# Patient Record
Sex: MALE | Race: WHITE | NOT HISPANIC OR LATINO
[De-identification: names, ages, dates, MRNs, and addresses within clinical notes are randomized per-mention and may not be internally consistent; named-entity substitution may affect disease eponyms.]

---

## 2017-01-03 ENCOUNTER — APPOINTMENT (OUTPATIENT)
Dept: HEMATOLOGY ONCOLOGY | Facility: CLINIC | Age: 67
End: 2017-01-03

## 2017-01-03 ENCOUNTER — OUTPATIENT (OUTPATIENT)
Dept: OUTPATIENT SERVICES | Facility: HOSPITAL | Age: 67
LOS: 1 days | Discharge: HOME | End: 2017-01-03

## 2017-01-03 VITALS
WEIGHT: 180 LBS | RESPIRATION RATE: 14 BRPM | BODY MASS INDEX: 22.38 KG/M2 | TEMPERATURE: 98.4 F | DIASTOLIC BLOOD PRESSURE: 85 MMHG | SYSTOLIC BLOOD PRESSURE: 144 MMHG | HEART RATE: 65 BPM | HEIGHT: 75 IN

## 2017-01-03 DIAGNOSIS — Z80.1 FAMILY HISTORY OF MALIGNANT NEOPLASM OF TRACHEA, BRONCHUS AND LUNG: ICD-10-CM

## 2017-01-03 DIAGNOSIS — Z78.9 OTHER SPECIFIED HEALTH STATUS: ICD-10-CM

## 2017-01-03 DIAGNOSIS — Z80.6 FAMILY HISTORY OF LEUKEMIA: ICD-10-CM

## 2017-01-03 LAB
BASOPHILS # BLD: 0.02 TH/MM3
BASOPHILS NFR BLD: 0.3 %
EOSINOPHIL # BLD: 0.05 TH/MM3
EOSINOPHIL NFR BLD: 0.7 %
ERYTHROCYTE [DISTWIDTH] IN BLOOD BY AUTOMATED COUNT: 12.4 %
GRANULOCYTES # BLD: 4.59 TH/MM3
GRANULOCYTES NFR BLD: 67.3 %
HCT VFR BLD AUTO: 42.1 %
HGB BLD-MCNC: 14.1 G/DL
IMM GRANULOCYTES # BLD: 0.01 TH/MM3
IMM GRANULOCYTES NFR BLD: 0.1 %
LYMPHOCYTES # BLD: 1.6 TH/MM3
LYMPHOCYTES NFR BLD: 23.5 %
MCH RBC QN AUTO: 30.7 PG
MCHC RBC AUTO-ENTMCNC: 33.5 G/DL
MCV RBC AUTO: 91.5 FL
MONOCYTES # BLD: 0.55 TH/MM3
MONOCYTES NFR BLD: 8.1 %
PLATELET # BLD: 234 TH/MM3
PMV BLD AUTO: 10.6 FL
RBC # BLD AUTO: 4.6 MIL/MM3
WBC # BLD: 6.82 TH/MM3

## 2017-01-04 LAB
ALBUMIN SERPL-MCNC: 4.4 G/DL
ALBUMIN/GLOB SERPL: 1.57
ALP SERPL-CCNC: 81 IU/L
ALT SERPL-CCNC: 15 IU/L
ANION GAP SERPL CALC-SCNC: 10 MEQ/L
AST SERPL-CCNC: 29 IU/L
BILIRUB SERPL-MCNC: 0.8 MG/DL
BUN SERPL-MCNC: 15 MG/DL
BUN/CREAT SERPL: 18.1 %
CALCIUM SERPL-MCNC: 9.7 MG/DL
CHLORIDE SERPL-SCNC: 103 MEQ/L
CO2 SERPL-SCNC: 27 MEQ/L
CREAT SERPL-MCNC: 0.83 MG/DL
GFR SERPL CREATININE-BSD FRML MDRD: 93
GLUCOSE SERPL-MCNC: 82 MG/DL
LACTATE DEHYDROGENASE (NORTH): 150 IU/L
POTASSIUM SERPL-SCNC: 4.6 MMOL/L
PROT SERPL-MCNC: 7.2 G/DL
SODIUM SERPL-SCNC: 140 MEQ/L

## 2017-06-27 DIAGNOSIS — C18.9 MALIGNANT NEOPLASM OF COLON, UNSPECIFIED: ICD-10-CM

## 2017-07-03 ENCOUNTER — OUTPATIENT (OUTPATIENT)
Dept: OUTPATIENT SERVICES | Facility: HOSPITAL | Age: 67
LOS: 1 days | Discharge: HOME | End: 2017-07-03

## 2017-07-03 ENCOUNTER — APPOINTMENT (OUTPATIENT)
Dept: HEMATOLOGY ONCOLOGY | Facility: CLINIC | Age: 67
End: 2017-07-03

## 2017-07-03 VITALS
SYSTOLIC BLOOD PRESSURE: 134 MMHG | WEIGHT: 172 LBS | RESPIRATION RATE: 14 BRPM | HEIGHT: 75 IN | BODY MASS INDEX: 21.39 KG/M2 | HEART RATE: 63 BPM | DIASTOLIC BLOOD PRESSURE: 83 MMHG | TEMPERATURE: 98.7 F

## 2017-07-03 DIAGNOSIS — C43.4 MALIGNANT MELANOMA OF SCALP AND NECK: ICD-10-CM

## 2017-07-05 DIAGNOSIS — C18.9 MALIGNANT NEOPLASM OF COLON, UNSPECIFIED: ICD-10-CM

## 2017-12-14 ENCOUNTER — OUTPATIENT (OUTPATIENT)
Dept: OUTPATIENT SERVICES | Facility: HOSPITAL | Age: 67
LOS: 1 days | Discharge: HOME | End: 2017-12-14

## 2017-12-14 DIAGNOSIS — S69.80XA OTHER SPECIFIED INJURIES OF UNSPECIFIED WRIST, HAND AND FINGER(S), INITIAL ENCOUNTER: ICD-10-CM

## 2018-01-08 ENCOUNTER — APPOINTMENT (OUTPATIENT)
Dept: HEMATOLOGY ONCOLOGY | Facility: CLINIC | Age: 68
End: 2018-01-08

## 2018-03-09 ENCOUNTER — OUTPATIENT (OUTPATIENT)
Dept: OUTPATIENT SERVICES | Facility: HOSPITAL | Age: 68
LOS: 1 days | Discharge: HOME | End: 2018-03-09

## 2018-03-09 DIAGNOSIS — M79.671 PAIN IN RIGHT FOOT: ICD-10-CM

## 2018-05-15 ENCOUNTER — APPOINTMENT (OUTPATIENT)
Dept: SURGERY | Facility: CLINIC | Age: 68
End: 2018-05-15
Payer: COMMERCIAL

## 2018-05-15 VITALS — WEIGHT: 170 LBS | BODY MASS INDEX: 21.14 KG/M2 | HEIGHT: 75 IN

## 2018-05-15 DIAGNOSIS — N50.819 TESTICULAR PAIN, UNSPECIFIED: ICD-10-CM

## 2018-05-15 PROCEDURE — 99214 OFFICE O/P EST MOD 30 MIN: CPT

## 2019-07-25 ENCOUNTER — OUTPATIENT (OUTPATIENT)
Dept: OUTPATIENT SERVICES | Facility: HOSPITAL | Age: 69
LOS: 1 days | Discharge: HOME | End: 2019-07-25

## 2019-07-25 DIAGNOSIS — R53.82 CHRONIC FATIGUE, UNSPECIFIED: ICD-10-CM

## 2019-07-25 DIAGNOSIS — E11.9 TYPE 2 DIABETES MELLITUS WITHOUT COMPLICATIONS: ICD-10-CM

## 2019-07-25 DIAGNOSIS — N40.1 BENIGN PROSTATIC HYPERPLASIA WITH LOWER URINARY TRACT SYMPTOMS: ICD-10-CM

## 2019-07-25 DIAGNOSIS — D51.9 VITAMIN B12 DEFICIENCY ANEMIA, UNSPECIFIED: ICD-10-CM

## 2019-07-25 DIAGNOSIS — R97.20 ELEVATED PROSTATE SPECIFIC ANTIGEN [PSA]: ICD-10-CM

## 2019-07-25 DIAGNOSIS — E03.9 HYPOTHYROIDISM, UNSPECIFIED: ICD-10-CM

## 2019-07-25 DIAGNOSIS — D64.9 ANEMIA, UNSPECIFIED: ICD-10-CM

## 2020-06-18 PROBLEM — N50.819 ORCHALGIA: Status: ACTIVE | Noted: 2018-05-15

## 2023-03-28 ENCOUNTER — APPOINTMENT (OUTPATIENT)
Dept: GASTROENTEROLOGY | Facility: CLINIC | Age: 73
End: 2023-03-28
Payer: MEDICARE

## 2023-03-28 ENCOUNTER — TRANSCRIPTION ENCOUNTER (OUTPATIENT)
Age: 73
End: 2023-03-28

## 2023-03-28 VITALS
DIASTOLIC BLOOD PRESSURE: 83 MMHG | BODY MASS INDEX: 21.39 KG/M2 | HEIGHT: 75 IN | OXYGEN SATURATION: 99 % | HEART RATE: 70 BPM | SYSTOLIC BLOOD PRESSURE: 145 MMHG | WEIGHT: 172 LBS

## 2023-03-28 DIAGNOSIS — R14.3 FLATULENCE: ICD-10-CM

## 2023-03-28 DIAGNOSIS — Z80.8 FAMILY HISTORY OF MALIGNANT NEOPLASM OF OTHER ORGANS OR SYSTEMS: ICD-10-CM

## 2023-03-28 DIAGNOSIS — Z87.898 PERSONAL HISTORY OF OTHER SPECIFIED CONDITIONS: ICD-10-CM

## 2023-03-28 DIAGNOSIS — Z80.6 FAMILY HISTORY OF LEUKEMIA: ICD-10-CM

## 2023-03-28 PROCEDURE — 99204 OFFICE O/P NEW MOD 45 MIN: CPT

## 2023-03-28 RX ORDER — ALFUZOSIN HYDROCHLORIDE 10 MG/1
10 TABLET, EXTENDED RELEASE ORAL
Refills: 0 | Status: ACTIVE | COMMUNITY

## 2023-03-28 RX ORDER — FINASTERIDE 1 MG/1
TABLET ORAL
Refills: 0 | Status: ACTIVE | COMMUNITY

## 2023-03-28 NOTE — HISTORY OF PRESENT ILLNESS
[FreeTextEntry1] : 73 yr old male with BPH and past H/O GERD here for further evaluation of irregular BMs for the past 10 months. He has been having about 6 BMs per day, comes out in small amounts like "balls".  He also described clear or brown mucous or watery in consistency during this time. He C/O a nontender mass protruding from his rectum that he has to push back in. . He has noted BRBPR on the toilet tissue about once a week for the past several months. He also C/O increased flatulence. He has a past H/O heartburn/GERD and had an EGD years ago (Dr. Elizabeth's office) and was negative for ulcer, H Pylori but he was treated for GERD. He denied frequent heartburn now. His last colonoscopy was done at Dr. Elizabeth's office 10-15 yrs ago; he is unsure if he had polyps but he was told to return in 10 yrs. He denied dysphagia, vomiting, weight loss, fevers, or melena. He denied a Family H/O colon, gastric, esophageal, liver, or pancreatic cancer.

## 2023-03-28 NOTE — REVIEW OF SYSTEMS
[Constipation] : constipation [Diarrhea] : diarrhea [Negative] : Heme/Lymph [Bleeding] : bleeding [Bloating (gassiness)] : bloating [Abdominal Pain] : no abdominal pain [Vomiting] : no vomiting [Heartburn] : no heartburn [Melena (black stool)] : no melena [Fecal Incontinence (soiling)] : no fecal incontinence

## 2023-03-28 NOTE — PHYSICAL EXAM
[Alert] : alert [Normal Voice/Communication] : normal voice/communication [No Acute Distress] : no acute distress [Well Developed] : well developed [Well Nourished] : well nourished [Sclera] : the sclera and conjunctiva were normal

## 2023-03-31 ENCOUNTER — LABORATORY RESULT (OUTPATIENT)
Age: 73
End: 2023-03-31

## 2023-04-03 LAB
ASTROVIRUS: DETECTED
CDIFF BY PCR: NOT DETECTED
CRP SERPL-MCNC: <3 MG/L
ERYTHROCYTE [SEDIMENTATION RATE] IN BLOOD BY WESTERGREN METHOD: 3 MM/HR
GI PCR PANEL: DETECTED
IGA SER QL IEP: 186 MG/DL
INR PPP: 1.08 RATIO
PT BLD: 12.4 SEC

## 2023-04-04 LAB
BACTERIA STL CULT: NORMAL
DEPRECATED O AND P PREP STL: NORMAL
TTG IGA SER IA-ACNC: <1.2 U/ML
TTG IGA SER-ACNC: NEGATIVE
TTG IGG SER IA-ACNC: 7.6 U/ML
TTG IGG SER IA-ACNC: ABNORMAL

## 2023-04-05 LAB
BAKER'S YEAST AB QL: 27.3 UNITS
BAKER'S YEAST IGA QL IA: 11.4 UNITS
BAKER'S YEAST IGA QN IA: NEGATIVE
BAKER'S YEAST IGG QN IA: ABNORMAL

## 2023-04-10 LAB
CALPROTECTIN FECAL: <16 UG/G
FAT STL QN: NORMAL
FAT STL QN: NORMAL
PANCREATIC ELASTASE, FECAL: 237 CD:794062645

## 2023-04-15 ENCOUNTER — NON-APPOINTMENT (OUTPATIENT)
Age: 73
End: 2023-04-15

## 2023-04-24 ENCOUNTER — APPOINTMENT (OUTPATIENT)
Dept: NEUROSURGERY | Facility: CLINIC | Age: 73
End: 2023-04-24
Payer: MEDICARE

## 2023-04-24 VITALS — BODY MASS INDEX: 21.14 KG/M2 | WEIGHT: 170 LBS | HEIGHT: 75 IN

## 2023-04-24 DIAGNOSIS — M25.551 PAIN IN RIGHT HIP: ICD-10-CM

## 2023-04-24 DIAGNOSIS — M25.552 PAIN IN RIGHT HIP: ICD-10-CM

## 2023-04-24 PROCEDURE — 99205 OFFICE O/P NEW HI 60 MIN: CPT

## 2023-04-25 NOTE — HISTORY OF PRESENT ILLNESS
[de-identified] : Mr. LASSITER presents today for evaluation of right leg pain.  He reports pain in the distal right anterior thigh into his knee and lateral shin.  At times he will experience numbness and paresthesias in the right foot.  He has minimal lower back pain.  He reports some stiffness in the lower back when he changes positions however his right leg pain will worsen with prolonged standing.  He also feels decreased range of motion in his hips.  He has trialed physical therapy with minimal relief.  No recent injections.\par \par We have reviewed an MRI of the lumbar spine from [facility] 3/23/23. He is found to have stenosis at L4/5. There are modic changes at L4, L5, and S1. \par \par PHYSICAL EXAM: \par Constitutional: Well appearing, no distress\par HEENT: Normocephalic Atraumatic\par Psychiatric: Alert and oriented to all spheres, normal mood\par Pulmonary: No respiratory distress\par Neurologic: \par CN II-XII grossly intact\par Palpation: no pain to palpation \par Strength: Full strength in all major muscle groups\par Sensation: Full sensation to light touch in all extremities\par Reflexes: \par  2+ patellar\par  2+ ankle jerk\par Restriction in ROM LS spine and bilateral hips. \par Signs:\par SLR negative\par Gait: steady, walking w/o assistance. \par \par

## 2023-04-28 ENCOUNTER — NON-APPOINTMENT (OUTPATIENT)
Age: 73
End: 2023-04-28

## 2023-04-28 ENCOUNTER — APPOINTMENT (OUTPATIENT)
Dept: GASTROENTEROLOGY | Facility: CLINIC | Age: 73
End: 2023-04-28
Payer: MEDICARE

## 2023-04-28 DIAGNOSIS — A09 INFECTIOUS GASTROENTERITIS AND COLITIS, UNSPECIFIED: ICD-10-CM

## 2023-04-28 PROCEDURE — 99442: CPT | Mod: 95

## 2023-04-28 RX ORDER — SODIUM SULFATE, POTASSIUM SULFATE AND MAGNESIUM SULFATE 1.6; 3.13; 17.5 G/177ML; G/177ML; G/177ML
17.5-3.13-1.6 SOLUTION ORAL
Qty: 2 | Refills: 0 | Status: DISCONTINUED | COMMUNITY
Start: 2023-03-28 | End: 2023-04-28

## 2023-04-28 NOTE — ASSESSMENT
[FreeTextEntry1] : 74 yo male with several months of diarrhea.  stool gi pcr revealed astrovirus, but that usually resolves in 4 to 5 days.  Will proceed with colonoscopy\par \par Other\par ANCA indeterminate:Asca weak positive will examine for IBD during colonoscopy\par \par TTG weak positive: will do HLA testing and deaminated gliadin antibody: will add egd to colonoscopy

## 2023-04-28 NOTE — HISTORY OF PRESENT ILLNESS
[FreeTextEntry1] : 74 yo male with several month history of diarrhea.\par The patient had blood  and stool tests  that revealed astrovirus and were equivocal for celiac disease. His diarrhea continues. He denies nausea or vomiting or black or bloody stool or fevers or chills.

## 2023-05-03 ENCOUNTER — APPOINTMENT (OUTPATIENT)
Age: 73
End: 2023-05-03
Payer: MEDICARE

## 2023-05-03 PROCEDURE — 99442: CPT | Mod: 95

## 2023-05-04 LAB
GLIADIN IGA SER QL: 5.2 UNITS
GLIADIN IGG SER QL: <5 UNITS
GLIADIN PEPTIDE IGA SER-ACNC: NEGATIVE
GLIADIN PEPTIDE IGG SER-ACNC: NEGATIVE

## 2023-05-12 LAB
ANNOTATION COMMENT IMP: NORMAL
HLA-DQ2: POSITIVE
HLA-DQ8 QL: NEGATIVE
REF LAB TEST METHOD: NORMAL

## 2023-05-17 NOTE — HISTORY OF PRESENT ILLNESS
[Home] : at home, [unfilled] , at the time of the visit. [Other Location: e.g. Home (Enter Location, City,State)___] : at [unfilled] [Verbal consent obtained from patient] : the patient, [unfilled] [FreeTextEntry4] : Magali Ramos

## 2023-06-06 ENCOUNTER — APPOINTMENT (OUTPATIENT)
Dept: SURGERY | Facility: CLINIC | Age: 73
End: 2023-06-06
Payer: MEDICARE

## 2023-06-06 VITALS — BODY MASS INDEX: 21.14 KG/M2 | HEIGHT: 75 IN | WEIGHT: 170 LBS

## 2023-06-06 DIAGNOSIS — S76.219A STRAIN OF ADDUCTOR MUSCLE, FASCIA AND TENDON OF UNSPECIFIED THIGH, INITIAL ENCOUNTER: ICD-10-CM

## 2023-06-06 DIAGNOSIS — S76.212S STRAIN OF ADDUCTOR MUSCLE, FASCIA AND TENDON OF LEFT THIGH, SEQUELA: ICD-10-CM

## 2023-06-06 PROCEDURE — 99203 OFFICE O/P NEW LOW 30 MIN: CPT

## 2023-06-06 NOTE — CONSULT LETTER
[Dear  ___] : Dear  [unfilled], [Courtesy Letter:] : I had the pleasure of seeing your patient, [unfilled], in my office today. [Please see my note below.] : Please see my note below. [Consult Closing:] : Thank you very much for allowing me to participate in the care of this patient.  If you have any questions, please do not hesitate to contact me. [FreeTextEntry3] : Respectfully,\par \par Alfred Silverio M.D., FACS\par  [DrYandel  ___] : Dr. PORTER

## 2023-06-06 NOTE — ASSESSMENT
[FreeTextEntry1] : Jose is a pleasant 73-year-old  with a past medical history significant for BPH, lumbar spinal stenosis, celiac disease and IBS, arthritis, left neck melanoma status post wide excision and sentinel lymph node biopsy in 2014 along with the repair of his left inguinal hernia with mesh by me in 2014 and his right inguinal hernia repair with mesh by me in 2005 now returning to the office with very intermittent discomfort in the groin area causing him concern.  He has been having worsening gastrointestinal issues and was planning on a colonoscopy and his gastroenterologist thought it would be prudent to rule out a hernia prior to proceeding with his colonoscopy.\par \par Physical examination demonstrates well-healed scars in both groins with no evidence of hernia recurrence or delayed complications.  Both testicles are normal.  His umbilical examination is unremarkable.  His abdomen is soft and flat.  There is no evidence of a new ventral, epigastric or spigelian hernia.  His current BMI is 21.\par \par Jose was counseled and reassured.  I believe his symptoms are related to either intermittent mild groin muscle strains versus his degenerative lower back disease and spinal stenosis.  No surgical intervention is warranted, of course.  He is cleared to proceed with colonoscopy from my standpoint.  I recommended he return to me in the future if any hernia related issues arise, of course.

## 2023-06-06 NOTE — PHYSICAL EXAM
[JVD] : no jugular venous distention  [Normal Breath Sounds] : Normal breath sounds [No Rash or Lesion] : No rash or lesion [Alert] : alert [Calm] : calm [de-identified] : healthy [de-identified] : normal [de-identified] : soft and flat abdomen [de-identified] : normal testicles [de-identified] : no evidence of hernia recurrence

## 2023-07-10 ENCOUNTER — APPOINTMENT (OUTPATIENT)
Dept: NEUROSURGERY | Facility: CLINIC | Age: 73
End: 2023-07-10
Payer: MEDICARE

## 2023-07-10 VITALS — HEIGHT: 75 IN | BODY MASS INDEX: 20.51 KG/M2 | WEIGHT: 165 LBS

## 2023-07-10 DIAGNOSIS — M48.9 SPONDYLOPATHY, UNSPECIFIED: ICD-10-CM

## 2023-07-10 DIAGNOSIS — K58.9 IRRITABLE BOWEL SYNDROME W/OUT DIARRHEA: ICD-10-CM

## 2023-07-10 PROCEDURE — 99214 OFFICE O/P EST MOD 30 MIN: CPT

## 2023-07-10 NOTE — ASSESSMENT
[FreeTextEntry1] : This is a 72 yo M who presents for neurosurgical revisit. He was initially seen with regards to lumbar radiculopathy. Such complaints have improved with the use of gabapentin and he wishes to monitor his continued response through conservative means.\par \par With regards to his new thoracic findings of a possible metastatic disease an updated MR T spine w w/o contrast is warranted for my review. The study is to be completed tomorrow and he will revisit with us on Thursday to review the films and to devise a continued treatment plan moving forward.\par \par All questions and concerns have been addressed and he is largely agreeable to proceed.\par \par Mary Niño PA-C \par Isabel Dalton MD\par

## 2023-07-10 NOTE — HISTORY OF PRESENT ILLNESS
[FreeTextEntry1] : Mr. LASSITER is a 74 yo M who presents for a neurosurgical revisit. He notes persistent low back pain along with radiating pain into the right lower extremity. He remains under the care of Dr. Pickett of [facility] and was issued gabapentin at 100mg PO TID. The regimen reduced the intensity of his pain 50% and he remains content with his response to medication. He has decided to hold off on a LSNRI considering his relief through medication use.\par \par Since his last encounter he has begun to experience isolated thoracic pain, correlating with his left posterior shoulder. He had mentioned the concern to Dr. Pickett and a MR T spine was ordered. The study was completed at [facility] which revealed possibly bony lesions concerning for metastatic disease. The imaging quality is very poor and we are unable to review any axial anatomy.\par \par An updated study is absolutely warranted for my review to assess the underlying pathology and he is agreeable to repeat the MRI.\par \par PHYSICAL EXAM: \par Constitutional: Well appearing, no distress\par HEENT: Normocephalic Atraumatic\par Psychiatric: Alert and oriented to all spheres, normal mood\par Pulmonary: No respiratory distress\par \par Neurologic: \par CN II-XII grossly intact\par Palpation: (+) mid-thoracic tenderness to palpation. \par Strength: Full strength in all major muscle groups\par Sensation: Full sensation to light touch in all extremities\par Reflexes: \par  2+ patellar\par  2+ ankle jerk\par \par ROM limited due to pain\par \par Signs:\par SLR negative\par \par Gait: steady, walking w/o assistance.\par

## 2023-07-11 ENCOUNTER — OUTPATIENT (OUTPATIENT)
Dept: OUTPATIENT SERVICES | Facility: HOSPITAL | Age: 73
LOS: 1 days | End: 2023-07-11
Payer: MEDICARE

## 2023-07-11 DIAGNOSIS — M48.9 SPONDYLOPATHY, UNSPECIFIED: ICD-10-CM

## 2023-07-11 DIAGNOSIS — Z00.8 ENCOUNTER FOR OTHER GENERAL EXAMINATION: ICD-10-CM

## 2023-07-11 PROCEDURE — 72157 MRI CHEST SPINE W/O & W/DYE: CPT

## 2023-07-11 PROCEDURE — A9579: CPT

## 2023-07-11 PROCEDURE — 72157 MRI CHEST SPINE W/O & W/DYE: CPT | Mod: 26,MH

## 2023-07-12 DIAGNOSIS — M48.9 SPONDYLOPATHY, UNSPECIFIED: ICD-10-CM

## 2023-07-13 ENCOUNTER — APPOINTMENT (OUTPATIENT)
Dept: NEUROSURGERY | Facility: CLINIC | Age: 73
End: 2023-07-13
Payer: MEDICARE

## 2023-07-13 VITALS — BODY MASS INDEX: 20.51 KG/M2 | WEIGHT: 165 LBS | HEIGHT: 75 IN

## 2023-07-13 PROCEDURE — 99214 OFFICE O/P EST MOD 30 MIN: CPT

## 2023-07-14 NOTE — HISTORY OF PRESENT ILLNESS
[FreeTextEntry1] : Mr. Tran presents today in follow up to review thoracic MRI, he presented with isolated thoracic pain, though the pain is not constant, and the pain is 1 out of 10.  He does experience the pain when he lays supine, but then the pain dissipates. \par  In addition to the isolated thoracic pain, he also had low back pain with right radicular lateral leg pain, associated with numbness in the toe. Reports the gabapentin 100mg TID, as been helping, though he ran out of the medication on Sunday, and is pending a refill from Dr. Cool. \par Symptoms is aggravated with prolong standing; Alleviated when he sits.\par \par EMG of the lower extremities showed right L5-S1 radiculopathy with denervation in the right biceps femoris and right L5-S1 paraspinal muscles.\par \par MRI Lumbar at [facility] showed L4-5 stenosis; Modic changes at L4, L5, S1.\par \par MRI Thoracic spine w/w/o contrast done at [facility] showed multiple rounded lesions throughout vertebral bodies, largest measuring the largest measuring up to 1.5 cm within T3 and 1.4 cm within T8. Diagnostic considerations include lipid poor hemangiomas, metastatic disease and multiple myeloma. Consider nuclear medicine bone scan for further evaluation

## 2023-07-18 ENCOUNTER — OUTPATIENT (OUTPATIENT)
Dept: OUTPATIENT SERVICES | Facility: HOSPITAL | Age: 73
LOS: 1 days | End: 2023-07-18
Payer: MEDICARE

## 2023-07-18 ENCOUNTER — RESULT REVIEW (OUTPATIENT)
Age: 73
End: 2023-07-18

## 2023-07-18 DIAGNOSIS — M48.9 SPONDYLOPATHY, UNSPECIFIED: ICD-10-CM

## 2023-07-18 PROCEDURE — 78306 BONE IMAGING WHOLE BODY: CPT | Mod: 26,MH

## 2023-07-18 PROCEDURE — 78803 RP LOCLZJ TUM SPECT 1 AREA: CPT

## 2023-07-18 PROCEDURE — 78803 RP LOCLZJ TUM SPECT 1 AREA: CPT | Mod: 26,MH

## 2023-07-18 PROCEDURE — A9503: CPT

## 2023-07-18 PROCEDURE — 78306 BONE IMAGING WHOLE BODY: CPT

## 2023-07-19 DIAGNOSIS — M48.9 SPONDYLOPATHY, UNSPECIFIED: ICD-10-CM

## 2023-07-26 ENCOUNTER — APPOINTMENT (OUTPATIENT)
Dept: NEUROSURGERY | Facility: CLINIC | Age: 73
End: 2023-07-26
Payer: MEDICARE

## 2023-07-26 VITALS — BODY MASS INDEX: 20.51 KG/M2 | WEIGHT: 165 LBS | HEIGHT: 75 IN

## 2023-07-26 DIAGNOSIS — Z09 ENCOUNTER FOR FOLLOW-UP EXAMINATION AFTER COMPLETED TREATMENT FOR CONDITIONS OTHER THAN MALIGNANT NEOPLASM: ICD-10-CM

## 2023-07-26 DIAGNOSIS — M48.061 SPINAL STENOSIS, LUMBAR REGION WITHOUT NEUROGENIC CLAUDICATION: ICD-10-CM

## 2023-07-26 PROCEDURE — 99213 OFFICE O/P EST LOW 20 MIN: CPT

## 2023-07-28 NOTE — REVIEW OF SYSTEMS
[As Noted in HPI] : as noted in HPI [Negative] : Neurological [Difficulty Walking] : no difficulty walking [Frequent Falls] : not falling

## 2023-07-28 NOTE — HISTORY OF PRESENT ILLNESS
[FreeTextEntry1] : CHIEF COMPLAINT: Mr. Tran, Presents today still with Mild Thoracic pain. He is still experiencing the pain when laying in a supine position. He is experiencing slight numbness in both toes,  And denies having any weakness or tingling at this time.\par \par \par \par CONSERVATIVE MANAGEMENT TRIALED:  \par \par  \par \par DIAGNOSTIC TESTING: \par \par

## 2023-11-07 ENCOUNTER — APPOINTMENT (OUTPATIENT)
Dept: GASTROENTEROLOGY | Facility: CLINIC | Age: 73
End: 2023-11-07
Payer: MEDICARE

## 2023-11-07 VITALS
DIASTOLIC BLOOD PRESSURE: 78 MMHG | SYSTOLIC BLOOD PRESSURE: 130 MMHG | OXYGEN SATURATION: 99 % | BODY MASS INDEX: 20.39 KG/M2 | HEIGHT: 75 IN | HEART RATE: 89 BPM | WEIGHT: 164 LBS

## 2023-11-07 DIAGNOSIS — K64.9 UNSPECIFIED HEMORRHOIDS: ICD-10-CM

## 2023-11-07 LAB
HCT VFR BLD CALC: 40.6 %
HGB BLD-MCNC: 13 G/DL
MCHC RBC-ENTMCNC: 30.2 PG
MCHC RBC-ENTMCNC: 32 G/DL
MCV RBC AUTO: 94.4 FL
PLATELET # BLD AUTO: 241 K/UL
PMV BLD: 11.4 FL
RBC # BLD: 4.3 M/UL
RBC # FLD: 13.3 %
WBC # FLD AUTO: 5.89 K/UL

## 2023-11-07 PROCEDURE — 99214 OFFICE O/P EST MOD 30 MIN: CPT

## 2023-11-07 RX ORDER — DOCUSATE SODIUM 100 MG/1
100 CAPSULE ORAL TWICE DAILY
Qty: 60 | Refills: 6 | Status: ACTIVE | COMMUNITY
Start: 2023-11-07 | End: 1900-01-01

## 2023-11-07 RX ORDER — PSYLLIUM SEED
48.57 PACKET (EA) ORAL DAILY
Qty: 1 | Refills: 5 | Status: ACTIVE | COMMUNITY
Start: 2023-11-07 | End: 1900-01-01

## 2023-11-07 RX ORDER — POLYETHYLENE GLYCOL 3350 AND ELECTROLYTES WITH LEMON FLAVOR 236; 22.74; 6.74; 5.86; 2.97 G/4L; G/4L; G/4L; G/4L; G/4L
236 POWDER, FOR SOLUTION ORAL
Qty: 1 | Refills: 1 | Status: ACTIVE | COMMUNITY
Start: 2023-11-07 | End: 1900-01-01

## 2023-11-07 RX ORDER — FOLIC ACID 1 MG/1
1 TABLET ORAL
Refills: 0 | Status: ACTIVE | COMMUNITY

## 2023-11-07 RX ORDER — METHOTREXATE 25 MG/ML
1 INJECTION, SOLUTION INTRA-ARTERIAL; INTRAMUSCULAR; INTRATHECAL; INTRAVENOUS
Refills: 0 | Status: ACTIVE | COMMUNITY

## 2023-11-08 LAB
INR PPP: 1.15 RATIO
PT BLD: 13.1 SEC

## 2023-11-14 ENCOUNTER — APPOINTMENT (OUTPATIENT)
Dept: SURGERY | Facility: CLINIC | Age: 73
End: 2023-11-14
Payer: MEDICARE

## 2023-11-14 VITALS
OXYGEN SATURATION: 98 % | DIASTOLIC BLOOD PRESSURE: 72 MMHG | TEMPERATURE: 97 F | HEIGHT: 75 IN | SYSTOLIC BLOOD PRESSURE: 126 MMHG | WEIGHT: 165 LBS | BODY MASS INDEX: 20.51 KG/M2 | HEART RATE: 78 BPM

## 2023-11-14 DIAGNOSIS — K62.89 OTHER SPECIFIED DISEASES OF ANUS AND RECTUM: ICD-10-CM

## 2023-11-14 PROCEDURE — 99203 OFFICE O/P NEW LOW 30 MIN: CPT | Mod: 25

## 2023-11-14 PROCEDURE — 46600 DIAGNOSTIC ANOSCOPY SPX: CPT

## 2023-11-21 ENCOUNTER — OUTPATIENT (OUTPATIENT)
Dept: OUTPATIENT SERVICES | Facility: HOSPITAL | Age: 73
LOS: 1 days | End: 2023-11-21
Payer: MEDICARE

## 2023-11-21 DIAGNOSIS — R07.9 CHEST PAIN, UNSPECIFIED: ICD-10-CM

## 2023-11-21 PROCEDURE — 71046 X-RAY EXAM CHEST 2 VIEWS: CPT | Mod: 26

## 2023-11-21 PROCEDURE — 71046 X-RAY EXAM CHEST 2 VIEWS: CPT

## 2023-11-22 DIAGNOSIS — R07.9 CHEST PAIN, UNSPECIFIED: ICD-10-CM

## 2023-11-29 PROBLEM — K62.89 PROCTITIS: Status: ACTIVE | Noted: 2023-11-20

## 2023-11-30 ENCOUNTER — RESULT REVIEW (OUTPATIENT)
Age: 73
End: 2023-11-30

## 2023-11-30 ENCOUNTER — TRANSCRIPTION ENCOUNTER (OUTPATIENT)
Age: 73
End: 2023-11-30

## 2023-11-30 ENCOUNTER — OUTPATIENT (OUTPATIENT)
Dept: OUTPATIENT SERVICES | Facility: HOSPITAL | Age: 73
LOS: 1 days | Discharge: HOME | End: 2023-11-30
Payer: MEDICARE

## 2023-11-30 VITALS
DIASTOLIC BLOOD PRESSURE: 82 MMHG | SYSTOLIC BLOOD PRESSURE: 132 MMHG | HEART RATE: 94 BPM | TEMPERATURE: 98 F | HEIGHT: 75 IN | WEIGHT: 154.98 LBS

## 2023-11-30 VITALS
OXYGEN SATURATION: 100 % | SYSTOLIC BLOOD PRESSURE: 147 MMHG | RESPIRATION RATE: 18 BRPM | HEART RATE: 74 BPM | DIASTOLIC BLOOD PRESSURE: 74 MMHG

## 2023-11-30 DIAGNOSIS — K64.9 UNSPECIFIED HEMORRHOIDS: ICD-10-CM

## 2023-11-30 DIAGNOSIS — Z98.890 OTHER SPECIFIED POSTPROCEDURAL STATES: Chronic | ICD-10-CM

## 2023-11-30 DIAGNOSIS — R19.5 OTHER FECAL ABNORMALITIES: ICD-10-CM

## 2023-11-30 PROCEDURE — 88305 TISSUE EXAM BY PATHOLOGIST: CPT | Mod: 26

## 2023-11-30 PROCEDURE — 88312 SPECIAL STAINS GROUP 1: CPT

## 2023-11-30 PROCEDURE — 88312 SPECIAL STAINS GROUP 1: CPT | Mod: 26

## 2023-11-30 PROCEDURE — 43239 EGD BIOPSY SINGLE/MULTIPLE: CPT | Mod: XS

## 2023-11-30 PROCEDURE — 45380 COLONOSCOPY AND BIOPSY: CPT

## 2023-11-30 PROCEDURE — 88305 TISSUE EXAM BY PATHOLOGIST: CPT

## 2023-11-30 NOTE — ASU PATIENT PROFILE, ADULT - FALL HARM RISK - HARM RISK INTERVENTIONS

## 2023-11-30 NOTE — ASU PATIENT PROFILE, ADULT - TEACHING/LEARNING RELIGIOUS CONSIDERATIONS
Pt presents to the er with complaints of a headache. Pt states that he has been having a headache for 4 months. Pt states that the pain starts at the base of head and goes up and into the forehead and behind the right eye. Pt states that his pain is 10/10. Pt states that he has been to a few different hospitals and have gotten some medications from them. Pt states that the pain comes and goes.    none

## 2023-11-30 NOTE — ASU DISCHARGE PLAN (ADULT/PEDIATRIC) - CALL YOUR DOCTOR IF YOU HAVE ANY OF THE FOLLOWING:
Bleeding that does not stop/Pain not relieved by Medications/Fever greater than (need to indicate Fahrenheit or Celsius)/Numbness, tingling, color or temperature change to extremity/Nausea and vomiting that does not stop/Excessive diarrhea/Inability to tolerate liquids or foods
[facility]

## 2023-11-30 NOTE — CHART NOTE - NSCHARTNOTEFT_GEN_A_CORE
PACU ANESTHESIA ADMISSION NOTE      Procedure:   Post op diagnosis:      ____  Intubated  TV:______       Rate: ______      FiO2: ______    _x___  Patent Airway    _x___  Full return of protective reflexes    _x___  Full recovery from anesthesia / back to baseline status    Vitals:  T(C): 36.2 (11-30-23 @ 09:48), Max: 36.7 (11-30-23 @ 07:35)  HR: 74 (11-30-23 @ 10:13) (70 - 94)  BP: 147/74 (11-30-23 @ 10:13) (122/58 - 156/61)  RR: 18 (11-30-23 @ 10:13) (18 - 18)  SpO2: 100% (11-30-23 @ 10:13) (100% - 100%)    Mental Status:  _x___ Awake   _____ Alert   _____ Drowsy   _____ Sedated    Nausea/Vomiting:  _x___  NO       ______Yes,   See Post - Op Orders         Pain Scale (0-10):  __0___    Treatment: _x___ None    ____ See Post - Op/PCA Orders    Post - Operative Fluids:   __x__ Oral   ____ See Post - Op Orders    Plan: Discharge:   _x___Home       _____Floor     _____Critical Care    _____  Other:_________________    Comments:  No anesthesia issues or complications noted.  Discharge when criteria met.

## 2023-11-30 NOTE — PRE-ANESTHESIA EVALUATION ADULT - NSANTHDIETYNSD_GEN_ALL_CORE
Yes Consent: The patient's consent was obtained including but not limited to risks of crusting, scabbing, blistering, scarring, darker or lighter pigmentary change, recurrence, incomplete removal and infection. Detail Level: Simple Render Note In Bullet Format When Appropriate: No Post-Care Instructions: I reviewed with the patient in detail post-care instructions. Patient is to wear sunprotection, and avoid picking at any of the treated lesions. Pt may apply Vaseline to crusted or scabbing areas. Number Of Freeze-Thaw Cycles: 3 freeze-thaw cycles Duration Of Freeze Thaw-Cycle (Seconds): 3 Render Post-Care Instructions In Note?: yes Aperture Size (Optional): C

## 2023-11-30 NOTE — ASU DISCHARGE PLAN (ADULT/PEDIATRIC) - CARE PROVIDER_API CALL
Pk Tran  Gastroenterology  [address] [place] [address]  Phone: [phone]  Fax: [phone]  Established Patient  Follow Up Time: Routine

## 2023-11-30 NOTE — ASU PATIENT PROFILE, ADULT - NSICDXPASTMEDICALHX_GEN_ALL_CORE_FT
PAST MEDICAL HISTORY:  History of BPH     History of hay fever     Rheumatoid arthritis     Skin cancer (melanoma)     Spinal stenosis

## 2023-12-01 ENCOUNTER — OUTPATIENT (OUTPATIENT)
Dept: OUTPATIENT SERVICES | Facility: HOSPITAL | Age: 73
LOS: 1 days | End: 2023-12-01
Payer: MEDICARE

## 2023-12-01 DIAGNOSIS — Z98.890 OTHER SPECIFIED POSTPROCEDURAL STATES: Chronic | ICD-10-CM

## 2023-12-01 DIAGNOSIS — Z00.00 ENCOUNTER FOR GENERAL ADULT MEDICAL EXAMINATION WITHOUT ABNORMAL FINDINGS: ICD-10-CM

## 2023-12-01 PROBLEM — Z87.438 PERSONAL HISTORY OF OTHER DISEASES OF MALE GENITAL ORGANS: Chronic | Status: ACTIVE | Noted: 2023-11-30

## 2023-12-01 PROBLEM — M48.00 SPINAL STENOSIS, SITE UNSPECIFIED: Chronic | Status: ACTIVE | Noted: 2023-11-30

## 2023-12-01 PROBLEM — M06.9 RHEUMATOID ARTHRITIS, UNSPECIFIED: Chronic | Status: ACTIVE | Noted: 2023-11-30

## 2023-12-01 PROBLEM — C43.9 MALIGNANT MELANOMA OF SKIN, UNSPECIFIED: Chronic | Status: ACTIVE | Noted: 2023-11-30

## 2023-12-01 PROBLEM — Z87.09 PERSONAL HISTORY OF OTHER DISEASES OF THE RESPIRATORY SYSTEM: Chronic | Status: ACTIVE | Noted: 2023-11-30

## 2023-12-01 LAB
SURGICAL PATHOLOGY STUDY: SIGNIFICANT CHANGE UP

## 2023-12-01 PROCEDURE — 80053 COMPREHEN METABOLIC PANEL: CPT

## 2023-12-02 ENCOUNTER — RESULT REVIEW (OUTPATIENT)
Age: 73
End: 2023-12-02

## 2023-12-02 ENCOUNTER — OUTPATIENT (OUTPATIENT)
Dept: OUTPATIENT SERVICES | Facility: HOSPITAL | Age: 73
LOS: 1 days | End: 2023-12-02
Payer: MEDICARE

## 2023-12-02 DIAGNOSIS — Z98.890 OTHER SPECIFIED POSTPROCEDURAL STATES: Chronic | ICD-10-CM

## 2023-12-02 DIAGNOSIS — K62.89 OTHER SPECIFIED DISEASES OF ANUS AND RECTUM: ICD-10-CM

## 2023-12-02 LAB
ALBUMIN SERPL ELPH-MCNC: 4.4 G/DL
ALP BLD-CCNC: 79 U/L
ALT SERPL-CCNC: 9 U/L
ANION GAP SERPL CALC-SCNC: 12 MMOL/L
AST SERPL-CCNC: 38 U/L
BILIRUB SERPL-MCNC: 0.7 MG/DL
BUN SERPL-MCNC: 14 MG/DL
CALCIUM SERPL-MCNC: 9.9 MG/DL
CHLORIDE SERPL-SCNC: 104 MMOL/L
CO2 SERPL-SCNC: 28 MMOL/L
CREAT SERPL-MCNC: 0.82 MG/DL
EGFR: 94 ML/MIN/1.73M2
GLUCOSE SERPL-MCNC: 98 MG/DL
POTASSIUM SERPL-SCNC: 4.2 MMOL/L
PROT SERPL-MCNC: 6.9 G/DL
SODIUM SERPL-SCNC: 144 MMOL/L

## 2023-12-02 PROCEDURE — 74177 CT ABD & PELVIS W/CONTRAST: CPT | Mod: 26,MH

## 2023-12-02 PROCEDURE — 74177 CT ABD & PELVIS W/CONTRAST: CPT

## 2023-12-03 DIAGNOSIS — K62.89 OTHER SPECIFIED DISEASES OF ANUS AND RECTUM: ICD-10-CM

## 2023-12-05 ENCOUNTER — APPOINTMENT (OUTPATIENT)
Dept: GASTROENTEROLOGY | Facility: CLINIC | Age: 73
End: 2023-12-05
Payer: MEDICARE

## 2023-12-05 ENCOUNTER — OUTPATIENT (OUTPATIENT)
Dept: OUTPATIENT SERVICES | Facility: HOSPITAL | Age: 73
LOS: 1 days | End: 2023-12-05
Payer: MEDICARE

## 2023-12-05 VITALS
OXYGEN SATURATION: 98 % | TEMPERATURE: 97 F | HEIGHT: 75 IN | HEART RATE: 69 BPM | DIASTOLIC BLOOD PRESSURE: 74 MMHG | BODY MASS INDEX: 19.52 KG/M2 | SYSTOLIC BLOOD PRESSURE: 150 MMHG | WEIGHT: 157 LBS

## 2023-12-05 DIAGNOSIS — Z00.00 ENCOUNTER FOR GENERAL ADULT MEDICAL EXAMINATION WITHOUT ABNORMAL FINDINGS: ICD-10-CM

## 2023-12-05 DIAGNOSIS — R19.7 DIARRHEA, UNSPECIFIED: ICD-10-CM

## 2023-12-05 DIAGNOSIS — K29.80 DUODENITIS WITHOUT BLEEDING: ICD-10-CM

## 2023-12-05 DIAGNOSIS — K59.00 CONSTIPATION, UNSPECIFIED: ICD-10-CM

## 2023-12-05 DIAGNOSIS — Z98.890 OTHER SPECIFIED POSTPROCEDURAL STATES: Chronic | ICD-10-CM

## 2023-12-05 DIAGNOSIS — K62.89 OTHER SPECIFIED DISEASES OF ANUS AND RECTUM: ICD-10-CM

## 2023-12-05 DIAGNOSIS — K62.5 HEMORRHAGE OF ANUS AND RECTUM: ICD-10-CM

## 2023-12-05 DIAGNOSIS — D12.8 BENIGN NEOPLASM OF RECTUM: ICD-10-CM

## 2023-12-05 PROCEDURE — 99214 OFFICE O/P EST MOD 30 MIN: CPT | Mod: GC

## 2023-12-05 PROCEDURE — 99204 OFFICE O/P NEW MOD 45 MIN: CPT

## 2023-12-06 PROBLEM — K62.5 RECTAL BLEEDING: Status: ACTIVE | Noted: 2023-03-28

## 2023-12-06 PROBLEM — R19.7 DIARRHEA: Status: ACTIVE | Noted: 2023-11-07

## 2023-12-06 PROBLEM — K59.00 CONSTIPATION: Status: ACTIVE | Noted: 2023-11-07

## 2023-12-07 DIAGNOSIS — K62.5 HEMORRHAGE OF ANUS AND RECTUM: ICD-10-CM

## 2023-12-07 DIAGNOSIS — K62.89 OTHER SPECIFIED DISEASES OF ANUS AND RECTUM: ICD-10-CM

## 2023-12-07 DIAGNOSIS — R19.7 DIARRHEA, UNSPECIFIED: ICD-10-CM

## 2023-12-07 DIAGNOSIS — K59.00 CONSTIPATION, UNSPECIFIED: ICD-10-CM

## 2023-12-12 ENCOUNTER — APPOINTMENT (OUTPATIENT)
Dept: GASTROENTEROLOGY | Facility: CLINIC | Age: 73
End: 2023-12-12
Payer: MEDICARE

## 2023-12-12 VITALS — BODY MASS INDEX: 19.52 KG/M2 | WEIGHT: 157 LBS | HEIGHT: 75 IN

## 2023-12-12 DIAGNOSIS — K62.89 OTHER SPECIFIED DISEASES OF ANUS AND RECTUM: ICD-10-CM

## 2023-12-12 DIAGNOSIS — R63.4 ABNORMAL WEIGHT LOSS: ICD-10-CM

## 2023-12-12 DIAGNOSIS — R19.5 OTHER FECAL ABNORMALITIES: ICD-10-CM

## 2023-12-12 PROCEDURE — 99214 OFFICE O/P EST MOD 30 MIN: CPT

## 2023-12-12 RX ORDER — GABAPENTIN 100 MG/1
100 CAPSULE ORAL
Refills: 0 | Status: DISCONTINUED | COMMUNITY
End: 2023-12-12

## 2023-12-12 RX ORDER — GABAPENTIN 400 MG/1
400 CAPSULE ORAL
Refills: 0 | Status: ACTIVE | COMMUNITY

## 2023-12-15 ENCOUNTER — INPATIENT (INPATIENT)
Facility: HOSPITAL | Age: 73
LOS: 2 days | Discharge: HOME HEALTH | DRG: 920 | End: 2023-12-18
Attending: INTERNAL MEDICINE | Admitting: STUDENT IN AN ORGANIZED HEALTH CARE EDUCATION/TRAINING PROGRAM
Payer: MEDICARE

## 2023-12-15 ENCOUNTER — RESULT REVIEW (OUTPATIENT)
Age: 73
End: 2023-12-15

## 2023-12-15 ENCOUNTER — TRANSCRIPTION ENCOUNTER (OUTPATIENT)
Age: 73
End: 2023-12-15

## 2023-12-15 VITALS
HEIGHT: 75 IN | RESPIRATION RATE: 18 BRPM | DIASTOLIC BLOOD PRESSURE: 85 MMHG | TEMPERATURE: 100 F | WEIGHT: 154.98 LBS | SYSTOLIC BLOOD PRESSURE: 142 MMHG | HEART RATE: 73 BPM

## 2023-12-15 DIAGNOSIS — K62.5 HEMORRHAGE OF ANUS AND RECTUM: ICD-10-CM

## 2023-12-15 DIAGNOSIS — Z85.820 PERSONAL HISTORY OF MALIGNANT MELANOMA OF SKIN: ICD-10-CM

## 2023-12-15 DIAGNOSIS — D37.5 NEOPLASM OF UNCERTAIN BEHAVIOR OF RECTUM: ICD-10-CM

## 2023-12-15 DIAGNOSIS — Z98.890 OTHER SPECIFIED POSTPROCEDURAL STATES: Chronic | ICD-10-CM

## 2023-12-15 DIAGNOSIS — M06.9 RHEUMATOID ARTHRITIS, UNSPECIFIED: ICD-10-CM

## 2023-12-15 DIAGNOSIS — K91.61 INTRAOPERATIVE HEMORRHAGE AND HEMATOMA OF A DIGESTIVE SYSTEM ORGAN OR STRUCTURE COMPLICATING A DIGESTIVE SYSTEM PROCEDURE: ICD-10-CM

## 2023-12-15 DIAGNOSIS — E87.6 HYPOKALEMIA: ICD-10-CM

## 2023-12-15 DIAGNOSIS — D64.9 ANEMIA, UNSPECIFIED: ICD-10-CM

## 2023-12-15 DIAGNOSIS — N40.0 BENIGN PROSTATIC HYPERPLASIA WITHOUT LOWER URINARY TRACT SYMPTOMS: ICD-10-CM

## 2023-12-15 DIAGNOSIS — C20 MALIGNANT NEOPLASM OF RECTUM: ICD-10-CM

## 2023-12-15 DIAGNOSIS — M48.00 SPINAL STENOSIS, SITE UNSPECIFIED: ICD-10-CM

## 2023-12-15 LAB
ALBUMIN SERPL ELPH-MCNC: 3.4 G/DL — LOW (ref 3.5–5.2)
ALBUMIN SERPL ELPH-MCNC: 3.4 G/DL — LOW (ref 3.5–5.2)
ALP SERPL-CCNC: 62 U/L — SIGNIFICANT CHANGE UP (ref 30–115)
ALP SERPL-CCNC: 62 U/L — SIGNIFICANT CHANGE UP (ref 30–115)
ALT FLD-CCNC: 10 U/L — SIGNIFICANT CHANGE UP (ref 0–41)
ALT FLD-CCNC: 10 U/L — SIGNIFICANT CHANGE UP (ref 0–41)
ANION GAP SERPL CALC-SCNC: 9 MMOL/L — SIGNIFICANT CHANGE UP (ref 7–14)
ANION GAP SERPL CALC-SCNC: 9 MMOL/L — SIGNIFICANT CHANGE UP (ref 7–14)
AST SERPL-CCNC: 33 U/L — SIGNIFICANT CHANGE UP (ref 0–41)
AST SERPL-CCNC: 33 U/L — SIGNIFICANT CHANGE UP (ref 0–41)
BILIRUB SERPL-MCNC: 0.6 MG/DL — SIGNIFICANT CHANGE UP (ref 0.2–1.2)
BILIRUB SERPL-MCNC: 0.6 MG/DL — SIGNIFICANT CHANGE UP (ref 0.2–1.2)
BUN SERPL-MCNC: 9 MG/DL — LOW (ref 10–20)
BUN SERPL-MCNC: 9 MG/DL — LOW (ref 10–20)
CALCIUM SERPL-MCNC: 8.7 MG/DL — SIGNIFICANT CHANGE UP (ref 8.4–10.5)
CALCIUM SERPL-MCNC: 8.7 MG/DL — SIGNIFICANT CHANGE UP (ref 8.4–10.5)
CHLORIDE SERPL-SCNC: 103 MMOL/L — SIGNIFICANT CHANGE UP (ref 98–110)
CHLORIDE SERPL-SCNC: 103 MMOL/L — SIGNIFICANT CHANGE UP (ref 98–110)
CO2 SERPL-SCNC: 28 MMOL/L — SIGNIFICANT CHANGE UP (ref 17–32)
CO2 SERPL-SCNC: 28 MMOL/L — SIGNIFICANT CHANGE UP (ref 17–32)
CREAT SERPL-MCNC: 0.7 MG/DL — SIGNIFICANT CHANGE UP (ref 0.7–1.5)
CREAT SERPL-MCNC: 0.7 MG/DL — SIGNIFICANT CHANGE UP (ref 0.7–1.5)
EGFR: 99 ML/MIN/1.73M2 — SIGNIFICANT CHANGE UP
EGFR: 99 ML/MIN/1.73M2 — SIGNIFICANT CHANGE UP
GLUCOSE SERPL-MCNC: 109 MG/DL — HIGH (ref 70–99)
GLUCOSE SERPL-MCNC: 109 MG/DL — HIGH (ref 70–99)
HCT VFR BLD CALC: 32 % — LOW (ref 42–52)
HCT VFR BLD CALC: 32 % — LOW (ref 42–52)
HGB BLD-MCNC: 10.9 G/DL — LOW (ref 14–18)
HGB BLD-MCNC: 10.9 G/DL — LOW (ref 14–18)
MCHC RBC-ENTMCNC: 31.5 PG — HIGH (ref 27–31)
MCHC RBC-ENTMCNC: 31.5 PG — HIGH (ref 27–31)
MCHC RBC-ENTMCNC: 34.1 G/DL — SIGNIFICANT CHANGE UP (ref 32–37)
MCHC RBC-ENTMCNC: 34.1 G/DL — SIGNIFICANT CHANGE UP (ref 32–37)
MCV RBC AUTO: 92.5 FL — SIGNIFICANT CHANGE UP (ref 80–94)
MCV RBC AUTO: 92.5 FL — SIGNIFICANT CHANGE UP (ref 80–94)
NRBC # BLD: 0 /100 WBCS — SIGNIFICANT CHANGE UP (ref 0–0)
NRBC # BLD: 0 /100 WBCS — SIGNIFICANT CHANGE UP (ref 0–0)
PLATELET # BLD AUTO: 194 K/UL — SIGNIFICANT CHANGE UP (ref 130–400)
PLATELET # BLD AUTO: 194 K/UL — SIGNIFICANT CHANGE UP (ref 130–400)
PMV BLD: 11.2 FL — HIGH (ref 7.4–10.4)
PMV BLD: 11.2 FL — HIGH (ref 7.4–10.4)
POTASSIUM SERPL-MCNC: 3.3 MMOL/L — LOW (ref 3.5–5)
POTASSIUM SERPL-MCNC: 3.3 MMOL/L — LOW (ref 3.5–5)
POTASSIUM SERPL-SCNC: 3.3 MMOL/L — LOW (ref 3.5–5)
POTASSIUM SERPL-SCNC: 3.3 MMOL/L — LOW (ref 3.5–5)
PROT SERPL-MCNC: 5.3 G/DL — LOW (ref 6–8)
PROT SERPL-MCNC: 5.3 G/DL — LOW (ref 6–8)
RBC # BLD: 3.46 M/UL — LOW (ref 4.7–6.1)
RBC # BLD: 3.46 M/UL — LOW (ref 4.7–6.1)
RBC # FLD: 13.2 % — SIGNIFICANT CHANGE UP (ref 11.5–14.5)
RBC # FLD: 13.2 % — SIGNIFICANT CHANGE UP (ref 11.5–14.5)
SODIUM SERPL-SCNC: 140 MMOL/L — SIGNIFICANT CHANGE UP (ref 135–146)
SODIUM SERPL-SCNC: 140 MMOL/L — SIGNIFICANT CHANGE UP (ref 135–146)
WBC # BLD: 7.64 K/UL — SIGNIFICANT CHANGE UP (ref 4.8–10.8)
WBC # BLD: 7.64 K/UL — SIGNIFICANT CHANGE UP (ref 4.8–10.8)
WBC # FLD AUTO: 7.64 K/UL — SIGNIFICANT CHANGE UP (ref 4.8–10.8)
WBC # FLD AUTO: 7.64 K/UL — SIGNIFICANT CHANGE UP (ref 4.8–10.8)

## 2023-12-15 PROCEDURE — 85027 COMPLETE CBC AUTOMATED: CPT

## 2023-12-15 PROCEDURE — 88307 TISSUE EXAM BY PATHOLOGIST: CPT | Mod: 26

## 2023-12-15 PROCEDURE — 84100 ASSAY OF PHOSPHORUS: CPT

## 2023-12-15 PROCEDURE — 86803 HEPATITIS C AB TEST: CPT

## 2023-12-15 PROCEDURE — 88342 IMHCHEM/IMCYTCHM 1ST ANTB: CPT | Mod: 26

## 2023-12-15 PROCEDURE — 85025 COMPLETE CBC W/AUTO DIFF WBC: CPT

## 2023-12-15 PROCEDURE — 36415 COLL VENOUS BLD VENIPUNCTURE: CPT

## 2023-12-15 PROCEDURE — 83735 ASSAY OF MAGNESIUM: CPT

## 2023-12-15 PROCEDURE — 88341 IMHCHEM/IMCYTCHM EA ADD ANTB: CPT | Mod: 26

## 2023-12-15 PROCEDURE — 99223 1ST HOSP IP/OBS HIGH 75: CPT | Mod: 25

## 2023-12-15 PROCEDURE — 80053 COMPREHEN METABOLIC PANEL: CPT

## 2023-12-15 RX ORDER — FINASTERIDE 5 MG/1
5 TABLET, FILM COATED ORAL DAILY
Refills: 0 | Status: DISCONTINUED | OUTPATIENT
Start: 2023-12-15 | End: 2023-12-18

## 2023-12-15 RX ORDER — CIPROFLOXACIN LACTATE 400MG/40ML
400 VIAL (ML) INTRAVENOUS EVERY 12 HOURS
Refills: 0 | Status: DISCONTINUED | OUTPATIENT
Start: 2023-12-15 | End: 2023-12-18

## 2023-12-15 RX ORDER — METHOTREXATE 2.5 MG/1
15 TABLET ORAL
Refills: 0 | Status: DISCONTINUED | OUTPATIENT
Start: 2023-12-15 | End: 2023-12-18

## 2023-12-15 RX ORDER — ONDANSETRON 8 MG/1
4 TABLET, FILM COATED ORAL ONCE
Refills: 0 | Status: DISCONTINUED | OUTPATIENT
Start: 2023-12-15 | End: 2023-12-18

## 2023-12-15 RX ORDER — METRONIDAZOLE 500 MG
500 TABLET ORAL ONCE
Refills: 0 | Status: COMPLETED | OUTPATIENT
Start: 2023-12-15 | End: 2023-12-15

## 2023-12-15 RX ORDER — GABAPENTIN 400 MG/1
0 CAPSULE ORAL
Refills: 0 | DISCHARGE

## 2023-12-15 RX ORDER — GABAPENTIN 400 MG/1
400 CAPSULE ORAL EVERY 8 HOURS
Refills: 0 | Status: DISCONTINUED | OUTPATIENT
Start: 2023-12-15 | End: 2023-12-18

## 2023-12-15 RX ORDER — FOLIC ACID 0.8 MG
1 TABLET ORAL DAILY
Refills: 0 | Status: DISCONTINUED | OUTPATIENT
Start: 2023-12-15 | End: 2023-12-18

## 2023-12-15 RX ORDER — CIPROFLOXACIN LACTATE 400MG/40ML
400 VIAL (ML) INTRAVENOUS ONCE
Refills: 0 | Status: COMPLETED | OUTPATIENT
Start: 2023-12-15 | End: 2023-12-15

## 2023-12-15 RX ORDER — METRONIDAZOLE 500 MG
500 TABLET ORAL EVERY 8 HOURS
Refills: 0 | Status: DISCONTINUED | OUTPATIENT
Start: 2023-12-15 | End: 2023-12-18

## 2023-12-15 RX ADMIN — Medication 1 MILLIGRAM(S): at 19:12

## 2023-12-15 RX ADMIN — Medication 100 MILLIGRAM(S): at 15:10

## 2023-12-15 RX ADMIN — GABAPENTIN 400 MILLIGRAM(S): 400 CAPSULE ORAL at 22:28

## 2023-12-15 RX ADMIN — Medication 200 MILLIGRAM(S): at 19:12

## 2023-12-15 RX ADMIN — Medication 200 MILLIGRAM(S): at 15:10

## 2023-12-15 RX ADMIN — Medication 100 MILLIGRAM(S): at 22:28

## 2023-12-15 RX ADMIN — FINASTERIDE 5 MILLIGRAM(S): 5 TABLET, FILM COATED ORAL at 19:12

## 2023-12-15 NOTE — H&P ADULT - ASSESSMENT
73 year old patient, known to have BPH, RA and spinal stenosis, presented initially to the Endoscopy unit for rectal polypoid mass resection with EMR.     #Rectal polypoid mass s/p resection and EMR  - Colonoscopy 11/30/2023: irregular friable mass from dentate line to 10 cm. At dentate line, lesion is 75% circumferential, and towards 10 cm it was 25% circumferential.  Lesion is broad and rises to a polypoid shape.  - s/p today rectal polypoid mass resection with EMR. Admitted for monitoring.   - Monitor CBC BID; f/up labs at 8:00 pm  - If there are clinical signs of bleed, abdominal rigidity or tachycardia -> get KUB  - Clear liquid diet; if tolerated advance progressively   - GI will follow up     #BPH  - c/w finasteride and Alfuzosin    #spinal stenosis  - c/w Gabapentin    #RA  - c/w Methotrexate     Activity: IAT  Diet: clear liquid  DVT ppx: check CBC at 8 pm; if no signs of bleed start on heparin   GI ppx: none 73 year old patient, known to have BPH, RA and spinal stenosis, presented initially to the Endoscopy unit for rectal polypoid mass resection with EMR.     #Rectal polypoid mass s/p resection and EMR  - Colonoscopy 11/30/2023: irregular friable mass from dentate line to 10 cm. At dentate line, lesion is 75% circumferential, and towards 10 cm it was 25% circumferential.  Lesion is broad and rises to a polypoid shape.  - s/p today rectal polypoid mass resection with EMR. Admitted for monitoring.   - s/p Cipro and Flagyl in endo suite  - c/w same ABx   - Monitor CBC BID; f/up labs at 8:00 pm  - If there are clinical signs of bleed, abdominal rigidity or tachycardia -> get KUB  - Clear liquid diet; if tolerated advance progressively   - GI will follow up     #BPH  - c/w finasteride and Alfuzosin    #spinal stenosis  - c/w Gabapentin    #RA  - c/w Methotrexate     Activity: IAT  Diet: clear liquid  DVT ppx: check CBC at 8 pm; if no signs of bleed start on heparin   GI ppx: none

## 2023-12-15 NOTE — CHART NOTE - NSCHARTNOTEFT_GEN_A_CORE
PACU ANESTHESIA ADMISSION NOTE      Procedure: Colonoscopy with EMR  Post op diagnosis:  colon polyps    ____  Intubated  TV:______       Rate: ______      FiO2: ______    __x__  Patent Airway    _x___  Full return of protective reflexes    _x___  Full recovery from anesthesia / back to baseline     Vitals:   T:  97.3F         R:    18              BP:     132/68             Sat:     100%              P: 67      Mental Status:  _x___ Awake   _x____ Alert   _____ Drowsy   _____ Sedated    Nausea/Vomiting:  _x___ NO  ______Yes,   See Post - Op Orders          Pain Scale (0-10):  __0/10___    Treatment: ____ None    _x___ See Post - Op/PCA Orders    Post - Operative Fluids:   ____ Oral   __x__ See Post - Op Orders    Plan: Discharge:   ____Home       ___x__Floor     _____Critical Care    _____  Other:_________________    Comments: Pt tolerated procedure well, no anesthesia related complications. Care of pt endorsed to PACU, report given to PACU RN. Discharge to the next level of care when criteria are met.

## 2023-12-15 NOTE — CONSULT NOTE ADULT - SUBJECTIVE AND OBJECTIVE BOX
Gastroenterology/Hepatology Consultation    For questions and inquiries please page [phone].  For urgent matters or after 5pm and on weekends please page the fellow on call through the GI paging system.    73y Male with   Patient is a 73y old  Male who presents with a chief complaint of s/p rectal polypoid mass resection (15 Dec 2023 16:03)    HPI:  73 year old patient, known to have BPH, RA and spinal stenosis, presented initially to the Endoscopy unit for rectal polypoid mass resection with EMR.   History goes back in 11/30/2023, when patient underwent colonoscopy with findings of irregular friable mass from dentate line to 10 cm. At dentate line, lesion is  75% circumferential, and towards 10 cm it was 25% circumferential.  Lesion is broad and rises to a polypoid shape.  He is s/p today rectal polypoid mass resection with EMR. Admitted for monitoring.     Patient denies fever, chills, URT, abdominal or urinary symptoms. Vitals wnl.   Laboratory workup pending collection at 8:00pm.          (15 Dec 2023 16:03)      GI Hx: Pt referred for elective EMR of large rectal polyp. The polyp measures at least 10cm extending from just proximal to the anal verge to 8-10 cm proximally. the plyp was an LST Neha 0-IIa+Is, removed by under water EMR in piece meal fashion. Spontaneous bleeding was noted and was controlled periotically The procedure was lengthy and took over 5 hours. Shweta stat was used at the end of the procedure to decrease the risk of bleeding. Patient tolerated well the procedure          Review of system  General:  (-) weight loss, (-) fevers  Eyes:  (-) visual changes  CV:  (-) chest pain  Resp: (-) SOB, (-) wheezing  GI: (-) abdominal pain,  (-) nausea, (-) vomiting, (-) dysphagia, (-) diarrhea, (-) constipation, (-) rectal bleeding, (-) melena, (-) hematemesis.  Neuro: (-) confusion, (-) weakness  Psych:  (-) Hallucinations  Heme:  (-) easy bruisability    Past medical/surgical Hx:  PAST MEDICAL & SURGICAL HISTORY:  Skin cancer (melanoma)      Rheumatoid arthritis      Spinal stenosis      History of BPH      History of hay fever      S/P bilateral inguinal hernia repair        Home Medications:  alfuzosin 10 mg oral tablet, extended release: 1 tab(s) orally once a day  finasteride 5 mg oral tablet: 1 tab(s) orally once a day  folic acid 1 mg oral tablet: 1 tab(s) orally once a day  folic acid 1 mg oral tablet: 1 tab(s) orally once a day  gabapentin 400 mg oral tablet: 1 tab(s) orally every 8 hours  methotrexate 15 mg/0.6 mL subcutaneous solution: subcutaneous once a week  saccharomyces boulardii lyo 250 mg oral capsule: 1 cap(s) orally 2 times a week      Allergies: No Known Allergies      Current Medications:   ciprofloxacin   IVPB 400 milliGRAM(s) IV Intermittent every 12 hours  finasteride 5 milliGRAM(s) Oral daily  folic acid 1 milliGRAM(s) Oral daily  gabapentin 400 milliGRAM(s) Oral every 8 hours  methotrexate 15 milliGRAM(s) Oral <User Schedule>  metroNIDAZOLE  IVPB 500 milliGRAM(s) IV Intermittent every 8 hours  ondansetron Injectable 4 milliGRAM(s) IV Push once PRN      Physical exam:  T(C): 36.1 (12-15-23 @ 18:47), Max: 37.5 (12-15-23 @ 08:40)  HR: 53 (12-15-23 @ 18:47) (53 - 73)  BP: 108/57 (12-15-23 @ 18:47) (108/57 - 142/85)  RR: 18 (12-15-23 @ 18:47) (16 - 18)  SpO2: 97% (12-15-23 @ 18:47) (97% - 100%)  GENERAL: NAD  HEAD:  Atraumatic, Normocephalic  EYES: Sclera:NL  NECK: Supple, no JVD or thyromegaly  CHEST/LUNG: Good bilateral air entry  HEART: normal S1, S2. Regular  ABDOMEN: (-) distended, (-) tender, (-) rebound, (+) BS, (-)HSM  EXTREMITIES: (-) edema  NEUROLOGY: (-) asterixis  SKIN: (-) jaundice  HAYLEE: (-) melena (-) brbpr    Data:    MCV   RDW   HGB trend:              Liver panel trend:             Gastroenterology/Hepatology Consultation    For questions and inquiries please page [phone].  For urgent matters or after 5pm and on weekends please page the fellow on call through the GI paging system.    73y Male with   Patient is a 73y old  Male who presents with a chief complaint of s/p rectal polypoid mass resection (15 Dec 2023 16:03)    HPI:  73 year old patient, known to have BPH, RA and spinal stenosis, presented initially to the Endoscopy unit for rectal polypoid mass resection with EMR.   History goes back in 11/30/2023, when patient underwent colonoscopy with findings of irregular friable mass from dentate line to 10 cm. At dentate line, lesion is  75% circumferential, and towards 10 cm it was 25% circumferential.  Lesion is broad and rises to a polypoid shape.  He is s/p today rectal polypoid mass resection with EMR. Admitted for monitoring.     Patient denies fever, chills, URT, abdominal or urinary symptoms. Vitals wnl.   Laboratory workup pending collection at 8:00pm.          (15 Dec 2023 16:03)      GI Hx: Pt referred for elective EMR of large rectal polyp. The polyp measures at least 10cm extending from just proximal to the anal verge to 8-10 cm proximally. the plyp was an LST Neha 0-IIa+Is, removed by under water EMR in piece meal fashion. Spontaneous bleeding was noted and was controlled periotically The procedure was lengthy and took over 5 hours. Shweta stat was used at the end of the procedure to decrease the risk of bleeding. Patient tolerated well the procedure          Review of system  General:  (-) weight loss, (-) fevers  Eyes:  (-) visual changes  CV:  (-) chest pain  Resp: (-) SOB, (-) wheezing  GI: (-) abdominal pain,  (-) nausea, (-) vomiting, (-) dysphagia, (-) diarrhea, (-) constipation, (-) rectal bleeding, (-) melena, (-) hematemesis.  Neuro: (-) confusion, (-) weakness  Psych:  (-) Hallucinations  Heme:  (-) easy bruisability    Past medical/surgical Hx:  PAST MEDICAL & SURGICAL HISTORY:  Skin cancer (melanoma)      Rheumatoid arthritis      Spinal stenosis      History of BPH      History of hay fever      S/P bilateral inguinal hernia repair        Home Medications:  alfuzosin 10 mg oral tablet, extended release: 1 tab(s) orally once a day  finasteride 5 mg oral tablet: 1 tab(s) orally once a day  folic acid 1 mg oral tablet: 1 tab(s) orally once a day  folic acid 1 mg oral tablet: 1 tab(s) orally once a day  gabapentin 400 mg oral tablet: 1 tab(s) orally every 8 hours  methotrexate 15 mg/0.6 mL subcutaneous solution: subcutaneous once a week  saccharomyces boulardii lyo 250 mg oral capsule: 1 cap(s) orally 2 times a week      Allergies: No Known Allergies      Current Medications:   ciprofloxacin   IVPB 400 milliGRAM(s) IV Intermittent every 12 hours  finasteride 5 milliGRAM(s) Oral daily  folic acid 1 milliGRAM(s) Oral daily  gabapentin 400 milliGRAM(s) Oral every 8 hours  methotrexate 15 milliGRAM(s) Oral <User Schedule>  metroNIDAZOLE  IVPB 500 milliGRAM(s) IV Intermittent every 8 hours  ondansetron Injectable 4 milliGRAM(s) IV Push once PRN      Physical exam:  T(C): 36.1 (12-15-23 @ 18:47), Max: 37.5 (12-15-23 @ 08:40)  HR: 53 (12-15-23 @ 18:47) (53 - 73)  BP: 108/57 (12-15-23 @ 18:47) (108/57 - 142/85)  RR: 18 (12-15-23 @ 18:47) (16 - 18)  SpO2: 97% (12-15-23 @ 18:47) (97% - 100%)  GENERAL: NAD  HEAD:  Atraumatic, Normocephalic  EYES: Sclera:NL  NECK: Supple, no JVD or thyromegaly  CHEST/LUNG: Good bilateral air entry  HEART: normal S1, S2. Regular  ABDOMEN: (-) distended, (-) tender, (-) rebound, (+) BS, (-)HSM  EXTREMITIES: (-) edema  NEUROLOGY: (-) asterixis  SKIN: (-) jaundice  HAYLEE: (-) melena (-) brbpr    Data:    MCV   RDW   HGB trend:              Liver panel trend:

## 2023-12-15 NOTE — PATIENT PROFILE ADULT - FUNCTIONAL ASSESSMENT - BASIC MOBILITY 6.
Patient presents with referral for home health following right total hip arthroplasty 6/3/2021 by [facility].  He returns to [facility] 6/25.      Patient states he is very sore and reports he was very painful and weak prior to surgery due to still healing from lumbar surgery in November 2020 and the deterioration of his right hip.
3 = A little assistance

## 2023-12-15 NOTE — CONSULT NOTE ADULT - ASSESSMENT
Post UEMR of a large LSt in the rectum    - Admit for post EMR care  - Monitor CBC  - Start clear liquids  - CIpro flagyl x 4 days (dose given intra procedurally  - In 48 hours advance diet to low residue x 2 weeks  - Risk of bleeding remains x 2 weeks post procedure  - Follow up upon DC  - Repeat colonoscopy in 1 month

## 2023-12-15 NOTE — PATIENT PROFILE ADULT - DOES PATIENT HAVE ADVANCE DIRECTIVE
Pt oriented to room. Pt with left arm in sling. Complaints of pain to arm and shoulder. Pt is alert and oriented. Denies SOB, Chest pain, or distress besides shoulder pain. Call light in reach. Bed alarm on. No

## 2023-12-15 NOTE — ASU PATIENT PROFILE, ADULT - FALL HARM RISK - HARM RISK INTERVENTIONS
Communicate Risk of Fall with Harm to all staff/Reinforce activity limits and safety measures with patient and family/Tailored Fall Risk Interventions/Visual Cue: Yellow wristband and red socks/Bed in lowest position, wheels locked, appropriate side rails in place/Call bell, personal items and telephone in reach/Instruct patient to call for assistance before getting out of bed or chair/Non-slip footwear when patient is out of bed/[place] to call system/Physically safe environment - no spills, clutter or unnecessary equipment/Purposeful Proactive Rounding/Room/bathroom lighting operational, light cord in reach Communicate Risk of Fall with Harm to all staff/Reinforce activity limits and safety measures with patient and family/Tailored Fall Risk Interventions/Visual Cue: Yellow wristband and red socks/Bed in lowest position, wheels locked, appropriate side rails in place/Call bell, personal items and telephone in reach/Instruct patient to call for assistance before getting out of bed or chair/Non-slip footwear when patient is out of bed/[place] to call system/Physically safe environment - no spills, clutter or unnecessary equipment/Purposeful Proactive Rounding/Room/bathroom lighting operational, light cord in reach

## 2023-12-15 NOTE — H&P ADULT - HISTORY OF PRESENT ILLNESS
73 year old patient, known to have BPH, RA and spinal stenosis, presented initially to the Endoscopy unit for rectal polypoid mass resection with EMR.   History goes back in 11/30/2023, when patient underwent colonoscopy with findings of irregular friable mass from dentate line to 10 cm. At dentate line, lesion is  75% circumferential, and towards 10 cm it was 25% circumferential.  Lesion is broad and rises to a polypoid shape.  He is s/p today rectal polypoid mass resection with EMR. Admitted for monitoring.     Patient denies fever, chills, URT, abdominal or urinary symptoms. Vitals wnl.   Laboratory workup pending collection at 8:00pm.

## 2023-12-15 NOTE — H&P ADULT - NSHPPHYSICALEXAM_GEN_ALL_CORE
T(C): 36.3 (12-15-23 @ 16:20), Max: 37.5 (12-15-23 @ 08:40)  HR: 56 (12-15-23 @ 16:50) (56 - 73)  BP: 131/79 (12-15-23 @ 16:50) (127/69 - 142/85)  RR: 16 (12-15-23 @ 16:50) (16 - 18)  SpO2: 100% (12-15-23 @ 16:50) (100% - 100%)    CONSTITUTIONAL: Well groomed, no apparent distress  RESP: No respiratory distress, no use of accessory muscles; CTA b/l, no WRR  CV: RRR, +S1S2, no MRG; no JVD; no peripheral edema  GI: Soft, NT, ND, no rebound, no guarding; no palpable masses  NEURO: AAOx3; no focal deficits

## 2023-12-15 NOTE — PATIENT PROFILE ADULT - FALL HARM RISK - HARM RISK INTERVENTIONS
Assistance with ambulation/Assistance OOB with selected safe patient handling equipment/Communicate Risk of Fall with Harm to all staff/Discuss with provider need for PT consult/Monitor gait and stability/Provide patient with walking aids - walker, cane, crutches/Reinforce activity limits and safety measures with patient and family/Tailored Fall Risk Interventions/Visual Cue: Yellow wristband and red socks/Bed in lowest position, wheels locked, appropriate side rails in place/Call bell, personal items and telephone in reach/Instruct patient to call for assistance before getting out of bed or chair/Non-slip footwear when patient is out of bed/[place] to call system/Physically safe environment - no spills, clutter or unnecessary equipment/Purposeful Proactive Rounding/Room/bathroom lighting operational, light cord in reach Assistance with ambulation/Assistance OOB with selected safe patient handling equipment/Communicate Risk of Fall with Harm to all staff/Discuss with provider need for PT consult/Monitor gait and stability/Provide patient with walking aids - walker, cane, crutches/Reinforce activity limits and safety measures with patient and family/Tailored Fall Risk Interventions/Visual Cue: Yellow wristband and red socks/Bed in lowest position, wheels locked, appropriate side rails in place/Call bell, personal items and telephone in reach/Instruct patient to call for assistance before getting out of bed or chair/Non-slip footwear when patient is out of bed/[place] to call system/Physically safe environment - no spills, clutter or unnecessary equipment/Purposeful Proactive Rounding/Room/bathroom lighting operational, light cord in reach

## 2023-12-16 LAB
ALBUMIN SERPL ELPH-MCNC: 3.8 G/DL — SIGNIFICANT CHANGE UP (ref 3.5–5.2)
ALBUMIN SERPL ELPH-MCNC: 3.8 G/DL — SIGNIFICANT CHANGE UP (ref 3.5–5.2)
ALP SERPL-CCNC: 63 U/L — SIGNIFICANT CHANGE UP (ref 30–115)
ALP SERPL-CCNC: 63 U/L — SIGNIFICANT CHANGE UP (ref 30–115)
ALT FLD-CCNC: 10 U/L — SIGNIFICANT CHANGE UP (ref 0–41)
ALT FLD-CCNC: 10 U/L — SIGNIFICANT CHANGE UP (ref 0–41)
ANION GAP SERPL CALC-SCNC: 9 MMOL/L — SIGNIFICANT CHANGE UP (ref 7–14)
ANION GAP SERPL CALC-SCNC: 9 MMOL/L — SIGNIFICANT CHANGE UP (ref 7–14)
AST SERPL-CCNC: 36 U/L — SIGNIFICANT CHANGE UP (ref 0–41)
AST SERPL-CCNC: 36 U/L — SIGNIFICANT CHANGE UP (ref 0–41)
BASOPHILS # BLD AUTO: 0.02 K/UL — SIGNIFICANT CHANGE UP (ref 0–0.2)
BASOPHILS # BLD AUTO: 0.02 K/UL — SIGNIFICANT CHANGE UP (ref 0–0.2)
BASOPHILS NFR BLD AUTO: 0.3 % — SIGNIFICANT CHANGE UP (ref 0–1)
BASOPHILS NFR BLD AUTO: 0.3 % — SIGNIFICANT CHANGE UP (ref 0–1)
BILIRUB SERPL-MCNC: 0.8 MG/DL — SIGNIFICANT CHANGE UP (ref 0.2–1.2)
BILIRUB SERPL-MCNC: 0.8 MG/DL — SIGNIFICANT CHANGE UP (ref 0.2–1.2)
BUN SERPL-MCNC: 7 MG/DL — LOW (ref 10–20)
BUN SERPL-MCNC: 7 MG/DL — LOW (ref 10–20)
CALCIUM SERPL-MCNC: 9.3 MG/DL — SIGNIFICANT CHANGE UP (ref 8.4–10.5)
CALCIUM SERPL-MCNC: 9.3 MG/DL — SIGNIFICANT CHANGE UP (ref 8.4–10.5)
CHLORIDE SERPL-SCNC: 107 MMOL/L — SIGNIFICANT CHANGE UP (ref 98–110)
CHLORIDE SERPL-SCNC: 107 MMOL/L — SIGNIFICANT CHANGE UP (ref 98–110)
CO2 SERPL-SCNC: 30 MMOL/L — SIGNIFICANT CHANGE UP (ref 17–32)
CO2 SERPL-SCNC: 30 MMOL/L — SIGNIFICANT CHANGE UP (ref 17–32)
CREAT SERPL-MCNC: 0.8 MG/DL — SIGNIFICANT CHANGE UP (ref 0.7–1.5)
CREAT SERPL-MCNC: 0.8 MG/DL — SIGNIFICANT CHANGE UP (ref 0.7–1.5)
EGFR: 95 ML/MIN/1.73M2 — SIGNIFICANT CHANGE UP
EGFR: 95 ML/MIN/1.73M2 — SIGNIFICANT CHANGE UP
EOSINOPHIL # BLD AUTO: 0.04 K/UL — SIGNIFICANT CHANGE UP (ref 0–0.7)
EOSINOPHIL # BLD AUTO: 0.04 K/UL — SIGNIFICANT CHANGE UP (ref 0–0.7)
EOSINOPHIL NFR BLD AUTO: 0.5 % — SIGNIFICANT CHANGE UP (ref 0–8)
EOSINOPHIL NFR BLD AUTO: 0.5 % — SIGNIFICANT CHANGE UP (ref 0–8)
GLUCOSE SERPL-MCNC: 94 MG/DL — SIGNIFICANT CHANGE UP (ref 70–99)
GLUCOSE SERPL-MCNC: 94 MG/DL — SIGNIFICANT CHANGE UP (ref 70–99)
HCT VFR BLD CALC: 33.9 % — LOW (ref 42–52)
HCT VFR BLD CALC: 33.9 % — LOW (ref 42–52)
HCT VFR BLD CALC: 35.6 % — LOW (ref 42–52)
HCT VFR BLD CALC: 35.6 % — LOW (ref 42–52)
HCV AB S/CO SERPL IA: 0.06 COI — SIGNIFICANT CHANGE UP
HCV AB S/CO SERPL IA: 0.06 COI — SIGNIFICANT CHANGE UP
HCV AB SERPL-IMP: SIGNIFICANT CHANGE UP
HCV AB SERPL-IMP: SIGNIFICANT CHANGE UP
HGB BLD-MCNC: 11.4 G/DL — LOW (ref 14–18)
HGB BLD-MCNC: 11.4 G/DL — LOW (ref 14–18)
HGB BLD-MCNC: 11.8 G/DL — LOW (ref 14–18)
HGB BLD-MCNC: 11.8 G/DL — LOW (ref 14–18)
IMM GRANULOCYTES NFR BLD AUTO: 0.4 % — HIGH (ref 0.1–0.3)
IMM GRANULOCYTES NFR BLD AUTO: 0.4 % — HIGH (ref 0.1–0.3)
LYMPHOCYTES # BLD AUTO: 0.81 K/UL — LOW (ref 1.2–3.4)
LYMPHOCYTES # BLD AUTO: 0.81 K/UL — LOW (ref 1.2–3.4)
LYMPHOCYTES # BLD AUTO: 10.7 % — LOW (ref 20.5–51.1)
LYMPHOCYTES # BLD AUTO: 10.7 % — LOW (ref 20.5–51.1)
MAGNESIUM SERPL-MCNC: 2 MG/DL — SIGNIFICANT CHANGE UP (ref 1.8–2.4)
MAGNESIUM SERPL-MCNC: 2 MG/DL — SIGNIFICANT CHANGE UP (ref 1.8–2.4)
MCHC RBC-ENTMCNC: 31.3 PG — HIGH (ref 27–31)
MCHC RBC-ENTMCNC: 31.3 PG — HIGH (ref 27–31)
MCHC RBC-ENTMCNC: 31.5 PG — HIGH (ref 27–31)
MCHC RBC-ENTMCNC: 31.5 PG — HIGH (ref 27–31)
MCHC RBC-ENTMCNC: 33.1 G/DL — SIGNIFICANT CHANGE UP (ref 32–37)
MCHC RBC-ENTMCNC: 33.1 G/DL — SIGNIFICANT CHANGE UP (ref 32–37)
MCHC RBC-ENTMCNC: 33.6 G/DL — SIGNIFICANT CHANGE UP (ref 32–37)
MCHC RBC-ENTMCNC: 33.6 G/DL — SIGNIFICANT CHANGE UP (ref 32–37)
MCV RBC AUTO: 93.1 FL — SIGNIFICANT CHANGE UP (ref 80–94)
MCV RBC AUTO: 93.1 FL — SIGNIFICANT CHANGE UP (ref 80–94)
MCV RBC AUTO: 94.9 FL — HIGH (ref 80–94)
MCV RBC AUTO: 94.9 FL — HIGH (ref 80–94)
MONOCYTES # BLD AUTO: 0.8 K/UL — HIGH (ref 0.1–0.6)
MONOCYTES # BLD AUTO: 0.8 K/UL — HIGH (ref 0.1–0.6)
MONOCYTES NFR BLD AUTO: 10.5 % — HIGH (ref 1.7–9.3)
MONOCYTES NFR BLD AUTO: 10.5 % — HIGH (ref 1.7–9.3)
NEUTROPHILS # BLD AUTO: 5.9 K/UL — SIGNIFICANT CHANGE UP (ref 1.4–6.5)
NEUTROPHILS # BLD AUTO: 5.9 K/UL — SIGNIFICANT CHANGE UP (ref 1.4–6.5)
NEUTROPHILS NFR BLD AUTO: 77.6 % — HIGH (ref 42.2–75.2)
NEUTROPHILS NFR BLD AUTO: 77.6 % — HIGH (ref 42.2–75.2)
NRBC # BLD: 0 /100 WBCS — SIGNIFICANT CHANGE UP (ref 0–0)
PHOSPHATE SERPL-MCNC: 3.2 MG/DL — SIGNIFICANT CHANGE UP (ref 2.1–4.9)
PHOSPHATE SERPL-MCNC: 3.2 MG/DL — SIGNIFICANT CHANGE UP (ref 2.1–4.9)
PLATELET # BLD AUTO: 197 K/UL — SIGNIFICANT CHANGE UP (ref 130–400)
PLATELET # BLD AUTO: 197 K/UL — SIGNIFICANT CHANGE UP (ref 130–400)
PLATELET # BLD AUTO: 202 K/UL — SIGNIFICANT CHANGE UP (ref 130–400)
PLATELET # BLD AUTO: 202 K/UL — SIGNIFICANT CHANGE UP (ref 130–400)
PMV BLD: 10.6 FL — HIGH (ref 7.4–10.4)
PMV BLD: 10.6 FL — HIGH (ref 7.4–10.4)
PMV BLD: 11.2 FL — HIGH (ref 7.4–10.4)
PMV BLD: 11.2 FL — HIGH (ref 7.4–10.4)
POTASSIUM SERPL-MCNC: 4.1 MMOL/L — SIGNIFICANT CHANGE UP (ref 3.5–5)
POTASSIUM SERPL-MCNC: 4.1 MMOL/L — SIGNIFICANT CHANGE UP (ref 3.5–5)
POTASSIUM SERPL-SCNC: 4.1 MMOL/L — SIGNIFICANT CHANGE UP (ref 3.5–5)
POTASSIUM SERPL-SCNC: 4.1 MMOL/L — SIGNIFICANT CHANGE UP (ref 3.5–5)
PROT SERPL-MCNC: 5.6 G/DL — LOW (ref 6–8)
PROT SERPL-MCNC: 5.6 G/DL — LOW (ref 6–8)
RBC # BLD: 3.64 M/UL — LOW (ref 4.7–6.1)
RBC # BLD: 3.64 M/UL — LOW (ref 4.7–6.1)
RBC # BLD: 3.75 M/UL — LOW (ref 4.7–6.1)
RBC # BLD: 3.75 M/UL — LOW (ref 4.7–6.1)
RBC # FLD: 13.3 % — SIGNIFICANT CHANGE UP (ref 11.5–14.5)
RBC # FLD: 13.3 % — SIGNIFICANT CHANGE UP (ref 11.5–14.5)
RBC # FLD: 13.5 % — SIGNIFICANT CHANGE UP (ref 11.5–14.5)
RBC # FLD: 13.5 % — SIGNIFICANT CHANGE UP (ref 11.5–14.5)
SODIUM SERPL-SCNC: 146 MMOL/L — SIGNIFICANT CHANGE UP (ref 135–146)
SODIUM SERPL-SCNC: 146 MMOL/L — SIGNIFICANT CHANGE UP (ref 135–146)
WBC # BLD: 7.09 K/UL — SIGNIFICANT CHANGE UP (ref 4.8–10.8)
WBC # BLD: 7.09 K/UL — SIGNIFICANT CHANGE UP (ref 4.8–10.8)
WBC # BLD: 7.6 K/UL — SIGNIFICANT CHANGE UP (ref 4.8–10.8)
WBC # BLD: 7.6 K/UL — SIGNIFICANT CHANGE UP (ref 4.8–10.8)
WBC # FLD AUTO: 7.09 K/UL — SIGNIFICANT CHANGE UP (ref 4.8–10.8)
WBC # FLD AUTO: 7.09 K/UL — SIGNIFICANT CHANGE UP (ref 4.8–10.8)
WBC # FLD AUTO: 7.6 K/UL — SIGNIFICANT CHANGE UP (ref 4.8–10.8)
WBC # FLD AUTO: 7.6 K/UL — SIGNIFICANT CHANGE UP (ref 4.8–10.8)

## 2023-12-16 PROCEDURE — 99232 SBSQ HOSP IP/OBS MODERATE 35: CPT | Mod: GC

## 2023-12-16 RX ORDER — ENOXAPARIN SODIUM 100 MG/ML
40 INJECTION SUBCUTANEOUS EVERY 24 HOURS
Refills: 0 | Status: DISCONTINUED | OUTPATIENT
Start: 2023-12-16 | End: 2023-12-18

## 2023-12-16 RX ADMIN — GABAPENTIN 400 MILLIGRAM(S): 400 CAPSULE ORAL at 21:04

## 2023-12-16 RX ADMIN — METHOTREXATE 15 MILLIGRAM(S): 2.5 TABLET ORAL at 08:55

## 2023-12-16 RX ADMIN — GABAPENTIN 400 MILLIGRAM(S): 400 CAPSULE ORAL at 13:02

## 2023-12-16 RX ADMIN — GABAPENTIN 400 MILLIGRAM(S): 400 CAPSULE ORAL at 05:07

## 2023-12-16 RX ADMIN — Medication 200 MILLIGRAM(S): at 05:07

## 2023-12-16 RX ADMIN — Medication 200 MILLIGRAM(S): at 17:07

## 2023-12-16 RX ADMIN — Medication 100 MILLIGRAM(S): at 05:07

## 2023-12-16 RX ADMIN — Medication 100 MILLIGRAM(S): at 21:04

## 2023-12-16 RX ADMIN — Medication 100 MILLIGRAM(S): at 13:02

## 2023-12-16 RX ADMIN — Medication 1 MILLIGRAM(S): at 12:58

## 2023-12-16 RX ADMIN — FINASTERIDE 5 MILLIGRAM(S): 5 TABLET, FILM COATED ORAL at 12:58

## 2023-12-16 RX ADMIN — ENOXAPARIN SODIUM 40 MILLIGRAM(S): 100 INJECTION SUBCUTANEOUS at 12:58

## 2023-12-16 NOTE — PROGRESS NOTE ADULT - ASSESSMENT
Patient is a 72 yo M w/PMH of  BPH, RA, spinal stenosis, and colonoscopy in 11/30/2023 w/colonoscopy showing irregular friable mass from dentate line to 10 cm w/polypoid shape who presented s/p rectal polypoid mass resection with EMR in the endoscopy unit w/vitals stable, labs notable for K 3.3 and hgb 10.9 now admitted for monitoring.    #Rectal polypoid mass s/p resection and EMR  - Colonoscopy 11/30/2023: irregular friable mass from dentate line to 10 cm. At dentate line, lesion is 75% circumferential, and towards 10 cm it was 25% circumferential.  Lesion is broad and rises to a polypoid shape.  - s/p today rectal polypoid mass resection with EMR. Admitted for monitoring.   - GI post-procedure note recs (12/15): Monitor CBC, Start clear liquids, CIpro flagyl x 4 days (dose given intra procedurally), In 48 hours advance diet to low residue x 2 weeks   Risk of bleeding remains x 2 weeks post procedure, Repeat colonoscopy in 1 month  - s/p Cipro and Flagyl in endo suite  - c/w cipro and flagyl   - Monitor CBC BID; f/up labs at 8:00 pm  - If there are clinical signs of bleed, abdominal rigidity or tachycardia -> get KUB  - Clear liquid diet; if tolerated advance progressively     #BPH  - c/w finasteride and Alfuzosin    #spinal stenosis  - c/w Gabapentin    #RA  - c/w Methotrexate     #MISC  - DVT ppx: check CBC at 8 pm; if no signs of bleed start on heparin   - GI ppx: none  - Activity: IAT  - Diet: clear liquid     Patient is a 74 yo M w/PMH of  BPH, RA, spinal stenosis, and colonoscopy in 11/30/2023 w/colonoscopy showing irregular friable mass from dentate line to 10 cm w/polypoid shape who presented s/p rectal polypoid mass resection with EMR in the endoscopy unit w/vitals stable, labs notable for K 3.3 and hgb 10.9 now admitted for monitoring.    #Rectal polypoid mass s/p resection and EMR  - Colonoscopy 11/30/2023: irregular friable mass from dentate line to 10 cm. At dentate line, lesion is 75% circumferential, and towards 10 cm it was 25% circumferential.  Lesion is broad and rises to a polypoid shape.  - s/p today rectal polypoid mass resection with EMR. Admitted for monitoring.   - GI post-procedure note recs (12/15): Monitor CBC, Start clear liquids, CIpro flagyl x 4 days (dose given intra procedurally), In 48 hours advance diet to low residue x 2 weeks   Risk of bleeding remains x 2 weeks post procedure, Repeat colonoscopy in 1 month  - s/p Cipro and Flagyl in endo suite  - c/w cipro and flagyl   - Monitor CBC BID; f/up labs at 8:00 pm  - If there are clinical signs of bleed, abdominal rigidity or tachycardia -> get KUB  - Clear liquid diet; if tolerated advance progressively     #BPH  - c/w finasteride and Alfuzosin    #spinal stenosis  - c/w Gabapentin    #RA  - c/w Methotrexate     #MISC  - DVT ppx: check CBC at 8 pm; if no signs of bleed start on heparin   - GI ppx: none  - Activity: IAT  - Diet: clear liquid     Patient is a 72 yo M w/PMH of  BPH, RA, spinal stenosis, and colonoscopy in 11/30/2023 w/colonoscopy showing irregular friable mass from dentate line to 10 cm w/polypoid shape who presented s/p rectal polypoid mass resection with EMR in the endoscopy unit w/vitals stable, labs notable for K 3.3 and hgb 10.9 now admitted for monitoring.    #Rectal polypoid mass s/p resection and EMR  - Colonoscopy 11/30/2023: irregular friable mass from dentate line to 10 cm. At dentate line, lesion is 75% circumferential, and towards 10 cm it was 25% circumferential.  Lesion is broad and rises to a polypoid shape.  - s/p today rectal polypoid mass resection with EMR. Admitted for monitoring.   - GI post-procedure note recs (12/15): Monitor CBC, Start clear liquids, CIpro flagyl x 4 days (dose given intra procedurally), In 48 hours advance diet to low residue x 2 weeks   Risk of bleeding remains x 2 weeks post procedure, Repeat colonoscopy in 1 month  - s/p Cipro and Flagyl in endo suite  - c/w cipro and flagyl   - Monitor CBC BID  - If there are clinical signs of bleed, abdominal rigidity or tachycardia -> get KUB  - Clear liquid diet; if tolerated advance progressively     #BPH  - c/w finasteride and Alfuzosin    #spinal stenosis  - c/w Gabapentin    #RA  - c/w Methotrexate     #MISC  - DVT ppx: Lovenox  - GI ppx: none  - Activity: IAT  - Diet: clear liquid     Patient is a 74 yo M w/PMH of  BPH, RA, spinal stenosis, and colonoscopy in 11/30/2023 w/colonoscopy showing irregular friable mass from dentate line to 10 cm w/polypoid shape who presented s/p rectal polypoid mass resection with EMR in the endoscopy unit w/vitals stable, labs notable for K 3.3 and hgb 10.9 now admitted for monitoring.    #Rectal polypoid mass s/p resection and EMR  - Colonoscopy 11/30/2023: irregular friable mass from dentate line to 10 cm. At dentate line, lesion is 75% circumferential, and towards 10 cm it was 25% circumferential.  Lesion is broad and rises to a polypoid shape.  - s/p today rectal polypoid mass resection with EMR. Admitted for monitoring.   - GI post-procedure note recs (12/15): Monitor CBC, Start clear liquids, CIpro flagyl x 4 days (dose given intra procedurally), In 48 hours advance diet to low residue x 2 weeks   Risk of bleeding remains x 2 weeks post procedure, Repeat colonoscopy in 1 month  - s/p Cipro and Flagyl in endo suite  - c/w cipro and flagyl   - Monitor CBC BID  - If there are clinical signs of bleed, abdominal rigidity or tachycardia -> get KUB  - Clear liquid diet; if tolerated advance progressively     #BPH  - c/w finasteride and Alfuzosin    #spinal stenosis  - c/w Gabapentin    #RA  - c/w Methotrexate     #MISC  - DVT ppx: Lovenox  - GI ppx: none  - Activity: IAT  - Diet: clear liquid

## 2023-12-16 NOTE — PROGRESS NOTE ADULT - SUBJECTIVE AND OBJECTIVE BOX
----------Daily Progress Note----------    HISTORY OF PRESENT ILLNESS:  Patient is a 74 yo M w/PMH of  BPH, RA, spinal stenosis, and colonoscopy in 11/30/2023 w/colonoscopy showing irregular friable mass from dentate line to 10 cm w/polypoid shape who presented s/p rectal polypoid mass resection with EMR in the endoscopy unit w/vitals stable, labs notable for K 3.3 and hgb 10.9 now admitted for monitoring.    Today is hospital day 1d.     INTERVAL HOSPITAL COURSE / OVERNIGHT EVENTS:  O/N event:      24 hr event:      Patient was examined and seen at bedside. This morning he is resting comfortably in bed and reports no new issues or overnight events.     Review of Systems: Otherwise unremarkable     <<<<<PAST MEDICAL & SURGICAL HISTORY>>>>>  Skin cancer (melanoma)    Rheumatoid arthritis    Spinal stenosis    History of BPH    History of hay fever    S/P bilateral inguinal hernia repair      ALLERGIES  No Known Allergies      Home Medications:  alfuzosin 10 mg oral tablet, extended release: 1 tab(s) orally once a day (15 Dec 2023 08:40)  finasteride 5 mg oral tablet: 1 tab(s) orally once a day (15 Dec 2023 08:40)  folic acid 1 mg oral tablet: 1 tab(s) orally once a day (15 Dec 2023 08:40)  folic acid 1 mg oral tablet: 1 tab(s) orally once a day (15 Dec 2023 17:10)  gabapentin 400 mg oral tablet: 1 tab(s) orally every 8 hours (15 Dec 2023 17:10)  methotrexate 15 mg/0.6 mL subcutaneous solution: subcutaneous once a week (15 Dec 2023 08:40)  saccharomyces boulardii lyo 250 mg oral capsule: 1 cap(s) orally 2 times a week (15 Dec 2023 08:40)        MEDICATIONS  STANDING MEDICATIONS  ciprofloxacin   IVPB 400 milliGRAM(s) IV Intermittent every 12 hours  finasteride 5 milliGRAM(s) Oral daily  folic acid 1 milliGRAM(s) Oral daily  gabapentin 400 milliGRAM(s) Oral every 8 hours  methotrexate 15 milliGRAM(s) Oral <User Schedule>  metroNIDAZOLE  IVPB 500 milliGRAM(s) IV Intermittent every 8 hours    PRN MEDICATIONS  ondansetron Injectable 4 milliGRAM(s) IV Push once PRN    VITALS:  T(F): 97.6  HR: 52  BP: 101/56  RR: 18  SpO2: 97%    <<<<<PHYSICAL EXAM>>>>>  GENERAL: Well developed, well nourished and in no acute distress. Resting comfortably in bed.  HEENT: Normocephalic, atraumatic, mucous membranes moist, EOMI, PERRLA, bilateral sclera anicteric, no conjunctival injection  Neck: Supple, non-tender, no lymphadenopathy.  PULMONARY: Clear to auscultation bilaterally. No rales, rhonchi, or wheezing.  CARDIOVASCULAR: Regular rate and rhythm, S1-S2, no murmurs  GASTROINTESTINAL: Soft, non-tender, non-distended, no guarding.  RENAL: No CVA tenderness.  SKIN/EXTREMITIES: No clubbing or edema  NEUROLOGIC/MUSCULOSKELETAL: AOx4, grossly moving all extremities, no focal deficits.    <<<<<LABS>>>>>                        10.9   7.64  )-----------( 194      ( 15 Dec 2023 21:19 )             32.0     12-15    140  |  103  |  9<L>  ----------------------------<  109<H>  3.3<L>   |  28  |  0.7    Ca    8.7      15 Dec 2023 21:19    TPro  5.3<L>  /  Alb  3.4<L>  /  TBili  0.6  /  DBili  x   /  AST  33  /  ALT  10  /  AlkPhos  62  12-15      Urinalysis Basic - ( 15 Dec 2023 21:19 )    Color: x / Appearance: x / SG: x / pH: x  Gluc: 109 mg/dL / Ketone: x  / Bili: x / Urobili: x   Blood: x / Protein: x / Nitrite: x   Leuk Esterase: x / RBC: x / WBC x   Sq Epi: x / Non Sq Epi: x / Bacteria: x          [id number]        <<<<<RADIOLOGY>>>>>        ----------------------------------------------------------------------------------------------------------------------------------------------------------------------------------------------- ----------Daily Progress Note----------    HISTORY OF PRESENT ILLNESS:  Patient is a 72 yo M w/PMH of  BPH, RA, spinal stenosis, and colonoscopy in 11/30/2023 w/colonoscopy showing irregular friable mass from dentate line to 10 cm w/polypoid shape who presented s/p rectal polypoid mass resection with EMR in the endoscopy unit w/vitals stable, labs notable for K 3.3 and hgb 10.9 now admitted for monitoring.    Today is hospital day 1d.     INTERVAL HOSPITAL COURSE / OVERNIGHT EVENTS:  O/N event:      24 hr event:      Patient was examined and seen at bedside. This morning he is resting comfortably in bed and reports no new issues or overnight events.     Review of Systems: Otherwise unremarkable     <<<<<PAST MEDICAL & SURGICAL HISTORY>>>>>  Skin cancer (melanoma)    Rheumatoid arthritis    Spinal stenosis    History of BPH    History of hay fever    S/P bilateral inguinal hernia repair      ALLERGIES  No Known Allergies      Home Medications:  alfuzosin 10 mg oral tablet, extended release: 1 tab(s) orally once a day (15 Dec 2023 08:40)  finasteride 5 mg oral tablet: 1 tab(s) orally once a day (15 Dec 2023 08:40)  folic acid 1 mg oral tablet: 1 tab(s) orally once a day (15 Dec 2023 08:40)  folic acid 1 mg oral tablet: 1 tab(s) orally once a day (15 Dec 2023 17:10)  gabapentin 400 mg oral tablet: 1 tab(s) orally every 8 hours (15 Dec 2023 17:10)  methotrexate 15 mg/0.6 mL subcutaneous solution: subcutaneous once a week (15 Dec 2023 08:40)  saccharomyces boulardii lyo 250 mg oral capsule: 1 cap(s) orally 2 times a week (15 Dec 2023 08:40)        MEDICATIONS  STANDING MEDICATIONS  ciprofloxacin   IVPB 400 milliGRAM(s) IV Intermittent every 12 hours  finasteride 5 milliGRAM(s) Oral daily  folic acid 1 milliGRAM(s) Oral daily  gabapentin 400 milliGRAM(s) Oral every 8 hours  methotrexate 15 milliGRAM(s) Oral <User Schedule>  metroNIDAZOLE  IVPB 500 milliGRAM(s) IV Intermittent every 8 hours    PRN MEDICATIONS  ondansetron Injectable 4 milliGRAM(s) IV Push once PRN    VITALS:  T(F): 97.6  HR: 52  BP: 101/56  RR: 18  SpO2: 97%    <<<<<PHYSICAL EXAM>>>>>  GENERAL: Well developed, well nourished and in no acute distress. Resting comfortably in bed.  HEENT: Normocephalic, atraumatic, mucous membranes moist, EOMI, PERRLA, bilateral sclera anicteric, no conjunctival injection  Neck: Supple, non-tender, no lymphadenopathy.  PULMONARY: Clear to auscultation bilaterally. No rales, rhonchi, or wheezing.  CARDIOVASCULAR: Regular rate and rhythm, S1-S2, no murmurs  GASTROINTESTINAL: Soft, non-tender, non-distended, no guarding.  RENAL: No CVA tenderness.  SKIN/EXTREMITIES: No clubbing or edema  NEUROLOGIC/MUSCULOSKELETAL: AOx4, grossly moving all extremities, no focal deficits.    <<<<<LABS>>>>>                        10.9   7.64  )-----------( 194      ( 15 Dec 2023 21:19 )             32.0     12-15    140  |  103  |  9<L>  ----------------------------<  109<H>  3.3<L>   |  28  |  0.7    Ca    8.7      15 Dec 2023 21:19    TPro  5.3<L>  /  Alb  3.4<L>  /  TBili  0.6  /  DBili  x   /  AST  33  /  ALT  10  /  AlkPhos  62  12-15      Urinalysis Basic - ( 15 Dec 2023 21:19 )    Color: x / Appearance: x / SG: x / pH: x  Gluc: 109 mg/dL / Ketone: x  / Bili: x / Urobili: x   Blood: x / Protein: x / Nitrite: x   Leuk Esterase: x / RBC: x / WBC x   Sq Epi: x / Non Sq Epi: x / Bacteria: x          [id number]        <<<<<RADIOLOGY>>>>>        ----------------------------------------------------------------------------------------------------------------------------------------------------------------------------------------------- ----------Daily Progress Note----------    HISTORY OF PRESENT ILLNESS:  Patient is a 74 yo M w/PMH of  BPH, RA, spinal stenosis, and colonoscopy in 11/30/2023 w/colonoscopy showing irregular friable mass from dentate line to 10 cm w/polypoid shape who presented s/p rectal polypoid mass resection with EMR in the endoscopy unit w/vitals stable, labs notable for K 3.3 and hgb 10.9 now admitted for monitoring.    Today is hospital day 1d.     INTERVAL HOSPITAL COURSE / OVERNIGHT EVENTS:  O/N event:  None    24 hr event:  None    Patient was examined and seen at bedside. Denies any abdominal pain, denies any hematochezia, no BM yet, endorses good appetite    Review of Systems: Otherwise unremarkable     <<<<<PAST MEDICAL & SURGICAL HISTORY>>>>>  Skin cancer (melanoma)    Rheumatoid arthritis    Spinal stenosis    History of BPH    History of hay fever    S/P bilateral inguinal hernia repair      ALLERGIES  No Known Allergies      Home Medications:  alfuzosin 10 mg oral tablet, extended release: 1 tab(s) orally once a day (15 Dec 2023 08:40)  finasteride 5 mg oral tablet: 1 tab(s) orally once a day (15 Dec 2023 08:40)  folic acid 1 mg oral tablet: 1 tab(s) orally once a day (15 Dec 2023 08:40)  folic acid 1 mg oral tablet: 1 tab(s) orally once a day (15 Dec 2023 17:10)  gabapentin 400 mg oral tablet: 1 tab(s) orally every 8 hours (15 Dec 2023 17:10)  methotrexate 15 mg/0.6 mL subcutaneous solution: subcutaneous once a week (15 Dec 2023 08:40)  saccharomyces boulardii lyo 250 mg oral capsule: 1 cap(s) orally 2 times a week (15 Dec 2023 08:40)        MEDICATIONS  STANDING MEDICATIONS  ciprofloxacin   IVPB 400 milliGRAM(s) IV Intermittent every 12 hours  finasteride 5 milliGRAM(s) Oral daily  folic acid 1 milliGRAM(s) Oral daily  gabapentin 400 milliGRAM(s) Oral every 8 hours  methotrexate 15 milliGRAM(s) Oral <User Schedule>  metroNIDAZOLE  IVPB 500 milliGRAM(s) IV Intermittent every 8 hours    PRN MEDICATIONS  ondansetron Injectable 4 milliGRAM(s) IV Push once PRN    VITALS:  T(F): 97.6  HR: 52  BP: 101/56  RR: 18  SpO2: 97%    <<<<<PHYSICAL EXAM>>>>>  GENERAL: NAD  PULMONARY: Clear to auscultation bilaterally. No rales, rhonchi, or wheezing.  CARDIOVASCULAR: Regular rate and rhythm, S1-S2, no murmurs, rubs, or gallops  GASTROINTESTINAL: Soft, non-tender, non-distended, no guarding, normoactive bowel sounds  SKIN/EXTREMITIES: Warm, 2+ tibialis posterior pulse, no UE or LE edema  NEUROLOGIC/MUSCULOSKELETAL: AOx4, grossly moving all extremities, no focal deficits.    <<<<<LABS>>>>>                        10.9   7.64  )-----------( 194      ( 15 Dec 2023 21:19 )             32.0     12-15    140  |  103  |  9<L>  ----------------------------<  109<H>  3.3<L>   |  28  |  0.7    Ca    8.7      15 Dec 2023 21:19    TPro  5.3<L>  /  Alb  3.4<L>  /  TBili  0.6  /  DBili  x   /  AST  33  /  ALT  10  /  AlkPhos  62  12-15      Urinalysis Basic - ( 15 Dec 2023 21:19 )    Color: x / Appearance: x / SG: x / pH: x  Gluc: 109 mg/dL / Ketone: x  / Bili: x / Urobili: x   Blood: x / Protein: x / Nitrite: x   Leuk Esterase: x / RBC: x / WBC x   Sq Epi: x / Non Sq Epi: x / Bacteria: x          [id number]        <<<<<RADIOLOGY>>>>>        ----------------------------------------------------------------------------------------------------------------------------------------------------------------------------------------------- ----------Daily Progress Note----------    HISTORY OF PRESENT ILLNESS:  Patient is a 74 yo M w/PMH of  BPH, RA, spinal stenosis, and colonoscopy in 11/30/2023 w/colonoscopy showing irregular friable mass from dentate line to 10 cm w/polypoid shape who presented s/p rectal polypoid mass resection with EMR in the endoscopy unit w/vitals stable, labs notable for K 3.3 and hgb 10.9 now admitted for monitoring.    Today is hospital day 1d.     INTERVAL HOSPITAL COURSE / OVERNIGHT EVENTS:  O/N event:  None    24 hr event:  None    Patient was examined and seen at bedside. Denies any abdominal pain, denies any hematochezia, no BM yet, endorses good appetite    Review of Systems: Otherwise unremarkable     <<<<<PAST MEDICAL & SURGICAL HISTORY>>>>>  Skin cancer (melanoma)    Rheumatoid arthritis    Spinal stenosis    History of BPH    History of hay fever    S/P bilateral inguinal hernia repair      ALLERGIES  No Known Allergies      Home Medications:  alfuzosin 10 mg oral tablet, extended release: 1 tab(s) orally once a day (15 Dec 2023 08:40)  finasteride 5 mg oral tablet: 1 tab(s) orally once a day (15 Dec 2023 08:40)  folic acid 1 mg oral tablet: 1 tab(s) orally once a day (15 Dec 2023 08:40)  folic acid 1 mg oral tablet: 1 tab(s) orally once a day (15 Dec 2023 17:10)  gabapentin 400 mg oral tablet: 1 tab(s) orally every 8 hours (15 Dec 2023 17:10)  methotrexate 15 mg/0.6 mL subcutaneous solution: subcutaneous once a week (15 Dec 2023 08:40)  saccharomyces boulardii lyo 250 mg oral capsule: 1 cap(s) orally 2 times a week (15 Dec 2023 08:40)        MEDICATIONS  STANDING MEDICATIONS  ciprofloxacin   IVPB 400 milliGRAM(s) IV Intermittent every 12 hours  finasteride 5 milliGRAM(s) Oral daily  folic acid 1 milliGRAM(s) Oral daily  gabapentin 400 milliGRAM(s) Oral every 8 hours  methotrexate 15 milliGRAM(s) Oral <User Schedule>  metroNIDAZOLE  IVPB 500 milliGRAM(s) IV Intermittent every 8 hours    PRN MEDICATIONS  ondansetron Injectable 4 milliGRAM(s) IV Push once PRN    VITALS:  T(F): 97.6  HR: 52  BP: 101/56  RR: 18  SpO2: 97%    <<<<<PHYSICAL EXAM>>>>>  GENERAL: NAD  PULMONARY: Clear to auscultation bilaterally. No rales, rhonchi, or wheezing.  CARDIOVASCULAR: Regular rate and rhythm, S1-S2, no murmurs, rubs, or gallops  GASTROINTESTINAL: Soft, non-tender, non-distended, no guarding, normoactive bowel sounds  SKIN/EXTREMITIES: Warm, 2+ tibialis posterior pulse, no UE or LE edema  NEUROLOGIC/MUSCULOSKELETAL: AOx4, grossly moving all extremities, no focal deficits.    <<<<<LABS>>>>>                        10.9   7.64  )-----------( 194      ( 15 Dec 2023 21:19 )             32.0     12-15    140  |  103  |  9<L>  ----------------------------<  109<H>  3.3<L>   |  28  |  0.7    Ca    8.7      15 Dec 2023 21:19    TPro  5.3<L>  /  Alb  3.4<L>  /  TBili  0.6  /  DBili  x   /  AST  33  /  ALT  10  /  AlkPhos  62  12-15      Urinalysis Basic - ( 15 Dec 2023 21:19 )    Color: x / Appearance: x / SG: x / pH: x  Gluc: 109 mg/dL / Ketone: x  / Bili: x / Urobili: x   Blood: x / Protein: x / Nitrite: x   Leuk Esterase: x / RBC: x / WBC x   Sq Epi: x / Non Sq Epi: x / Bacteria: x          [id number]        <<<<<RADIOLOGY>>>>>        -----------------------------------------------------------------------------------------------------------------------------------------------------------------------------------------------

## 2023-12-16 NOTE — PROGRESS NOTE ADULT - SUBJECTIVE AND OBJECTIVE BOX
Gastroenterology Follow Up Note      [facility] 3B 014 A [facility]  Patient Name: MARI LASSITER  Age: 73y  Gender: Male      Chief Complaint  Patient is a 73y old Male who presents with a chief complaint of s/p rectal polypoid mass resection (16 Dec 2023 06:55)  Primary diagnosis of Hemorrhage of rectum and anus      Reason for Consult  EMR for rectal mass      Progress Note  This morning patient was seen and examined at bedside.    Today is hospital day 1d.  Patient is doing fine. No acute events overnight.   Patient's appetite is adequate, but he is still on clears and denies nausea or vomiting.   Patient denies any abdominal pain.  Last bowel movement was clear post golytely and was on 12/15.        Vital Signs in the last 24 hours   Vitals Summary T(C): 36.4 (12-16-23 @ 05:20), Max: 36.6 (12-15-23 @ 21:18)  HR: 52 (12-16-23 @ 05:20) (52 - 69)  BP: 101/56 (12-16-23 @ 05:20) (101/56 - 136/70)  RR: 18 (12-16-23 @ 05:20) (16 - 18)  SpO2: 97% (12-15-23 @ 18:47) (97% - 100%)  Vent Data   Intake/ Output   12-15-23 @ 07:01  -  12-16-23 @ 07:00  --------------------------------------------------------  IN: 0 mL / OUT: 1600 mL / NET: -1600 mL      Physical Exam  * General Appearance: Alert, cooperative, interactive, oriented to time, place, and person, in no acute distress  * Lungs: Good bilateral air entry, normal breath sounds  * Heart: Regular Rate and Rhythm, normal S1 and S2, no audible murmur, rub, or gallop  * Abdomen: Symmetric, no scar, soft, non-tender, bowel sounds active all four quadrants      Investigations   Laboratory Workup      - CBC:                        11.4   7.60  )-----------( 197      ( 16 Dec 2023 08:04 )             33.9       - Hgb Trend:  11.4  12-16-23 @ 08:04  10.9  12-15-23 @ 21:19          - Chemistry:  12-16    146  |  107  |  7<L>  ----------------------------<  94  4.1   |  30  |  0.8    Ca    9.3      16 Dec 2023 08:04  Phos  3.2     12-16  Mg     2.0     12-16    TPro  5.6<L>  /  Alb  3.8  /  TBili  0.8  /  DBili  x   /  AST  36  /  ALT  10  /  AlkPhos  63  12-16    Liver panel trend:  TBili 0.8   /   AST 36   /   ALT 10   /   AlkP 63   /   Tptn 5.6   /   Alb 3.8    /   DBili --      12-16  TBili 0.6   /   AST 33   /   ALT 10   /   AlkP 62   /   Tptn 5.3   /   Alb 3.4    /   DBili --      12-15          Microbiological Workup  Urinalysis Basic - ( 16 Dec 2023 08:04 )    Color: x / Appearance: x / SG: x / pH: x  Gluc: 94 mg/dL / Ketone: x  / Bili: x / Urobili: x   Blood: x / Protein: x / Nitrite: x   Leuk Esterase: x / RBC: x / WBC x   Sq Epi: x / Non Sq Epi: x / Bacteria: x      Current Medications  Standing Medications  ciprofloxacin   IVPB 400 milliGRAM(s) IV Intermittent every 12 hours  enoxaparin Injectable 40 milliGRAM(s) SubCutaneous every 24 hours  finasteride 5 milliGRAM(s) Oral daily  folic acid 1 milliGRAM(s) Oral daily  gabapentin 400 milliGRAM(s) Oral every 8 hours  methotrexate 15 milliGRAM(s) Oral <User Schedule>  metroNIDAZOLE  IVPB 500 milliGRAM(s) IV Intermittent every 8 hours    PRN Medications  ondansetron Injectable 4 milliGRAM(s) IV Push once PRN Nausea and/or Vomiting    Singles Doses Administered  ciprofloxacin   IVPB 400 milliGRAM(s) IV Intermittent once  metroNIDAZOLE  IVPB 500 milliGRAM(s) IV Intermittent once       Gastroenterology Follow Up Note      [facility]  Patient Name: MARI LASSITER  Age: 73y  Gender: Male      Chief Complaint  Patient is a 73y old Male who presents with a chief complaint of s/p rectal polypoid mass resection (16 Dec 2023 06:55)  Primary diagnosis of Hemorrhage of rectum and anus      Reason for Consult  EMR for rectal mass      Progress Note  This morning patient was seen and examined at bedside.    Today is hospital day 1d.  Patient is doing fine. No acute events overnight.   Patient's appetite is adequate, but he is still on clears and denies nausea or vomiting.   Patient denies any abdominal pain.  Last bowel movement was clear post golytely and was on 12/15.        Vital Signs in the last 24 hours   Vitals Summary T(C): 36.4 (12-16-23 @ 05:20), Max: 36.6 (12-15-23 @ 21:18)  HR: 52 (12-16-23 @ 05:20) (52 - 69)  BP: 101/56 (12-16-23 @ 05:20) (101/56 - 136/70)  RR: 18 (12-16-23 @ 05:20) (16 - 18)  SpO2: 97% (12-15-23 @ 18:47) (97% - 100%)  Vent Data   Intake/ Output   12-15-23 @ 07:01  -  12-16-23 @ 07:00  --------------------------------------------------------  IN: 0 mL / OUT: 1600 mL / NET: -1600 mL      Physical Exam  * General Appearance: Alert, cooperative, interactive, oriented to time, place, and person, in no acute distress  * Lungs: Good bilateral air entry, normal breath sounds  * Heart: Regular Rate and Rhythm, normal S1 and S2, no audible murmur, rub, or gallop  * Abdomen: Symmetric, no scar, soft, non-tender, bowel sounds active all four quadrants      Investigations   Laboratory Workup      - CBC:                        11.4   7.60  )-----------( 197      ( 16 Dec 2023 08:04 )             33.9       - Hgb Trend:  11.4  12-16-23 @ 08:04  10.9  12-15-23 @ 21:19          - Chemistry:  12-16    146  |  107  |  7<L>  ----------------------------<  94  4.1   |  30  |  0.8    Ca    9.3      16 Dec 2023 08:04  Phos  3.2     12-16  Mg     2.0     12-16    TPro  5.6<L>  /  Alb  3.8  /  TBili  0.8  /  DBili  x   /  AST  36  /  ALT  10  /  AlkPhos  63  12-16    Liver panel trend:  TBili 0.8   /   AST 36   /   ALT 10   /   AlkP 63   /   Tptn 5.6   /   Alb 3.8    /   DBili --      12-16  TBili 0.6   /   AST 33   /   ALT 10   /   AlkP 62   /   Tptn 5.3   /   Alb 3.4    /   DBili --      12-15          Microbiological Workup  Urinalysis Basic - ( 16 Dec 2023 08:04 )    Color: x / Appearance: x / SG: x / pH: x  Gluc: 94 mg/dL / Ketone: x  / Bili: x / Urobili: x   Blood: x / Protein: x / Nitrite: x   Leuk Esterase: x / RBC: x / WBC x   Sq Epi: x / Non Sq Epi: x / Bacteria: x      Current Medications  Standing Medications  ciprofloxacin   IVPB 400 milliGRAM(s) IV Intermittent every 12 hours  enoxaparin Injectable 40 milliGRAM(s) SubCutaneous every 24 hours  finasteride 5 milliGRAM(s) Oral daily  folic acid 1 milliGRAM(s) Oral daily  gabapentin 400 milliGRAM(s) Oral every 8 hours  methotrexate 15 milliGRAM(s) Oral <User Schedule>  metroNIDAZOLE  IVPB 500 milliGRAM(s) IV Intermittent every 8 hours    PRN Medications  ondansetron Injectable 4 milliGRAM(s) IV Push once PRN Nausea and/or Vomiting    Singles Doses Administered  ciprofloxacin   IVPB 400 milliGRAM(s) IV Intermittent once  metroNIDAZOLE  IVPB 500 milliGRAM(s) IV Intermittent once

## 2023-12-16 NOTE — PROGRESS NOTE ADULT - ASSESSMENT
Assessment and Plan  Case of a 73 year old male patient with history of RA, BPH, spinal stenosis and recently diagnosed rectal mass who presented on 12/15 for EMR of mass, status post EMR on 12/15 complicated by bleeding during procedure. He was admitted for monitoring for post polypectomy bleeding.       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 At dentate line, lesion is 75% circumferential, and towards 10 cm it was 25% circumferential.  Lesion is broad and rises to a polypoid shape.  * Status post repeat on 12/15 with EMR of mass  * Currently hemodynamically stable  * Stable Hb  * Last BM was on 12/15: no bleeding    RECOMMENDATIONS  - Follow up pathology and consider oncology evaluation if warranted  - Continue clear liquid diet today then switch to full liquids on 12/17. Can then discharge on 12/17 on soft diet with low residues  - Monitor BM for hematochezia  - Trend Hb and keep >7  - Start protonix 40mg QD  - Monitor nausea. Continue IV Zofran PRN  - Continue IV antibiotics and consider cultures in case of fever  - Will need follow up with Dr Phill Campbell in 1 month for repeat colonoscopy      Thank you for your consult.  - Please note that plan was communicated with medical team.   - Please reach GI on [phone] during weekdays till 5pm.  - Please call the GI service line after 5pm on Weekdays and anytime on Weekends: [phone].      Doris Fontana MD  PGY - 4 Gastroenterology Fellow   [facility]     Assessment and Plan  Case of a 73 year old male patient with history of RA, BPH, spinal stenosis and recently diagnosed rectal mass who presented on 12/15 for EMR of mass, status post EMR on 12/15 complicated by bleeding during procedure. He was admitted for monitoring for post polypectomy bleeding.       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 At dentate line, lesion is 75% circumferential, and towards 10 cm it was 25% circumferential.  Lesion is broad and rises to a polypoid shape.  * Status post repeat on 12/15 with EMR of mass  * Currently hemodynamically stable  * Stable Hb  * Last BM was on 12/15: no bleeding    RECOMMENDATIONS  - Follow up pathology and consider oncology evaluation if warranted  - Continue clear liquid diet today then switch to full liquids on 12/17. Can then discharge on 12/17 on soft diet with low residues  - Monitor BM for hematochezia  - Trend Hb and keep >7  - Start protonix 40mg QD  - Monitor nausea. Continue IV Zofran PRN  - Continue IV antibiotics and consider cultures in case of fever  - Will need follow up with Dr Phill Campbell in 1 month for repeat colonoscopy      Thank you for your consult.  - Please note that plan was communicated with medical team.   - Please reach GI on [phone] during weekdays till 5pm.  - Please call the GI service line after 5pm on Weekdays and anytime on Weekends: [phone].      Doris Fontana MD  PGY - 4 Gastroenterology Fellow   [facility]

## 2023-12-17 ENCOUNTER — TRANSCRIPTION ENCOUNTER (OUTPATIENT)
Age: 73
End: 2023-12-17

## 2023-12-17 LAB
ALBUMIN SERPL ELPH-MCNC: 3.4 G/DL — LOW (ref 3.5–5.2)
ALBUMIN SERPL ELPH-MCNC: 3.4 G/DL — LOW (ref 3.5–5.2)
ALP SERPL-CCNC: 61 U/L — SIGNIFICANT CHANGE UP (ref 30–115)
ALP SERPL-CCNC: 61 U/L — SIGNIFICANT CHANGE UP (ref 30–115)
ALT FLD-CCNC: 9 U/L — SIGNIFICANT CHANGE UP (ref 0–41)
ALT FLD-CCNC: 9 U/L — SIGNIFICANT CHANGE UP (ref 0–41)
ANION GAP SERPL CALC-SCNC: 8 MMOL/L — SIGNIFICANT CHANGE UP (ref 7–14)
ANION GAP SERPL CALC-SCNC: 8 MMOL/L — SIGNIFICANT CHANGE UP (ref 7–14)
AST SERPL-CCNC: 32 U/L — SIGNIFICANT CHANGE UP (ref 0–41)
AST SERPL-CCNC: 32 U/L — SIGNIFICANT CHANGE UP (ref 0–41)
BASOPHILS # BLD AUTO: 0.03 K/UL — SIGNIFICANT CHANGE UP (ref 0–0.2)
BASOPHILS # BLD AUTO: 0.03 K/UL — SIGNIFICANT CHANGE UP (ref 0–0.2)
BASOPHILS NFR BLD AUTO: 0.5 % — SIGNIFICANT CHANGE UP (ref 0–1)
BASOPHILS NFR BLD AUTO: 0.5 % — SIGNIFICANT CHANGE UP (ref 0–1)
BILIRUB SERPL-MCNC: 0.6 MG/DL — SIGNIFICANT CHANGE UP (ref 0.2–1.2)
BILIRUB SERPL-MCNC: 0.6 MG/DL — SIGNIFICANT CHANGE UP (ref 0.2–1.2)
BUN SERPL-MCNC: 7 MG/DL — LOW (ref 10–20)
BUN SERPL-MCNC: 7 MG/DL — LOW (ref 10–20)
CALCIUM SERPL-MCNC: 8.9 MG/DL — SIGNIFICANT CHANGE UP (ref 8.4–10.5)
CALCIUM SERPL-MCNC: 8.9 MG/DL — SIGNIFICANT CHANGE UP (ref 8.4–10.5)
CHLORIDE SERPL-SCNC: 106 MMOL/L — SIGNIFICANT CHANGE UP (ref 98–110)
CHLORIDE SERPL-SCNC: 106 MMOL/L — SIGNIFICANT CHANGE UP (ref 98–110)
CO2 SERPL-SCNC: 29 MMOL/L — SIGNIFICANT CHANGE UP (ref 17–32)
CO2 SERPL-SCNC: 29 MMOL/L — SIGNIFICANT CHANGE UP (ref 17–32)
CREAT SERPL-MCNC: 0.8 MG/DL — SIGNIFICANT CHANGE UP (ref 0.7–1.5)
CREAT SERPL-MCNC: 0.8 MG/DL — SIGNIFICANT CHANGE UP (ref 0.7–1.5)
EGFR: 95 ML/MIN/1.73M2 — SIGNIFICANT CHANGE UP
EGFR: 95 ML/MIN/1.73M2 — SIGNIFICANT CHANGE UP
EOSINOPHIL # BLD AUTO: 0.06 K/UL — SIGNIFICANT CHANGE UP (ref 0–0.7)
EOSINOPHIL # BLD AUTO: 0.06 K/UL — SIGNIFICANT CHANGE UP (ref 0–0.7)
EOSINOPHIL NFR BLD AUTO: 1 % — SIGNIFICANT CHANGE UP (ref 0–8)
EOSINOPHIL NFR BLD AUTO: 1 % — SIGNIFICANT CHANGE UP (ref 0–8)
GLUCOSE SERPL-MCNC: 140 MG/DL — HIGH (ref 70–99)
GLUCOSE SERPL-MCNC: 140 MG/DL — HIGH (ref 70–99)
HCT VFR BLD CALC: 32.4 % — LOW (ref 42–52)
HCT VFR BLD CALC: 32.4 % — LOW (ref 42–52)
HCT VFR BLD CALC: 35 % — LOW (ref 42–52)
HCT VFR BLD CALC: 35 % — LOW (ref 42–52)
HGB BLD-MCNC: 11 G/DL — LOW (ref 14–18)
HGB BLD-MCNC: 11 G/DL — LOW (ref 14–18)
HGB BLD-MCNC: 11.5 G/DL — LOW (ref 14–18)
HGB BLD-MCNC: 11.5 G/DL — LOW (ref 14–18)
IMM GRANULOCYTES NFR BLD AUTO: 0.2 % — SIGNIFICANT CHANGE UP (ref 0.1–0.3)
IMM GRANULOCYTES NFR BLD AUTO: 0.2 % — SIGNIFICANT CHANGE UP (ref 0.1–0.3)
LYMPHOCYTES # BLD AUTO: 1.05 K/UL — LOW (ref 1.2–3.4)
LYMPHOCYTES # BLD AUTO: 1.05 K/UL — LOW (ref 1.2–3.4)
LYMPHOCYTES # BLD AUTO: 16.8 % — LOW (ref 20.5–51.1)
LYMPHOCYTES # BLD AUTO: 16.8 % — LOW (ref 20.5–51.1)
MAGNESIUM SERPL-MCNC: 1.8 MG/DL — SIGNIFICANT CHANGE UP (ref 1.8–2.4)
MAGNESIUM SERPL-MCNC: 1.8 MG/DL — SIGNIFICANT CHANGE UP (ref 1.8–2.4)
MCHC RBC-ENTMCNC: 31.5 PG — HIGH (ref 27–31)
MCHC RBC-ENTMCNC: 31.5 PG — HIGH (ref 27–31)
MCHC RBC-ENTMCNC: 32.3 PG — HIGH (ref 27–31)
MCHC RBC-ENTMCNC: 32.3 PG — HIGH (ref 27–31)
MCHC RBC-ENTMCNC: 32.9 G/DL — SIGNIFICANT CHANGE UP (ref 32–37)
MCHC RBC-ENTMCNC: 32.9 G/DL — SIGNIFICANT CHANGE UP (ref 32–37)
MCHC RBC-ENTMCNC: 34 G/DL — SIGNIFICANT CHANGE UP (ref 32–37)
MCHC RBC-ENTMCNC: 34 G/DL — SIGNIFICANT CHANGE UP (ref 32–37)
MCV RBC AUTO: 95 FL — HIGH (ref 80–94)
MCV RBC AUTO: 95 FL — HIGH (ref 80–94)
MCV RBC AUTO: 95.9 FL — HIGH (ref 80–94)
MCV RBC AUTO: 95.9 FL — HIGH (ref 80–94)
MONOCYTES # BLD AUTO: 0.53 K/UL — SIGNIFICANT CHANGE UP (ref 0.1–0.6)
MONOCYTES # BLD AUTO: 0.53 K/UL — SIGNIFICANT CHANGE UP (ref 0.1–0.6)
MONOCYTES NFR BLD AUTO: 8.5 % — SIGNIFICANT CHANGE UP (ref 1.7–9.3)
MONOCYTES NFR BLD AUTO: 8.5 % — SIGNIFICANT CHANGE UP (ref 1.7–9.3)
NEUTROPHILS # BLD AUTO: 4.57 K/UL — SIGNIFICANT CHANGE UP (ref 1.4–6.5)
NEUTROPHILS # BLD AUTO: 4.57 K/UL — SIGNIFICANT CHANGE UP (ref 1.4–6.5)
NEUTROPHILS NFR BLD AUTO: 73 % — SIGNIFICANT CHANGE UP (ref 42.2–75.2)
NEUTROPHILS NFR BLD AUTO: 73 % — SIGNIFICANT CHANGE UP (ref 42.2–75.2)
NRBC # BLD: 0 /100 WBCS — SIGNIFICANT CHANGE UP (ref 0–0)
PHOSPHATE SERPL-MCNC: 2.7 MG/DL — SIGNIFICANT CHANGE UP (ref 2.1–4.9)
PHOSPHATE SERPL-MCNC: 2.7 MG/DL — SIGNIFICANT CHANGE UP (ref 2.1–4.9)
PLATELET # BLD AUTO: 195 K/UL — SIGNIFICANT CHANGE UP (ref 130–400)
PLATELET # BLD AUTO: 195 K/UL — SIGNIFICANT CHANGE UP (ref 130–400)
PLATELET # BLD AUTO: 203 K/UL — SIGNIFICANT CHANGE UP (ref 130–400)
PLATELET # BLD AUTO: 203 K/UL — SIGNIFICANT CHANGE UP (ref 130–400)
PMV BLD: 11 FL — HIGH (ref 7.4–10.4)
PMV BLD: 11 FL — HIGH (ref 7.4–10.4)
PMV BLD: 11.1 FL — HIGH (ref 7.4–10.4)
PMV BLD: 11.1 FL — HIGH (ref 7.4–10.4)
POTASSIUM SERPL-MCNC: 4 MMOL/L — SIGNIFICANT CHANGE UP (ref 3.5–5)
POTASSIUM SERPL-MCNC: 4 MMOL/L — SIGNIFICANT CHANGE UP (ref 3.5–5)
POTASSIUM SERPL-SCNC: 4 MMOL/L — SIGNIFICANT CHANGE UP (ref 3.5–5)
POTASSIUM SERPL-SCNC: 4 MMOL/L — SIGNIFICANT CHANGE UP (ref 3.5–5)
PROT SERPL-MCNC: 5.7 G/DL — LOW (ref 6–8)
PROT SERPL-MCNC: 5.7 G/DL — LOW (ref 6–8)
RBC # BLD: 3.41 M/UL — LOW (ref 4.7–6.1)
RBC # BLD: 3.41 M/UL — LOW (ref 4.7–6.1)
RBC # BLD: 3.65 M/UL — LOW (ref 4.7–6.1)
RBC # BLD: 3.65 M/UL — LOW (ref 4.7–6.1)
RBC # FLD: 13.4 % — SIGNIFICANT CHANGE UP (ref 11.5–14.5)
RBC # FLD: 13.4 % — SIGNIFICANT CHANGE UP (ref 11.5–14.5)
RBC # FLD: 13.6 % — SIGNIFICANT CHANGE UP (ref 11.5–14.5)
RBC # FLD: 13.6 % — SIGNIFICANT CHANGE UP (ref 11.5–14.5)
SODIUM SERPL-SCNC: 143 MMOL/L — SIGNIFICANT CHANGE UP (ref 135–146)
SODIUM SERPL-SCNC: 143 MMOL/L — SIGNIFICANT CHANGE UP (ref 135–146)
WBC # BLD: 6.25 K/UL — SIGNIFICANT CHANGE UP (ref 4.8–10.8)
WBC # BLD: 6.25 K/UL — SIGNIFICANT CHANGE UP (ref 4.8–10.8)
WBC # BLD: 7.1 K/UL — SIGNIFICANT CHANGE UP (ref 4.8–10.8)
WBC # BLD: 7.1 K/UL — SIGNIFICANT CHANGE UP (ref 4.8–10.8)
WBC # FLD AUTO: 6.25 K/UL — SIGNIFICANT CHANGE UP (ref 4.8–10.8)
WBC # FLD AUTO: 6.25 K/UL — SIGNIFICANT CHANGE UP (ref 4.8–10.8)
WBC # FLD AUTO: 7.1 K/UL — SIGNIFICANT CHANGE UP (ref 4.8–10.8)
WBC # FLD AUTO: 7.1 K/UL — SIGNIFICANT CHANGE UP (ref 4.8–10.8)

## 2023-12-17 RX ORDER — TAMSULOSIN HYDROCHLORIDE 0.4 MG/1
0.4 CAPSULE ORAL AT BEDTIME
Refills: 0 | Status: DISCONTINUED | OUTPATIENT
Start: 2023-12-17 | End: 2023-12-18

## 2023-12-17 RX ORDER — CIPROFLOXACIN LACTATE 400MG/40ML
1 VIAL (ML) INTRAVENOUS
Qty: 4 | Refills: 0
Start: 2023-12-17 | End: 2023-12-18

## 2023-12-17 RX ORDER — METRONIDAZOLE 500 MG
1 TABLET ORAL
Qty: 6 | Refills: 0
Start: 2023-12-17 | End: 2023-12-18

## 2023-12-17 RX ORDER — FOLIC ACID 0.8 MG
1 TABLET ORAL
Refills: 0 | DISCHARGE

## 2023-12-17 RX ADMIN — FINASTERIDE 5 MILLIGRAM(S): 5 TABLET, FILM COATED ORAL at 13:18

## 2023-12-17 RX ADMIN — Medication 100 MILLIGRAM(S): at 13:18

## 2023-12-17 RX ADMIN — Medication 100 MILLIGRAM(S): at 05:09

## 2023-12-17 RX ADMIN — Medication 200 MILLIGRAM(S): at 05:09

## 2023-12-17 RX ADMIN — TAMSULOSIN HYDROCHLORIDE 0.4 MILLIGRAM(S): 0.4 CAPSULE ORAL at 22:07

## 2023-12-17 RX ADMIN — ENOXAPARIN SODIUM 40 MILLIGRAM(S): 100 INJECTION SUBCUTANEOUS at 13:18

## 2023-12-17 RX ADMIN — GABAPENTIN 400 MILLIGRAM(S): 400 CAPSULE ORAL at 13:18

## 2023-12-17 RX ADMIN — Medication 1 MILLIGRAM(S): at 13:18

## 2023-12-17 RX ADMIN — Medication 100 MILLIGRAM(S): at 22:07

## 2023-12-17 RX ADMIN — GABAPENTIN 400 MILLIGRAM(S): 400 CAPSULE ORAL at 22:07

## 2023-12-17 RX ADMIN — GABAPENTIN 400 MILLIGRAM(S): 400 CAPSULE ORAL at 05:08

## 2023-12-17 RX ADMIN — Medication 200 MILLIGRAM(S): at 17:19

## 2023-12-17 NOTE — PROGRESS NOTE ADULT - SUBJECTIVE AND OBJECTIVE BOX
Gastroenterology Follow Up Note      Location: [facility] 3B 014 A [facility]  Patient Name: MARI LASSITER  Age: 73y  Gender: Male      Chief Complaint  Patient is a 73y old Male who presents with a chief complaint of s/p rectal polypoid mass resection (16 Dec 2023 12:12)  Primary diagnosis of Hemorrhage of rectum and anus      Reason for Consult  EMR for rectal mass    Progress Note  This morning patient was seen and examined at bedside.    Today is hospital day 2d.  Patient is doing fine. No acute events overnight.   Patient's appetite is adequate, but he is still on clears and denies nausea or vomiting.   Patient denies any abdominal pain.  Last bowel movement was clear post golytely and was on 12/15 (no hematochezia).      Vital Signs in the last 24 hours   Vitals Summary T(C): 36.2 (12-17-23 @ 05:04), Max: 36.2 (12-16-23 @ 14:28)  HR: 55 (12-17-23 @ 05:04) (55 - 60)  BP: 103/55 (12-17-23 @ 05:04) (102/62 - 103/59)  RR: 18 (12-17-23 @ 05:04) (16 - 18)  SpO2: 99% (12-16-23 @ 20:27) (99% - 99%)  Vent Data   Intake/ Output   12-16-23 @ 07:01  -  12-17-23 @ 07:00  --------------------------------------------------------  IN: 0 mL / OUT: 2050 mL / NET: -2050 mL        Physical Exam  * General Appearance: Alert, cooperative, interactive, oriented to time, place, and person, in no acute distress  * Lungs: Good bilateral air entry, normal breath sounds  * Heart: Regular Rate and Rhythm, normal S1 and S2, no audible murmur, rub, or gallop  * Abdomen: Symmetric, no scar, soft, non-tender, bowel sounds active all four quadrants      Investigations   Laboratory Workup      - CBC:                        11.0   6.25  )-----------( 195      ( 17 Dec 2023 08:48 )             32.4       - Hgb Trend:  11.0  12-17-23 @ 08:48  11.8  12-16-23 @ 17:52  11.4  12-16-23 @ 08:04  10.9  12-15-23 @ 21:19          - Chemistry:  12-17    143  |  106  |  7<L>  ----------------------------<  140<H>  4.0   |  29  |  0.8    Ca    8.9      17 Dec 2023 08:48  Phos  2.7     12-17  Mg     1.8     12-17    TPro  5.7<L>  /  Alb  3.4<L>  /  TBili  0.6  /  DBili  x   /  AST  32  /  ALT  9   /  AlkPhos  61  12-17    Liver panel trend:  TBili 0.6   /   AST 32   /   ALT 9   /   AlkP 61   /   Tptn 5.7   /   Alb 3.4    /   DBili --      12-17  TBili 0.8   /   AST 36   /   ALT 10   /   AlkP 63   /   Tptn 5.6   /   Alb 3.8    /   DBili --      12-16  TBili 0.6   /   AST 33   /   ALT 10   /   AlkP 62   /   Tptn 5.3   /   Alb 3.4    /   DBili --      12-15        Microbiological Workup  Urinalysis Basic - ( 17 Dec 2023 08:48 )    Color: x / Appearance: x / SG: x / pH: x  Gluc: 140 mg/dL / Ketone: x  / Bili: x / Urobili: x   Blood: x / Protein: x / Nitrite: x   Leuk Esterase: x / RBC: x / WBC x   Sq Epi: x / Non Sq Epi: x / Bacteria: x      Current Medications  Standing Medications  ciprofloxacin   IVPB 400 milliGRAM(s) IV Intermittent every 12 hours  enoxaparin Injectable 40 milliGRAM(s) SubCutaneous every 24 hours  finasteride 5 milliGRAM(s) Oral daily  folic acid 1 milliGRAM(s) Oral daily  gabapentin 400 milliGRAM(s) Oral every 8 hours  methotrexate 15 milliGRAM(s) Oral <User Schedule>  metroNIDAZOLE  IVPB 500 milliGRAM(s) IV Intermittent every 8 hours    PRN Medications  ondansetron Injectable 4 milliGRAM(s) IV Push once PRN Nausea and/or Vomiting    Singles Doses Administered  ciprofloxacin   IVPB 400 milliGRAM(s) IV Intermittent once  metroNIDAZOLE  IVPB 500 milliGRAM(s) IV Intermittent once       Gastroenterology Follow Up Note      Location: [facility]B [facility]14 A [facility]  Patient Name: MARI LASSITER  Age: 73y  Gender: Male      Chief Complaint  Patient is a 73y old Male who presents with a chief complaint of s/p rectal polypoid mass resection (16 Dec 2023 12:12)  Primary diagnosis of Hemorrhage of rectum and anus      Reason for Consult  EMR for rectal mass    Progress Note  This morning patient was seen and examined at bedside.    Today is hospital day 2d.  Patient is doing fine. No acute events overnight.   Patient's appetite is adequate, but he is still on clears and denies nausea or vomiting.   Patient denies any abdominal pain.  Last bowel movement was clear post golytely and was on 12/15 (no hematochezia).      Vital Signs in the last 24 hours   Vitals Summary T(C): 36.2 (12-17-23 @ 05:04), Max: 36.2 (12-16-23 @ 14:28)  HR: 55 (12-17-23 @ 05:04) (55 - 60)  BP: 103/55 (12-17-23 @ 05:04) (102/62 - 103/59)  RR: 18 (12-17-23 @ 05:04) (16 - 18)  SpO2: 99% (12-16-23 @ 20:27) (99% - 99%)  Vent Data   Intake/ Output   12-16-23 @ 07:01  -  12-17-23 @ 07:00  --------------------------------------------------------  IN: 0 mL / OUT: 2050 mL / NET: -2050 mL        Physical Exam  * General Appearance: Alert, cooperative, interactive, oriented to time, place, and person, in no acute distress  * Lungs: Good bilateral air entry, normal breath sounds  * Heart: Regular Rate and Rhythm, normal S1 and S2, no audible murmur, rub, or gallop  * Abdomen: Symmetric, no scar, soft, non-tender, bowel sounds active all four quadrants      Investigations   Laboratory Workup      - CBC:                        11.0   6.25  )-----------( 195      ( 17 Dec 2023 08:48 )             32.4       - Hgb Trend:  11.0  12-17-23 @ 08:48  11.8  12-16-23 @ 17:52  11.4  12-16-23 @ 08:04  10.9  12-15-23 @ 21:19          - Chemistry:  12-17    143  |  106  |  7<L>  ----------------------------<  140<H>  4.0   |  29  |  0.8    Ca    8.9      17 Dec 2023 08:48  Phos  2.7     12-17  Mg     1.8     12-17    TPro  5.7<L>  /  Alb  3.4<L>  /  TBili  0.6  /  DBili  x   /  AST  32  /  ALT  9   /  AlkPhos  61  12-17    Liver panel trend:  TBili 0.6   /   AST 32   /   ALT 9   /   AlkP 61   /   Tptn 5.7   /   Alb 3.4    /   DBili --      12-17  TBili 0.8   /   AST 36   /   ALT 10   /   AlkP 63   /   Tptn 5.6   /   Alb 3.8    /   DBili --      12-16  TBili 0.6   /   AST 33   /   ALT 10   /   AlkP 62   /   Tptn 5.3   /   Alb 3.4    /   DBili --      12-15        Microbiological Workup  Urinalysis Basic - ( 17 Dec 2023 08:48 )    Color: x / Appearance: x / SG: x / pH: x  Gluc: 140 mg/dL / Ketone: x  / Bili: x / Urobili: x   Blood: x / Protein: x / Nitrite: x   Leuk Esterase: x / RBC: x / WBC x   Sq Epi: x / Non Sq Epi: x / Bacteria: x      Current Medications  Standing Medications  ciprofloxacin   IVPB 400 milliGRAM(s) IV Intermittent every 12 hours  enoxaparin Injectable 40 milliGRAM(s) SubCutaneous every 24 hours  finasteride 5 milliGRAM(s) Oral daily  folic acid 1 milliGRAM(s) Oral daily  gabapentin 400 milliGRAM(s) Oral every 8 hours  methotrexate 15 milliGRAM(s) Oral <User Schedule>  metroNIDAZOLE  IVPB 500 milliGRAM(s) IV Intermittent every 8 hours    PRN Medications  ondansetron Injectable 4 milliGRAM(s) IV Push once PRN Nausea and/or Vomiting    Singles Doses Administered  ciprofloxacin   IVPB 400 milliGRAM(s) IV Intermittent once  metroNIDAZOLE  IVPB 500 milliGRAM(s) IV Intermittent once

## 2023-12-17 NOTE — DISCHARGE NOTE PROVIDER - HOSPITAL COURSE
74 yo M w/PMH of  BPH, RA, spinal stenosis, and colonoscopy in 11/30/2023 w/colonoscopy showing irregular friable mass from dentate line to 10 cm w/polypoid shape who presented s/p rectal polypoid mass resection with EMR in the endoscopy unit w/vitals stable, labs notable for K 3.3 and hgb 10.9 now admitted for monitoring.    #Rectal polypoid mass s/p resection and EMR  patient was monitored for bleeding complication. patient was continued on prophylactic antibiotics.  GI was following- recommended CLD which was advanced to Full liquid and is planned to continue low residue diet on dischrge for 2 weeks.  Follow up cipro, flagyl for 2 more days  Patient will need repeat colonoscopy within one month.     # anemia- hg was monitored and was stable    #BPH  - c/w finasteride and Alfuzosin  - jones present on admission- per patient it was placed on Thursday at urology office- Dr Shaikh;   TOV; pending voiding today; bladder scan; if unable to void; plan for straight cath X3  continued flomax and finasteride in hospital    #spinal stenosis  - c/w Gabapentin    #RA  - c/w Methotrexate    72 yo M w/PMH of  BPH, RA, spinal stenosis, and colonoscopy in 11/30/2023 w/colonoscopy showing irregular friable mass from dentate line to 10 cm w/polypoid shape who presented s/p rectal polypoid mass resection with EMR in the endoscopy unit w/vitals stable, labs notable for K 3.3 and hgb 10.9 now admitted for monitoring.    #Rectal polypoid mass s/p resection and EMR  patient was monitored for bleeding complication. patient was continued on prophylactic antibiotics.  GI was following- recommended CLD which was advanced to Full liquid and is planned to continue low residue diet on dischrge for 2 weeks.  Follow up cipro, flagyl for 2 more days  Patient will need repeat colonoscopy within one month.     # anemia- hg was monitored and was stable    #BPH  - c/w finasteride and Alfuzosin  - jones present on admission- per patient it was placed on Thursday at urology office- Dr Shaikh;   TOV; pending voiding today; bladder scan; if unable to void; plan for straight cath X3  continued flomax and finasteride in hospital    #spinal stenosis  - c/w Gabapentin    #RA  - c/w Methotrexate    74 yo M w/PMH of  BPH, RA, spinal stenosis, and colonoscopy in 11/30/2023 w/colonoscopy showing irregular friable mass from dentate line to 10 cm w/polypoid shape who presented s/p rectal polypoid mass resection with EMR in the endoscopy unit w/vitals stable, labs notable for K 3.3 and hgb 10.9 now admitted for monitoring.    #Rectal polypoid mass s/p resection and EMR  patient was monitored for bleeding complication. patient was continued on prophylactic antibiotics.  GI was following- recommended CLD which was advanced to Full liquid and is planned to continue low residue diet on dischrge for 2 weeks.  Follow up cipro, flagyl for 2 more days  Patient will need repeat colonoscopy within one month.   Patient tolerated clear liquid diet and advanced to low-fiber diet.  Patient advised to continue low-fiber diet for 2 weeks.      # anemia- hg was monitored and was stable    #BPH  - c/w finasteride and Alfuzosin  - jones present on admission- per patient it was placed on Thursday at urology office- Dr Shaikh;   TOV; pending voiding today; bladder scan; if unable to void; plan for straight cath X3  continued flomax and finasteride in hospital    #spinal stenosis  - c/w Gabapentin    #RA  - c/w Methotrexate    72 yo M w/PMH of  BPH, RA, spinal stenosis, and colonoscopy in 11/30/2023 w/colonoscopy showing irregular friable mass from dentate line to 10 cm w/polypoid shape who presented s/p rectal polypoid mass resection with EMR in the endoscopy unit w/vitals stable, labs notable for K 3.3 and hgb 10.9 now admitted for monitoring.    #Rectal polypoid mass s/p resection and EMR  patient was monitored for bleeding complication. patient was continued on prophylactic antibiotics.  GI was following- recommended CLD which was advanced to Full liquid and is planned to continue low residue diet on dischrge for 2 weeks.  Follow up cipro, flagyl for 2 more days  Patient will need repeat colonoscopy within one month.   Patient tolerated clear liquid diet and advanced to low-fiber diet.  Patient advised to continue low-fiber diet for 2 weeks.      # anemia- hg was monitored and was stable    #BPH  - c/w finasteride and Alfuzosin  - jones present on admission- per patient it was placed on Thursday at urology office- Dr Shaikh;   TOV; pending voiding today; bladder scan; if unable to void; plan for straight cath X3  continued flomax and finasteride in hospital    #spinal stenosis  - c/w Gabapentin    #RA  - c/w Methotrexate    72 yo M w/PMH of  BPH, RA, spinal stenosis, and colonoscopy in 11/30/2023 w/colonoscopy showing irregular friable mass from dentate line to 10 cm w/polypoid shape who presented s/p rectal polypoid mass resection with EMR in the endoscopy unit w/vitals stable, labs notable for K 3.3 and hgb 10.9 now admitted for monitoring.    #Rectal polypoid mass s/p resection and EMR  patient was monitored for bleeding complication. patient was continued on prophylactic antibiotics and instructed to continue to take the antibiotics til tomorrow 12/19 to complete the course.  GI was following- recommended CLD which was advanced to Full liquid and is planned to continue low residue diet on discharge for 2 weeks.  Follow up cipro, flagyl for 2 more days  Patient will need repeat colonoscopy within one month.   Patient tolerated clear liquid diet and advanced to low-fiber diet.  Patient advised to continue low-fiber diet for 2 weeks.      # anemia- hg was monitored and was stable    #BPH  - c/w finasteride and Alfuzosin  - jones present on admission- per patient it was placed on Thursday at urology office- Dr Shaikh;   TOV; pending voiding today; bladder scan; if unable to void; plan for straight cath X3  continued flomax and finasteride in hospital    #spinal stenosis  - c/w Gabapentin    #RA  - c/w Methotrexate    74 yo M w/PMH of  BPH, RA, spinal stenosis, and colonoscopy in 11/30/2023 w/colonoscopy showing irregular friable mass from dentate line to 10 cm w/polypoid shape who presented s/p rectal polypoid mass resection with EMR in the endoscopy unit w/vitals stable, labs notable for K 3.3 and hgb 10.9 now admitted for monitoring.    #Rectal polypoid mass s/p resection and EMR  patient was monitored for bleeding complication. patient was continued on prophylactic antibiotics and instructed to continue to take the antibiotics til tomorrow 12/19 to complete the course.  GI was following- recommended CLD which was advanced to Full liquid and is planned to continue low residue diet on discharge for 2 weeks.  Follow up cipro, flagyl for 2 more days  Patient will need repeat colonoscopy within one month.   Patient tolerated clear liquid diet and advanced to low-fiber diet.  Patient advised to continue low-fiber diet for 2 weeks.      # anemia- hg was monitored and was stable    #BPH  - c/w finasteride and Alfuzosin  - jones present on admission- per patient it was placed on Thursday at urology office- Dr Shaikh;   TOV; pending voiding today; bladder scan; if unable to void; plan for straight cath X3  continued flomax and finasteride in hospital    #spinal stenosis  - c/w Gabapentin    #RA  - c/w Methotrexate

## 2023-12-17 NOTE — DISCHARGE NOTE PROVIDER - ATTENDING DISCHARGE PHYSICAL EXAMINATION:
PHYSICAL EXAM  GEN: no distress, comfortable  PULM: BS heard b/l equal, No wheezing  CVS: S1S2 present, no rubs or gallops  ABD: Soft, non-distended, no guarding; non-tender  NEURO: A&Ox3

## 2023-12-17 NOTE — DISCHARGE NOTE PROVIDER - NSDCMRMEDTOKEN_GEN_ALL_CORE_FT
alfuzosin 10 mg oral tablet, extended release: 1 tab(s) orally once a day  Cipro 500 mg oral tablet: 1 tab(s) orally 2 times a day  finasteride 5 mg oral tablet: 1 tab(s) orally once a day  folic acid 1 mg oral tablet: 1 tab(s) orally once a day  gabapentin 400 mg oral tablet: 1 tab(s) orally every 8 hours  methotrexate 15 mg/0.6 mL subcutaneous solution: subcutaneous once a week  metroNIDAZOLE 500 mg oral tablet: 1 tab(s) orally 3 times a day  saccharomyces boulardii lyo 250 mg oral capsule: 1 cap(s) orally 2 times a week

## 2023-12-17 NOTE — DISCHARGE NOTE PROVIDER - NSDCFUSCHEDAPPT_GEN_ALL_CORE_FT
Flynn Lorenzo  [facility]  [facility] [address]  Scheduled Appointment: 12/20/2023     Flynn Lorenzo  [facility]  [facility] [address]  Scheduled Appointment: 12/20/2023

## 2023-12-17 NOTE — DISCHARGE NOTE PROVIDER - CARE PROVIDER_API CALL
Yasir Vail  Gastroenterology  [address] [facility] [place] [address]  Phone: [phone]  Fax: [phone]  Follow Up Time: 2 weeks    Danya Vega Y  Geriatric Medicine  [address] [place] [address]  Phone: [phone]  Fax: [phone]  Follow Up Time: 1 week   Yasir Vail  Gastroenterology  [address] [facility] [place] [address]  Phone: [phone]  Fax: [phone]  Follow Up Time: 2 weeks    Danya Vega Y  Geriatric Medicine  [address] [place] [address]  Phone: [phone]  Fax: [phone]  Follow Up Time: 1 week

## 2023-12-17 NOTE — DISCHARGE NOTE PROVIDER - PROVIDER TOKENS
PROVIDER:[TOKEN:[[id number]],FOLLOWUP:[2 weeks]],PROVIDER:[TOKEN:[[id number]],FOLLOWUP:[1 week]] PROVIDER:[TOKEN:[[id number]],FOLLOWUP:[2 weeks]],PROVIDER:[TOKEN:[[id number]],FOLLOWUP:[1 week]]

## 2023-12-17 NOTE — DISCHARGE NOTE PROVIDER - NSDCCPCAREPLAN_GEN_ALL_CORE_FT
PRINCIPAL DISCHARGE DIAGNOSIS  Diagnosis: Colon polyps  Assessment and Plan of Treatment: You had endoscopic mucosal resection of polypoid mass.  You need to follow up with GI physician  in 1-2 weeks for biopsy result and for repeating colonoscopy in 1 month.  You need to contiune antibiotics till 12/19  You are being discharged with specific dietary guidelines to promote optimal healing and ease digestion.  Soft and Low Residue Diet Overview: gentle on the digestive system, comprising easily digestible and low-fiber foods.  -Approved Foods:  Include well-cooked vegetables without skins, such as carrots and zucchini.  Choose ripe fruits without seeds or skin, like bananas and melons.  Opt for lean proteins, such as poultry, fish, and tofu.  Emphasize refined grains like white rice, pasta, and white bread.  - Dairy Choices:  Select low-fat dairy options, such as yogurt and mild cheeses.  Ensure dairy products are well-tolerated and do not cause discomfort.  - Avoid High-Fiber Foods:  Steer clear of whole grains, raw fruits, and vegetables, nuts, seeds, and tough cuts of meat.  Read food labels to identify high-fiber content and avoid such items.  -Cooking Methods:  Choose cooking methods that enhance digestibility, such as steaming, boiling, baking, or broiling.  Minimize frying or grilling, as these can be harder on the digestive system.  - Small, Frequent Meals:  Consume smaller, more frequent meals throughout the day rather than a few large meals.This aids in easier digestion and reduces the workload on the digestive tract.  - Hydration:  Stay well-hydrated by drinking plenty of fluids, primarily water.  Adequate hydration supports bowel regularity and prevents dehydration.  - Understand that the low-residue soft diet is a transitional phase. Please continue it for 2 weeks

## 2023-12-17 NOTE — PROGRESS NOTE ADULT - ASSESSMENT
Assessment and Plan  Case of a 73 year old male patient with history of RA, BPH, spinal stenosis and recently diagnosed rectal mass who presented on 12/15 for EMR of mass, status post EMR on 12/15 complicated by bleeding during procedure. He was admitted for monitoring for post polypectomy bleeding.       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 At dentate line, lesion is 75% circumferential, and towards 10 cm it was 25% circumferential.  Lesion is broad and rises to a polypoid shape.  * Status post repeat on 12/15 with EMR of mass  * Currently hemodynamically stable  * Stable Hb  * Last BM was on 12/15: no bleeding    RECOMMENDATIONS  - Follow up pathology and consider oncology evaluation if warranted  - Advance diet to full liquids today. Can then discharge on 12/17 on soft diet with low residues  - Continue antibiotics   - Start protonix 40mg QD  - Monitor BM for hematochezia  - Trend Hb and keep >7  - Monitor nausea. Continue IV Zofran PRN  - Will need follow up with Dr Phill Campbell in 1 month for repeat colonoscopy      Thank you for your consult.  - Please note that plan was communicated with medical team.   - Please reach GI on [phone] during weekdays till 5pm.  - Please call the GI service line after 5pm on Weekdays and anytime on Weekends: [phone].      Doris Fontana MD  PGY - 4 Gastroenterology Fellow   [facility]       Assessment and Plan  Case of a 73 year old male patient with history of RA, BPH, spinal stenosis and recently diagnosed rectal mass who presented on 12/15 for EMR of mass, status post EMR on 12/15 complicated by bleeding during procedure. He was admitted for monitoring for post polypectomy bleeding.       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 At dentate line, lesion is 75% circumferential, and towards 10 cm it was 25% circumferential.  Lesion is broad and rises to a polypoid shape.  * Status post repeat on 12/15 with EMR of mass  * Currently hemodynamically stable  * Stable Hb  * Last BM was on 12/15: no bleeding    RECOMMENDATIONS  - Follow up pathology and consider oncology evaluation if warranted  - Advance diet to full liquids today. Can then discharge on 12/17 on soft diet with low residues  - Continue antibiotics   - Start protonix 40mg QD  - Monitor BM for hematochezia  - Trend Hb and keep >7  - Monitor nausea. Continue IV Zofran PRN  - Will need follow up with Dr Phill Campbell in 1 month for repeat colonoscopy      Thank you for your consult.  - Please note that plan was communicated with medical team.   - Please reach GI on [phone] during weekdays till 5pm.  - Please call the GI service line after 5pm on Weekdays and anytime on Weekends: [phone].      Doris Fontana MD  PGY - 4 Gastroenterology Fellow   [facility]

## 2023-12-18 ENCOUNTER — TRANSCRIPTION ENCOUNTER (OUTPATIENT)
Age: 73
End: 2023-12-18

## 2023-12-18 VITALS
RESPIRATION RATE: 18 BRPM | SYSTOLIC BLOOD PRESSURE: 109 MMHG | DIASTOLIC BLOOD PRESSURE: 57 MMHG | TEMPERATURE: 98 F | HEART RATE: 76 BPM

## 2023-12-18 LAB
ALBUMIN SERPL ELPH-MCNC: 4 G/DL — SIGNIFICANT CHANGE UP (ref 3.5–5.2)
ALBUMIN SERPL ELPH-MCNC: 4 G/DL — SIGNIFICANT CHANGE UP (ref 3.5–5.2)
ALP SERPL-CCNC: 67 U/L — SIGNIFICANT CHANGE UP (ref 30–115)
ALP SERPL-CCNC: 67 U/L — SIGNIFICANT CHANGE UP (ref 30–115)
ALT FLD-CCNC: 11 U/L — SIGNIFICANT CHANGE UP (ref 0–41)
ALT FLD-CCNC: 11 U/L — SIGNIFICANT CHANGE UP (ref 0–41)
ANION GAP SERPL CALC-SCNC: 7 MMOL/L — SIGNIFICANT CHANGE UP (ref 7–14)
ANION GAP SERPL CALC-SCNC: 7 MMOL/L — SIGNIFICANT CHANGE UP (ref 7–14)
AST SERPL-CCNC: 34 U/L — SIGNIFICANT CHANGE UP (ref 0–41)
AST SERPL-CCNC: 34 U/L — SIGNIFICANT CHANGE UP (ref 0–41)
BASOPHILS # BLD AUTO: 0.03 K/UL — SIGNIFICANT CHANGE UP (ref 0–0.2)
BASOPHILS # BLD AUTO: 0.03 K/UL — SIGNIFICANT CHANGE UP (ref 0–0.2)
BASOPHILS NFR BLD AUTO: 0.5 % — SIGNIFICANT CHANGE UP (ref 0–1)
BASOPHILS NFR BLD AUTO: 0.5 % — SIGNIFICANT CHANGE UP (ref 0–1)
BILIRUB SERPL-MCNC: 0.5 MG/DL — SIGNIFICANT CHANGE UP (ref 0.2–1.2)
BILIRUB SERPL-MCNC: 0.5 MG/DL — SIGNIFICANT CHANGE UP (ref 0.2–1.2)
BUN SERPL-MCNC: 7 MG/DL — LOW (ref 10–20)
BUN SERPL-MCNC: 7 MG/DL — LOW (ref 10–20)
CALCIUM SERPL-MCNC: 9.4 MG/DL — SIGNIFICANT CHANGE UP (ref 8.4–10.4)
CALCIUM SERPL-MCNC: 9.4 MG/DL — SIGNIFICANT CHANGE UP (ref 8.4–10.4)
CHLORIDE SERPL-SCNC: 106 MMOL/L — SIGNIFICANT CHANGE UP (ref 98–110)
CHLORIDE SERPL-SCNC: 106 MMOL/L — SIGNIFICANT CHANGE UP (ref 98–110)
CO2 SERPL-SCNC: 29 MMOL/L — SIGNIFICANT CHANGE UP (ref 17–32)
CO2 SERPL-SCNC: 29 MMOL/L — SIGNIFICANT CHANGE UP (ref 17–32)
CREAT SERPL-MCNC: 0.8 MG/DL — SIGNIFICANT CHANGE UP (ref 0.7–1.5)
CREAT SERPL-MCNC: 0.8 MG/DL — SIGNIFICANT CHANGE UP (ref 0.7–1.5)
EGFR: 95 ML/MIN/1.73M2 — SIGNIFICANT CHANGE UP
EGFR: 95 ML/MIN/1.73M2 — SIGNIFICANT CHANGE UP
EOSINOPHIL # BLD AUTO: 0.13 K/UL — SIGNIFICANT CHANGE UP (ref 0–0.7)
EOSINOPHIL # BLD AUTO: 0.13 K/UL — SIGNIFICANT CHANGE UP (ref 0–0.7)
EOSINOPHIL NFR BLD AUTO: 2.3 % — SIGNIFICANT CHANGE UP (ref 0–8)
EOSINOPHIL NFR BLD AUTO: 2.3 % — SIGNIFICANT CHANGE UP (ref 0–8)
GLUCOSE SERPL-MCNC: 101 MG/DL — HIGH (ref 70–99)
GLUCOSE SERPL-MCNC: 101 MG/DL — HIGH (ref 70–99)
HCT VFR BLD CALC: 34.4 % — LOW (ref 42–52)
HCT VFR BLD CALC: 34.4 % — LOW (ref 42–52)
HGB BLD-MCNC: 11.6 G/DL — LOW (ref 14–18)
HGB BLD-MCNC: 11.6 G/DL — LOW (ref 14–18)
IMM GRANULOCYTES NFR BLD AUTO: 0.2 % — SIGNIFICANT CHANGE UP (ref 0.1–0.3)
IMM GRANULOCYTES NFR BLD AUTO: 0.2 % — SIGNIFICANT CHANGE UP (ref 0.1–0.3)
LYMPHOCYTES # BLD AUTO: 1.46 K/UL — SIGNIFICANT CHANGE UP (ref 1.2–3.4)
LYMPHOCYTES # BLD AUTO: 1.46 K/UL — SIGNIFICANT CHANGE UP (ref 1.2–3.4)
LYMPHOCYTES # BLD AUTO: 26 % — SIGNIFICANT CHANGE UP (ref 20.5–51.1)
LYMPHOCYTES # BLD AUTO: 26 % — SIGNIFICANT CHANGE UP (ref 20.5–51.1)
MAGNESIUM SERPL-MCNC: 1.9 MG/DL — SIGNIFICANT CHANGE UP (ref 1.8–2.4)
MAGNESIUM SERPL-MCNC: 1.9 MG/DL — SIGNIFICANT CHANGE UP (ref 1.8–2.4)
MCHC RBC-ENTMCNC: 32 PG — HIGH (ref 27–31)
MCHC RBC-ENTMCNC: 32 PG — HIGH (ref 27–31)
MCHC RBC-ENTMCNC: 33.7 G/DL — SIGNIFICANT CHANGE UP (ref 32–37)
MCHC RBC-ENTMCNC: 33.7 G/DL — SIGNIFICANT CHANGE UP (ref 32–37)
MCV RBC AUTO: 95 FL — HIGH (ref 80–94)
MCV RBC AUTO: 95 FL — HIGH (ref 80–94)
MONOCYTES # BLD AUTO: 0.63 K/UL — HIGH (ref 0.1–0.6)
MONOCYTES # BLD AUTO: 0.63 K/UL — HIGH (ref 0.1–0.6)
MONOCYTES NFR BLD AUTO: 11.2 % — HIGH (ref 1.7–9.3)
MONOCYTES NFR BLD AUTO: 11.2 % — HIGH (ref 1.7–9.3)
NEUTROPHILS # BLD AUTO: 3.36 K/UL — SIGNIFICANT CHANGE UP (ref 1.4–6.5)
NEUTROPHILS # BLD AUTO: 3.36 K/UL — SIGNIFICANT CHANGE UP (ref 1.4–6.5)
NEUTROPHILS NFR BLD AUTO: 59.8 % — SIGNIFICANT CHANGE UP (ref 42.2–75.2)
NEUTROPHILS NFR BLD AUTO: 59.8 % — SIGNIFICANT CHANGE UP (ref 42.2–75.2)
NRBC # BLD: 0 /100 WBCS — SIGNIFICANT CHANGE UP (ref 0–0)
NRBC # BLD: 0 /100 WBCS — SIGNIFICANT CHANGE UP (ref 0–0)
PHOSPHATE SERPL-MCNC: 3 MG/DL — SIGNIFICANT CHANGE UP (ref 2.1–4.9)
PHOSPHATE SERPL-MCNC: 3 MG/DL — SIGNIFICANT CHANGE UP (ref 2.1–4.9)
PLATELET # BLD AUTO: 210 K/UL — SIGNIFICANT CHANGE UP (ref 130–400)
PLATELET # BLD AUTO: 210 K/UL — SIGNIFICANT CHANGE UP (ref 130–400)
PMV BLD: 10.7 FL — HIGH (ref 7.4–10.4)
PMV BLD: 10.7 FL — HIGH (ref 7.4–10.4)
POTASSIUM SERPL-MCNC: 4.3 MMOL/L — SIGNIFICANT CHANGE UP (ref 3.5–5)
POTASSIUM SERPL-MCNC: 4.3 MMOL/L — SIGNIFICANT CHANGE UP (ref 3.5–5)
POTASSIUM SERPL-SCNC: 4.3 MMOL/L — SIGNIFICANT CHANGE UP (ref 3.5–5)
POTASSIUM SERPL-SCNC: 4.3 MMOL/L — SIGNIFICANT CHANGE UP (ref 3.5–5)
PROT SERPL-MCNC: 6.1 G/DL — SIGNIFICANT CHANGE UP (ref 6–8)
PROT SERPL-MCNC: 6.1 G/DL — SIGNIFICANT CHANGE UP (ref 6–8)
RBC # BLD: 3.62 M/UL — LOW (ref 4.7–6.1)
RBC # BLD: 3.62 M/UL — LOW (ref 4.7–6.1)
RBC # FLD: 13.4 % — SIGNIFICANT CHANGE UP (ref 11.5–14.5)
RBC # FLD: 13.4 % — SIGNIFICANT CHANGE UP (ref 11.5–14.5)
SODIUM SERPL-SCNC: 142 MMOL/L — SIGNIFICANT CHANGE UP (ref 135–146)
SODIUM SERPL-SCNC: 142 MMOL/L — SIGNIFICANT CHANGE UP (ref 135–146)
WBC # BLD: 5.62 K/UL — SIGNIFICANT CHANGE UP (ref 4.8–10.8)
WBC # BLD: 5.62 K/UL — SIGNIFICANT CHANGE UP (ref 4.8–10.8)
WBC # FLD AUTO: 5.62 K/UL — SIGNIFICANT CHANGE UP (ref 4.8–10.8)
WBC # FLD AUTO: 5.62 K/UL — SIGNIFICANT CHANGE UP (ref 4.8–10.8)

## 2023-12-18 PROCEDURE — 99239 HOSP IP/OBS DSCHRG MGMT >30: CPT

## 2023-12-18 RX ADMIN — GABAPENTIN 400 MILLIGRAM(S): 400 CAPSULE ORAL at 13:26

## 2023-12-18 RX ADMIN — Medication 1 MILLIGRAM(S): at 13:25

## 2023-12-18 RX ADMIN — GABAPENTIN 400 MILLIGRAM(S): 400 CAPSULE ORAL at 05:10

## 2023-12-18 RX ADMIN — Medication 200 MILLIGRAM(S): at 05:10

## 2023-12-18 RX ADMIN — FINASTERIDE 5 MILLIGRAM(S): 5 TABLET, FILM COATED ORAL at 13:25

## 2023-12-18 RX ADMIN — Medication 100 MILLIGRAM(S): at 05:11

## 2023-12-18 RX ADMIN — ENOXAPARIN SODIUM 40 MILLIGRAM(S): 100 INJECTION SUBCUTANEOUS at 13:26

## 2023-12-18 RX ADMIN — Medication 100 MILLIGRAM(S): at 13:25

## 2023-12-18 NOTE — PROGRESS NOTE ADULT - ATTENDING COMMENTS
patient was not discharge yesterday because of failed TOV needing staright cath and later jones catheter placement.    Discharge plan d/w patient in detail   Follow up with GI and urology discussed  low residue diet and monitoring for bleeding discussed  contiune antibiotics for 2 days more  follow up with Gi for path results and further endoscopic plans.   Discharge note reviewed. Discussed with nursing, casemanagement and resident physician
Patient seen and examined with Fellow Agree with HPI/PMH  Meds Labs/ PE. Assessment and Plan Per fellow.
72 yo M w/PMH of  BPH, RA, spinal stenosis, and colonoscopy in 11/30/2023 w/colonoscopy showing irregular friable mass from dentate line to 10 cm w/polypoid shape who presented s/p rectal polypoid mass resection with EMR in the endoscopy unit w/vitals stable, labs notable for K 3.3 and hgb 10.9 now admitted for monitoring.    #Rectal polypoid mass s/p resection and EMR  - s/p  rectal polypoid mass resection with EMR.   - , Start clear liquids, CIpro flagyl x 4 days  FLD tomorrow and start on low residue diet on Monday for 2 weeks  - c/w cipro and flagyl   - Monitor CBC     # hypokalemia  - repleted; monitor    # anemia- monitor hg    #BPH  - c/w finasteride and Alfuzosin  - jones present on admission- cont    #spinal stenosis  - c/w Gabapentin    #RA  - c/w Methotrexate     #MISC  - DVT ppx: Lovenox    plan of care d/w GI team. D/w platient, nursing, case management  and resident team

## 2023-12-18 NOTE — PROGRESS NOTE ADULT - REASON FOR ADMISSION
s/p rectal polypoid mass resection

## 2023-12-18 NOTE — DISCHARGE NOTE NURSING/CASE MANAGEMENT/SOCIAL WORK - PATIENT PORTAL LINK FT
You can access the FollowMyHealth Patient Portal offered by [facility] by registering at the following website: [email or web address]. By joining [employer]’s FollowMyHealth portal, you will also be able to view your health information using other applications (apps) compatible with our system. You can access the FollowMyHealth Patient Portal offered by [facility] by registering at the following website: [email or web address]. By joining [employer]’s FollowMyHealth portal, you will also be able to view your health information using other applications (apps) compatible with our system.

## 2023-12-18 NOTE — DISCHARGE NOTE NURSING/CASE MANAGEMENT/SOCIAL WORK - NSDCPEFALRISK_GEN_ALL_CORE
For information on Fall & Injury Prevention, visit: [email or web address]/[email or web address]-prevention-[email or web address]-maintains-health-and-mobility OR  https://[email or web address].[email or web address]/news/fall-prevention-tips-to-avoid-injury OR  https://www.cdc.gov/steadi/patient.html For information on Fall & Injury Prevention, visit: [email or web address]://www.[facility]/news/fall-prevention-protects-and-maintains-health-[email or web address]-mobility OR  https://www.[facility]/news/fall-prevention-tips-to-avoid-injury OR  https://www.cdc.gov/steadi/patient.html

## 2023-12-18 NOTE — PROGRESS NOTE ADULT - ASSESSMENT
Patient is a 74 yo M w/PMH of  BPH, RA, spinal stenosis, and colonoscopy in 11/30/2023 w/colonoscopy showing irregular friable mass from dentate line to 10 cm w/polypoid shape who presented s/p rectal polypoid mass resection with EMR in the endoscopy unit w/vitals stable, labs notable for K 3.3 and hgb 10.9 now admitted for monitoring.    #Rectal polypoid mass s/p resection and EMR  - Colonoscopy 11/30/2023: irregular friable mass from dentate line to 10 cm. At dentate line, lesion is 75% circumferential, and towards 10 cm it was 25% circumferential.  Lesion is broad and rises to a polypoid shape.  - s/p today rectal polypoid mass resection with EMR. Admitted for monitoring.   - GI post-procedure note recs (12/15): Monitor CBC, Start clear liquids, CIpro flagyl x 4 days (dose given intra procedurally), In 48 hours advance diet to low residue x 2 weeks   Risk of bleeding remains x 2 weeks post procedure, Repeat colonoscopy in 1 month  - s/p Cipro and Flagyl in endo suite  - c/w cipro and flagyl   - Monitor CBC BID  - If there are clinical signs of bleed, abdominal rigidity or tachycardia -> get KUB  - Tolerating clear liquid diet  - Advanced to low-fiber diet, tolerating well  - Instructed to continue low-fiber diet for 2 weeks beyond discharge    #BPH  - c/w finasteride and Alfuzosin    #spinal stenosis  - c/w Gabapentin    #RA  - c/w Methotrexate     #MISC  - DVT ppx: Lovenox  - GI ppx: none  - Activity: IAT  - Diet: clear liquid   Patient is a 72 yo M w/PMH of  BPH, RA, spinal stenosis, and colonoscopy in 11/30/2023 w/colonoscopy showing irregular friable mass from dentate line to 10 cm w/polypoid shape who presented s/p rectal polypoid mass resection with EMR in the endoscopy unit w/vitals stable, labs notable for K 3.3 and hgb 10.9 now admitted for monitoring.    #Rectal polypoid mass s/p resection and EMR  - Colonoscopy 11/30/2023: irregular friable mass from dentate line to 10 cm. At dentate line, lesion is 75% circumferential, and towards 10 cm it was 25% circumferential.  Lesion is broad and rises to a polypoid shape.  - s/p today rectal polypoid mass resection with EMR. Admitted for monitoring.   - GI post-procedure note recs (12/15): Monitor CBC, Start clear liquids, CIpro flagyl x 4 days (dose given intra procedurally), In 48 hours advance diet to low residue x 2 weeks   Risk of bleeding remains x 2 weeks post procedure, Repeat colonoscopy in 1 month  - s/p Cipro and Flagyl in endo suite  - c/w cipro and flagyl   - Monitor CBC BID  - If there are clinical signs of bleed, abdominal rigidity or tachycardia -> get KUB  - Tolerating clear liquid diet  - Advanced to low-fiber diet, tolerating well  - Instructed to continue low-fiber diet for 2 weeks beyond discharge    #BPH  - c/w finasteride and Alfuzosin    #spinal stenosis  - c/w Gabapentin    #RA  - c/w Methotrexate     #MISC  - DVT ppx: Lovenox  - GI ppx: none  - Activity: IAT  - Diet: clear liquid

## 2023-12-18 NOTE — PROGRESS NOTE ADULT - SUBJECTIVE AND OBJECTIVE BOX
24H events:    Patient is a 73y old Male who presents with a chief complaint of s/p rectal polypoid mass resection (17 Dec 2023 15:45)    Primary diagnosis of Colon polyps    Today is hospital day 3d. This morning patient was seen and examined at bedside, resting comfortably in bed.    No acute or major events overnight.  Denies any complaints. Patient reports that he slept well, appetite is good.     Code Status:    Family communication:  Contact date:  Name of person contacted:  Relationship to patient:  Communication details:  What matters most:    PAST MEDICAL & SURGICAL HISTORY  Skin cancer (melanoma)    Rheumatoid arthritis    Spinal stenosis    History of BPH    History of hay fever    S/P bilateral inguinal hernia repair      SOCIAL HISTORY:  Social History:      ALLERGIES:  No Known Allergies    MEDICATIONS:  STANDING MEDICATIONS  ciprofloxacin   IVPB 400 milliGRAM(s) IV Intermittent every 12 hours  enoxaparin Injectable 40 milliGRAM(s) SubCutaneous every 24 hours  finasteride 5 milliGRAM(s) Oral daily  folic acid 1 milliGRAM(s) Oral daily  gabapentin 400 milliGRAM(s) Oral every 8 hours  methotrexate 15 milliGRAM(s) Oral <User Schedule>  metroNIDAZOLE  IVPB 500 milliGRAM(s) IV Intermittent every 8 hours  tamsulosin 0.4 milliGRAM(s) Oral at bedtime    PRN MEDICATIONS  ondansetron Injectable 4 milliGRAM(s) IV Push once PRN    VITALS:   T(F): 97.5  HR: 76  BP: 109/57  RR: 18  SpO2: --    PHYSICAL EXAM:  GENERAL:   (x  ) NAD, lying in bed comfortably     (  ) obtunded     (  ) lethargic     (  ) somnolent    HEAD:   (x  ) Atraumatic     (  ) hematoma     (  ) laceration (specify location:       )     NECK:  ( x ) Supple     (  ) neck stiffness     (  ) nuchal rigidity     (  )  no JVD     (  ) JVD present ( -- cm)    HEART:  Rate -->     (x  ) normal rate     (  ) bradycardic     (  ) tachycardic  Rhythm -->     ( x ) regular     (  ) regularly irregular     (  ) irregularly irregular  Murmurs -->     (  ) normal s1s2     (  ) systolic murmur     (  ) diastolic murmur     (  ) continuous murmur      (  ) S3 present     (  ) S4 present    LUNGS:   (  )Unlabored respirations     (  ) tachypnea  (  x) B/L air entry     (  ) decreased breath sounds in:  (location     )    (  ) no adventitious sound     (  ) crackles     (  ) wheezing      (  ) rhonchi      (specify location:       )  (  ) chest wall tenderness (specify location:       )    ABDOMEN:   (x  ) Soft     (  ) tense   |   (x  ) nondistended     (  ) distended   |   (  ) +BS     (  ) hypoactive bowel sounds     (  ) hyperactive bowel sounds  ( x ) nontender     (  ) RUQ tenderness     (  ) RLQ tenderness     (  ) LLQ tenderness     (  ) epigastric tenderness     (  ) diffuse tenderness  (  ) Splenomegaly      (  ) Hepatomegaly      (  ) Jaundice     (  ) ecchymosis     EXTREMITIES:  (  ) Normal     (  ) Rash     (  ) ecchymosis     (  ) varicose veins      (  ) pitting edema     (  ) non-pitting edema   (  ) ulceration     (  ) gangrene:     (location:     )    NERVOUS SYSTEM:    (  ) A&Ox3     (  ) confused     (  ) lethargic  CN II-XII:     (  ) Intact     (  ) deficits found     (Specify:     )   Upper extremities:     (  ) no sensorimotor deficits     (  ) weakness     (  ) loss of proprioception/vibration     (  ) loss of touch/temperature (specify:    )  Lower extremities:     (  ) no sensorimotor deficits     (  ) weakness     (  ) loss of proprioception/vibration     (  ) loss of touch/temperature (specify:    )    SKIN:   (  ) No rashes or lesions     (  ) maculopapular rash     (  ) pustules     (  ) vesicles     (  ) ulcer     (  ) ecchymosis     (specify location:     )    AMPAC score:    (  ) Indwelling Dang Catheter:   Date insterted:    Reason (  ) Critical illness     (  ) urinary retention    (  ) Accurate Ins/Outs Monitoring     (  ) CMO patient    (  ) Central Line:   Date inserted:  Location: (  ) Right IJ     (  ) Left IJ     (  ) Right Fem     (  ) Left Fem    (  ) SPC        (  ) pigtail       (  ) PEG tube       (  ) colostomy       (  ) jejunostomy  (  ) U-Dall    LABS:                        11.6   5.62  )-----------( 210      ( 18 Dec 2023 04:30 )             34.4     12-18    142  |  106  |  7<L>  ----------------------------<  101<H>  4.3   |  29  |  0.8    Ca    9.4      18 Dec 2023 04:30  Phos  3.0     12-18  Mg     1.9     12-18    TPro  6.1  /  Alb  4.0  /  TBili  0.5  /  DBili  x   /  AST  34  /  ALT  11  /  AlkPhos  67  12-18      Urinalysis Basic - ( 18 Dec 2023 04:30 )    Color: x / Appearance: x / SG: x / pH: x  Gluc: 101 mg/dL / Ketone: x  / Bili: x / Urobili: x   Blood: x / Protein: x / Nitrite: x   Leuk Esterase: x / RBC: x / WBC x   Sq Epi: x / Non Sq Epi: x / Bacteria: x                RADIOLOGY:      < from: CT Abdomen and Pelvis w/ Oral Cont and w/ IV Cont (12.02.23 @ 16:45) >  IMPRESSION:    1. Lower rectal, noncircumferential intraluminal polypoid mass, measuring   up to 6 cm AP; correlation with colonoscopy findings, pathology   recommended.    2. No evidence of abdominopelvic metastatic disease.    < end of copied text >  < from: Xray Chest 2 Views PA/Lat (11.21.23 @ 11:46) >  Impression:    No radiographic evidence ofacute cardiopulmonary disease.    Hyperinflated lungs which could reflect underlying COPD.    < end of copied text >

## 2023-12-18 NOTE — PROGRESS NOTE ADULT - PROVIDER SPECIALTY LIST ADULT
Gastroenterology
Pt. Is positive H. Pylori new antibiotic treatment initiated.  
Gastroenterology
Internal Medicine
Internal Medicine

## 2023-12-19 NOTE — END OF VISIT
Patient here for port flush  Port accessed without issue, good blood return noted  Port deaccessed without issue  Patient to schedule next appointment upon exit at    Patient declined AVS  [] : Fellow [Time Spent: ___ minutes] : I have spent [unfilled] minutes of time on the encounter.

## 2023-12-19 NOTE — REVIEW OF SYSTEMS
[Fever] : no fever [Recent Weight Gain (___ Lbs)] : no recent weight gain [Recent Weight Loss (___ Lbs)] : no recent weight loss [Red Eyes] : eyes not red [Scleral Icterus (Yellow Eyes)] : no scleral icterus [Heart Rate Is Fast] : the heart rate was not fast [Heart Rate Is Slow] : the heart rate was not slow [Vomiting] : no vomiting [Abdominal Pain] : no abdominal pain [Heartburn] : no heartburn [Melena (black stool)] : no melena [Bloating (gassiness)] : no bloating [Itching] : no itching [Jaundice (yellowing of skin)] : no jaundice [Confused] : no confusion [Convulsions] : no convulsions

## 2023-12-19 NOTE — HISTORY OF PRESENT ILLNESS
[FreeTextEntry1] : 73-year-old male with several month history of diarrhea. The patient had blood and stool tests that revealed astrovirus and were equivocal for celiac disease. His diarrhea continues. He denied abdominal pain, fevers, vomiting, melena, heartburn, dysphagia, or weight loss.t. He now C/O occasional constipation with increased straining and he has noted tissue prolapsing coming out for about a month. He has also noted BRB on the tissue with about 2 episodes of BRB in the toilet (a small amount) recently.  Patient had colonoscopy 11/30/2023. Irregular friable mass from dentate line to 10 cm. At dentate line, lesion is 75% circumferential, and towards 10 cm it was 25% circumferential. Biopsies were taken. Lesion is broad and rises to a polypoid shape. Pathology showed villous adenoma Polyp (5 mm) in the transverse colon. (Polypectomy). Focal acute cryptitis on pathology  EGD: 11/30/2023. non erosive gastritis. No h. pylori  Normal duodenum: Fragments of small bowel mucosa with mild nonspecific chronic inflammation and intact villous architecture.  CT abdomen and pelvis: 12/2/2023 1. Lower rectal, noncircumferential intraluminal polypoid mass, measuring up to 6 cm AP; correlation with colonoscopy findings, pathology recommended. 2. No evidence of abdominopelvic metastatic disease.

## 2023-12-19 NOTE — ASSESSMENT
[FreeTextEntry1] : 73-year-old male with several month history of diarrhea alternating with constipation, hematochezia found to have rectal mass on colonoscopy, pathology showing villous adenoma, CT abdomen and pelvis negative for metastasis is here for evaluation    Patient will benefit from rectal EMR/ ESD. Risks / benefits and alternatives were discussed with the patient, who reflected understanding and is agreeable to have the procedure done.

## 2023-12-20 LAB
SURGICAL PATHOLOGY STUDY: SIGNIFICANT CHANGE UP
SURGICAL PATHOLOGY STUDY: SIGNIFICANT CHANGE UP

## 2023-12-22 DIAGNOSIS — Z00.00 ENCOUNTER FOR GENERAL ADULT MEDICAL EXAMINATION WITHOUT ABNORMAL FINDINGS: ICD-10-CM

## 2023-12-28 NOTE — CDI QUERY NOTE - NSCDIOTHERTXTBX_GEN_ALL_CORE_HH
Based on your professional judgment and the clinical indicators provided below, please clarify if rectal polyp can be further specified as:    - Rectal polyp further specified as invasive adenocarcinoma, moderately differentiated, arising in a villous adenoma per pathology report  - Other (please specify):  - Clinically unable to determine        CLINICAL INDICATORS    12/15 Colonoscopy with EMR Report:   Indications: Rectal polyp  Limitations/Complications: Large size mass requiring prolonged anesthesia time  Findings: Irregular friable rectal mass from dentate line to 10cm proximally … Lesion is broad, lateral spreading in carpet like pattern. … the lesion was removed using piecemeal fashion … Polypectomy pieces were collected and sent for pathology.     12/15 Surgical Pathology Report:       1. Rectal polyp, bottle #1, EMR: Villous adenoma, no high grade dysplasia      2. Rectal polyp, bottle #2, EMR: Invasive adenocarcinoma, moderately differentiated, arising in a  villous adenoma. Piecemeal nature precludes assessment of margin status.     12/17 Discharge Note Provider: Rectal polypoid mass s/p resection and EMR … You need to follow up with GI physician  in 1-2 weeks for biopsy result and for repeating colonoscopy in 1 month.      Thank you,  Eden WHITTEN, RN, BSN  [phone] Based on your professional judgment and the clinical indicators provided below, please clarify if rectal polyp can be further specified as:    - Rectal polyp further specified as invasive adenocarcinoma, moderately differentiated, arising in a villous adenoma per pathology report  - Other (please specify):  - Clinically unable to determine        CLINICAL INDICATORS    12/15 Colonoscopy with EMR Report:   Indications: Rectal polyp  Limitations/Complications: Large size mass requiring prolonged anesthesia time  Findings: Irregular friable rectal mass from dentate line to 10cm proximally … Lesion is broad, lateral spreading in carpet like pattern. … the lesion was removed using piecemeal fashion … Polypectomy pieces were collected and sent for pathology.     12/15 Surgical Pathology Report:       1. Rectal polyp, bottle #1, EMR: Villous adenoma, no high grade dysplasia      2. Rectal polyp, bottle #2, EMR: Invasive adenocarcinoma, moderately differentiated, arising in a  villous adenoma. Piecemeal nature precludes assessment of margin status.     12/17 Discharge Note Provider: Rectal polypoid mass s/p resection and EMR … You need to follow up with GI physician  in 1-2 weeks for biopsy result and for repeating colonoscopy in 1 month.      Thank you,  Eden WHITTEN, RN, BSN  [phone]

## 2024-01-02 ENCOUNTER — APPOINTMENT (OUTPATIENT)
Dept: SURGERY | Facility: CLINIC | Age: 74
End: 2024-01-02
Payer: MEDICARE

## 2024-01-02 VITALS
DIASTOLIC BLOOD PRESSURE: 64 MMHG | SYSTOLIC BLOOD PRESSURE: 134 MMHG | TEMPERATURE: 97 F | HEART RATE: 80 BPM | BODY MASS INDEX: 19.52 KG/M2 | WEIGHT: 157 LBS | OXYGEN SATURATION: 98 % | HEIGHT: 75 IN

## 2024-01-02 PROCEDURE — 99213 OFFICE O/P EST LOW 20 MIN: CPT

## 2024-01-02 NOTE — CHART NOTE - NSCHARTNOTEFT_GEN_A_CORE
Rectal polyp further specified as invasive adenocarcinoma, moderately differentiated, arising in a villous adenoma per pathology report      Discussed with patient regrding result  He had follow up with Dr Benavides;   He is planning to follow up with Dr Phill Lovett for repeat colonoscopy plans

## 2024-01-08 RX ORDER — POLYETHYLENE GLYCOL 3350 AND ELECTROLYTES WITH LEMON FLAVOR 236; 22.74; 6.74; 5.86; 2.97 G/4L; G/4L; G/4L; G/4L; G/4L
236 POWDER, FOR SOLUTION ORAL
Qty: 1 | Refills: 0 | Status: ACTIVE | COMMUNITY
Start: 2023-12-06 | End: 1900-01-01

## 2024-01-09 ENCOUNTER — TRANSCRIPTION ENCOUNTER (OUTPATIENT)
Age: 74
End: 2024-01-09

## 2024-01-09 ENCOUNTER — OUTPATIENT (OUTPATIENT)
Dept: OUTPATIENT SERVICES | Facility: HOSPITAL | Age: 74
LOS: 1 days | Discharge: HOME | End: 2024-01-09
Payer: MEDICARE

## 2024-01-09 ENCOUNTER — RESULT REVIEW (OUTPATIENT)
Age: 74
End: 2024-01-09

## 2024-01-09 VITALS — SYSTOLIC BLOOD PRESSURE: 127 MMHG | HEART RATE: 59 BPM | TEMPERATURE: 99 F | DIASTOLIC BLOOD PRESSURE: 69 MMHG

## 2024-01-09 VITALS
DIASTOLIC BLOOD PRESSURE: 88 MMHG | OXYGEN SATURATION: 100 % | HEART RATE: 74 BPM | RESPIRATION RATE: 18 BRPM | SYSTOLIC BLOOD PRESSURE: 132 MMHG

## 2024-01-09 DIAGNOSIS — Z98.890 OTHER SPECIFIED POSTPROCEDURAL STATES: Chronic | ICD-10-CM

## 2024-01-09 DIAGNOSIS — K64.9 UNSPECIFIED HEMORRHOIDS: ICD-10-CM

## 2024-01-09 DIAGNOSIS — C21.8 MALIGNANT NEOPLASM OF OVERLAPPING SITES OF RECTUM, ANUS AND ANAL CANAL: ICD-10-CM

## 2024-01-09 DIAGNOSIS — K58.9 IRRITABLE BOWEL SYNDROME WITHOUT DIARRHEA: ICD-10-CM

## 2024-01-09 PROCEDURE — 88342 IMHCHEM/IMCYTCHM 1ST ANTB: CPT

## 2024-01-09 PROCEDURE — 45390 COLONOSCOPY W/RESECTION: CPT | Mod: XU

## 2024-01-09 PROCEDURE — 45391 COLONOSCOPY W/ENDOSCOPE US: CPT

## 2024-01-09 PROCEDURE — 88341 IMHCHEM/IMCYTCHM EA ADD ANTB: CPT | Mod: 26

## 2024-01-09 PROCEDURE — 88305 TISSUE EXAM BY PATHOLOGIST: CPT

## 2024-01-09 PROCEDURE — 88341 IMHCHEM/IMCYTCHM EA ADD ANTB: CPT

## 2024-01-09 PROCEDURE — 88342 IMHCHEM/IMCYTCHM 1ST ANTB: CPT | Mod: 26

## 2024-01-09 PROCEDURE — C1889: CPT

## 2024-01-09 PROCEDURE — 88305 TISSUE EXAM BY PATHOLOGIST: CPT | Mod: 26

## 2024-01-09 RX ORDER — SODIUM CHLORIDE 9 MG/ML
1000 INJECTION, SOLUTION INTRAVENOUS
Refills: 0 | Status: DISCONTINUED | OUTPATIENT
Start: 2024-01-09 | End: 2024-01-09

## 2024-01-09 RX ORDER — TAMSULOSIN HYDROCHLORIDE 0.4 MG/1
0.4 CAPSULE ORAL AT BEDTIME
Refills: 0 | Status: DISCONTINUED | OUTPATIENT
Start: 2024-01-09 | End: 2024-01-09

## 2024-01-09 RX ORDER — ALFUZOSIN HYDROCHLORIDE 10 MG/1
1 TABLET, EXTENDED RELEASE ORAL
Refills: 0 | DISCHARGE

## 2024-01-09 RX ADMIN — TAMSULOSIN HYDROCHLORIDE 0.4 MILLIGRAM(S): 0.4 CAPSULE ORAL at 15:43

## 2024-01-09 NOTE — ASU DISCHARGE PLAN (ADULT/PEDIATRIC) - NS MD DC FALL RISK RISK
For information on Fall & Injury Prevention, visit: [email or web address].[email or web address]/[email or web address]-[email or web address]-[email or web address]-mobility OR  https://www.[facility]/news/fall-prevention-tips-[email or web address]-injury OR  https://www.cdc.gov/steadi/patient.html For information on Fall & Injury Prevention, visit: https://www.[email or web address]/news/fall-prevention-protects-[email or web address]-maintains-health-[email or web address]-mobility OR  https://www.[facility]/news/fall-prevention-tips-to-avoid-injury OR  https://www.cdc.gov/steadi/patient.html

## 2024-01-09 NOTE — ASU PATIENT PROFILE, ADULT - FALL HARM RISK - HARM RISK INTERVENTIONS
Communicate Risk of Fall with Harm to all staff/Reinforce activity limits and safety measures with patient and family/Tailored Fall Risk Interventions/Visual Cue: Yellow wristband and red socks/Bed in lowest position, wheels locked, appropriate side rails in place/Call bell, personal items and telephone in reach/Instruct patient to call for assistance before getting out of bed or chair/Non-slip footwear when patient is out of bed/[place] to call system/Physically safe environment - no spills, clutter or unnecessary equipment/Purposeful Proactive Rounding/Room/bathroom lighting operational, light cord in reach Communicate Risk of Fall with Harm to all staff/Reinforce activity limits and safety measures with patient and family/Tailored Fall Risk Interventions/Visual Cue: Yellow wristband and red socks/Bed in lowest position, wheels locked, appropriate side rails in place/Call bell, personal items and telephone in reach/Instruct patient to call for assistance before getting out of bed or chair/Non-slip footwear when patient is out of bed/[place] to call system/Physically safe environment - no spills, clutter or unnecessary equipment/Purposeful Proactive Rounding/Room/bathroom lighting operational, light cord in reach

## 2024-01-09 NOTE — CHART NOTE - NSCHARTNOTEFT_GEN_A_CORE
- Patient is known to have BPH and is s/p procedure on 01/09 (received propofol)  - After procedure, patient was having difficulty voiding   - Bladder scan was performed around noon time revealing around 900cc residue  - Urology team was contacted and straight catheterization was performed: drained around 950cc of urine  - Flomax was also administered  - A few hours later, patient was not able to urinate and repeat bladder scan showed a residue of 500cc  - After contacting urology team on call, decision was made to place a jones catheter and discharge patient to follow up with his own urologist in the coming few days  - Urology team is on way to place jones catheter  - Patient understands need to schedule an appointment with urologist in coming few days as a retained jones can result in high risk of complicated UTI

## 2024-01-10 LAB
SURGICAL PATHOLOGY STUDY: SIGNIFICANT CHANGE UP
SURGICAL PATHOLOGY STUDY: SIGNIFICANT CHANGE UP

## 2024-01-11 DIAGNOSIS — K62.1 RECTAL POLYP: ICD-10-CM

## 2024-01-11 DIAGNOSIS — M48.00 SPINAL STENOSIS, SITE UNSPECIFIED: ICD-10-CM

## 2024-01-11 DIAGNOSIS — Z85.828 PERSONAL HISTORY OF OTHER MALIGNANT NEOPLASM OF SKIN: ICD-10-CM

## 2024-01-11 DIAGNOSIS — C21.8 MALIGNANT NEOPLASM OF OVERLAPPING SITES OF RECTUM, ANUS AND ANAL CANAL: ICD-10-CM

## 2024-01-11 DIAGNOSIS — M06.9 RHEUMATOID ARTHRITIS, UNSPECIFIED: ICD-10-CM

## 2024-01-15 NOTE — ASSESSMENT
[FreeTextEntry1] : 73-year-old male with large rectal polyp removed by EMR showing invasive adenocarcinoma.  I spoke to the patient regarding his diagnosis. Apparently there is still residual polyp that he is scheduled for repeat EMR. We discussed the diagnosis of invasive adenocarcinoma in a lesion extending down to the dentate line. We discussed the possibility of low anterior resection and abdominal perineal resection. Patient at this time would prefer to avoid any surgery. We will present his case at [facility] for consensus statement. We'll see him back in two weeks.

## 2024-01-15 NOTE — ADDENDUM
[FreeTextEntry1] : I, Shahida Schmitt ([facility]) assisted in filling out this chart under the dictation of Dr. Flynn Lorenzo on 01/11/2024.

## 2024-01-15 NOTE — HISTORY OF PRESENT ILLNESS
[FreeTextEntry1] : Patient is a 73-year-old male with PMHx of BPH, spinal stenosis and rheumatoid arthritis on methotrexate who presents for evaluation of rectal adenocarcinoma. On the patient's last office visit, he was found to have polypoid tissue in the rectum. He was referred to GI for colonoscopy. He underwent colonoscopy on November 30th, 2023 with Dr. Tran, which showed a irregular friable mass from the dentate line to 10cms. HPIs showed villus adenoma. Patient was then referred to Dr. Phill Ibrahim, and underwent EMR on December 15th, 2023. Pathology showed invasive adenocarcinoma, moderately differentiated arising in a villous adenoma with unclear margins due to piecemeal resection. Patient presents today for evaluation. Patient overall says he feels well. He's tolerating a diet and having regular bowel movements. He reports complete resolution of his diarrhea, prolapsing, and bleeding. He denies recent fevers, chills, nausea or vomiting. He denies family history of colon cancer, rectal cancer, inflammatory bowel disease.

## 2024-01-17 ENCOUNTER — OUTPATIENT (OUTPATIENT)
Dept: OUTPATIENT SERVICES | Facility: HOSPITAL | Age: 74
LOS: 1 days | End: 2024-01-17
Payer: MEDICARE

## 2024-01-17 ENCOUNTER — RESULT REVIEW (OUTPATIENT)
Age: 74
End: 2024-01-17

## 2024-01-17 DIAGNOSIS — C21.8 MALIGNANT NEOPLASM OF OVERLAPPING SITES OF RECTUM, ANUS AND ANAL CANAL: ICD-10-CM

## 2024-01-17 DIAGNOSIS — Z98.890 OTHER SPECIFIED POSTPROCEDURAL STATES: Chronic | ICD-10-CM

## 2024-01-17 DIAGNOSIS — Z00.8 ENCOUNTER FOR OTHER GENERAL EXAMINATION: ICD-10-CM

## 2024-01-17 PROCEDURE — 72197 MRI PELVIS W/O & W/DYE: CPT | Mod: 26,MH

## 2024-01-17 PROCEDURE — A9579: CPT

## 2024-01-17 PROCEDURE — 72170 X-RAY EXAM OF PELVIS: CPT

## 2024-01-17 PROCEDURE — 72170 X-RAY EXAM OF PELVIS: CPT | Mod: 26

## 2024-01-17 PROCEDURE — 72197 MRI PELVIS W/O & W/DYE: CPT

## 2024-01-18 DIAGNOSIS — C21.8 MALIGNANT NEOPLASM OF OVERLAPPING SITES OF RECTUM, ANUS AND ANAL CANAL: ICD-10-CM

## 2024-01-23 ENCOUNTER — NON-APPOINTMENT (OUTPATIENT)
Age: 74
End: 2024-01-23

## 2024-01-23 RX ORDER — POLYETHYLENE GLYCOL 3350 17 G/17G
17 POWDER, FOR SOLUTION ORAL DAILY
Qty: 510 | Refills: 5 | Status: ACTIVE | COMMUNITY
Start: 2024-01-23 | End: 1900-01-01

## 2024-02-06 ENCOUNTER — APPOINTMENT (OUTPATIENT)
Dept: SURGERY | Facility: CLINIC | Age: 74
End: 2024-02-06
Payer: MEDICARE

## 2024-02-06 VITALS
BODY MASS INDEX: 21.01 KG/M2 | WEIGHT: 169 LBS | DIASTOLIC BLOOD PRESSURE: 80 MMHG | OXYGEN SATURATION: 96 % | TEMPERATURE: 98.2 F | HEART RATE: 81 BPM | HEIGHT: 75 IN | SYSTOLIC BLOOD PRESSURE: 140 MMHG

## 2024-02-06 PROCEDURE — 99214 OFFICE O/P EST MOD 30 MIN: CPT

## 2024-02-07 ENCOUNTER — NON-APPOINTMENT (OUTPATIENT)
Age: 74
End: 2024-02-07

## 2024-02-07 LAB — CEA SERPL-MCNC: 6 NG/ML

## 2024-02-15 ENCOUNTER — OUTPATIENT (OUTPATIENT)
Dept: OUTPATIENT SERVICES | Facility: HOSPITAL | Age: 74
LOS: 1 days | End: 2024-02-15
Payer: MEDICARE

## 2024-02-15 VITALS
RESPIRATION RATE: 18 BRPM | WEIGHT: 171.96 LBS | OXYGEN SATURATION: 96 % | HEIGHT: 75 IN | DIASTOLIC BLOOD PRESSURE: 68 MMHG | SYSTOLIC BLOOD PRESSURE: 140 MMHG | HEART RATE: 68 BPM | TEMPERATURE: 97 F

## 2024-02-15 DIAGNOSIS — Z98.890 OTHER SPECIFIED POSTPROCEDURAL STATES: Chronic | ICD-10-CM

## 2024-02-15 DIAGNOSIS — Z01.818 ENCOUNTER FOR OTHER PREPROCEDURAL EXAMINATION: ICD-10-CM

## 2024-02-15 DIAGNOSIS — C20 MALIGNANT NEOPLASM OF RECTUM: ICD-10-CM

## 2024-02-15 LAB
A1C WITH ESTIMATED AVERAGE GLUCOSE RESULT: 5.4 % — SIGNIFICANT CHANGE UP (ref 4–5.6)
ALBUMIN SERPL ELPH-MCNC: 4.5 G/DL — SIGNIFICANT CHANGE UP (ref 3.5–5.2)
ALP SERPL-CCNC: 92 U/L — SIGNIFICANT CHANGE UP (ref 30–115)
ALT FLD-CCNC: 15 U/L — SIGNIFICANT CHANGE UP (ref 0–41)
ANION GAP SERPL CALC-SCNC: 11 MMOL/L — SIGNIFICANT CHANGE UP (ref 7–14)
APPEARANCE UR: CLEAR — SIGNIFICANT CHANGE UP
APTT BLD: 34.1 SEC — SIGNIFICANT CHANGE UP (ref 27–39.2)
AST SERPL-CCNC: 28 U/L — SIGNIFICANT CHANGE UP (ref 0–41)
BACTERIA # UR AUTO: NEGATIVE /HPF — SIGNIFICANT CHANGE UP
BASOPHILS # BLD AUTO: 0.04 K/UL — SIGNIFICANT CHANGE UP (ref 0–0.2)
BASOPHILS NFR BLD AUTO: 0.7 % — SIGNIFICANT CHANGE UP (ref 0–1)
BILIRUB SERPL-MCNC: 0.4 MG/DL — SIGNIFICANT CHANGE UP (ref 0.2–1.2)
BILIRUB UR-MCNC: NEGATIVE — SIGNIFICANT CHANGE UP
BLD GP AB SCN SERPL QL: SIGNIFICANT CHANGE UP
BUN SERPL-MCNC: 16 MG/DL — SIGNIFICANT CHANGE UP (ref 10–20)
CALCIUM SERPL-MCNC: 9.6 MG/DL — SIGNIFICANT CHANGE UP (ref 8.4–10.5)
CAST: 1 /LPF — SIGNIFICANT CHANGE UP (ref 0–4)
CHLORIDE SERPL-SCNC: 104 MMOL/L — SIGNIFICANT CHANGE UP (ref 98–110)
CO2 SERPL-SCNC: 25 MMOL/L — SIGNIFICANT CHANGE UP (ref 17–32)
COLOR SPEC: YELLOW — SIGNIFICANT CHANGE UP
CREAT SERPL-MCNC: 0.7 MG/DL — SIGNIFICANT CHANGE UP (ref 0.7–1.5)
DIFF PNL FLD: NEGATIVE — SIGNIFICANT CHANGE UP
EGFR: 99 ML/MIN/1.73M2 — SIGNIFICANT CHANGE UP
EOSINOPHIL # BLD AUTO: 0.14 K/UL — SIGNIFICANT CHANGE UP (ref 0–0.7)
EOSINOPHIL NFR BLD AUTO: 2.5 % — SIGNIFICANT CHANGE UP (ref 0–8)
ESTIMATED AVERAGE GLUCOSE: 108 MG/DL — SIGNIFICANT CHANGE UP (ref 68–114)
GLUCOSE SERPL-MCNC: 93 MG/DL — SIGNIFICANT CHANGE UP (ref 70–99)
GLUCOSE UR QL: NEGATIVE MG/DL — SIGNIFICANT CHANGE UP
HCT VFR BLD CALC: 38.9 % — LOW (ref 42–52)
HGB BLD-MCNC: 12.7 G/DL — LOW (ref 14–18)
IMM GRANULOCYTES NFR BLD AUTO: 0.2 % — SIGNIFICANT CHANGE UP (ref 0.1–0.3)
INR BLD: 1.09 RATIO — SIGNIFICANT CHANGE UP (ref 0.65–1.3)
KETONES UR-MCNC: NEGATIVE MG/DL — SIGNIFICANT CHANGE UP
LEUKOCYTE ESTERASE UR-ACNC: ABNORMAL
LYMPHOCYTES # BLD AUTO: 1.34 K/UL — SIGNIFICANT CHANGE UP (ref 1.2–3.4)
LYMPHOCYTES # BLD AUTO: 24.1 % — SIGNIFICANT CHANGE UP (ref 20.5–51.1)
MCHC RBC-ENTMCNC: 31 PG — SIGNIFICANT CHANGE UP (ref 27–31)
MCHC RBC-ENTMCNC: 32.6 G/DL — SIGNIFICANT CHANGE UP (ref 32–37)
MCV RBC AUTO: 94.9 FL — HIGH (ref 80–94)
MONOCYTES # BLD AUTO: 0.51 K/UL — SIGNIFICANT CHANGE UP (ref 0.1–0.6)
MONOCYTES NFR BLD AUTO: 9.2 % — SIGNIFICANT CHANGE UP (ref 1.7–9.3)
MRSA PCR RESULT.: NEGATIVE — SIGNIFICANT CHANGE UP
NEUTROPHILS # BLD AUTO: 3.53 K/UL — SIGNIFICANT CHANGE UP (ref 1.4–6.5)
NEUTROPHILS NFR BLD AUTO: 63.3 % — SIGNIFICANT CHANGE UP (ref 42.2–75.2)
NITRITE UR-MCNC: NEGATIVE — SIGNIFICANT CHANGE UP
NRBC # BLD: 0 /100 WBCS — SIGNIFICANT CHANGE UP (ref 0–0)
PH UR: 6.5 — SIGNIFICANT CHANGE UP (ref 5–8)
PLATELET # BLD AUTO: 254 K/UL — SIGNIFICANT CHANGE UP (ref 130–400)
PMV BLD: 10.8 FL — HIGH (ref 7.4–10.4)
POTASSIUM SERPL-MCNC: 4.4 MMOL/L — SIGNIFICANT CHANGE UP (ref 3.5–5)
POTASSIUM SERPL-SCNC: 4.4 MMOL/L — SIGNIFICANT CHANGE UP (ref 3.5–5)
PROT SERPL-MCNC: 6.9 G/DL — SIGNIFICANT CHANGE UP (ref 6–8)
PROT UR-MCNC: NEGATIVE MG/DL — SIGNIFICANT CHANGE UP
PROTHROM AB SERPL-ACNC: 12.4 SEC — SIGNIFICANT CHANGE UP (ref 9.95–12.87)
RBC # BLD: 4.1 M/UL — LOW (ref 4.7–6.1)
RBC # FLD: 12.7 % — SIGNIFICANT CHANGE UP (ref 11.5–14.5)
RBC CASTS # UR COMP ASSIST: 0 /HPF — SIGNIFICANT CHANGE UP (ref 0–4)
SODIUM SERPL-SCNC: 140 MMOL/L — SIGNIFICANT CHANGE UP (ref 135–146)
SP GR SPEC: 1.01 — SIGNIFICANT CHANGE UP (ref 1–1.03)
SQUAMOUS # UR AUTO: 0 /HPF — SIGNIFICANT CHANGE UP (ref 0–5)
UROBILINOGEN FLD QL: 0.2 MG/DL — SIGNIFICANT CHANGE UP (ref 0.2–1)
WBC # BLD: 5.57 K/UL — SIGNIFICANT CHANGE UP (ref 4.8–10.8)
WBC # FLD AUTO: 5.57 K/UL — SIGNIFICANT CHANGE UP (ref 4.8–10.8)
WBC UR QL: 2 /HPF — SIGNIFICANT CHANGE UP (ref 0–5)

## 2024-02-15 PROCEDURE — 36415 COLL VENOUS BLD VENIPUNCTURE: CPT

## 2024-02-15 PROCEDURE — 86901 BLOOD TYPING SEROLOGIC RH(D): CPT

## 2024-02-15 PROCEDURE — 80053 COMPREHEN METABOLIC PANEL: CPT

## 2024-02-15 PROCEDURE — 81001 URINALYSIS AUTO W/SCOPE: CPT

## 2024-02-15 PROCEDURE — 99214 OFFICE O/P EST MOD 30 MIN: CPT | Mod: 25

## 2024-02-15 PROCEDURE — 86900 BLOOD TYPING SEROLOGIC ABO: CPT

## 2024-02-15 PROCEDURE — 83036 HEMOGLOBIN GLYCOSYLATED A1C: CPT

## 2024-02-15 PROCEDURE — 93005 ELECTROCARDIOGRAM TRACING: CPT

## 2024-02-15 PROCEDURE — 85730 THROMBOPLASTIN TIME PARTIAL: CPT

## 2024-02-15 PROCEDURE — 86850 RBC ANTIBODY SCREEN: CPT

## 2024-02-15 PROCEDURE — 87640 STAPH A DNA AMP PROBE: CPT

## 2024-02-15 PROCEDURE — 87641 MR-STAPH DNA AMP PROBE: CPT

## 2024-02-15 PROCEDURE — 85610 PROTHROMBIN TIME: CPT

## 2024-02-15 PROCEDURE — 85025 COMPLETE CBC W/AUTO DIFF WBC: CPT

## 2024-02-15 PROCEDURE — 93010 ELECTROCARDIOGRAM REPORT: CPT

## 2024-02-15 RX ORDER — GABAPENTIN 400 MG/1
1 CAPSULE ORAL
Refills: 0 | DISCHARGE

## 2024-02-15 RX ORDER — SACCHAROMYCES BOULARDII 250 MG
1 POWDER IN PACKET (EA) ORAL
Refills: 0 | DISCHARGE

## 2024-02-15 NOTE — H&P PST ADULT - NSANTHOSAYNRD_GEN_A_CORE
No. MIGEL screening performed.  STOP BANG Legend: 0-2 = LOW Risk; 3-4 = INTERMEDIATE Risk; 5-8 = HIGH Risk

## 2024-02-15 NOTE — H&P PST ADULT - NSICDXPASTMEDICALHX_GEN_ALL_CORE_FT
PAST MEDICAL HISTORY:  History of BPH     History of hay fever     Rectal cancer     Rheumatoid arthritis     Skin cancer (melanoma)     Spinal stenosis

## 2024-02-15 NOTE — H&P PST ADULT - HISTORY OF PRESENT ILLNESS
denies recent URI  infections or s/s UTI    scheduled for 2/29 robotic assisted lap low anterior resection with diverting loop ileostomy possible abdominoperineal resection  HISTORY RECTAL CANCER  HISTORY RHEUMATOID ARTHRITIS FOLLOWS DR GUAJARDO- COMPLIANT TO FOLLOW ADVISE AND CALL MD FOR PRE OP [facility].  PRIOR NECK LYMPH NODE SURGERY 2014. FULL ROM NECK NOTED  ADVISED FOLLOW ALL SURGEON PREOP INSTRUCTIONS AS WELL  -PATIENT COMPLIANT     As per patient, this is their complete medical and surgical history, including medications both prescribed or over the counter.    Patient verbalized understanding of instructions and was given the opportunity to ask questions and have them answered.    Anesthesia Alert  YES--Difficult Airway  CLASS IV  yes--History of neck surgery or radiation  2014 NECK SURGERY LEFT SIDE  NO--Limited ROM of neck FULLROM NECK NOTED  NO--History of Malignant hyperthermia  NO--Personal or family history of Pseudocholinesterase deficiency.  NO--Prior Anesthesia Complication  NO--Latex Allergy  NO--Loose teeth  YES--History of Rheumatoid Arthritis  Methotrexate TO FOLLOW UP WITH RHEUMATOLOGY PRE OP  NO--MIGEL  NO--Bleeding risk  NO--Other_____    [employer] Activity Status Index (DASI) from [employer]  on 2/15/2024  ** All calculations should be rechecked by clinician prior to use **    RESULT SUMMARY:  42.7 points  The higher the score (maximum 58.2), the higher the functional status.    7.99 METs        INPUTS:  Take care of self —> 2.75 = Yes  Walk indoors —> 1.75 = Yes  Walk 1&ndash;2 blocks on level ground —> 2.75 = Yes  Climb a flight of stairs or walk up a hill —> 5.5 = Yes  Run a short distance —> 0 = No  Do light work around the house —> 2.7 = Yes  Do moderate work around the house —> 3.5 = Yes  Do heavy work around the house —> 8 = Yes  Do yardwork —> 4.5 = Yes  Have sexual relations —> 5.25 = Yes  Participate in moderate recreational activities —> 6 = Yes  Participate in strenuous sports —> 0 = No      Revised Cardiac Risk Index for Pre-Operative Risk from [employer]  on 2/15/2024  ** All calculations should be rechecked by clinician prior to use **    RESULT SUMMARY:  0 points  Class I Risk    3.9 %  30-day risk of death, MI, or cardiac arrest    From D[place]ppe 2017. These numbers are higher than those from the original study (Rahul 1999). See Evidence for details.      INPUTS:  Elevated-risk surgery —> 0 = No  History of ischemic heart disease —> 0 = No  History of congestive heart failure —> 0 = No  History of cerebrovascular disease —> 0 = No  Pre-operative treatment with insulin —> 0 = No  Pre-operative creatinine >2 mg/dL / 176.8 µmol/L —> 0 = No

## 2024-02-15 NOTE — H&P PST ADULT - ATTENDING COMMENTS
73M with rectal cancer presents for robotic low anterior resection with diverting loop ileostomy possible abdominoperineal resection, possible open

## 2024-02-16 ENCOUNTER — OUTPATIENT (OUTPATIENT)
Dept: OUTPATIENT SERVICES | Facility: HOSPITAL | Age: 74
LOS: 1 days | End: 2024-02-16
Payer: MEDICARE

## 2024-02-16 ENCOUNTER — RESULT REVIEW (OUTPATIENT)
Age: 74
End: 2024-02-16

## 2024-02-16 DIAGNOSIS — Z00.8 ENCOUNTER FOR OTHER GENERAL EXAMINATION: ICD-10-CM

## 2024-02-16 DIAGNOSIS — C20 MALIGNANT NEOPLASM OF RECTUM: ICD-10-CM

## 2024-02-16 DIAGNOSIS — Z98.890 OTHER SPECIFIED POSTPROCEDURAL STATES: Chronic | ICD-10-CM

## 2024-02-16 DIAGNOSIS — Z01.818 ENCOUNTER FOR OTHER PREPROCEDURAL EXAMINATION: ICD-10-CM

## 2024-02-16 PROCEDURE — 71250 CT THORAX DX C-: CPT

## 2024-02-16 PROCEDURE — 71250 CT THORAX DX C-: CPT | Mod: 26,MH

## 2024-02-17 DIAGNOSIS — C20 MALIGNANT NEOPLASM OF RECTUM: ICD-10-CM

## 2024-02-22 NOTE — ADDENDUM
[FreeTextEntry1] : I, Shahida Schmitt (Formerly [facility]) assisted in filling out this chart under the dictation of Dr. Flynn Lorenzo on 02/08/2024.

## 2024-02-22 NOTE — HISTORY OF PRESENT ILLNESS
[FreeTextEntry1] : Patient is a 73-year-old male with PMHx of BPH, spinal stenosis and rheumatoid arthritis on methotrexate who presents for evaluation of rectal adenocarcinoma. On the patient's original evaluation, he was found to have polypoid tissue in the rectum. He was referred to GI for colonoscopy. He underwent colonoscopy on November 30th, 2023 with Dr. Tran, which showed a irregular friable mass from the dentate line to 10cms. HPIs showed villus adenoma. Patient was then referred to Dr. Phill Ibrahim, and underwent EMR on December 15th, 2023. Pathology showed invasive adenocarcinoma, moderately differentiated arising in a villous adenoma with unclear margins due to piecemeal resection. Patient was taken again on January 9, 2024, an attempt to remove the remainder of the lesion. However, this was not possible. His case was discussed at Tumor Board and the recommendation was to complete his staging and then proceed with robot-assisted laparoscopic low anterior resection versus abdominal perineal resection. He presents today to discuss surgery. Patient overall says he feels well. He's tolerating a diet and having regular bowel movements. He reports complete resolution of his diarrhea, prolapsing, and bleeding. He denies recent fevers, chills, nausea or vomiting. He denies family history of colon cancer, rectal cancer, inflammatory bowel disease.

## 2024-02-22 NOTE — ASSESSMENT
[FreeTextEntry1] : 73-year-old male with malignant neoplasm of the rectum.  I had a long conversation with the patient regarding his condition. His pelvic MRI shows a T1/T2 lesion in the mid-rectum with no suspicious lymph nodes. I recommended CT of the chest and CEA level to complete his staging. We discussed robot-assisted laparoscopic lower anterior resection with diverting loop ileostomy vs robot-assisted laparoscopic abdominal perineal resection with permanent end colostomy. The decision on the procedure will depend on if adequate distal margin can be obtained at the time of surgery. All risk, benefits, and alternatives were discussed including bleeding, infection, damage to surrounding structures, anastomotic stricture, anastomotic leak, perineal wound, complications, cardiopulmonary events, venous thromboembolic events, hernia formation, neuropathy, bowel, bladder, or erectile dysfunction. We discussed with the patient that if he requires abdominal peroneal resection that his colostomy will be permanent. We expect a 3-7 day hospital stay and 6-7 week recovery. Patient expressed understanding and was in agreement with the plan.

## 2024-02-22 NOTE — PHYSICAL EXAM
[FreeTextEntry1] : General: Awake, Alert, No Acute Distress Respiratory: Normal Respiratory Effort.

## 2024-02-23 PROBLEM — C20 MALIGNANT NEOPLASM OF RECTUM: Chronic | Status: ACTIVE | Noted: 2024-02-15

## 2024-02-26 RX ORDER — METRONIDAZOLE 500 MG/1
500 TABLET ORAL
Qty: 6 | Refills: 0 | Status: ACTIVE | COMMUNITY
Start: 2024-02-26 | End: 1900-01-01

## 2024-02-26 RX ORDER — NEOMYCIN SULFATE 500 MG/1
500 TABLET ORAL
Qty: 6 | Refills: 0 | Status: ACTIVE | COMMUNITY
Start: 2024-02-26 | End: 1900-01-01

## 2024-02-28 ENCOUNTER — NON-APPOINTMENT (OUTPATIENT)
Age: 74
End: 2024-02-28

## 2024-02-29 ENCOUNTER — INPATIENT (INPATIENT)
Facility: HOSPITAL | Age: 74
LOS: 5 days | Discharge: HOME HEALTH | DRG: 330 | End: 2024-03-06
Attending: COLON & RECTAL SURGERY | Admitting: COLON & RECTAL SURGERY
Payer: MEDICARE

## 2024-02-29 ENCOUNTER — RESULT REVIEW (OUTPATIENT)
Age: 74
End: 2024-02-29

## 2024-02-29 ENCOUNTER — APPOINTMENT (OUTPATIENT)
Dept: SURGERY | Facility: HOSPITAL | Age: 74
End: 2024-02-29

## 2024-02-29 VITALS
SYSTOLIC BLOOD PRESSURE: 133 MMHG | HEART RATE: 75 BPM | OXYGEN SATURATION: 99 % | TEMPERATURE: 98 F | RESPIRATION RATE: 16 BRPM | DIASTOLIC BLOOD PRESSURE: 77 MMHG | HEIGHT: 75 IN | WEIGHT: 160.06 LBS

## 2024-02-29 DIAGNOSIS — Z98.890 OTHER SPECIFIED POSTPROCEDURAL STATES: Chronic | ICD-10-CM

## 2024-02-29 DIAGNOSIS — C20 MALIGNANT NEOPLASM OF RECTUM: ICD-10-CM

## 2024-02-29 LAB
ALBUMIN SERPL ELPH-MCNC: 3.9 G/DL — SIGNIFICANT CHANGE UP (ref 3.5–5.2)
ALP SERPL-CCNC: 71 U/L — SIGNIFICANT CHANGE UP (ref 30–115)
ALT FLD-CCNC: 14 U/L — SIGNIFICANT CHANGE UP (ref 0–41)
ANION GAP SERPL CALC-SCNC: 12 MMOL/L — SIGNIFICANT CHANGE UP (ref 7–14)
AST SERPL-CCNC: 27 U/L — SIGNIFICANT CHANGE UP (ref 0–41)
BILIRUB SERPL-MCNC: 0.5 MG/DL — SIGNIFICANT CHANGE UP (ref 0.2–1.2)
BUN SERPL-MCNC: 13 MG/DL — SIGNIFICANT CHANGE UP (ref 10–20)
CALCIUM SERPL-MCNC: 8.8 MG/DL — SIGNIFICANT CHANGE UP (ref 8.4–10.4)
CHLORIDE SERPL-SCNC: 102 MMOL/L — SIGNIFICANT CHANGE UP (ref 98–110)
CO2 SERPL-SCNC: 24 MMOL/L — SIGNIFICANT CHANGE UP (ref 17–32)
CREAT SERPL-MCNC: 0.8 MG/DL — SIGNIFICANT CHANGE UP (ref 0.7–1.5)
EGFR: 95 ML/MIN/1.73M2 — SIGNIFICANT CHANGE UP
GLUCOSE BLDC GLUCOMTR-MCNC: 114 MG/DL — HIGH (ref 70–99)
GLUCOSE BLDC GLUCOMTR-MCNC: 147 MG/DL — HIGH (ref 70–99)
GLUCOSE BLDC GLUCOMTR-MCNC: 178 MG/DL — HIGH (ref 70–99)
GLUCOSE SERPL-MCNC: 172 MG/DL — HIGH (ref 70–99)
POTASSIUM SERPL-MCNC: 3.9 MMOL/L — SIGNIFICANT CHANGE UP (ref 3.5–5)
POTASSIUM SERPL-SCNC: 3.9 MMOL/L — SIGNIFICANT CHANGE UP (ref 3.5–5)
PROT SERPL-MCNC: 5.8 G/DL — LOW (ref 6–8)
SODIUM SERPL-SCNC: 138 MMOL/L — SIGNIFICANT CHANGE UP (ref 135–146)

## 2024-02-29 PROCEDURE — 86900 BLOOD TYPING SEROLOGIC ABO: CPT

## 2024-02-29 PROCEDURE — 85027 COMPLETE CBC AUTOMATED: CPT

## 2024-02-29 PROCEDURE — 36415 COLL VENOUS BLD VENIPUNCTURE: CPT

## 2024-02-29 PROCEDURE — 86901 BLOOD TYPING SEROLOGIC RH(D): CPT

## 2024-02-29 PROCEDURE — 86850 RBC ANTIBODY SCREEN: CPT

## 2024-02-29 PROCEDURE — 83735 ASSAY OF MAGNESIUM: CPT

## 2024-02-29 PROCEDURE — 97110 THERAPEUTIC EXERCISES: CPT | Mod: GP

## 2024-02-29 PROCEDURE — 97116 GAIT TRAINING THERAPY: CPT | Mod: GP

## 2024-02-29 PROCEDURE — 88341 IMHCHEM/IMCYTCHM EA ADD ANTB: CPT | Mod: 26

## 2024-02-29 PROCEDURE — C9399: CPT

## 2024-02-29 PROCEDURE — 80048 BASIC METABOLIC PNL TOTAL CA: CPT

## 2024-02-29 PROCEDURE — 88342 IMHCHEM/IMCYTCHM 1ST ANTB: CPT | Mod: 26

## 2024-02-29 PROCEDURE — 88309 TISSUE EXAM BY PATHOLOGIST: CPT | Mod: 26

## 2024-02-29 PROCEDURE — 86923 COMPATIBILITY TEST ELECTRIC: CPT

## 2024-02-29 PROCEDURE — 80053 COMPREHEN METABOLIC PANEL: CPT

## 2024-02-29 PROCEDURE — 88309 TISSUE EXAM BY PATHOLOGIST: CPT

## 2024-02-29 PROCEDURE — 71045 X-RAY EXAM CHEST 1 VIEW: CPT

## 2024-02-29 PROCEDURE — 44213 LAP MOBIL SPLENIC FL ADD-ON: CPT

## 2024-02-29 PROCEDURE — 44208 L COLECTOMY/COLOPROCTOSTOMY: CPT

## 2024-02-29 PROCEDURE — C1889: CPT

## 2024-02-29 PROCEDURE — S2900: CPT

## 2024-02-29 PROCEDURE — 45397 LAP REMOVE RECTUM W/POUCH: CPT | Mod: 80,GC

## 2024-02-29 PROCEDURE — 85025 COMPLETE CBC W/AUTO DIFF WBC: CPT

## 2024-02-29 PROCEDURE — C1751: CPT

## 2024-02-29 PROCEDURE — S2900 ROBOTIC SURGICAL SYSTEM: CPT | Mod: NC

## 2024-02-29 PROCEDURE — 88341 IMHCHEM/IMCYTCHM EA ADD ANTB: CPT

## 2024-02-29 PROCEDURE — 97162 PT EVAL MOD COMPLEX 30 MIN: CPT | Mod: GP

## 2024-02-29 PROCEDURE — 84100 ASSAY OF PHOSPHORUS: CPT

## 2024-02-29 PROCEDURE — 82962 GLUCOSE BLOOD TEST: CPT

## 2024-02-29 PROCEDURE — 36430 TRANSFUSION BLD/BLD COMPNT: CPT

## 2024-02-29 PROCEDURE — P9040: CPT

## 2024-02-29 RX ORDER — GABAPENTIN 400 MG/1
600 CAPSULE ORAL EVERY 8 HOURS
Refills: 0 | Status: DISCONTINUED | OUTPATIENT
Start: 2024-02-29 | End: 2024-03-06

## 2024-02-29 RX ORDER — GABAPENTIN 400 MG/1
300 CAPSULE ORAL ONCE
Refills: 0 | Status: DISCONTINUED | OUTPATIENT
Start: 2024-02-29 | End: 2024-02-29

## 2024-02-29 RX ORDER — HYDROMORPHONE HYDROCHLORIDE 2 MG/ML
0.25 INJECTION INTRAMUSCULAR; INTRAVENOUS; SUBCUTANEOUS
Refills: 0 | Status: DISCONTINUED | OUTPATIENT
Start: 2024-02-29 | End: 2024-02-29

## 2024-02-29 RX ORDER — ACETAMINOPHEN 500 MG
1000 TABLET ORAL EVERY 6 HOURS
Refills: 0 | Status: DISCONTINUED | OUTPATIENT
Start: 2024-02-29 | End: 2024-03-06

## 2024-02-29 RX ORDER — SODIUM CHLORIDE 9 MG/ML
1000 INJECTION, SOLUTION INTRAVENOUS
Refills: 0 | Status: DISCONTINUED | OUTPATIENT
Start: 2024-02-29 | End: 2024-02-29

## 2024-02-29 RX ORDER — TAMSULOSIN HYDROCHLORIDE 0.4 MG/1
0.4 CAPSULE ORAL AT BEDTIME
Refills: 0 | Status: DISCONTINUED | OUTPATIENT
Start: 2024-02-29 | End: 2024-03-06

## 2024-02-29 RX ORDER — ACETAMINOPHEN 500 MG
1000 TABLET ORAL ONCE
Refills: 0 | Status: DISCONTINUED | OUTPATIENT
Start: 2024-02-29 | End: 2024-02-29

## 2024-02-29 RX ORDER — HYDROMORPHONE HYDROCHLORIDE 2 MG/ML
0.5 INJECTION INTRAMUSCULAR; INTRAVENOUS; SUBCUTANEOUS
Refills: 0 | Status: DISCONTINUED | OUTPATIENT
Start: 2024-02-29 | End: 2024-02-29

## 2024-02-29 RX ORDER — ONDANSETRON 8 MG/1
4 TABLET, FILM COATED ORAL EVERY 8 HOURS
Refills: 0 | Status: DISCONTINUED | OUTPATIENT
Start: 2024-02-29 | End: 2024-03-04

## 2024-02-29 RX ORDER — SODIUM CHLORIDE 9 MG/ML
1000 INJECTION, SOLUTION INTRAVENOUS
Refills: 0 | Status: DISCONTINUED | OUTPATIENT
Start: 2024-02-29 | End: 2024-03-01

## 2024-02-29 RX ORDER — HEPARIN SODIUM 5000 [USP'U]/ML
5000 INJECTION INTRAVENOUS; SUBCUTANEOUS ONCE
Refills: 0 | Status: DISCONTINUED | OUTPATIENT
Start: 2024-02-29 | End: 2024-02-29

## 2024-02-29 RX ORDER — KETOROLAC TROMETHAMINE 30 MG/ML
15 SYRINGE (ML) INJECTION EVERY 6 HOURS
Refills: 0 | Status: DISCONTINUED | OUTPATIENT
Start: 2024-02-29 | End: 2024-03-01

## 2024-02-29 RX ORDER — ONDANSETRON 8 MG/1
4 TABLET, FILM COATED ORAL ONCE
Refills: 0 | Status: DISCONTINUED | OUTPATIENT
Start: 2024-02-29 | End: 2024-02-29

## 2024-02-29 RX ORDER — HYDROMORPHONE HYDROCHLORIDE 2 MG/ML
0.5 INJECTION INTRAMUSCULAR; INTRAVENOUS; SUBCUTANEOUS EVERY 4 HOURS
Refills: 0 | Status: DISCONTINUED | OUTPATIENT
Start: 2024-02-29 | End: 2024-03-01

## 2024-02-29 RX ORDER — HYDROMORPHONE HYDROCHLORIDE 2 MG/ML
1 INJECTION INTRAMUSCULAR; INTRAVENOUS; SUBCUTANEOUS
Refills: 0 | Status: DISCONTINUED | OUTPATIENT
Start: 2024-02-29 | End: 2024-02-29

## 2024-02-29 RX ORDER — ENOXAPARIN SODIUM 100 MG/ML
40 INJECTION SUBCUTANEOUS EVERY 24 HOURS
Refills: 0 | Status: DISCONTINUED | OUTPATIENT
Start: 2024-02-29 | End: 2024-03-06

## 2024-02-29 RX ADMIN — SODIUM CHLORIDE 100 MILLILITER(S): 9 INJECTION, SOLUTION INTRAVENOUS at 15:51

## 2024-02-29 RX ADMIN — GABAPENTIN 600 MILLIGRAM(S): 400 CAPSULE ORAL at 21:09

## 2024-02-29 RX ADMIN — ENOXAPARIN SODIUM 40 MILLIGRAM(S): 100 INJECTION SUBCUTANEOUS at 15:44

## 2024-02-29 RX ADMIN — Medication 15 MILLIGRAM(S): at 17:25

## 2024-02-29 RX ADMIN — Medication 1000 MILLIGRAM(S): at 23:14

## 2024-02-29 RX ADMIN — Medication 1000 MILLIGRAM(S): at 17:25

## 2024-02-29 RX ADMIN — TAMSULOSIN HYDROCHLORIDE 0.4 MILLIGRAM(S): 0.4 CAPSULE ORAL at 21:09

## 2024-02-29 RX ADMIN — Medication 15 MILLIGRAM(S): at 23:14

## 2024-02-29 NOTE — BRIEF OPERATIVE NOTE - NSICDXBRIEFPROCEDURE_GEN_ALL_CORE_FT
PROCEDURES:  Robot-assisted laparoscopic low anterior resection using da Myrna Xi 29-Feb-2024 14:24:06  Betito Armenta  Anastomosis, colon to anus 29-Feb-2024 14:24:34  Betito Armenta  Mobilization of splenic flexure 29-Feb-2024 14:24:45  Betito Armenta  Robot-assisted creation of loop ileostomy 29-Feb-2024 14:25:10  Betito Armenta  Block, transversus abdominis plane, bilateral 29-Feb-2024 14:26:03  Betito Armenta

## 2024-02-29 NOTE — BRIEF OPERATIVE NOTE - OPERATION/FINDINGS
Robotic assisted Low anterior resection. Mobilization of splenic flexure, recto-sigmoidectomy performed and anal colon anastomosis achieved. Pelvic drain left in place, hemostasis achieved. Loop ileostomy created.

## 2024-02-29 NOTE — PATIENT PROFILE ADULT - FALL HARM RISK - HARM RISK INTERVENTIONS

## 2024-03-01 LAB
ANION GAP SERPL CALC-SCNC: 7 MMOL/L — SIGNIFICANT CHANGE UP (ref 7–14)
ANION GAP SERPL CALC-SCNC: 9 MMOL/L — SIGNIFICANT CHANGE UP (ref 7–14)
BASOPHILS # BLD AUTO: 0.01 K/UL — SIGNIFICANT CHANGE UP (ref 0–0.2)
BASOPHILS # BLD AUTO: 0.02 K/UL — SIGNIFICANT CHANGE UP (ref 0–0.2)
BASOPHILS NFR BLD AUTO: 0.1 % — SIGNIFICANT CHANGE UP (ref 0–1)
BASOPHILS NFR BLD AUTO: 0.2 % — SIGNIFICANT CHANGE UP (ref 0–1)
BUN SERPL-MCNC: 11 MG/DL — SIGNIFICANT CHANGE UP (ref 10–20)
BUN SERPL-MCNC: 18 MG/DL — SIGNIFICANT CHANGE UP (ref 10–20)
CALCIUM SERPL-MCNC: 8.3 MG/DL — LOW (ref 8.4–10.5)
CALCIUM SERPL-MCNC: 8.6 MG/DL — SIGNIFICANT CHANGE UP (ref 8.4–10.5)
CHLORIDE SERPL-SCNC: 106 MMOL/L — SIGNIFICANT CHANGE UP (ref 98–110)
CHLORIDE SERPL-SCNC: 107 MMOL/L — SIGNIFICANT CHANGE UP (ref 98–110)
CO2 SERPL-SCNC: 23 MMOL/L — SIGNIFICANT CHANGE UP (ref 17–32)
CO2 SERPL-SCNC: 25 MMOL/L — SIGNIFICANT CHANGE UP (ref 17–32)
CREAT SERPL-MCNC: 0.8 MG/DL — SIGNIFICANT CHANGE UP (ref 0.7–1.5)
CREAT SERPL-MCNC: 1 MG/DL — SIGNIFICANT CHANGE UP (ref 0.7–1.5)
EGFR: 80 ML/MIN/1.73M2 — SIGNIFICANT CHANGE UP
EGFR: 95 ML/MIN/1.73M2 — SIGNIFICANT CHANGE UP
EOSINOPHIL # BLD AUTO: 0 K/UL — SIGNIFICANT CHANGE UP (ref 0–0.7)
EOSINOPHIL # BLD AUTO: 0.01 K/UL — SIGNIFICANT CHANGE UP (ref 0–0.7)
EOSINOPHIL NFR BLD AUTO: 0 % — SIGNIFICANT CHANGE UP (ref 0–8)
EOSINOPHIL NFR BLD AUTO: 0.1 % — SIGNIFICANT CHANGE UP (ref 0–8)
GLUCOSE SERPL-MCNC: 111 MG/DL — HIGH (ref 70–99)
GLUCOSE SERPL-MCNC: 114 MG/DL — HIGH (ref 70–99)
HCT VFR BLD CALC: 25.3 % — LOW (ref 42–52)
HCT VFR BLD CALC: 25.8 % — LOW (ref 42–52)
HCT VFR BLD CALC: 32.4 % — LOW (ref 42–52)
HCT VFR BLD CALC: 33.2 % — LOW (ref 42–52)
HGB BLD-MCNC: 10.9 G/DL — LOW (ref 14–18)
HGB BLD-MCNC: 11 G/DL — LOW (ref 14–18)
HGB BLD-MCNC: 8.3 G/DL — LOW (ref 14–18)
HGB BLD-MCNC: 8.6 G/DL — LOW (ref 14–18)
IMM GRANULOCYTES NFR BLD AUTO: 0.5 % — HIGH (ref 0.1–0.3)
IMM GRANULOCYTES NFR BLD AUTO: 0.5 % — HIGH (ref 0.1–0.3)
LYMPHOCYTES # BLD AUTO: 0.53 K/UL — LOW (ref 1.2–3.4)
LYMPHOCYTES # BLD AUTO: 1.17 K/UL — LOW (ref 1.2–3.4)
LYMPHOCYTES # BLD AUTO: 4 % — LOW (ref 20.5–51.1)
LYMPHOCYTES # BLD AUTO: 9 % — LOW (ref 20.5–51.1)
MAGNESIUM SERPL-MCNC: 1.7 MG/DL — LOW (ref 1.8–2.4)
MAGNESIUM SERPL-MCNC: 1.9 MG/DL — SIGNIFICANT CHANGE UP (ref 1.8–2.4)
MCHC RBC-ENTMCNC: 30.9 PG — SIGNIFICANT CHANGE UP (ref 27–31)
MCHC RBC-ENTMCNC: 31 PG — SIGNIFICANT CHANGE UP (ref 27–31)
MCHC RBC-ENTMCNC: 31.3 PG — HIGH (ref 27–31)
MCHC RBC-ENTMCNC: 31.3 PG — HIGH (ref 27–31)
MCHC RBC-ENTMCNC: 32.8 G/DL — SIGNIFICANT CHANGE UP (ref 32–37)
MCHC RBC-ENTMCNC: 33.1 G/DL — SIGNIFICANT CHANGE UP (ref 32–37)
MCHC RBC-ENTMCNC: 33.3 G/DL — SIGNIFICANT CHANGE UP (ref 32–37)
MCHC RBC-ENTMCNC: 33.6 G/DL — SIGNIFICANT CHANGE UP (ref 32–37)
MCV RBC AUTO: 92 FL — SIGNIFICANT CHANGE UP (ref 80–94)
MCV RBC AUTO: 93.8 FL — SIGNIFICANT CHANGE UP (ref 80–94)
MCV RBC AUTO: 94.1 FL — HIGH (ref 80–94)
MCV RBC AUTO: 94.3 FL — HIGH (ref 80–94)
MONOCYTES # BLD AUTO: 1.28 K/UL — HIGH (ref 0.1–0.6)
MONOCYTES # BLD AUTO: 1.43 K/UL — HIGH (ref 0.1–0.6)
MONOCYTES NFR BLD AUTO: 11 % — HIGH (ref 1.7–9.3)
MONOCYTES NFR BLD AUTO: 9.7 % — HIGH (ref 1.7–9.3)
NEUTROPHILS # BLD AUTO: 10.27 K/UL — HIGH (ref 1.4–6.5)
NEUTROPHILS # BLD AUTO: 11.36 K/UL — HIGH (ref 1.4–6.5)
NEUTROPHILS NFR BLD AUTO: 79.2 % — HIGH (ref 42.2–75.2)
NEUTROPHILS NFR BLD AUTO: 85.7 % — HIGH (ref 42.2–75.2)
NRBC # BLD: 0 /100 WBCS — SIGNIFICANT CHANGE UP (ref 0–0)
PHOSPHATE SERPL-MCNC: 1.7 MG/DL — LOW (ref 2.1–4.9)
PHOSPHATE SERPL-MCNC: 3.3 MG/DL — SIGNIFICANT CHANGE UP (ref 2.1–4.9)
PLATELET # BLD AUTO: 174 K/UL — SIGNIFICANT CHANGE UP (ref 130–400)
PLATELET # BLD AUTO: 174 K/UL — SIGNIFICANT CHANGE UP (ref 130–400)
PLATELET # BLD AUTO: 189 K/UL — SIGNIFICANT CHANGE UP (ref 130–400)
PLATELET # BLD AUTO: 224 K/UL — SIGNIFICANT CHANGE UP (ref 130–400)
PMV BLD: 10.7 FL — HIGH (ref 7.4–10.4)
PMV BLD: 10.8 FL — HIGH (ref 7.4–10.4)
PMV BLD: 11 FL — HIGH (ref 7.4–10.4)
PMV BLD: 11.2 FL — HIGH (ref 7.4–10.4)
POTASSIUM SERPL-MCNC: 4.2 MMOL/L — SIGNIFICANT CHANGE UP (ref 3.5–5)
POTASSIUM SERPL-MCNC: 4.3 MMOL/L — SIGNIFICANT CHANGE UP (ref 3.5–5)
POTASSIUM SERPL-SCNC: 4.2 MMOL/L — SIGNIFICANT CHANGE UP (ref 3.5–5)
POTASSIUM SERPL-SCNC: 4.3 MMOL/L — SIGNIFICANT CHANGE UP (ref 3.5–5)
RBC # BLD: 2.69 M/UL — LOW (ref 4.7–6.1)
RBC # BLD: 2.75 M/UL — LOW (ref 4.7–6.1)
RBC # BLD: 3.52 M/UL — LOW (ref 4.7–6.1)
RBC # BLD: 3.52 M/UL — LOW (ref 4.7–6.1)
RBC # FLD: 13 % — SIGNIFICANT CHANGE UP (ref 11.5–14.5)
RBC # FLD: 13.2 % — SIGNIFICANT CHANGE UP (ref 11.5–14.5)
RBC # FLD: 13.2 % — SIGNIFICANT CHANGE UP (ref 11.5–14.5)
RBC # FLD: 13.7 % — SIGNIFICANT CHANGE UP (ref 11.5–14.5)
SODIUM SERPL-SCNC: 136 MMOL/L — SIGNIFICANT CHANGE UP (ref 135–146)
SODIUM SERPL-SCNC: 141 MMOL/L — SIGNIFICANT CHANGE UP (ref 135–146)
WBC # BLD: 12.97 K/UL — HIGH (ref 4.8–10.8)
WBC # BLD: 13.24 K/UL — HIGH (ref 4.8–10.8)
WBC # BLD: 14.15 K/UL — HIGH (ref 4.8–10.8)
WBC # BLD: 18.89 K/UL — HIGH (ref 4.8–10.8)
WBC # FLD AUTO: 12.97 K/UL — HIGH (ref 4.8–10.8)
WBC # FLD AUTO: 13.24 K/UL — HIGH (ref 4.8–10.8)
WBC # FLD AUTO: 14.15 K/UL — HIGH (ref 4.8–10.8)
WBC # FLD AUTO: 18.89 K/UL — HIGH (ref 4.8–10.8)

## 2024-03-01 PROCEDURE — 99291 CRITICAL CARE FIRST HOUR: CPT | Mod: 24,25

## 2024-03-01 RX ORDER — SODIUM CHLORIDE 9 MG/ML
500 INJECTION, SOLUTION INTRAVENOUS ONCE
Refills: 0 | Status: COMPLETED | OUTPATIENT
Start: 2024-03-01 | End: 2024-03-01

## 2024-03-01 RX ORDER — TRAMADOL HYDROCHLORIDE 50 MG/1
25 TABLET ORAL EVERY 6 HOURS
Refills: 0 | Status: DISCONTINUED | OUTPATIENT
Start: 2024-03-01 | End: 2024-03-06

## 2024-03-01 RX ORDER — SODIUM CHLORIDE 9 MG/ML
1000 INJECTION, SOLUTION INTRAVENOUS
Refills: 0 | Status: DISCONTINUED | OUTPATIENT
Start: 2024-03-01 | End: 2024-03-03

## 2024-03-01 RX ORDER — CHLORHEXIDINE GLUCONATE 213 G/1000ML
1 SOLUTION TOPICAL DAILY
Refills: 0 | Status: DISCONTINUED | OUTPATIENT
Start: 2024-03-01 | End: 2024-03-04

## 2024-03-01 RX ORDER — SODIUM CHLORIDE 9 MG/ML
750 INJECTION, SOLUTION INTRAVENOUS ONCE
Refills: 0 | Status: COMPLETED | OUTPATIENT
Start: 2024-03-01 | End: 2024-03-01

## 2024-03-01 RX ORDER — SODIUM CHLORIDE 9 MG/ML
1000 INJECTION, SOLUTION INTRAVENOUS ONCE
Refills: 0 | Status: COMPLETED | OUTPATIENT
Start: 2024-03-01 | End: 2024-03-01

## 2024-03-01 RX ORDER — MAGNESIUM SULFATE 500 MG/ML
2 VIAL (ML) INJECTION ONCE
Refills: 0 | Status: COMPLETED | OUTPATIENT
Start: 2024-03-01 | End: 2024-03-01

## 2024-03-01 RX ORDER — FOLIC ACID 0.8 MG
1 TABLET ORAL DAILY
Refills: 0 | Status: DISCONTINUED | OUTPATIENT
Start: 2024-03-01 | End: 2024-03-06

## 2024-03-01 RX ORDER — FINASTERIDE 5 MG/1
5 TABLET, FILM COATED ORAL DAILY
Refills: 0 | Status: DISCONTINUED | OUTPATIENT
Start: 2024-03-01 | End: 2024-03-06

## 2024-03-01 RX ORDER — SODIUM CHLORIDE 9 MG/ML
1000 INJECTION, SOLUTION INTRAVENOUS
Refills: 0 | Status: DISCONTINUED | OUTPATIENT
Start: 2024-03-01 | End: 2024-03-01

## 2024-03-01 RX ADMIN — TAMSULOSIN HYDROCHLORIDE 0.4 MILLIGRAM(S): 0.4 CAPSULE ORAL at 21:45

## 2024-03-01 RX ADMIN — SODIUM CHLORIDE 120 MILLILITER(S): 9 INJECTION, SOLUTION INTRAVENOUS at 19:05

## 2024-03-01 RX ADMIN — SODIUM CHLORIDE 100 MILLILITER(S): 9 INJECTION, SOLUTION INTRAVENOUS at 05:08

## 2024-03-01 RX ADMIN — SODIUM CHLORIDE 500 MILLILITER(S): 9 INJECTION, SOLUTION INTRAVENOUS at 16:46

## 2024-03-01 RX ADMIN — Medication 85 MILLIMOLE(S): at 23:45

## 2024-03-01 RX ADMIN — Medication 1000 MILLIGRAM(S): at 05:08

## 2024-03-01 RX ADMIN — SODIUM CHLORIDE 1000 MILLILITER(S): 9 INJECTION, SOLUTION INTRAVENOUS at 14:54

## 2024-03-01 RX ADMIN — GABAPENTIN 600 MILLIGRAM(S): 400 CAPSULE ORAL at 05:08

## 2024-03-01 RX ADMIN — Medication 1000 MILLIGRAM(S): at 23:14

## 2024-03-01 RX ADMIN — GABAPENTIN 600 MILLIGRAM(S): 400 CAPSULE ORAL at 14:58

## 2024-03-01 RX ADMIN — Medication 1000 MILLIGRAM(S): at 17:38

## 2024-03-01 RX ADMIN — GABAPENTIN 600 MILLIGRAM(S): 400 CAPSULE ORAL at 21:45

## 2024-03-01 RX ADMIN — Medication 25 GRAM(S): at 23:19

## 2024-03-01 RX ADMIN — Medication 15 MILLIGRAM(S): at 17:38

## 2024-03-01 RX ADMIN — Medication 15 MILLIGRAM(S): at 05:07

## 2024-03-01 RX ADMIN — Medication 15 MILLIGRAM(S): at 12:16

## 2024-03-01 RX ADMIN — Medication 1000 MILLIGRAM(S): at 12:16

## 2024-03-01 RX ADMIN — SODIUM CHLORIDE 750 MILLILITER(S): 9 INJECTION, SOLUTION INTRAVENOUS at 19:05

## 2024-03-01 RX ADMIN — SODIUM CHLORIDE 500 MILLILITER(S): 9 INJECTION, SOLUTION INTRAVENOUS at 12:17

## 2024-03-01 NOTE — PROGRESS NOTE ADULT - ATTENDING COMMENTS
73M with rectal ca POD 1 s/p robotic LAR, coloanal anastomosis with DLI     Patient seen and examined. No acute events.  tolerating liquids without nausea or vomiting.      Abdomen - soft, non distended, appropriately tender.  RLQ ileostomy pink and viable with bilious output    Plan  - Low fiber diet  - mIVF @ 40mL  - Maintian jones until POD2  - PT consult  - GI / DVT PPX  - tylenol toradol dilaudid for pain  - WOCN evaluation

## 2024-03-01 NOTE — PHYSICAL THERAPY INITIAL EVALUATION ADULT - PERTINENT HX OF CURRENT PROBLEM, REHAB EVAL
scheduled for 2/29 robotic assisted lap low anterior resection with diverting loop ileostomy possible abdominoperineal resection  HISTORY RECTAL CANCER  HISTORY RHEUMATOID ARTHRITIS FOLLOWS DR GUAJARDO- COMPLIANT TO FOLLOW ADVISE AND CALL MD FOR PRE OP [facility].  PRIOR NECK LYMPH NODE SURGERY 2014. FULL ROM NECK NOTED  ADVISED FOLLOW ALL SURGEON PREOP INSTRUCTIONS AS WELL  -PATIENT COMPLIANT

## 2024-03-01 NOTE — PROVIDER CONTACT NOTE (OTHER) - REASON
pt's  bp  is 82/45 after  1st bolus
pt's bp is 95/50 pulse is 85 - pt's dee site appears to be oozing and slightly bloody
should we give lovenox

## 2024-03-01 NOTE — CONSULT NOTE ADULT - ATTENDING COMMENTS
Critical Care: [id number]   This patient has a high probability of sudden, clinically significant deterioration, which requires the highest level of physician preparedness to intervene urgently. I managed/supervised life or organ supporting interventions that required frequent physician assessment. I devoted my full attention in the ICU to the direct care of this patient for the period of time indicated below. Time I spent with the family or surrogate(s) is included only if the patient was incapable of providing the necessary information or participating in medical decision making. Time devoted to teaching and to any procedures I billed separately is not included.     MARI LASSITER 73y Male admitted to [x ] SICU /[ ] SDU with post-OP hypotension and high ileostomy output, S/P Low anterior resection for the rectal CA with diverting loop ostomy. Persistently hypotensive on the floor requiring upgrade to ICU, electrolytes abnormalities, airway at risk for obstruction, high risk for hemodynamic instability.  Patient is examined and evaluated at the bedside with SICU team. Treatment plan discussed with SICU team, nurses and primary team.   Chest X-ray and all relevant studies reviewed during rounds.  Will continue hemodynamic monitoring as per protocol in SICU.    Neuro:  GCS [ 15]  AAOx3 [x ]  Neurovascular checks as per SICU protocol                 [ ] 3% NaCl     [ ] 2% NaCl                [ ] Keppra  [ ] Lamictal  [ ] Depakote  [ ] Dilantin                Pharmacological Paralysis [ ] Yes  [x ]  No      Sedated/Pain control with                 [ ] Dilaudid drip, [ ]  Ketamine drip, [ ] Fentanyl drip, [ ] Propofol, [ ] Precedex, [ ] Versed drip, [ ] Ativan drip,                           [ ] OxyContin standing,  [ ] OxyContin PRN, [ x] Dilaudid PRN pushes, [ ] Fentanyl PRN pushes, [ ] PCA,                [x ] Tylenol IV/PO, [ x] Gabapentin, [ ]  Ketorolac, [x ] Tramadol,  [ ] Lidoderm Patch       Other Medications               [ ] Seroquel, [ ] Zyprexa, [ ] Haldol,  [ ] Clonazepam [ ] Xanax, [ ] Versed/Ativan PRN, [ ] Valium [x ] None               [ ] Robaxin   [ ] Baclofen  [ ] Flexeril               [ ] CIWA (Ativan/Valium/ Librium)  CV: continue to monitor, received boluses 2750ml  On pressors [ ]  Yes  [x ]  No          [ ]  Levophed, [ ] Leonel-Synephrine, [ ] Vasopressin, [ ]  Epinephrine          [ ] Dobutamine, [ ] Milrinone, [ ]  Midodrine,  [ ] Others    Other Cardiac Meds          [ ] Amiodarone IV/PO, [ ] Digoxin, [ ] Cardizem drip, [ ] Cleviprex drip, [ ] Esmolol drip, [ ] Cardene drip  Respiratory: Acute respiratory insufficiency -> continue monitoring                        None Invasive Support  [ x] Incentive Spirometer 1000ml                                 [ ] BiPAP   [ ] CPAP/NIV   [ ] HFNC   [ ] NR Face Mask  [ ] NC  [ ] Trach Caller [x ] Room Air                        Ventilatory support  [ ] Yes [ x] No                            [ ] SBT                                  [ ] PC    [ ] VC   [ ] AC/PRVC   [ ] BiVent/APRV   [ ]CPAP   GI  [ x]  bowel regiment on hold [x ] BM none [x ] Flatus none   [ x] Ostomy 1300+ml  [ ] NG tube   [x] BALDO 500ml since morning         Prophylaxis [x ] PPI  [ ] H2 Blockers  [ ] Others  Nutrition: continue   [ x] Diet NPO for now  [ ] TPN/PPN   [ ] calories count   [ ]  Tube Feeds     Renal: Continue I&Os monitoring, Dang catheter  [x ] Yes  [ ]  No  [ ] Consideration for discontinuation [ ] Taxes cath/PrimaFit  [ ] TOV                                                               [ ] HD/CVVH   [x ] Urine output       Lytes/Acid-base: replete hypokalemia, hypomagnesemia, hypocalcemia, hypophosphatemia        IV Fluids   [ x] LR@120ml/hr  [ ] NS  [ ] D5W1/2NS [ ] Bicarbonate Drip  [ ] Albumin [ ] Lasix drip/push  [ ] Bumex drip/push  ID: leukocytosis -> continue to monitor:         IV Abx [x ] Yes mariangel-OP, [ ] No;  ID consulted [ ] Yes, [x ] No        Cultures send  [ ] Respiratory   [ ] Blood   [ ] Urine  [ ] Fluids  [ ] Tissue  [ x]  None  Lines:   [ ] Right   [ ] Left  [ ] Bilateral                     [ ] Subclavian TLC        [ ]  Internal Jugular TLC     [ ]  Femoral TLC                     [ ] Subclavian Cordis    [ ] Internal Jugular Cordis   [ ] Femoral Cordis                     [ ] HD catheter    [ ] PICC     [ ]  Midline   [x ] Peripheral IVs                                                 [ ] Right   [ ] Left   [ x] None                     [ ] Radial A-Line    [ ] Femoral A-Line   [ ] Axillary A-Line               Heme: continue to evaluate for acute blood loss anemia- trend Hg/Hct                     AC Reversal Indicated [ ] Yes  [x ] No                                      [ ] Kcentra,  [ ] 3Factors concentrate, [ ] Andexxa                     Transfused  indicated  [ ] Yes, [x ] No    [ ] PRBCs   [ ] Platelets   [ ] FFPs   [ ] Cryoprecipitate                    Should be started on or continued with  following  [ ] Yes,  [ x] No                               [ ] Lovenox  [ ] Coumadin  [ ] Heparin drip  [ ] DOVACs  [ ] ASA  [ ] Antiplatelets   Endocrine: Prevent and treat hyperglycemia with insulin as needed,                                [x ] Sliding scale,  [ ] Lantus/Regular Insuline                              Insuline drip for hyperglycemia [ ] Yes, [x ] No   PV: follow pulse exam  Skin: decub precautions  DVT Prophylaxis:  [ x] SCDs  [ ] Heparin SQ  [x ] Lovenox  SQ  Stress Gastritis Prophylaxis: PPI/H2 Blockers if indicated  Mobility: patient is evaluated at the bedside with mobility team and the goals for today are discussed with PT [ x] out of bed to chair    PATIENT/FAMILY/SURROGATE CONFERENCE:  [x ] Yes with patient at the bedside. [ ] No  PURPOSE: To obtain necessary information, To discuss treatment options under consideration today.    I saw and evaluated the patient personally. I have reviewed and agree with note above. Treatment plan discussed with SICU team, nurses and primary team at the time of the multidisciplinary rounds. The above note is NOT written at the time of rounds and will reflect all changes throughout management of the patient for the day note is written for.    Alana Gilmore MD, FACS  Trauma/ACS/SCC Attending

## 2024-03-01 NOTE — PROGRESS NOTE ADULT - SUBJECTIVE AND OBJECTIVE BOX
GENERAL SURGERY PROGRESS NOTE    Patient: MARI LASSITER , 73y ([birth year])Male   MRN: [id number]  Location: [address]  Visit: 02-29-24 Inpatient  Date: 03-01-24 @ 09:53    Events of past 24 hours:  Patient seen and examined at bedside  CAROLINE  Patient pain well controlled, no nausea or vomiting  Ostomy productive  Drain in place, sanguineous in appearance          PAST MEDICAL & SURGICAL HISTORY:  Skin cancer (melanoma)      Rheumatoid arthritis      Spinal stenosis      History of BPH      History of hay fever      Rectal cancer      S/P bilateral inguinal hernia repair          Vitals:   T(F): 96.6 (03-01-24 @ 08:13), Max: 98.1 (02-29-24 @ 14:20)  HR: 85 (03-01-24 @ 08:13)  BP: 95/50 (03-01-24 @ 08:13)  RR: 18 (03-01-24 @ 08:13)  SpO2: 99% (03-01-24 @ 08:13)      Diet, Low Fiber      Fluids: dextrose 5% + sodium chloride 0.45%.: Solution, 1000 milliLiter(s) infuse at 40 mL/Hr  Provider's Contact #: [phone]      I & O's:    02-29-24 @ 07:01  -  03-01-24 @ 07:00  --------------------------------------------------------  IN:    Lactated Ringers: 300 mL  Total IN: 300 mL    OUT:    Drain (mL): 172.5 mL    Ileostomy (mL): 200 mL    Indwelling Catheter - Urethral (mL): 1775 mL  Total OUT: 2147.5 mL    Total NET: -1847.5 mL      PHYSICAL EXAM:  General: NAD, AAOx3, calm and cooperative  Cardiac: RRR S1, S2  Respiratory: CTAB, normal respiratory effort, breath sounds equal BL, no wheeze, rhonchi or crackles  Abdomen: Soft, non-distended, non-tender, no rebound, no guarding. ostomy appliance with stool in bag, BALDO drain with sanguinous output intact    MEDICATIONS  (STANDING):  acetaminophen     Tablet .. 1000 milliGRAM(s) Oral every 6 hours  dextrose 5% + sodium chloride 0.45%. 1000 milliLiter(s) (40 mL/Hr) IV Continuous <Continuous>  enoxaparin Injectable 40 milliGRAM(s) SubCutaneous every 24 hours  gabapentin 600 milliGRAM(s) Oral every 8 hours  ketorolac   Injectable 15 milliGRAM(s) IV Push every 6 hours  tamsulosin 0.4 milliGRAM(s) Oral at bedtime    MEDICATIONS  (PRN):  HYDROmorphone  Injectable 0.5 milliGRAM(s) IV Push every 4 hours PRN Severe Pain (7 - 10)  ondansetron Injectable 4 milliGRAM(s) IV Push every 8 hours PRN Nausea and/or Vomiting      DVT PROPHYLAXIS: enoxaparin Injectable 40 milliGRAM(s) SubCutaneous every 24 hours    GI PROPHYLAXIS:   ANTICOAGULATION:   ANTIBIOTICS:            LAB/STUDIES:  Labs:  CAPILLARY BLOOD GLUCOSE      POCT Blood Glucose.: 178 mg/dL (29 Feb 2024 14:27)  POCT Blood Glucose.: 147 mg/dL (29 Feb 2024 10:00)                          10.9   14.15 )-----------( 189      ( 01 Mar 2024 01:05 )             32.4         03-01    141  |  107  |  11  ----------------------------<  111<H>  4.2   |  25  |  0.8      Calcium: 8.6 mg/dL (03-01-24 @ 01:05)      LFTs:             5.8  | 0.5  | 27       ------------------[71      ( 29 Feb 2024 15:56 )  3.9  | x    | 14          Lipase:x      Amylase:x             Coags:            Urinalysis Basic - ( 01 Mar 2024 01:05 )    Color: x / Appearance: x / SG: x / pH: x  Gluc: 111 mg/dL / Ketone: x  / Bili: x / Urobili: x   Blood: x / Protein: x / Nitrite: x   Leuk Esterase: x / RBC: x / WBC x   Sq Epi: x / Non Sq Epi: x / Bacteria: x

## 2024-03-01 NOTE — PHYSICAL THERAPY INITIAL EVALUATION ADULT - GENERAL OBSERVATIONS, REHAB EVAL
11:15 - 11:40 Pt encountered seated in b/s chair, + jones, + ileostomy bag, + IVL by DENY Morton, + BALDO drain, NAD. Agreeable to PT. Attempted to perform orthostatics, see VSS flowsheet.

## 2024-03-01 NOTE — PROGRESS NOTE ADULT - ASSESSMENT
ASSESSMENT:  72 y/o male s/p robotic low anterior resection, anal colonic anastamosis, loop ileostomy    PLAN:  -Advance patient to low fiber diet  -Drop IV fluids to maintenance at 40  -Plan to discontinue jones tomorrow  -PT consult  -Ostomy nurse consult  -Ostomy bar to stay in for 5 days or to be discontinued prior to discharge  -Monitor BALDO output, monitor ostomy output     Blue Team [phone]        Above plan discussed with Attending Surgeon Dr. Lorenzo , patient, patient family, and Primary team  03-01-24 @ 09:51

## 2024-03-01 NOTE — PHYSICAL THERAPY INITIAL EVALUATION ADULT - TRANSFER SAFETY CONCERNS NOTED: SIT/STAND, REHAB EVAL
Pt c/o moderate to severe dizziness when standing; unable to obtain BP reading in standing (machine error)/losing balance/decreased weight-shifting ability

## 2024-03-01 NOTE — CONSULT NOTE ADULT - SUBJECTIVE AND OBJECTIVE BOX
MARI LASSITER   [id number]   [birth year]  73yM    Admit Date: 02-29-24  Indication for SDU/SICU: Hemodynamic monitoring, Management of high output ileostomy s/p robot-assisted LAR        ============================  HPI   73M with PMH of Rectal CA (follows with Dr. Crowe), RA, BPH, left neck melanoma s/p wide local excision with SLNB (2014), and spinal stenosis, presents to [facility] for elective low anterior resection. Rectal lesion initially found/biopsied via EMR revealing a friable mass 10cm from the dentate line. 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    Patient went to the OR 2/29 for robot-assisted low anterior resection, mobilization of the splenic flexure, ano-colonic anastomosis, and creation of protective loop ileostomy.     OR Stats  OR Time:  6 hours    IV Fluids:                 EBL:                       Blood Products:                     UOP:      Findings   - Robotic assisted Low anterior resection. Mobilization of splenic flexure, recto-sigmoidectomy performed and anal colon anastomosis achieved. Pelvic drain left in place, hemostasis achieved. Loop ileostomy created.    POD1 patient was getting up to ambulate then felt dizzy. He was able to sit in the chair for some time, but then began to feel light headed and was put back into bed. Throughout this time, patient has had persistent hypotension (80s/40s) with normal HR. Patient was experiencing increased sanguinous output from his pelvic BALDO drain in addition to elevated output from his ileostomy. Patient was given total of 2L of crystalloid with improvement in his SBP (90s/60s). Repeat CBC revealed a drop to 8.3 (previously 11.0) over 11 hours.     At the bedside, patient has no complaints at this time, with only some incisional tenderness at his incisions on physical exam. He has been tolerating CLD and low fiber diet, denies nausea and vomiting. His ileostomy is pink and viable appearing.     24 Hour Events   - Admission under SICU service    [X] A ten-point review of systems was otherwise negative except as noted above.  [  ] Due to altered mental status/intubation, subjective information was not attained from the patient. History was obtained, to the extent possible, from review of the chart and collateral sources of information.    =========================================================================================================================================  =========================================================================================================================================    PMH  PAST MEDICAL & SURGICAL HISTORY:  Skin cancer (melanoma)  Rheumatoid arthritis  Spinal stenosis  History of BPH  History of hay fever  Rectal cancer  S/P bilateral inguinal hernia repair    Home Meds:  Home Medications:  finasteride 5 mg oral tablet: 1 tab(s) orally once a day (29 Feb 2024 07:13)  folic acid 1 mg oral tablet: 1 tab(s) orally once a day (29 Feb 2024 07:13)  gabapentin 600 mg oral tablet: 1 tab(s) orally 3 times a day (29 Feb 2024 07:13)  methotrexate 15 mg/0.6 mL subcutaneous solution: subcutaneous once a week (29 Feb 2024 07:13)  saw palmetto 320 mg with phytosterols oral capsule: 2 cap(s) orally once a day (29 Feb 2024 07:13)  tamsulosin 0.4 mg oral capsule: 1 cap(s) orally (29 Feb 2024 07:13)     Allergies  Allergies  No Known Allergies  Intolerances     Current Medications:  acetaminophen     Tablet .. 1000 milliGRAM(s) Oral every 6 hours  chlorhexidine 2% Cloths 1 Application(s) Topical daily  enoxaparin Injectable 40 milliGRAM(s) SubCutaneous every 24 hours  gabapentin 600 milliGRAM(s) Oral every 8 hours  ketorolac   Injectable 15 milliGRAM(s) IV Push every 6 hours  lactated ringers. 1000 milliLiter(s) (120 mL/Hr) IV Continuous <Continuous>  ondansetron Injectable 4 milliGRAM(s) IV Push every 8 hours PRN Nausea and/or Vomiting  tamsulosin 0.4 milliGRAM(s) Oral at bedtime    Vital Signs, Intake/Output (Last 24Hours)  ICU Vital Signs Last 24 Hrs  T(C): 37.1 (01 Mar 2024 16:07), Max: 37.1 (01 Mar 2024 16:07)  T(F): 98.7 (01 Mar 2024 16:07), Max: 98.7 (01 Mar 2024 16:07)  HR: 69 (01 Mar 2024 16:07) (69 - 85)  BP: 89/54 (01 Mar 2024 16:07) (82/45 - 111/59)  BP(mean): --  ABP: --  ABP(mean): --  RR: 18 (01 Mar 2024 16:07) (18 - 18)  SpO2: 98% (01 Mar 2024 16:07) (97% - 99%)    O2 Parameters below as of 01 Mar 2024 08:13  Patient On (Oxygen Delivery Method): nasal cannula  O2 Flow (L/min): 2    I&O's Summary    29 Feb 2024 07:01  -  01 Mar 2024 07:00  --------------------------------------------------------  IN: 300 mL / OUT: 2147.5 mL / NET: -1847.5 mL    01 Mar 2024 07:01  -  01 Mar 2024 17:00  --------------------------------------------------------  IN: 1500 mL / OUT: 2330 mL / NET: -830 mL    Physical Exam:  ----------------------------------------------------------------------------------------------------------  GCS: 15  Exam: A&Ox3, no focal deficits  RESPIRATORY:  Normal expansion/effort  CARDIOVASCULAR:   S1/S2.  RRR  No peripheral edema  GASTROINTESTINAL:  Abdomen soft, minimally tender, non-distended   - Incisions clean, dry, and intact   - Ileostomy in place producing thin brown liquid   - R pelvic BALDO with sanguinous output  MUSCULOSKELETAL:  Extremities warm, pink, well-perfused.  DERM:  No skin breakdown   :   Exam: Dang catheter in place. Normal genitalia    Tubes/Lines/Drains   ----------------------------------------------------------------------------------------------------------  [x] Peripheral IV  [ ] Urinary Catheter Dang                                               [ ] Central Venous Line                   [ ] Arterial Line		       MARI LASSITER   [id number]   [birth year]  73yM    Admit Date: 02-29-24  Indication for SICU: Hemodynamic monitoring, Management of high output ileostomy s/p robot-assisted LAR        ============================  HPI   73M with PMH of Rectal CA (follows with Dr. Crowe), RA, BPH, left neck melanoma s/p wide local excision with SLNB (2014), and spinal stenosis, presents to [facility] for elective low anterior resection. Rectal lesion initially found/biopsied via EMR revealing a friable mass 10cm from the dentate line. 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    Patient went to the OR 2/29 for robot-assisted low anterior resection, mobilization of the splenic flexure, ano-colonic anastomosis, and creation of protective loop ileostomy.     OR Stats  OR Time:  6 hours    IV Fluids:                 EBL:                       Blood Products:                     UOP:      Findings   - Robotic assisted Low anterior resection. Mobilization of splenic flexure, recto-sigmoidectomy performed and anal colon anastomosis achieved. Pelvic drain left in place, hemostasis achieved. Loop ileostomy created.    POD1 patient was getting up to ambulate then felt dizzy. He was able to sit in the chair for some time, but then began to feel light headed and was put back into bed. Throughout this time, patient has had persistent hypotension (80s/40s) with normal HR. Patient was experiencing increased sanguinous output from his pelvic BALDO drain in addition to elevated output from his ileostomy. Patient was given total of 2L of crystalloid with improvement in his SBP (90s/60s). Repeat CBC revealed a drop to 8.3 (previously 11.0) over 11 hours.     At the bedside, patient has no complaints at this time, with only some incisional tenderness at his incisions on physical exam. He has been tolerating CLD and low fiber diet, denies nausea and vomiting. His ileostomy is pink and viable appearing.     24 Hour Events   - Admission under SICU service    [X] A ten-point review of systems was otherwise negative except as noted above.  [  ] Due to altered mental status/intubation, subjective information was not attained from the patient. History was obtained, to the extent possible, from review of the chart and collateral sources of information.    =========================================================================================================================================  =========================================================================================================================================    PMH  PAST MEDICAL & SURGICAL HISTORY:  Skin cancer (melanoma)  Rheumatoid arthritis  Spinal stenosis  History of BPH  History of hay fever  Rectal cancer  S/P bilateral inguinal hernia repair    Home Meds:  Home Medications:  finasteride 5 mg oral tablet: 1 tab(s) orally once a day (29 Feb 2024 07:13)  folic acid 1 mg oral tablet: 1 tab(s) orally once a day (29 Feb 2024 07:13)  gabapentin 600 mg oral tablet: 1 tab(s) orally 3 times a day (29 Feb 2024 07:13)  methotrexate 15 mg/0.6 mL subcutaneous solution: subcutaneous once a week (29 Feb 2024 07:13)  saw palmetto 320 mg with phytosterols oral capsule: 2 cap(s) orally once a day (29 Feb 2024 07:13)  tamsulosin 0.4 mg oral capsule: 1 cap(s) orally (29 Feb 2024 07:13)     Allergies  Allergies  No Known Allergies  Intolerances     Current Medications:  acetaminophen     Tablet .. 1000 milliGRAM(s) Oral every 6 hours  chlorhexidine 2% Cloths 1 Application(s) Topical daily  enoxaparin Injectable 40 milliGRAM(s) SubCutaneous every 24 hours  gabapentin 600 milliGRAM(s) Oral every 8 hours  ketorolac   Injectable 15 milliGRAM(s) IV Push every 6 hours  lactated ringers. 1000 milliLiter(s) (120 mL/Hr) IV Continuous <Continuous>  ondansetron Injectable 4 milliGRAM(s) IV Push every 8 hours PRN Nausea and/or Vomiting  tamsulosin 0.4 milliGRAM(s) Oral at bedtime    Vital Signs, Intake/Output (Last 24Hours)  ICU Vital Signs Last 24 Hrs  T(C): 37.1 (01 Mar 2024 16:07), Max: 37.1 (01 Mar 2024 16:07)  T(F): 98.7 (01 Mar 2024 16:07), Max: 98.7 (01 Mar 2024 16:07)  HR: 69 (01 Mar 2024 16:07) (69 - 85)  BP: 89/54 (01 Mar 2024 16:07) (82/45 - 111/59)  BP(mean): --  ABP: --  ABP(mean): --  RR: 18 (01 Mar 2024 16:07) (18 - 18)  SpO2: 98% (01 Mar 2024 16:07) (97% - 99%)    O2 Parameters below as of 01 Mar 2024 08:13  Patient On (Oxygen Delivery Method): nasal cannula  O2 Flow (L/min): 2    I&O's Summary    29 Feb 2024 07:01  -  01 Mar 2024 07:00  --------------------------------------------------------  IN: 300 mL / OUT: 2147.5 mL / NET: -1847.5 mL    01 Mar 2024 07:01  -  01 Mar 2024 17:00  --------------------------------------------------------  IN: 1500 mL / OUT: 2330 mL / NET: -830 mL    Physical Exam:  ----------------------------------------------------------------------------------------------------------  GCS: 15  Exam: A&Ox3, no focal deficits  RESPIRATORY:  Normal expansion/effort  CARDIOVASCULAR:   S1/S2.  RRR  No peripheral edema  GASTROINTESTINAL:  Abdomen soft, minimally tender, non-distended   - Incisions clean, dry, and intact   - Ileostomy in place producing thin brown liquid   - R pelvic BALDO with sanguinous output  MUSCULOSKELETAL:  Extremities warm, pink, well-perfused.  DERM:  No skin breakdown   :   Exam: Dang catheter in place. Normal genitalia    Tubes/Lines/Drains   ----------------------------------------------------------------------------------------------------------  [x] Peripheral IV  [ ] Urinary Catheter Dang                                               [ ] Central Venous Line                   [ ] Arterial Line		       MARI LASSITER   [id number]   [birth year]  73yM    Admit Date: 02-29-24  Indication for SICU: Hemodynamic monitoring, Management of high output ileostomy s/p robot-assisted LAR        ============================  HPI   73M with PMH of Rectal CA (follows with Dr. Crowe), RA, BPH, left neck melanoma s/p wide local excision with SLNB (2014), and spinal stenosis, presents to [facility] for elective low anterior resection. Rectal lesion initially found/biopsied via EMR revealing a friable mass 10cm from the dentate line. 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    Patient went to the OR 2/29 for robot-assisted low anterior resection, mobilization of the splenic flexure, ano-colonic anastomosis, and creation of protective loop ileostomy.     OR Stats  OR Time:  6 hours    IV Fluids:  2500           EBL:   250           Blood Products:  none         UOP: 750  Findings   - Robotic assisted Low anterior resection. Mobilization of splenic flexure, recto-sigmoidectomy performed and anal colon anastomosis achieved. Pelvic drain left in place, hemostasis achieved. Loop ileostomy created.    POD1 patient was getting up to ambulate then felt dizzy. He was able to sit in the chair for some time, but then began to feel light headed and was put back into bed. Throughout this time, patient has had persistent hypotension (80s/40s) with normal HR. Patient was experiencing increased sanguinous output from his pelvic BALDO drain in addition to elevated output from his ileostomy. Patient was given total of 2L of crystalloid with improvement in his SBP (90s/60s). Repeat CBC revealed a drop to 8.3 (previously 11.0) over 11 hours.     At the bedside, patient has no complaints at this time, with only some incisional tenderness at his incisions on physical exam. He has been tolerating CLD and low fiber diet, denies nausea and vomiting. His ileostomy is pink and viable appearing.     24 Hour Events   - Admission under SICU service    [X] A ten-point review of systems was otherwise negative except as noted above.  [  ] Due to altered mental status/intubation, subjective information was not attained from the patient. History was obtained, to the extent possible, from review of the chart and collateral sources of information.    =========================================================================================================================================  =========================================================================================================================================    PMH  PAST MEDICAL & SURGICAL HISTORY:  Skin cancer (melanoma)  Rheumatoid arthritis  Spinal stenosis  History of BPH  History of hay fever  Rectal cancer  S/P bilateral inguinal hernia repair    Home Meds:  Home Medications:  finasteride 5 mg oral tablet: 1 tab(s) orally once a day (29 Feb 2024 07:13)  folic acid 1 mg oral tablet: 1 tab(s) orally once a day (29 Feb 2024 07:13)  gabapentin 600 mg oral tablet: 1 tab(s) orally 3 times a day (29 Feb 2024 07:13)  methotrexate 15 mg/0.6 mL subcutaneous solution: subcutaneous once a week (29 Feb 2024 07:13)  saw palmetto 320 mg with phytosterols oral capsule: 2 cap(s) orally once a day (29 Feb 2024 07:13)  tamsulosin 0.4 mg oral capsule: 1 cap(s) orally (29 Feb 2024 07:13)     Allergies  Allergies  No Known Allergies  Intolerances     Current Medications:  acetaminophen     Tablet .. 1000 milliGRAM(s) Oral every 6 hours  chlorhexidine 2% Cloths 1 Application(s) Topical daily  enoxaparin Injectable 40 milliGRAM(s) SubCutaneous every 24 hours  gabapentin 600 milliGRAM(s) Oral every 8 hours  ketorolac   Injectable 15 milliGRAM(s) IV Push every 6 hours  lactated ringers. 1000 milliLiter(s) (120 mL/Hr) IV Continuous <Continuous>  ondansetron Injectable 4 milliGRAM(s) IV Push every 8 hours PRN Nausea and/or Vomiting  tamsulosin 0.4 milliGRAM(s) Oral at bedtime    Vital Signs, Intake/Output (Last 24Hours)  ICU Vital Signs Last 24 Hrs  T(C): 37.1 (01 Mar 2024 16:07), Max: 37.1 (01 Mar 2024 16:07)  T(F): 98.7 (01 Mar 2024 16:07), Max: 98.7 (01 Mar 2024 16:07)  HR: 69 (01 Mar 2024 16:07) (69 - 85)  BP: 89/54 (01 Mar 2024 16:07) (82/45 - 111/59)  BP(mean): --  ABP: --  ABP(mean): --  RR: 18 (01 Mar 2024 16:07) (18 - 18)  SpO2: 98% (01 Mar 2024 16:07) (97% - 99%)    O2 Parameters below as of 01 Mar 2024 08:13  Patient On (Oxygen Delivery Method): nasal cannula  O2 Flow (L/min): 2    I&O's Summary    29 Feb 2024 07:01  -  01 Mar 2024 07:00  --------------------------------------------------------  IN: 300 mL / OUT: 2147.5 mL / NET: -1847.5 mL    01 Mar 2024 07:01  -  01 Mar 2024 17:00  --------------------------------------------------------  IN: 1500 mL / OUT: 2330 mL / NET: -830 mL    Physical Exam:  ----------------------------------------------------------------------------------------------------------  GCS: 15  Exam: A&Ox3, no focal deficits  RESPIRATORY:  Normal expansion/effort  CARDIOVASCULAR:   S1/S2.  RRR  No peripheral edema  GASTROINTESTINAL:  Abdomen soft, minimally tender, non-distended   - Incisions clean, dry, and intact   - Ileostomy in place producing thin brown liquid   - R pelvic BALDO with sanguinous output  MUSCULOSKELETAL:  Extremities warm, pink, well-perfused.  DERM:  No skin breakdown   :   Exam: Dang catheter in place. Normal genitalia    Tubes/Lines/Drains   ----------------------------------------------------------------------------------------------------------  [x] Peripheral IV  [ ] Urinary Catheter Dang                                               [ ] Central Venous Line                   [ ] Arterial Line		       MARI LASSITER   [id number]   [birth year]  73yM    Admit Date: 02-29-24  Indication for SICU: Hemodynamic monitoring, Management of high output ileostomy s/p robot-assisted LAR        ============================  HPI   73M with PMH of Rectal CA (follows with Dr. Crowe), RA, BPH, left neck melanoma s/p wide local excision with SLNB (2014), and spinal stenosis, presents to [facility] for elective low anterior resection. Rectal lesion initially found/biopsied via EMR revealing a friable mass 10cm from the dentate line. 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    Patient went to the OR 2/29 for robot-assisted low anterior resection, mobilization of the splenic flexure, ano-colonic anastomosis, and creation of protective loop ileostomy.     OR Stats  OR Time:  6 hours    IV Fluids:  2500           EBL:   250           Blood Products:  none         UOP: 750  Findings   - Robotic assisted Low anterior resection. Mobilization of splenic flexure, recto-sigmoidectomy performed and anal colon anastomosis achieved. Pelvic drain left in place, hemostasis achieved. Loop ileostomy created.    POD1 patient was getting up to ambulate then felt dizzy. He was able to sit in the chair for some time, but then began to feel light headed and was put back into bed. Throughout this time, patient has had persistent hypotension (80s/40s) with normal HR. Patient was experiencing increased sanguinous output from his pelvic BALDO drain in addition to elevated output from his ileostomy. Patient was given total of 2L of crystalloid with improvement in his SBP (90s/60s). Repeat CBC revealed a drop to 8.3 (previously 11.0) over 11 hours.     At the bedside, patient has no complaints at this time, with only some incisional tenderness at his incisions on physical exam. He has been tolerating CLD and low fiber diet, denies nausea and vomiting. His ileostomy is pink and viable appearing.     24 Hour Events   - Admission under SICU service    [X] A ten-point review of systems was otherwise negative except as noted above.  [  ] Due to altered mental status/intubation, subjective information was not attained from the patient. History was obtained, to the extent possible, from review of the chart and collateral sources of information.    =========================================================================================================================================    PMH  PAST MEDICAL & SURGICAL HISTORY:  Skin cancer (melanoma)  Rheumatoid arthritis  Spinal stenosis  History of BPH  History of hay fever  Rectal cancer  S/P bilateral inguinal hernia repair    Home Meds:  Home Medications:  finasteride 5 mg oral tablet: 1 tab(s) orally once a day (29 Feb 2024 07:13)  folic acid 1 mg oral tablet: 1 tab(s) orally once a day (29 Feb 2024 07:13)  gabapentin 600 mg oral tablet: 1 tab(s) orally 3 times a day (29 Feb 2024 07:13)  methotrexate 15 mg/0.6 mL subcutaneous solution: subcutaneous once a week (29 Feb 2024 07:13)  saw palmetto 320 mg with phytosterols oral capsule: 2 cap(s) orally once a day (29 Feb 2024 07:13)  tamsulosin 0.4 mg oral capsule: 1 cap(s) orally (29 Feb 2024 07:13)     Allergies  Allergies  No Known Allergies  Intolerances     Current Medications:  acetaminophen     Tablet .. 1000 milliGRAM(s) Oral every 6 hours  chlorhexidine 2% Cloths 1 Application(s) Topical daily  enoxaparin Injectable 40 milliGRAM(s) SubCutaneous every 24 hours  gabapentin 600 milliGRAM(s) Oral every 8 hours  ketorolac   Injectable 15 milliGRAM(s) IV Push every 6 hours  lactated ringers. 1000 milliLiter(s) (120 mL/Hr) IV Continuous <Continuous>  ondansetron Injectable 4 milliGRAM(s) IV Push every 8 hours PRN Nausea and/or Vomiting  tamsulosin 0.4 milliGRAM(s) Oral at bedtime    Vital Signs, Intake/Output (Last 24Hours)  ICU Vital Signs Last 24 Hrs  T(C): 37.1 (01 Mar 2024 16:07), Max: 37.1 (01 Mar 2024 16:07)  T(F): 98.7 (01 Mar 2024 16:07), Max: 98.7 (01 Mar 2024 16:07)  HR: 69 (01 Mar 2024 16:07) (69 - 85)  BP: 89/54 (01 Mar 2024 16:07) (82/45 - 111/59)  BP(mean): --  ABP: --  ABP(mean): --  RR: 18 (01 Mar 2024 16:07) (18 - 18)  SpO2: 98% (01 Mar 2024 16:07) (97% - 99%)    O2 Parameters below as of 01 Mar 2024 08:13  Patient On (Oxygen Delivery Method): nasal cannula  O2 Flow (L/min): 2    I&O's Summary    29 Feb 2024 07:01  -  01 Mar 2024 07:00  --------------------------------------------------------  IN: 300 mL / OUT: 2147.5 mL / NET: -1847.5 mL    01 Mar 2024 07:01  -  01 Mar 2024 17:00  --------------------------------------------------------  IN: 1500 mL / OUT: 2330 mL / NET: -830 mL    Physical Exam:  ----------------------------------------------------------------------------------------------------------  GCS: 15  Exam: A&Ox3, no focal deficits  RESPIRATORY:  Normal expansion/effort  CARDIOVASCULAR:   S1/S2.  RRR  No peripheral edema  GASTROINTESTINAL:  Abdomen soft, minimally tender, non-distended   - Incisions clean, dry, and intact   - Ileostomy in place producing thin brown liquid   - R pelvic BALDO with sanguinous output  MUSCULOSKELETAL:  Extremities warm, pink, well-perfused.  DERM:  No skin breakdown   :   Exam: Dang catheter in place. Normal genitalia    Tubes/Lines/Drains   ----------------------------------------------------------------------------------------------------------  [x] Peripheral IV  [ ] Urinary Catheter Dang                                               [ ] Central Venous Line                   [ ] Arterial Line

## 2024-03-01 NOTE — CONSULT NOTE ADULT - ASSESSMENT
73M with PMH of Rectal CA (follows with Dr. Crowe), RA, BPH, left neck melanoma s/p wide local excision with SLNB (2014), and spinal stenosis, now s/p robot-assisted low anterior resection, mobilization of the splenic flexure, ano-colonic anastomosis, and creation of protective loop ileostomy (2/29)    NEURO:  #Acute pain   - APAP 1000mg q6   - [Home med] Gabapentin 600mg TID   - Toradol 15mg q6     RESP:   #Oxygenation    - Tolerating RA  #Activity    - increase as tolerated    - Encourage IS    CARDS:   #Hypotension   - Responsive to IVF bolus (2.0 L)   - Continue to monitor, bolus as needed  Imaging:    - EKG (2/15): HR 67, NSR with sinus arrythmia, normal EKG    GI/NUTR:   #Rectal CA s/p LAR with Loop Ileostomy   - Monitor Ileostomy output, replete prn   - Remove red rubber POD5   - R pelvic BALDO with sanguinous output, continue to monitor  #Diet, Low Fiber   - As tolerated   - Monitor for N/V   - [Home med] Folic acid 1mg  #GI Prophylaxis    -not indicated  #Bowel regimen    - Not indicated    /RENAL:   #urine output    -indwelling jones (placed in the OR 2/29)  #BPH   - [Home med] Tamsulosin 0.4mg   - [Home med] Finasteride 5mg    Labs:          BUN/Cr- 13/0.8  -->,  11/0.8  -->          Electrolytes-(03-01 @ 01:05)Na 141 // K 4.2 // Mg 1.9 // Phos 3.3        HEME/ONC:   #DVT ppx   - Lovenox 40mg  #Anemia 2/2 Sanguinous BALDO Drainage   - (3/1) 1u pRBC, post transfusion CBC pending  #Hx of L Neck Melanoma s/p Excision with SLNB (2014)   - Follows with Dr. Crowe  Labs: Hb/Hct:  11.0/33.2  (03-01 @ 11:40)  -->,  8.3/25.3  (03-01 @ 15:20)  -->                      Plts:  224  -->,  174  -->                 PTT/INR:      T&S expires: 3/4    ID:  #leukocytosis    - Monitor WBC  WBC- 14.15  --->>,  18.89  --->>,  13.24  --->>  Temp trend- 24hrs T(F): 99 (03-01 @ 17:10), Max: 99 (03-01 @ 17:10)  Current antibiotics- none    ENDO:    -FSG ACHS    -Glucose goal 140-180. if above 180 start ISS    MSK:     Activity - Ambulate as Tolerated:     Time/Priority:  Routine (03-01-24 @ 10:29)    LINES/DRAINS:  PIV, Jones    ADVANCED DIRECTIVES:  Full Code    HCP/Emergency Contact-    INDICATION FOR SICU:  Hemodynamic monitoring, Management of high output ileostomy s/p robot-assisted LAR         DISPO:   Case discussed with attending Dr. Gilmore 73M with PMH of Rectal CA (follows with Dr. Crowe), RA, BPH, left neck melanoma s/p wide local excision with SLNB (2014), and spinal stenosis, now s/p robot-assisted low anterior resection, mobilization of the splenic flexure, ano-colonic anastomosis, and creation of protective loop ileostomy (2/29)    NEURO:  #Acute pain   - APAP 1000mg q6   - [Home med] Gabapentin 600mg TID   - Toradol 15mg q6     RESP:   #Oxygenation    - Tolerating RA  #Activity    - increase as tolerated    - Encourage IS    CARDS:   #Hypotension   - Responsive to IVF bolus (2.0 L)   - Continue to monitor, bolus as needed  Imaging:    - EKG (2/15): HR 67, NSR with sinus arrythmia, normal EKG    GI/NUTR:   #Rectal CA s/p LAR with Loop Ileostomy   - Monitor Ileostomy output, replete prn   - Remove red rubber POD5   - R pelvic BALDO with sanguinous output, continue to monitor  #Diet, Low Fiber   - As tolerated   - Monitor for N/V   - [Home med] Folic acid 1mg  #GI Prophylaxis    -not indicated  #Bowel regimen    - Not indicated    /RENAL:   #urine output    -indwelling jones (placed in the OR 2/29)  #BPH   - [Home med] Tamsulosin 0.4mg   - [Home med] Finasteride 5mg    Labs:          BUN/Cr- 13/0.8  -->,  11/0.8  -->          Electrolytes-(03-01 @ 01:05)Na 141 // K 4.2 // Mg 1.9 // Phos 3.3        HEME/ONC:   #DVT ppx   - Lovenox 40mg  #Anemia 2/2 Sanguinous BALDO Drainage   - (3/1) 1u pRBC, post transfusion CBC pending  #Hx of L Neck Melanoma s/p Excision with SLNB (2014)   - Follows with Dr. Crowe  #RA   - [Home med] Methotrexate 15mg 1x weekly (HOLDING)  Labs: Hb/Hct:  11.0/33.2  (03-01 @ 11:40)  -->,  8.3/25.3  (03-01 @ 15:20)  -->                      Plts:  224  -->,  174  -->                 PTT/INR:      T&S expires: 3/4    ID:  #leukocytosis    - Monitor WBC  WBC- 14.15  --->>,  18.89  --->>,  13.24  --->>  Temp trend- 24hrs T(F): 99 (03-01 @ 17:10), Max: 99 (03-01 @ 17:10)  Current antibiotics- none    ENDO:    -FSG ACHS    -Glucose goal 140-180. if above 180 start ISS    MSK:     Activity - Ambulate as Tolerated:     Time/Priority:  Routine (03-01-24 @ 10:29)    LINES/DRAINS:  PIV, Jones    ADVANCED DIRECTIVES:  Full Code    HCP/Emergency Contact-    INDICATION FOR SICU:  Hemodynamic monitoring, Management of high output ileostomy s/p robot-assisted LAR         DISPO:   Case discussed with attending Dr. Gilmore 73M with PMH of Rectal CA (follows with Dr. Crowe), RA, BPH, left neck melanoma s/p wide local excision with SLNB (2014), and spinal stenosis, now s/p robot-assisted low anterior resection, mobilization of the splenic flexure, ano-colonic anastomosis, and creation of protective loop ileostomy (2/29)    NEURO:  #Acute pain   - APAP 1000mg q6   - [Home med] Gabapentin 600mg TID   - Toradol 15mg q6     RESP:   #Oxygenation    - Tolerating RA  #Activity    - increase as tolerated    - Encourage IS    CARDS:   #Hypotension   - Responsive to IVF bolus (2.0 L)   - Continue to monitor, bolus as needed  Imaging:    - EKG (2/15): HR 67, NSR with sinus arrythmia, normal EKG    GI/NUTR:   #Rectal CA s/p LAR with Loop Ileostomy   - Monitor Ileostomy output, replete prn   - Remove red rubber POD5   - R pelvic BALDO with sanguinous output, continue to monitor  #Diet, Low Fiber   - As tolerated   - Monitor for N/V   - [Home med] Folic acid 1mg  #GI Prophylaxis    -not indicated  #Bowel regimen    - Not indicated    /RENAL:   #urine output   - indwelling jones (placed in the OR 2/29)   - LR @ 120  #BPH   - [Home med] Tamsulosin 0.4mg   - [Home med] Finasteride 5mg    Labs:          BUN/Cr- 13/0.8  -->,  11/0.8  -->          Electrolytes-(03-01 @ 01:05)Na 141 // K 4.2 // Mg 1.9 // Phos 3.3        HEME/ONC:   #DVT ppx   - Lovenox 40mg  #Anemia 2/2 Sanguinous BALDO Drainage   - (3/1) 1u pRBC, post transfusion CBC pending  #Hx of L Neck Melanoma s/p Excision with SLNB (2014)   - Follows with Dr. Crowe  #RA   - [Home med] Methotrexate 15mg 1x weekly (HOLDING)  Labs: Hb/Hct:  11.0/33.2  (03-01 @ 11:40)  -->,  8.3/25.3  (03-01 @ 15:20)  -->                      Plts:  224  -->,  174  -->                 PTT/INR:      T&S expires: 3/4    ID:  #leukocytosis    - Monitor WBC  WBC- 14.15  --->>,  18.89  --->>,  13.24  --->>  Temp trend- 24hrs T(F): 99 (03-01 @ 17:10), Max: 99 (03-01 @ 17:10)  Current antibiotics- none    ENDO:    -FSG ACHS    -Glucose goal 140-180. if above 180 start ISS    MSK:     Activity - Ambulate as Tolerated:     Time/Priority:  Routine (03-01-24 @ 10:29)    LINES/DRAINS:  PIV, Jones    ADVANCED DIRECTIVES:  Full Code    HCP/Emergency Contact-    INDICATION FOR SICU:  Hemodynamic monitoring, Management of high output ileostomy s/p robot-assisted LAR         DISPO:   Case discussed with attending Dr. Gilmore 73M with PMH of Rectal CA (follows with Dr. Crowe), RA, BPH, left neck melanoma s/p wide local excision with SLNB (2014), and spinal stenosis, now s/p robot-assisted low anterior resection, mobilization of the splenic flexure, ano-colonic anastomosis, and creation of protective loop ileostomy (2/29)    NEURO:  #Acute pain   - APAP 1000mg q6   - [Home med] Gabapentin 600mg TID   - Toradol 15mg q6     RESP:   #Oxygenation    - Tolerating RA  #Activity    - increase as tolerated    - Encourage IS    CARDS:   #Hypotension   - Responsive to IVF bolus (2.0 L)   - Continue to monitor, bolus as needed  Imaging:    - EKG (2/15): HR 67, NSR with sinus arrythmia, normal EKG    GI/NUTR:   #Rectal CA s/p LAR with Loop Ileostomy   - Monitor Ileostomy output, replete prn   - Remove red rubber POD5   - R pelvic BALDO with sanguinous output, continue to monitor  #Diet, Low Fiber   - As tolerated   - Monitor for N/V   - [Home med] Folic acid 1mg  #GI Prophylaxis    -not indicated  #Bowel regimen    - Not indicated    /RENAL:   #urine output   - indwelling jones (placed in the OR 2/29)   - LR @ 120  #BPH   - [Home med] Tamsulosin 0.4mg   - [Home med] Finasteride 5mg    Labs:          BUN/Cr- 13/0.8  -->,  11/0.8  -->          Electrolytes-(03-01 @ 01:05)Na 141 // K 4.2 // Mg 1.9 // Phos 3.3        HEME/ONC:   #DVT ppx   - Lovenox 40mg  #Anemia 2/2 Sanguinous BALDO Drainage   - (3/1) 1u pRBC, post transfusion CBC pending  #Hx of L Neck Melanoma s/p Excision with SLNB (2014)   - Follows with Dr. Crowe  #RA   - [Home med] Methotrexate 15mg 1x weekly (HOLDING)  Labs: Hb/Hct:  11.0/33.2  (03-01 @ 11:40)  -->,  8.3/25.3  (03-01 @ 15:20)  -->                      Plts:  224  -->,  174  -->                 PTT/INR:      T&S expires: 3/4    ID:  #leukocytosis    - Monitor WBC  WBC- 14.15  --->>,  18.89  --->>,  13.24  --->>  Temp trend- 24hrs T(F): 99 (03-01 @ 17:10), Max: 99 (03-01 @ 17:10)  Current antibiotics- none    ENDO:    -FSG ACHS    -Glucose goal 140-180. if above 180 start ISS    MSK:     Activity - Ambulate as Tolerated:     Time/Priority:  Routine (03-01-24 @ 10:29)    LINES/DRAINS:  PIV, Jones, Pelvic BALDO    ADVANCED DIRECTIVES:  Full Code    HCP/Emergency Contact-    INDICATION FOR SICU:  Hemodynamic monitoring, Management of high output ileostomy s/p robot-assisted LAR         DISPO:   Case discussed with attending Dr. Gilmore

## 2024-03-01 NOTE — PROVIDER CONTACT NOTE (OTHER) - SITUATION
adrianne  at  pt bedside  - bolus given
pt is alert, sitting up, talking, tolerating clears
per conversation with THELMA Davis , we will hold lovenox  for now

## 2024-03-02 LAB
ANION GAP SERPL CALC-SCNC: 7 MMOL/L — SIGNIFICANT CHANGE UP (ref 7–14)
ANION GAP SERPL CALC-SCNC: 7 MMOL/L — SIGNIFICANT CHANGE UP (ref 7–14)
BASOPHILS # BLD AUTO: 0.02 K/UL — SIGNIFICANT CHANGE UP (ref 0–0.2)
BASOPHILS # BLD AUTO: 0.02 K/UL — SIGNIFICANT CHANGE UP (ref 0–0.2)
BASOPHILS NFR BLD AUTO: 0.2 % — SIGNIFICANT CHANGE UP (ref 0–1)
BASOPHILS NFR BLD AUTO: 0.2 % — SIGNIFICANT CHANGE UP (ref 0–1)
BUN SERPL-MCNC: 14 MG/DL — SIGNIFICANT CHANGE UP (ref 10–20)
BUN SERPL-MCNC: 17 MG/DL — SIGNIFICANT CHANGE UP (ref 10–20)
CALCIUM SERPL-MCNC: 8.1 MG/DL — LOW (ref 8.4–10.5)
CALCIUM SERPL-MCNC: 8.4 MG/DL — SIGNIFICANT CHANGE UP (ref 8.4–10.5)
CHLORIDE SERPL-SCNC: 108 MMOL/L — SIGNIFICANT CHANGE UP (ref 98–110)
CHLORIDE SERPL-SCNC: 110 MMOL/L — SIGNIFICANT CHANGE UP (ref 98–110)
CO2 SERPL-SCNC: 24 MMOL/L — SIGNIFICANT CHANGE UP (ref 17–32)
CO2 SERPL-SCNC: 25 MMOL/L — SIGNIFICANT CHANGE UP (ref 17–32)
CREAT SERPL-MCNC: 0.8 MG/DL — SIGNIFICANT CHANGE UP (ref 0.7–1.5)
CREAT SERPL-MCNC: 0.8 MG/DL — SIGNIFICANT CHANGE UP (ref 0.7–1.5)
EGFR: 95 ML/MIN/1.73M2 — SIGNIFICANT CHANGE UP
EGFR: 95 ML/MIN/1.73M2 — SIGNIFICANT CHANGE UP
EOSINOPHIL # BLD AUTO: 0.06 K/UL — SIGNIFICANT CHANGE UP (ref 0–0.7)
EOSINOPHIL # BLD AUTO: 0.14 K/UL — SIGNIFICANT CHANGE UP (ref 0–0.7)
EOSINOPHIL NFR BLD AUTO: 0.5 % — SIGNIFICANT CHANGE UP (ref 0–8)
EOSINOPHIL NFR BLD AUTO: 1.2 % — SIGNIFICANT CHANGE UP (ref 0–8)
GLUCOSE SERPL-MCNC: 115 MG/DL — HIGH (ref 70–99)
GLUCOSE SERPL-MCNC: 116 MG/DL — HIGH (ref 70–99)
HCT VFR BLD CALC: 24.1 % — LOW (ref 42–52)
HCT VFR BLD CALC: 24.6 % — LOW (ref 42–52)
HGB BLD-MCNC: 8.1 G/DL — LOW (ref 14–18)
HGB BLD-MCNC: 8.2 G/DL — LOW (ref 14–18)
IMM GRANULOCYTES NFR BLD AUTO: 0.5 % — HIGH (ref 0.1–0.3)
IMM GRANULOCYTES NFR BLD AUTO: 0.6 % — HIGH (ref 0.1–0.3)
LYMPHOCYTES # BLD AUTO: 1.11 K/UL — LOW (ref 1.2–3.4)
LYMPHOCYTES # BLD AUTO: 1.36 K/UL — SIGNIFICANT CHANGE UP (ref 1.2–3.4)
LYMPHOCYTES # BLD AUTO: 11.3 % — LOW (ref 20.5–51.1)
LYMPHOCYTES # BLD AUTO: 9.2 % — LOW (ref 20.5–51.1)
MAGNESIUM SERPL-MCNC: 1.7 MG/DL — LOW (ref 1.8–2.4)
MAGNESIUM SERPL-MCNC: 1.8 MG/DL — SIGNIFICANT CHANGE UP (ref 1.8–2.4)
MAGNESIUM SERPL-MCNC: 2 MG/DL — SIGNIFICANT CHANGE UP (ref 1.8–2.4)
MCHC RBC-ENTMCNC: 31.2 PG — HIGH (ref 27–31)
MCHC RBC-ENTMCNC: 31.5 PG — HIGH (ref 27–31)
MCHC RBC-ENTMCNC: 33.3 G/DL — SIGNIFICANT CHANGE UP (ref 32–37)
MCHC RBC-ENTMCNC: 33.6 G/DL — SIGNIFICANT CHANGE UP (ref 32–37)
MCV RBC AUTO: 92.7 FL — SIGNIFICANT CHANGE UP (ref 80–94)
MCV RBC AUTO: 94.6 FL — HIGH (ref 80–94)
MONOCYTES # BLD AUTO: 1.34 K/UL — HIGH (ref 0.1–0.6)
MONOCYTES # BLD AUTO: 1.45 K/UL — HIGH (ref 0.1–0.6)
MONOCYTES NFR BLD AUTO: 11.2 % — HIGH (ref 1.7–9.3)
MONOCYTES NFR BLD AUTO: 12 % — HIGH (ref 1.7–9.3)
NEUTROPHILS # BLD AUTO: 9.08 K/UL — HIGH (ref 1.4–6.5)
NEUTROPHILS # BLD AUTO: 9.33 K/UL — HIGH (ref 1.4–6.5)
NEUTROPHILS NFR BLD AUTO: 75.6 % — HIGH (ref 42.2–75.2)
NEUTROPHILS NFR BLD AUTO: 77.5 % — HIGH (ref 42.2–75.2)
NRBC # BLD: 0 /100 WBCS — SIGNIFICANT CHANGE UP (ref 0–0)
NRBC # BLD: 0 /100 WBCS — SIGNIFICANT CHANGE UP (ref 0–0)
PHOSPHATE SERPL-MCNC: 1.9 MG/DL — LOW (ref 2.1–4.9)
PHOSPHATE SERPL-MCNC: 2 MG/DL — LOW (ref 2.1–4.9)
PHOSPHATE SERPL-MCNC: 2.9 MG/DL — SIGNIFICANT CHANGE UP (ref 2.1–4.9)
PLATELET # BLD AUTO: 149 K/UL — SIGNIFICANT CHANGE UP (ref 130–400)
PLATELET # BLD AUTO: 160 K/UL — SIGNIFICANT CHANGE UP (ref 130–400)
PMV BLD: 11.1 FL — HIGH (ref 7.4–10.4)
PMV BLD: 11.2 FL — HIGH (ref 7.4–10.4)
POTASSIUM SERPL-MCNC: 3.9 MMOL/L — SIGNIFICANT CHANGE UP (ref 3.5–5)
POTASSIUM SERPL-MCNC: 4.1 MMOL/L — SIGNIFICANT CHANGE UP (ref 3.5–5)
POTASSIUM SERPL-SCNC: 3.9 MMOL/L — SIGNIFICANT CHANGE UP (ref 3.5–5)
POTASSIUM SERPL-SCNC: 4.1 MMOL/L — SIGNIFICANT CHANGE UP (ref 3.5–5)
RBC # BLD: 2.6 M/UL — LOW (ref 4.7–6.1)
RBC # BLD: 2.6 M/UL — LOW (ref 4.7–6.1)
RBC # FLD: 13.9 % — SIGNIFICANT CHANGE UP (ref 11.5–14.5)
RBC # FLD: 14.1 % — SIGNIFICANT CHANGE UP (ref 11.5–14.5)
SODIUM SERPL-SCNC: 139 MMOL/L — SIGNIFICANT CHANGE UP (ref 135–146)
SODIUM SERPL-SCNC: 142 MMOL/L — SIGNIFICANT CHANGE UP (ref 135–146)
WBC # BLD: 12 K/UL — HIGH (ref 4.8–10.8)
WBC # BLD: 12.04 K/UL — HIGH (ref 4.8–10.8)
WBC # FLD AUTO: 12 K/UL — HIGH (ref 4.8–10.8)
WBC # FLD AUTO: 12.04 K/UL — HIGH (ref 4.8–10.8)

## 2024-03-02 PROCEDURE — 71045 X-RAY EXAM CHEST 1 VIEW: CPT | Mod: 26

## 2024-03-02 PROCEDURE — 99233 SBSQ HOSP IP/OBS HIGH 50: CPT

## 2024-03-02 PROCEDURE — 99291 CRITICAL CARE FIRST HOUR: CPT | Mod: 24,25

## 2024-03-02 RX ORDER — LOPERAMIDE HCL 2 MG
2 TABLET ORAL EVERY 12 HOURS
Refills: 0 | Status: DISCONTINUED | OUTPATIENT
Start: 2024-03-02 | End: 2024-03-03

## 2024-03-02 RX ORDER — PANTOPRAZOLE SODIUM 20 MG/1
40 TABLET, DELAYED RELEASE ORAL
Refills: 0 | Status: DISCONTINUED | OUTPATIENT
Start: 2024-03-02 | End: 2024-03-06

## 2024-03-02 RX ORDER — PSYLLIUM SEED (WITH DEXTROSE)
1 POWDER (GRAM) ORAL DAILY
Refills: 0 | Status: DISCONTINUED | OUTPATIENT
Start: 2024-03-02 | End: 2024-03-06

## 2024-03-02 RX ORDER — HYDROMORPHONE HYDROCHLORIDE 2 MG/ML
0.25 INJECTION INTRAMUSCULAR; INTRAVENOUS; SUBCUTANEOUS ONCE
Refills: 0 | Status: DISCONTINUED | OUTPATIENT
Start: 2024-03-02 | End: 2024-03-02

## 2024-03-02 RX ORDER — SODIUM CHLORIDE 9 MG/ML
500 INJECTION, SOLUTION INTRAVENOUS ONCE
Refills: 0 | Status: COMPLETED | OUTPATIENT
Start: 2024-03-02 | End: 2024-03-02

## 2024-03-02 RX ORDER — LOPERAMIDE HCL 2 MG
2 TABLET ORAL EVERY 8 HOURS
Refills: 0 | Status: DISCONTINUED | OUTPATIENT
Start: 2024-03-02 | End: 2024-03-02

## 2024-03-02 RX ORDER — MAGNESIUM SULFATE 500 MG/ML
2 VIAL (ML) INJECTION ONCE
Refills: 0 | Status: COMPLETED | OUTPATIENT
Start: 2024-03-02 | End: 2024-03-02

## 2024-03-02 RX ADMIN — TRAMADOL HYDROCHLORIDE 25 MILLIGRAM(S): 50 TABLET ORAL at 21:35

## 2024-03-02 RX ADMIN — CHLORHEXIDINE GLUCONATE 1 APPLICATION(S): 213 SOLUTION TOPICAL at 11:05

## 2024-03-02 RX ADMIN — FINASTERIDE 5 MILLIGRAM(S): 5 TABLET, FILM COATED ORAL at 11:04

## 2024-03-02 RX ADMIN — PANTOPRAZOLE SODIUM 40 MILLIGRAM(S): 20 TABLET, DELAYED RELEASE ORAL at 09:41

## 2024-03-02 RX ADMIN — GABAPENTIN 600 MILLIGRAM(S): 400 CAPSULE ORAL at 21:33

## 2024-03-02 RX ADMIN — Medication 2 MILLIGRAM(S): at 09:41

## 2024-03-02 RX ADMIN — Medication 1000 MILLIGRAM(S): at 06:33

## 2024-03-02 RX ADMIN — Medication 1000 MILLIGRAM(S): at 00:00

## 2024-03-02 RX ADMIN — Medication 2 MILLIGRAM(S): at 21:31

## 2024-03-02 RX ADMIN — SODIUM CHLORIDE 120 MILLILITER(S): 9 INJECTION, SOLUTION INTRAVENOUS at 17:42

## 2024-03-02 RX ADMIN — Medication 1000 MILLIGRAM(S): at 06:45

## 2024-03-02 RX ADMIN — TRAMADOL HYDROCHLORIDE 25 MILLIGRAM(S): 50 TABLET ORAL at 20:16

## 2024-03-02 RX ADMIN — Medication 2 MILLIGRAM(S): at 14:03

## 2024-03-02 RX ADMIN — ENOXAPARIN SODIUM 40 MILLIGRAM(S): 100 INJECTION SUBCUTANEOUS at 14:13

## 2024-03-02 RX ADMIN — HYDROMORPHONE HYDROCHLORIDE 0.25 MILLIGRAM(S): 2 INJECTION INTRAMUSCULAR; INTRAVENOUS; SUBCUTANEOUS at 21:04

## 2024-03-02 RX ADMIN — HYDROMORPHONE HYDROCHLORIDE 0.25 MILLIGRAM(S): 2 INJECTION INTRAMUSCULAR; INTRAVENOUS; SUBCUTANEOUS at 21:00

## 2024-03-02 RX ADMIN — Medication 85 MILLIMOLE(S): at 23:57

## 2024-03-02 RX ADMIN — Medication 1000 MILLIGRAM(S): at 17:42

## 2024-03-02 RX ADMIN — GABAPENTIN 600 MILLIGRAM(S): 400 CAPSULE ORAL at 14:02

## 2024-03-02 RX ADMIN — GABAPENTIN 600 MILLIGRAM(S): 400 CAPSULE ORAL at 06:33

## 2024-03-02 RX ADMIN — Medication 25 GRAM(S): at 23:58

## 2024-03-02 RX ADMIN — SODIUM CHLORIDE 500 MILLILITER(S): 9 INJECTION, SOLUTION INTRAVENOUS at 07:51

## 2024-03-02 RX ADMIN — Medication 1000 MILLIGRAM(S): at 11:33

## 2024-03-02 RX ADMIN — Medication 1000 MILLIGRAM(S): at 11:03

## 2024-03-02 RX ADMIN — TAMSULOSIN HYDROCHLORIDE 0.4 MILLIGRAM(S): 0.4 CAPSULE ORAL at 21:33

## 2024-03-02 NOTE — PROGRESS NOTE ADULT - ATTENDING COMMENTS
Critical Care: [id number]   This patient has a high probability of sudden, clinically significant deterioration, which requires the highest level of physician preparedness to intervene urgently. I managed/supervised life or organ supporting interventions that required frequent physician assessment. I devoted my full attention in the ICU to the direct care of this patient for the period of time indicated below. Time I spent with the family or surrogate(s) is included only if the patient was incapable of providing the necessary information or participating in medical decision making. Time devoted to teaching and to any procedures I billed separately is not included.     MARI LASSITER 73y Male admitted to [x ] SICU /[ ] SDU with post-OP hypotension and high ileostomy output, S/P Low anterior resection for the rectal CA with diverting loop ostomy. Persistently hypotensive on the floor requiring upgrade to ICU, electrolytes abnormalities, airway at risk for obstruction, high risk for hemodynamic instability.  Patient is examined and evaluated at the bedside with SICU team. Treatment plan discussed with SICU team, nurses and primary team.   Chest X-ray and all relevant studies reviewed during rounds.  Will continue hemodynamic monitoring as per protocol in SICU.    Neuro:  GCS [ 15]  AAOx3 [x ]  Neurovascular checks as per SICU protocol                 [ ] 3% NaCl     [ ] 2% NaCl                [ ] Keppra  [ ] Lamictal  [ ] Depakote  [ ] Dilantin                Pharmacological Paralysis [ ] Yes  [x ]  No      Sedated/Pain control with                 [ ] Dilaudid drip, [ ]  Ketamine drip, [ ] Fentanyl drip, [ ] Propofol, [ ] Precedex, [ ] Versed drip, [ ] Ativan drip,                           [ ] OxyContin standing,  [ ] OxyContin PRN, [ x] Dilaudid PRN pushes, [ ] Fentanyl PRN pushes, [ ] PCA,                [x ] Tylenol IV/PO, [ x] Gabapentin, [ ]  Ketorolac, [x ] Tramadol,  [ ] Lidoderm Patch       Other Medications               [ ] Seroquel, [ ] Zyprexa, [ ] Haldol,  [ ] Clonazepam [ ] Xanax, [ ] Versed/Ativan PRN, [ ] Valium [x ] None               [ ] Robaxin   [ ] Baclofen  [ ] Flexeril               [ ] CIWA (Ativan/Valium/ Librium)  CV: continue to monitor, received boluses 2750ml initially   On pressors [ ]  Yes  [x ]  No          [ ]  Levophed, [ ] Leonel-Synephrine, [ ] Vasopressin, [ ]  Epinephrine          [ ] Dobutamine, [ ] Milrinone, [ ]  Midodrine,  [ ] Others    Other Cardiac Meds          [ ] Amiodarone IV/PO, [ ] Digoxin, [ ] Cardizem drip, [ ] Cleviprex drip, [ ] Esmolol drip, [ ] Cardene drip  Respiratory: Acute respiratory insufficiency -> continue monitoring                        None Invasive Support  [ x] Incentive Spirometer 1500ml                                 [ ] BiPAP   [ ] CPAP/NIV   [ ] HFNC   [ ] NR Face Mask  [ ] NC  [ ] Trach Caller [x ] Room Air                        Ventilatory support  [ ] Yes [ x] No                            [ ] SBT                                  [ ] PC    [ ] VC   [ ] AC/PRVC   [ ] BiVent/APRV   [ ]CPAP   GI  [ x]  bowel regiment on hold [x ] BM none [x ] Flatus none   [ x] Ostomy 2450ml  [ ] NG tube   [x] BALDO 790ml/24hrs        Prophylaxis [x ] PPI  [ ] H2 Blockers  [ ] Others  Nutrition: continue   [ x] Diet low fiber   [ ] TPN/PPN   [ ] calories count   [ ]  Tube Feeds     Renal: Continue I&Os monitoring, Dang catheter  [x ] Yes  [ ]  No  [ ] Consideration for discontinuation [ ] Taxes cath/PrimaFit  [ ] TOV                                                               [ ] HD/CVVH   [x ] Urine output 1600ml/24hrs       Lytes/Acid-base: replete hypokalemia, hypomagnesemia, hypocalcemia, hypophosphatemia        IV Fluids   [ x] LR@120ml/hr  [ ] NS  [ ] D5W1/2NS [ ] Bicarbonate Drip  [ ] Albumin [ ] Lasix drip/push  [ ] Bumex drip/push  ID: leukocytosis -> continue to monitor:         IV Abx [x ] Yes mariangel-OP, [ ] No;  ID consulted [ ] Yes, [x ] No        Cultures send  [ ] Respiratory   [ ] Blood   [ ] Urine  [ ] Fluids  [ ] Tissue  [ x]  None  Lines:   [ ] Right   [ ] Left  [ ] Bilateral                     [ ] Subclavian TLC        [ ]  Internal Jugular TLC     [ ]  Femoral TLC                     [ ] Subclavian Cordis    [ ] Internal Jugular Cordis   [ ] Femoral Cordis                     [ ] HD catheter    [ ] PICC     [ ]  Midline   [x ] Peripheral IVs                                                 [ ] Right   [ ] Left   [ x] None                     [ ] Radial A-Line    [ ] Femoral A-Line   [ ] Axillary A-Line               Heme: continue to evaluate for acute blood loss anemia- trend Hg/Hct                     AC Reversal Indicated [ ] Yes  [x ] No                                      [ ] Kcentra,  [ ] 3Factors concentrate, [ ] Andexxa                     Transfused  indicated  [ ] Yes, [x ] No    [ ] PRBCs   [ ] Platelets   [ ] FFPs   [ ] Cryoprecipitate                    Should be started on or continued with  following  [ ] Yes,  [ x] No                               [ ] Lovenox  [ ] Coumadin  [ ] Heparin drip  [ ] DOVACs  [ ] ASA  [ ] Antiplatelets   Endocrine: Prevent and treat hyperglycemia with insulin as needed,                                [x ] Sliding scale,  [ ] Lantus/Regular Insuline                              Insuline drip for hyperglycemia [ ] Yes, [x ] No   PV: follow pulse exam  Skin: decub precautions  DVT Prophylaxis:  [ x] SCDs  [ ] Heparin SQ  [x ] Lovenox  SQ  Stress Gastritis Prophylaxis: PPI/H2 Blockers if indicated  Mobility: patient is evaluated at the bedside with mobility team and the goals for today are discussed with PT [ x] out of bed to chair    PATIENT/FAMILY/SURROGATE CONFERENCE:  [x ] Yes with patient at the bedside. [ ] No  PURPOSE: To obtain necessary information, To discuss treatment options under consideration today.    I saw and evaluated the patient personally. I have reviewed and agree with note above. Treatment plan discussed with SICU team, nurses and primary team at the time of the multidisciplinary rounds. The above note is NOT written at the time of rounds and will reflect all changes throughout management of the patient for the day note is written for.    Alana Gilmore MD, FACS  Trauma/ACS/SCC Attending Critical Care: [id number]   This patient has a high probability of sudden, clinically significant deterioration, which requires the highest level of physician preparedness to intervene urgently. I managed/supervised life or organ supporting interventions that required frequent physician assessment. I devoted my full attention in the ICU to the direct care of this patient for the period of time indicated below. Time I spent with the family or surrogate(s) is included only if the patient was incapable of providing the necessary information or participating in medical decision making. Time devoted to teaching and to any procedures I billed separately is not included.     MARI LASSITER 73y Male admitted to [x ] SICU /[ ] SDU with post-OP hypotension and high ileostomy output, S/P Low anterior resection for the rectal CA with diverting loop ostomy. Persistently hypotensive on the floor requiring upgrade to ICU, electrolytes abnormalities, airway at risk for obstruction, high risk for hemodynamic instability.  Patient is examined and evaluated at the bedside with SICU team. Treatment plan discussed with SICU team, nurses and primary team.   Chest X-ray and all relevant studies reviewed during rounds.  Will continue hemodynamic monitoring as per protocol in SICU.    Neuro:  GCS [ 15]  AAOx3 [x ]  Neurovascular checks as per SICU protocol                 [ ] 3% NaCl     [ ] 2% NaCl                [ ] Keppra  [ ] Lamictal  [ ] Depakote  [ ] Dilantin                Pharmacological Paralysis [ ] Yes  [x ]  No      Sedated/Pain control with                 [ ] Dilaudid drip, [ ]  Ketamine drip, [ ] Fentanyl drip, [ ] Propofol, [ ] Precedex, [ ] Versed drip, [ ] Ativan drip,                           [ ] OxyContin standing,  [ ] OxyContin PRN, [ x] Dilaudid PRN pushes, [ ] Fentanyl PRN pushes, [ ] PCA,                [x ] Tylenol IV/PO, [ x] Gabapentin, [ ]  Ketorolac, [x ] Tramadol,  [ ] Lidoderm Patch       Other Medications               [ ] Seroquel, [ ] Zyprexa, [ ] Haldol,  [ ] Clonazepam [ ] Xanax, [ ] Versed/Ativan PRN, [ ] Valium [x ] None               [ ] Robaxin   [ ] Baclofen  [ ] Flexeril               [ ] CIWA (Ativan/Valium/ Librium)  CV: continue to monitor, received boluses 2750ml initially   On pressors [ ]  Yes  [x ]  No          [ ]  Levophed, [ ] Leonel-Synephrine, [ ] Vasopressin, [ ]  Epinephrine          [ ] Dobutamine, [ ] Milrinone, [ ]  Midodrine,  [ ] Others    Other Cardiac Meds          [ ] Amiodarone IV/PO, [ ] Digoxin, [ ] Cardizem drip, [ ] Cleviprex drip, [ ] Esmolol drip, [ ] Cardene drip  Respiratory: Acute respiratory insufficiency -> continue monitoring                        None Invasive Support  [ x] Incentive Spirometer 1500ml                                 [ ] BiPAP   [ ] CPAP/NIV   [ ] HFNC   [ ] NR Face Mask  [ ] NC  [ ] Trach Caller [x ] Room Air                        Ventilatory support  [ ] Yes [ x] No                            [ ] SBT                                  [ ] PC    [ ] VC   [ ] AC/PRVC   [ ] BiVent/APRV   [ ]CPAP   GI  [ x]  bowel regiment on hold [x ] BM none [x ] Flatus none   [ x] Ostomy 2450ml  [ ] NG tube   [x] BALDO 790ml/24hrs        Prophylaxis [x ] PPI  [ ] H2 Blockers  [ ] Others  Nutrition: continue   [ x] Diet low fiber   [ ] TPN/PPN   [ ] calories count   [ ]  Tube Feeds     Renal: Continue I&Os monitoring, Dang catheter  [x ] Yes  [ ]  No  [ ] Consideration for discontinuation [ ] Taxes cath/PrimaFit  [ ] TOV                                                               [ ] HD/CVVH   [x ] Urine output 1600ml/24hrs       Lytes/Acid-base: replete hypokalemia, hypomagnesemia, hypocalcemia, hypophosphatemia        IV Fluids   [ x] LR@120ml/hr  [ ] NS  [ ] D5W1/2NS [ ] Bicarbonate Drip  [ ] Albumin [ ] Lasix drip/push  [ ] Bumex drip/push  ID: leukocytosis -> continue to monitor:         IV Abx [x ] Yes mariangel-OP, [ ] No;  ID consulted [ ] Yes, [x ] No        Cultures send  [ ] Respiratory   [ ] Blood   [ ] Urine  [ ] Fluids  [ ] Tissue  [ x]  None  Lines:   [ ] Right   [ ] Left  [ ] Bilateral                     [ ] Subclavian TLC        [ ]  Internal Jugular TLC     [ ]  Femoral TLC                     [ ] Subclavian Cordis    [ ] Internal Jugular Cordis   [ ] Femoral Cordis                     [ ] HD catheter    [ ] PICC     [ ]  Midline   [x ] Peripheral IVs                                                 [ ] Right   [ ] Left   [ x] None                     [ ] Radial A-Line    [ ] Femoral A-Line   [ ] Axillary A-Line               Heme: continue to evaluate for acute blood loss anemia- trend Hg/Hct                     AC Reversal Indicated [ ] Yes  [x ] No                                      [ ] Kcentra,  [ ] 3Factors concentrate, [ ] Andexxa                     Transfused  indicated  [ ] Yes, [x ] No    [x ] [id number] prior to arrival ti ICU   [ ] Platelets   [ ] FFPs   [ ] Cryoprecipitate                    Should be started on or continued with  following  [ ] Yes,  [ x] No                               [ ] Lovenox  [ ] Coumadin  [ ] Heparin drip  [ ] DOVACs  [ ] ASA  [ ] Antiplatelets   Endocrine: Prevent and treat hyperglycemia with insulin as needed,                                [x ] Sliding scale,  [ ] Lantus/Regular Insuline                              Insuline drip for hyperglycemia [ ] Yes, [x ] No   PV: follow pulse exam  Skin: decub precautions  DVT Prophylaxis:  [ x] SCDs  [ ] Heparin SQ  [x ] Lovenox  SQ  Stress Gastritis Prophylaxis: PPI/H2 Blockers if indicated  Mobility: patient is evaluated at the bedside with mobility team and the goals for today are discussed with PT [ x] out of bed to chair    PATIENT/FAMILY/SURROGATE CONFERENCE:  [x ] Yes with patient at the bedside. [ ] No  PURPOSE: To obtain necessary information, To discuss treatment options under consideration today.    I saw and evaluated the patient personally. I have reviewed and agree with note above. Treatment plan discussed with SICU team, nurses and primary team at the time of the multidisciplinary rounds. The above note is NOT written at the time of rounds and will reflect all changes throughout management of the patient for the day note is written for.    Alana Gilmore MD, FACS  Trauma/ACS/SCC Attending

## 2024-03-02 NOTE — PROGRESS NOTE ADULT - ASSESSMENT
Assessment and Recommendation:   · Assessment	  73M with PMH of Rectal CA (follows with Dr. Crowe), RA, BPH, left neck melanoma s/p wide local excision with SLNB (2014), and spinal stenosis, now s/p robot-assisted low anterior resection, mobilization of the splenic flexure, ano-colonic anastomosis, and creation of protective loop ileostomy (2/29)    NEURO:  #Acute pain   - APAP 1000mg q6   - [Home med] Gabapentin 600mg TID     RESP:   #Oxygenation    - Tolerating RA  #Activity    - increase as tolerated    - Encourage IS    CARDS:   #Hypotension - improving    - Responsive to IVF bolus (2.0 L)   - Continue to monitor, bolus as needed  Imaging:    - EKG (2/15): HR 67, NSR with sinus arrythmia, normal EKG    GI/NUTR:   #Rectal CA s/p LAR with Loop Ileostomy   - Monitor Ileostomy output, replete prn (repleted 750cc IVF at 19:00 on (3/1) )   - Remove red rubber POD5   - R pelvic BALDO with sanguinous output, continue to monitor  #Diet, Low Fiber   - As tolerated   - Monitor for N/V   - [Home med] Folic acid 1mg  #GI Prophylaxis    -not indicated  #Bowel regimen    - HOLDING     /RENAL:   #urine output   - indwelling jones (placed in the OR 2/29)   - LR @ 120  #BPH   - [Home med] Tamsulosin 0.4mg   - [Home med] Finasteride 5mg    Labs:          BUN/Cr- 11/0.8  -->,  18/1.0  -->          Electrolytes-(03-01 @ 22:47)Na 136// K 4.3 // Mg 1.7 // Phos 1.7  - Mag, phos repleted     HEME/ONC:   #DVT ppx   - Lovenox 40mg  #Anemia 2/2 Sanguinous BALDO Drainage   - (3/1) 1u pRBC, post transfusion CBC 8.6 > f/u AM CBC   #Hx of L Neck Melanoma s/p Excision with SLNB (2014)   - Follows with Dr. Crowe  #RA   - [Home med] Methotrexate 15mg 1x weekly (HOLDING)      Labs: Hb/Hct:  8.3/25.3  -->,  8.6/25.8  -->                      Plts:  174  -->,  174  -->                 PTT/INR:        ID:  #leukocytosis    - Monitor WBC  WBC- 18.89  --->>,  13.24  --->>,  12.97  --->>  Antibiotics-  Culture-  Temp trend- 24hrs T(F): 97.7 (03-02 @ 00:00), Max: 99 (03-01 @ 17:10)  Current antibiotics-    ENDO:    -FSG ACHS    -Glucose goal 140-180. if above 180 start ISS    MSK:    Activity - Ambulate as Tolerated:     Time/Priority:  Routine (03-01-24 @ 10:29)    LINES/DRAINS:  PIV, Jones, Pelvic BALDO    ADVANCED DIRECTIVES:  Full Code    HCP/Emergency Contact-    INDICATION FOR SICU:  Hemodynamic monitoring, Management of high output ileostomy s/p robot-assisted LAR         DISPO:   Case discussed with attending Dr. Gilmore     Assessment and Recommendation:   73M with PMH of Rectal CA (follows with Dr. Crowe), RA, BPH, left neck melanoma s/p wide local excision with SLNB (2014), and spinal stenosis, now s/p robot-assisted low anterior resection, mobilization of the splenic flexure, ano-colonic anastomosis, and creation of protective loop ileostomy (2/29)    NEURO:  #Acute pain  -APAP 1000mg q6  -Gabapentin 600mg TID  -Tramadol 25mg PO q 6 PRN     RESP:   #Oxygenation    - Tolerating RA  #Activity    - increase as tolerated    - Encourage IS-->1750    CARDS:   #acute post-operative hypotension likely 2/2 hypovolemia  -2.75L IVF bolus + 1 U PRBC given with improvement  Imaging:    - EKG (2/15): HR 67, NSR with sinus arrythmia, normal EKG    GI/NUTR:   #Rectal CA s/p LAR with Loop Ileostomy   - Monitor Ileostomy output, replete 0.5:1 prn (repleted 500cc IVF this AM)   - Remove red rubber POD5   - R pelvic BALDO with sanguinous output, continue to monitor  #Diet, Low Fiber   - As tolerated   - Monitor for N/V   - [Home med] Folic acid 1mg  #GI Prophylaxis    -not indicated  #Bowel regimen    - HOLDING     /RENAL:   #urine output   - indwelling jones (placed in the OR 2/29)   - LR @ 120  #BPH   - [Home med] Tamsulosin 0.4mg   - [Home med] Finasteride 5mg    Labs:          BUN/Cr- 11/0.8  -->,  18/1.0  -->          Electrolytes-(03-01 @ 22:47)Na 136// K 4.3 // Mg 1.7 // Phos 1.7  - Mag, phos repleted     HEME/ONC:   #DVT ppx   - Lovenox 40mg  #Anemia 2/2 Sanguinous BALDO Drainage   - (3/1) 1u pRBC, post transfusion CBC 8.6 > f/u AM CBC   #Hx of L Neck Melanoma s/p Excision with SLNB (2014)   - Follows with Dr. Crowe  #RA   - [Home med] Methotrexate 15mg 1x weekly (HOLDING)      Labs: Hb/Hct:  8.3/25.3  -->,  8.6/25.8  -->                      Plts:  174  -->,  174  -->                 PTT/INR:        ID:  #leukocytosis    - Monitor WBC  WBC- 18.89  --->>,  13.24  --->>,  12.97  --->>  Antibiotics-  Culture-  Temp trend- 24hrs T(F): 97.7 (03-02 @ 00:00), Max: 99 (03-01 @ 17:10)  Current antibiotics-    ENDO:    -FSG ACHS    -Glucose goal 140-180. if above 180 start ISS    MSK:    Activity - Ambulate as Tolerated:     Time/Priority:  Routine (03-01-24 @ 10:29)    LINES/DRAINS:  PIV, Jones, Pelvic BALDO    ADVANCED DIRECTIVES:  Full Code    HCP/Emergency Contact-    INDICATION FOR SICU:  Hemodynamic monitoring, Management of high output ileostomy s/p robot-assisted LAR         DISPO:   Case discussed with attending Dr. Gilmore     Assessment and Recommendation:   73M with PMH of Rectal CA (follows with Dr. Crowe), RA, BPH, left neck melanoma s/p wide local excision with SLNB (2014), and spinal stenosis, now s/p robot-assisted low anterior resection, mobilization of the splenic flexure, ano-colonic anastomosis, and creation of protective loop ileostomy (2/29)    NEURO:  #Acute pain  -APAP 1000mg q6  -Gabapentin 600mg TID  -Tramadol 25mg PO q 6 PRN     RESP:   #Oxygenation    - Tolerating RA  #Activity    - increase as tolerated    - Encourage IS-->1750    CARDS:   #acute post-operative hypotension likely 2/2 hypovolemia  -2.75L IVF bolus + 1 U PRBC given with improvement  Imaging:    - EKG (2/15): HR 67, NSR with sinus arrythmia, normal EKG    GI/NUTR:   #Rectal CA s/p LAR with Loop Ileostomy   - Monitor Ileostomy output, replete 0.5:1 prn (repleted 500cc IVF this AM)   - Remove red rubber POD5   - R pelvic BALDO with sanguinous output, continue to monitor  #Diet, Low Fiber   - As tolerated   - Monitor for N/V  #GI Prophylaxis    -not indicated  #Bowel regimen    - HOLDING     /RENAL:   #urine output   - indwelling jones (placed in the OR 2/29)   - LR @ 120  #BPH   - [Home med] Tamsulosin 0.4mg   - [Home med] Finasteride 5mg    Labs:          BUN/Cr- 11/0.8  -->,  18/1.0  -->          Electrolytes-(03-01 @ 22:47)Na 136// K 4.3 // Mg 1.7 // Phos 1.7  - Mag, phos repleted     HEME/ONC:   #DVT ppx   - Lovenox 40mg  #Anemia 2/2 Sanguinous BALDO Drainage   - (3/1) 1u pRBC, post transfusion CBC 8.6   #Hx of L Neck Melanoma s/p Excision with SLNB (2014)   - Follows with Dr. Crowe  #RA   - [Home med] Methotrexate 15mg 1x weekly (HOLDING)    Labs: Hb/Hct:  8.6/25.8  -->,  8.1/24.1  -->                      Plts:  174  -->,  149  -->                 PTT/INR:        ID:  #monitor for fever/leukocytosis   WBC- 13.24  --->>,  12.97  --->>,  12.04  --->>  Temp trend- 24hrs T(F): 97.9 (03-02 @ 04:00), Max: 99 (03-01 @ 17:10)    ENDO:  #blood glucose monitoring   -FSG ACHS   -on insulin sliding scale     MSK:    #Activity - Ambulate as Tolerated:     Time/Priority:  Routine (03-01-24 @ 10:29)    LINES/DRAINS:  PIV, Jones, Pelvic BALDO    ADVANCED DIRECTIVES:  Full Code  INDICATION FOR SICU:  Hemodynamic monitoring, Management of high output ileostomy s/p robot-assisted LAR         DISPO:   Case discussed with attending Dr. Gilmore

## 2024-03-02 NOTE — PROGRESS NOTE ADULT - SUBJECTIVE AND OBJECTIVE BOX
MARI LASSITER   [id number]   [birth year]  73yM    Admit Date: 02-29-24  Indication for SICU: Hemodynamic monitoring, Management of high output ileostomy s/p robot-assisted LAR        ============================  HPI   73M with PMH of Rectal CA (follows with Dr. 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 Rectal lesion initially found/biopsied via EMR revealing a friable mass 10cm from the dentate line. 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    Patient went to the OR 2/29 for robot-assisted low anterior resection, mobilization of the splenic flexure, ano-colonic anastomosis, and creation of protective loop ileostomy.     OR Stats  OR Time:  6 hours    IV Fluids:  2500           EBL:   250           Blood Products:  none         UOP: 750  Findings   - Robotic assisted Low anterior resection. Mobilization of splenic flexure, recto-sigmoidectomy performed and anal colon anastomosis achieved. Pelvic drain left in place, hemostasis achieved. Loop ileostomy created.    POD1 patient was getting up to ambulate then felt dizzy. He was able to sit in the chair for some time, but then began to feel light headed and was put back into bed. Throughout this time, patient has had persistent hypotension (80s/40s) with normal HR. Patient was experiencing increased sanguinous output from his pelvic BALDO drain in addition to elevated output from his ileostomy. Patient was given total of 2L of crystalloid with improvement in his SBP (90s/60s). Repeat CBC revealed a drop to 8.3 (previously 11.0) over 11 hours.     At the bedside, patient has no complaints at this time, with only some incisional tenderness at his incisions on physical exam. He has been tolerating CLD and low fiber diet, denies nausea and vomiting. His ileostomy is pink and viable appearing.     24 Hour Events  3/1  PM   -PM rounds: BALDO sanguinous output, ileostomy stoma pink viable with liquid brown output, SBP 90s, HR 75, will continue to monitor BALDO  -low fiber diet, gluten free (pt preference) diet   -f/u post transfusion CBC 8.3 (8.6)   -Continue to monitor ileostomy & BALDO drain outputs  -Mag & phos repleted       DAY   - Admission to SICU   - 2L IVF, improvement in BP (90s/60s)   - BALDO with sanguinous output   - Ileostomy appears viable, thin brown fluid   - Arrived to SICU, /52   - Additional  bolus to replete ileostomy losses  - D/C Toradol, started Tramadol   - Admission under SICU service    [X] A ten-point review of systems was otherwise negative except as noted above.  [  ] Due to altered mental status/intubation, subjective information was not attained from the patient. History was obtained, to the extent possible, from review of the chart and collateral sources of information.    =========================================================================================================================================  =========================================================================================================================================       MARI LASSITER   [id number]   [birth year]  73yM    Admit Date: 24  Indication for SICU: Hemodynamic monitoring, Management of high output ileostomy s/p robot-assisted LAR        ============================  HPI   73M with PMH of Rectal CA (follows with Dr. Crowe), RA, BPH, left neck melanoma s/p wide local excision with SLNB (), and spinal stenosis, presents to [facility] for elective low anterior resection. Rectal lesion initially found/biopsied via EMR revealing a friable mass 10cm from the dentate line. 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    Patient went to the OR  for robot-assisted low anterior resection, mobilization of the splenic flexure, ano-colonic anastomosis, and creation of protective loop ileostomy.     OR Stats  OR Time:  6 hours    IV Fluids:  2500           EBL:   250           Blood Products:  none         UOP: 750  Findings   - Robotic assisted Low anterior resection. Mobilization of splenic flexure, recto-sigmoidectomy performed and anal colon anastomosis achieved. Pelvic drain left in place, hemostasis achieved. Loop ileostomy created.    POD1 patient was getting up to ambulate then felt dizzy. He was able to sit in the chair for some time, but then began to feel light headed and was put back into bed. Throughout this time, patient has had persistent hypotension (80s/40s) with normal HR. Patient was experiencing increased sanguinous output from his pelvic BALDO drain in addition to elevated output from his ileostomy. Patient was given total of 2L of crystalloid with improvement in his SBP (90s/60s). Repeat CBC revealed a drop to 8.3 (previously 11.0) over 11 hours.     At the bedside, patient has no complaints at this time, with only some incisional tenderness at his incisions on physical exam. He has been tolerating CLD and low fiber diet, denies nausea and vomiting. His ileostomy is pink and viable appearing.     24 Hour Events  3/1  PM   -PM rounds: BALDO sanguinous output, ileostomy stoma pink viable with liquid brown output, SBP 90s, HR 75, will continue to monitor BALDO  -low fiber diet, gluten free (pt preference) diet   -f/u post transfusion CBC 8.3 (8.6)   -Continue to monitor ileostomy & BALDO drain outputs  -Mag & phos repleted       DAY   - Admission to SICU   - 2L IVF, improvement in BP (90s/60s)   - BALDO with sanguinous output   - Ileostomy appears viable, thin brown fluid   - Arrived to SICU, /52   - Additional  bolus to replete ileostomy losses  - D/C Toradol, started Tramadol   - Admission under SICU service    [X] A ten-point review of systems was otherwise negative except as noted above.  [  ] Due to altered mental status/intubation, subjective information was not attained from the patient. History was obtained, to the extent possible, from review of the chart and collateral sources of information.    =========================================================================================================================================  =========================================================================================================================================      Daily     Daily Weight in k.1 (02 Mar 2024 04:00)    Diet, Low Fiber:   Gluten-Gliadin Restricted  No Carbonated Beverages (24 @ 20:46)      CURRENT MEDS:  Neurologic Medications  acetaminophen     Tablet .. 1000 milliGRAM(s) Oral every 6 hours  gabapentin 600 milliGRAM(s) Oral every 8 hours  ondansetron Injectable 4 milliGRAM(s) IV Push every 8 hours PRN Nausea and/or Vomiting  traMADol 25 milliGRAM(s) Oral every 6 hours PRN Severe Pain (7 - 10)    Respiratory Medications    Cardiovascular Medications    Gastrointestinal Medications  folic acid 1 milliGRAM(s) Oral daily  lactated ringers. 1000 milliLiter(s) IV Continuous <Continuous>    Genitourinary Medications  tamsulosin 0.4 milliGRAM(s) Oral at bedtime    Hematologic/Oncologic Medications  enoxaparin Injectable 40 milliGRAM(s) SubCutaneous every 24 hours    Antimicrobial/Immunologic Medications    Endocrine/Metabolic Medications  finasteride 5 milliGRAM(s) Oral daily    Topical/Other Medications  chlorhexidine 2% Cloths 1 Application(s) Topical daily      ICU Vital Signs Last 24 Hrs  T(C): 36.6 (02 Mar 2024 04:00), Max: 37.2 (01 Mar 2024 17:10)  T(F): 97.9 (02 Mar 2024 04:00), Max: 99 (01 Mar 2024 17:10)  HR: 90 (02 Mar 2024 08:00) (64 - 101)  BP: 124/59 (02 Mar 2024 07:00) (82/45 - 124/59)  BP(mean): 84 (02 Mar 2024 07:00) (62 - 84)  ABP: --  ABP(mean): --  RR: 20 (02 Mar 2024 08:00) (15 - 20)  SpO2: 97% (02 Mar 2024 08:00) (95% - 98%)    O2 Parameters below as of 02 Mar 2024 08:00  Patient On (Oxygen Delivery Method): room air      I&O's Summary    01 Mar 2024 07:01  -  02 Mar 2024 07:00  --------------------------------------------------------  IN: 5192.2 mL / OUT: 4845 mL / NET: 347.2 mL    02 Mar 2024 07:01  -  02 Mar 2024 08:21  --------------------------------------------------------  IN: 620 mL / OUT: 760 mL / NET: -140 mL      I&O's Detail    01 Mar 2024 07:01  -  02 Mar 2024 07:00  --------------------------------------------------------  IN:    IV PiggyBack: 333.2 mL    Lactated Ringers: 1560 mL    Lactated Ringers Bolus: 2250 mL    Oral Fluid: 740 mL    PRBCs (Packed Red Blood Cells): 309 mL  Total IN: 5192.2 mL    OUT:    Drain (mL): 790 mL    Ileostomy (mL): 2450 mL    Indwelling Catheter - Urethral (mL): 1605 mL  Total OUT: 4845 mL    Total NET: 347.2 mL      02 Mar 2024 07:01  -  02 Mar 2024 08:21  --------------------------------------------------------  IN:    Lactated Ringers: 120 mL    Lactated Ringers Bolus: 500 mL  Total IN: 620 mL    OUT:    Drain (mL): 100 mL    Ileostomy (mL): 160 mL    Indwelling Catheter - Urethral (mL): 500 mL  Total OUT: 760 mL    Total NET: -140 mL          PHYSICAL EXAM:    General/Neuro: A&O x 3, no focal deficits.   Lungs: Saturating well on RA. Lungs clear to auscultation, Normal expansion/effort.   Cardiovascular : S1, S2.  Afib, rate-controlled.  GI: Abdomen soft, Non-tender, Non-distended. Ileostomy in place, stoma patent, +gas, thin brown liquid in bag. RLQ Pelvic BALDO drain with sanginous OP.   Extremities: Extremities warm, pink, well-perfused.   Derm: Good skin turgor, no skin breakdown.    :  Dang catheter in place.    LABS:  CAPILLARY BLOOD GLUCOSE                          8.1    12.04 )-----------( 149      ( 02 Mar 2024 05:37 )             24.1       03-02    139  |  108  |  14  ----------------------------<  115<H>  3.9   |  24  |  0.8    Ca    8.1<L>      02 Mar 2024 05:37  Phos  2.9     03-02  Mg     2.0     03-02    TPro  5.8<L>  /  Alb  3.9  /  TBili  0.5  /  DBili  x   /  AST  27  /  ALT  14  /  AlkPhos  71          Urinalysis Basic - ( 02 Mar 2024 05:37 )    Color: x / Appearance: x / SG: x / pH: x  Gluc: 115 mg/dL / Ketone: x  / Bili: x / Urobili: x   Blood: x / Protein: x / Nitrite: x   Leuk Esterase: x / RBC: x / WBC x   Sq Epi: x / Non Sq Epi: x / Bacteria: x         MARI LASSITER   [id number]   [birth year]  73yM    Admit Date: 24  Indication for SICU: Hemodynamic monitoring, Management of high output ileostomy s/p robot-assisted LAR        ============================  HPI   73M with PMH of Rectal CA (follows with Dr. Crowe), RA, BPH, left neck melanoma s/p wide local excision with SLNB (), and spinal stenosis, presents to [facility] for elective low anterior resection. Rectal lesion initially found/biopsied via EMR revealing a friable mass 10cm from the dentate line. 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    Patient went to the OR  for robot-assisted low anterior resection, mobilization of the splenic flexure, ano-colonic anastomosis, and creation of protective loop ileostomy.     OR Stats  OR Time:  6 hours    IV Fluids:  2500           EBL:   250           Blood Products:  none         UOP: 750  Findings   - Robotic assisted Low anterior resection. Mobilization of splenic flexure, recto-sigmoidectomy performed and anal colon anastomosis achieved. Pelvic drain left in place, hemostasis achieved. Loop ileostomy created.    POD1 patient was getting up to ambulate then felt dizzy. He was able to sit in the chair for some time, but then began to feel light headed and was put back into bed. Throughout this time, patient has had persistent hypotension (80s/40s) with normal HR. Patient was experiencing increased sanguinous output from his pelvic BALDO drain in addition to elevated output from his ileostomy. Patient was given total of 2L of crystalloid with improvement in his SBP (90s/60s). Repeat CBC revealed a drop to 8.3 (previously 11.0) over 11 hours.     At the bedside, patient has no complaints at this time, with only some incisional tenderness at his incisions on physical exam. He has been tolerating CLD and low fiber diet, denies nausea and vomiting. His ileostomy is pink and viable appearing.     24 Hour Events  3/1  PM   -PM rounds: BALDO sanguinous output, ileostomy stoma pink viable with liquid brown output, SBP 90s, HR 75, will continue to monitor BALDO  -low fiber diet, gluten free (pt preference) diet   -f/u post transfusion CBC 8.3 (8.6)   -Continue to monitor ileostomy & BALDO drain outputs  -Mag & phos repleted       DAY   - Admission to SICU   - 2L IVF, improvement in BP (90s/60s)   - BALDO with sanguinous output   - Ileostomy appears viable, thin brown fluid   - Arrived to SICU, /52   - Additional  bolus to replete ileostomy losses  - D/C Toradol, started Tramadol   - Admission under SICU service    [X] A ten-point review of systems was otherwise negative except as noted above.  [  ] Due to altered mental status/intubation, subjective information was not attained from the patient. History was obtained, to the extent possible, from review of the chart and collateral sources of information.    =========================================================================================================================================  =========================================================================================================================================      Daily     Daily Weight in k.1 (02 Mar 2024 04:00)    Diet, Low Fiber:   Gluten-Gliadin Restricted  No Carbonated Beverages (24 @ 20:46)      CURRENT MEDS:  Neurologic Medications  acetaminophen     Tablet .. 1000 milliGRAM(s) Oral every 6 hours  gabapentin 600 milliGRAM(s) Oral every 8 hours  ondansetron Injectable 4 milliGRAM(s) IV Push every 8 hours PRN Nausea and/or Vomiting  traMADol 25 milliGRAM(s) Oral every 6 hours PRN Severe Pain (7 - 10)    Respiratory Medications    Cardiovascular Medications    Gastrointestinal Medications  folic acid 1 milliGRAM(s) Oral daily  lactated ringers. 1000 milliLiter(s) IV Continuous <Continuous>    Genitourinary Medications  tamsulosin 0.4 milliGRAM(s) Oral at bedtime    Hematologic/Oncologic Medications  enoxaparin Injectable 40 milliGRAM(s) SubCutaneous every 24 hours    Antimicrobial/Immunologic Medications    Endocrine/Metabolic Medications  finasteride 5 milliGRAM(s) Oral daily    Topical/Other Medications  chlorhexidine 2% Cloths 1 Application(s) Topical daily      ICU Vital Signs Last 24 Hrs  T(C): 36.6 (02 Mar 2024 04:00), Max: 37.2 (01 Mar 2024 17:10)  T(F): 97.9 (02 Mar 2024 04:00), Max: 99 (01 Mar 2024 17:10)  HR: 90 (02 Mar 2024 08:00) (64 - 101)  BP: 124/59 (02 Mar 2024 07:00) (82/45 - 124/59)  BP(mean): 84 (02 Mar 2024 07:00) (62 - 84)  ABP: --  ABP(mean): --  RR: 20 (02 Mar 2024 08:00) (15 - 20)  SpO2: 97% (02 Mar 2024 08:00) (95% - 98%)    O2 Parameters below as of 02 Mar 2024 08:00  Patient On (Oxygen Delivery Method): room air      I&O's Summary    01 Mar 2024 07:01  -  02 Mar 2024 07:00  --------------------------------------------------------  IN: 5192.2 mL / OUT: 4845 mL / NET: 347.2 mL    02 Mar 2024 07:01  -  02 Mar 2024 08:21  --------------------------------------------------------  IN: 620 mL / OUT: 760 mL / NET: -140 mL      I&O's Detail    01 Mar 2024 07:01  -  02 Mar 2024 07:00  --------------------------------------------------------  IN:    IV PiggyBack: 333.2 mL    Lactated Ringers: 1560 mL    Lactated Ringers Bolus: 2250 mL    Oral Fluid: 740 mL    PRBCs (Packed Red Blood Cells): 309 mL  Total IN: 5192.2 mL    OUT:    Drain (mL): 790 mL    Ileostomy (mL): 2450 mL    Indwelling Catheter - Urethral (mL): 1605 mL  Total OUT: 4845 mL    Total NET: 347.2 mL      02 Mar 2024 07:01  -  02 Mar 2024 08:21  --------------------------------------------------------  IN:    Lactated Ringers: 120 mL    Lactated Ringers Bolus: 500 mL  Total IN: 620 mL    OUT:    Drain (mL): 100 mL    Ileostomy (mL): 160 mL    Indwelling Catheter - Urethral (mL): 500 mL  Total OUT: 760 mL    Total NET: -140 mL          PHYSICAL EXAM:    General/Neuro: A&O x 3, no focal deficits.   Lungs: Saturating well on RA. Lungs clear to auscultation, Normal expansion/effort.   Cardiovascular : S1, S2.  NSR.  GI: Abdomen soft, Non-tender, Non-distended. Ileostomy in place, stoma patent, +gas, thin brown liquid in bag. RLQ Pelvic BALDO drain with sanginous OP.   Extremities: Extremities warm, pink, well-perfused.   Derm: Good skin turgor, no skin breakdown.    :  Dang catheter in place.    LABS:  CAPILLARY BLOOD GLUCOSE                          8.1    12.04 )-----------( 149      ( 02 Mar 2024 05:37 )             24.1       03-02    139  |  108  |  14  ----------------------------<  115<H>  3.9   |  24  |  0.8    Ca    8.1<L>      02 Mar 2024 05:37  Phos  2.9     03-02  Mg     2.0     03-02    TPro  5.8<L>  /  Alb  3.9  /  TBili  0.5  /  DBili  x   /  AST  27  /  ALT  14  /  AlkPhos  71          Urinalysis Basic - ( 02 Mar 2024 05:37 )    Color: x / Appearance: x / SG: x / pH: x  Gluc: 115 mg/dL / Ketone: x  / Bili: x / Urobili: x   Blood: x / Protein: x / Nitrite: x   Leuk Esterase: x / RBC: x / WBC x   Sq Epi: x / Non Sq Epi: x / Bacteria: x

## 2024-03-02 NOTE — PROGRESS NOTE ADULT - SUBJECTIVE AND OBJECTIVE BOX
GENERAL SURGERY PROGRESS NOTE     MARI LASSITER  [address] [place] [address] day :2d    POD:  Procedure: Robot-assisted laparoscopic low anterior resection using da Myrna Xi    Anastomosis, colon to anus    Mobilization of splenic flexure    Robot-assisted creation of loop ileostomy    Block, transversus abdominis plane, bilateral      Surgical Attending: Flynn Lorenzo  Overnight events:    tolerating low fiber diet, gluten free (pt preference) diet   Mag phos repleted   2L IVF, improvement in BP (90s/60s) to 115/55.   s/p 1 UpRBC HGb 8.6 from 8.3       T(F): 97.7 (03-02-24 @ 00:00), Max: 99 (03-01-24 @ 17:10)  HR: 89 (03-02-24 @ 00:00) (67 - 101)  BP: 115/55 (03-02-24 @ 00:00) (82/45 - 115/55)  ABP: --  ABP(mean): --  RR: 18 (03-02-24 @ 00:00) (15 - 20)  SpO2: 97% (03-02-24 @ 00:00) (96% - 99%)    IN'S / OUT's:    02-29-24 @ 07:01  -  03-01-24 @ 07:00  --------------------------------------------------------  IN:    Lactated Ringers: 300 mL  Total IN: 300 mL    OUT:    Drain (mL): 172.5 mL    Ileostomy (mL): 200 mL    Indwelling Catheter - Urethral (mL): 1775 mL  Total OUT: 2147.5 mL    Total NET: -1847.5 mL      03-01-24 @ 07:01  -  03-02-24 @ 01:55  --------------------------------------------------------  IN:    IV PiggyBack: 249.9 mL    Lactated Ringers: 840 mL    Lactated Ringers Bolus: 2250 mL    Oral Fluid: 500 mL    PRBCs (Packed Red Blood Cells): 309 mL  Total IN: 4148.9 mL    OUT:    Drain (mL): 690 mL    Ileostomy (mL): 1750 mL    Indwelling Catheter - Urethral (mL): 1155 mL  Total OUT: 3595 mL    Total NET: 553.9 mL          PHYSICAL EXAM:  GENERAL: NAD   CHEST/LUNG: Clear to auscultation bilaterally  HEART: Regular rate and rhythm  ABDOMEN: Soft, Nontender, Nondistended; BALDO sanguinous output, ileostomy stoma pink viable with liquid brown output  EXTREMITIES:  No clubbing, cyanosis, or edema      LABS  Labs:  CAPILLARY BLOOD GLUCOSE                              8.6    12.97 )-----------( 174      ( 01 Mar 2024 21:45 )             25.8       Auto Neutrophil %: 79.2 % (03-01-24 @ 21:45)  Auto Immature Granulocyte %: 0.5 % (03-01-24 @ 21:45)  Auto Neutrophil %: 85.7 % (03-01-24 @ 15:20)  Auto Immature Granulocyte %: 0.5 % (03-01-24 @ 15:20)    03-01    136  |  106  |  18  ----------------------------<  114<H>  4.3   |  23  |  1.0      Magnesium: 1.7 mg/dL (03-01-24 @ 22:47)      LFTs:             5.8  | 0.5  | 27       ------------------[71      ( 29 Feb 2024 15:56 )  3.9  | x    | 14          Lipase:x      Amylase:x             Coags:            Urinalysis Basic - ( 01 Mar 2024 21:45 )    Color: x / Appearance: x / SG: x / pH: x  Gluc: 114 mg/dL / Ketone: x  / Bili: x / Urobili: x   Blood: x / Protein: x / Nitrite: x   Leuk Esterase: x / RBC: x / WBC x   Sq Epi: x / Non Sq Epi: x / Bacteria: x            RADIOLOGY & ADDITIONAL TESTS:      A/P:  MARI LASSITER is a 74 y/o male s/p robotic low anterior resection, anal colonic anastamosis, loop ileostomy    PLAN:  -upgraded to SICu for hemodynamic monitoring- bleeding into BALDO, s/P 1UPRBC   - hypotension: 2L LR responded well. SBP 80-90s now 115.   -Advance patient to low fiber diet/gluten free  -IV fluids LR @120   -keep jones strict I&os   -PT consult- rec rehab   -Ostomy nurse consult  -Ostomy bar to stay in for 5 days or to be discontinued prior to discharge (out 3/5)   -Monitor BALDO output, monitor ostomy output  -care per SICU     Disposition:  ***    Above plan to be discussed with Attending Surgeon Dr. Moya    [employer] [id number]

## 2024-03-03 LAB
ANION GAP SERPL CALC-SCNC: 10 MMOL/L — SIGNIFICANT CHANGE UP (ref 7–14)
BASOPHILS # BLD AUTO: 0.02 K/UL — SIGNIFICANT CHANGE UP (ref 0–0.2)
BASOPHILS NFR BLD AUTO: 0.2 % — SIGNIFICANT CHANGE UP (ref 0–1)
BUN SERPL-MCNC: 14 MG/DL — SIGNIFICANT CHANGE UP (ref 10–20)
CALCIUM SERPL-MCNC: 8.7 MG/DL — SIGNIFICANT CHANGE UP (ref 8.4–10.4)
CHLORIDE SERPL-SCNC: 107 MMOL/L — SIGNIFICANT CHANGE UP (ref 98–110)
CO2 SERPL-SCNC: 25 MMOL/L — SIGNIFICANT CHANGE UP (ref 17–32)
CREAT SERPL-MCNC: 0.7 MG/DL — SIGNIFICANT CHANGE UP (ref 0.7–1.5)
EGFR: 99 ML/MIN/1.73M2 — SIGNIFICANT CHANGE UP
EOSINOPHIL # BLD AUTO: 0.41 K/UL — SIGNIFICANT CHANGE UP (ref 0–0.7)
EOSINOPHIL NFR BLD AUTO: 4.1 % — SIGNIFICANT CHANGE UP (ref 0–8)
GLUCOSE SERPL-MCNC: 106 MG/DL — HIGH (ref 70–99)
HCT VFR BLD CALC: 24.3 % — LOW (ref 42–52)
HCT VFR BLD CALC: 25.4 % — LOW (ref 42–52)
HGB BLD-MCNC: 7.9 G/DL — LOW (ref 14–18)
HGB BLD-MCNC: 8.3 G/DL — LOW (ref 14–18)
IMM GRANULOCYTES NFR BLD AUTO: 0.4 % — HIGH (ref 0.1–0.3)
LYMPHOCYTES # BLD AUTO: 1.42 K/UL — SIGNIFICANT CHANGE UP (ref 1.2–3.4)
LYMPHOCYTES # BLD AUTO: 14.1 % — LOW (ref 20.5–51.1)
MAGNESIUM SERPL-MCNC: 2 MG/DL — SIGNIFICANT CHANGE UP (ref 1.8–2.4)
MCHC RBC-ENTMCNC: 30.6 PG — SIGNIFICANT CHANGE UP (ref 27–31)
MCHC RBC-ENTMCNC: 31.1 PG — HIGH (ref 27–31)
MCHC RBC-ENTMCNC: 32.5 G/DL — SIGNIFICANT CHANGE UP (ref 32–37)
MCHC RBC-ENTMCNC: 32.7 G/DL — SIGNIFICANT CHANGE UP (ref 32–37)
MCV RBC AUTO: 94.2 FL — HIGH (ref 80–94)
MCV RBC AUTO: 95.1 FL — HIGH (ref 80–94)
MONOCYTES # BLD AUTO: 0.98 K/UL — HIGH (ref 0.1–0.6)
MONOCYTES NFR BLD AUTO: 9.7 % — HIGH (ref 1.7–9.3)
NEUTROPHILS # BLD AUTO: 7.19 K/UL — HIGH (ref 1.4–6.5)
NEUTROPHILS NFR BLD AUTO: 71.5 % — SIGNIFICANT CHANGE UP (ref 42.2–75.2)
NRBC # BLD: 0 /100 WBCS — SIGNIFICANT CHANGE UP (ref 0–0)
NRBC # BLD: 0 /100 WBCS — SIGNIFICANT CHANGE UP (ref 0–0)
PHOSPHATE SERPL-MCNC: 2.5 MG/DL — SIGNIFICANT CHANGE UP (ref 2.1–4.9)
PLATELET # BLD AUTO: 177 K/UL — SIGNIFICANT CHANGE UP (ref 130–400)
PLATELET # BLD AUTO: 191 K/UL — SIGNIFICANT CHANGE UP (ref 130–400)
PMV BLD: 11.2 FL — HIGH (ref 7.4–10.4)
PMV BLD: 11.7 FL — HIGH (ref 7.4–10.4)
POTASSIUM SERPL-MCNC: 4.5 MMOL/L — SIGNIFICANT CHANGE UP (ref 3.5–5)
POTASSIUM SERPL-SCNC: 4.5 MMOL/L — SIGNIFICANT CHANGE UP (ref 3.5–5)
RBC # BLD: 2.58 M/UL — LOW (ref 4.7–6.1)
RBC # BLD: 2.67 M/UL — LOW (ref 4.7–6.1)
RBC # FLD: 13.7 % — SIGNIFICANT CHANGE UP (ref 11.5–14.5)
RBC # FLD: 13.7 % — SIGNIFICANT CHANGE UP (ref 11.5–14.5)
SODIUM SERPL-SCNC: 142 MMOL/L — SIGNIFICANT CHANGE UP (ref 135–146)
WBC # BLD: 10.06 K/UL — SIGNIFICANT CHANGE UP (ref 4.8–10.8)
WBC # BLD: 8.81 K/UL — SIGNIFICANT CHANGE UP (ref 4.8–10.8)
WBC # FLD AUTO: 10.06 K/UL — SIGNIFICANT CHANGE UP (ref 4.8–10.8)
WBC # FLD AUTO: 8.81 K/UL — SIGNIFICANT CHANGE UP (ref 4.8–10.8)

## 2024-03-03 PROCEDURE — 99233 SBSQ HOSP IP/OBS HIGH 50: CPT | Mod: 24

## 2024-03-03 PROCEDURE — 99291 CRITICAL CARE FIRST HOUR: CPT | Mod: 24,25

## 2024-03-03 RX ORDER — LOPERAMIDE HCL 2 MG
2 TABLET ORAL EVERY 6 HOURS
Refills: 0 | Status: DISCONTINUED | OUTPATIENT
Start: 2024-03-03 | End: 2024-03-05

## 2024-03-03 RX ORDER — SODIUM CHLORIDE 9 MG/ML
1000 INJECTION, SOLUTION INTRAVENOUS
Refills: 0 | Status: DISCONTINUED | OUTPATIENT
Start: 2024-03-03 | End: 2024-03-03

## 2024-03-03 RX ORDER — LOPERAMIDE HCL 2 MG
2 TABLET ORAL EVERY 8 HOURS
Refills: 0 | Status: DISCONTINUED | OUTPATIENT
Start: 2024-03-03 | End: 2024-03-03

## 2024-03-03 RX ORDER — SODIUM CHLORIDE 9 MG/ML
500 INJECTION, SOLUTION INTRAVENOUS ONCE
Refills: 0 | Status: COMPLETED | OUTPATIENT
Start: 2024-03-03 | End: 2024-03-03

## 2024-03-03 RX ADMIN — Medication 1000 MILLIGRAM(S): at 17:33

## 2024-03-03 RX ADMIN — TAMSULOSIN HYDROCHLORIDE 0.4 MILLIGRAM(S): 0.4 CAPSULE ORAL at 21:46

## 2024-03-03 RX ADMIN — Medication 2 MILLIGRAM(S): at 17:33

## 2024-03-03 RX ADMIN — Medication 1000 MILLIGRAM(S): at 01:20

## 2024-03-03 RX ADMIN — Medication 1000 MILLIGRAM(S): at 23:26

## 2024-03-03 RX ADMIN — GABAPENTIN 600 MILLIGRAM(S): 400 CAPSULE ORAL at 05:55

## 2024-03-03 RX ADMIN — SODIUM CHLORIDE 120 MILLILITER(S): 9 INJECTION, SOLUTION INTRAVENOUS at 05:54

## 2024-03-03 RX ADMIN — ENOXAPARIN SODIUM 40 MILLIGRAM(S): 100 INJECTION SUBCUTANEOUS at 16:08

## 2024-03-03 RX ADMIN — Medication 2 MILLIGRAM(S): at 23:26

## 2024-03-03 RX ADMIN — SODIUM CHLORIDE 1000 MILLILITER(S): 9 INJECTION, SOLUTION INTRAVENOUS at 07:00

## 2024-03-03 RX ADMIN — Medication 1000 MILLIGRAM(S): at 05:55

## 2024-03-03 RX ADMIN — Medication 1 MILLIGRAM(S): at 11:45

## 2024-03-03 RX ADMIN — PANTOPRAZOLE SODIUM 40 MILLIGRAM(S): 20 TABLET, DELAYED RELEASE ORAL at 06:05

## 2024-03-03 RX ADMIN — Medication 2 MILLIGRAM(S): at 11:45

## 2024-03-03 RX ADMIN — GABAPENTIN 600 MILLIGRAM(S): 400 CAPSULE ORAL at 13:39

## 2024-03-03 RX ADMIN — Medication 1000 MILLIGRAM(S): at 07:27

## 2024-03-03 RX ADMIN — GABAPENTIN 600 MILLIGRAM(S): 400 CAPSULE ORAL at 21:46

## 2024-03-03 RX ADMIN — Medication 2 MILLIGRAM(S): at 05:55

## 2024-03-03 RX ADMIN — Medication 1000 MILLIGRAM(S): at 00:00

## 2024-03-03 RX ADMIN — Medication 63.75 MILLIMOLE(S): at 22:12

## 2024-03-03 RX ADMIN — CHLORHEXIDINE GLUCONATE 1 APPLICATION(S): 213 SOLUTION TOPICAL at 11:46

## 2024-03-03 RX ADMIN — Medication 1 PACKET(S): at 11:46

## 2024-03-03 RX ADMIN — Medication 1000 MILLIGRAM(S): at 11:45

## 2024-03-03 RX ADMIN — FINASTERIDE 5 MILLIGRAM(S): 5 TABLET, FILM COATED ORAL at 11:46

## 2024-03-03 NOTE — PROGRESS NOTE ADULT - SUBJECTIVE AND OBJECTIVE BOX
MARI LASSITER   [id number]   [birth year]  73yM    Admit Date: 02-29-24  Indication for SICU: Hemodynamic monitoring, Management of high output ileostomy s/p robot-assisted LAR        ============================  HPI   73M with PMH of Rectal CA (follows with Dr. 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 Rectal lesion initially found/biopsied via EMR revealing a friable mass 10cm from the dentate line. 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    Patient went to the OR 2/29 for robot-assisted low anterior resection, mobilization of the splenic flexure, ano-colonic anastomosis, and creation of protective loop ileostomy.     OR Stats  OR Time:  6 hours    IV Fluids:  2500           EBL:   250           Blood Products:  none         UOP: 750  Findings   - Robotic assisted Low anterior resection. Mobilization of splenic flexure, recto-sigmoidectomy performed and anal colon anastomosis achieved. Pelvic drain left in place, hemostasis achieved. Loop ileostomy created.    POD1 patient was getting up to ambulate then felt dizzy. He was able to sit in the chair for some time, but then began to feel light headed and was put back into bed. Throughout this time, patient has had persistent hypotension (80s/40s) with normal HR. Patient was experiencing increased sanguinous output from his pelvic BALDO drain in addition to elevated output from his ileostomy. Patient was given total of 2L of crystalloid with improvement in his SBP (90s/60s). Repeat CBC revealed a drop to 8.3 (previously 11.0) over 11 hours.     At the bedside, patient has no complaints at this time, with only some incisional tenderness at his incisions on physical exam. He has been tolerating CLD and low fiber diet, denies nausea and vomiting. His ileostomy is pink and viable appearing.     24 Hour Events  3/2  Night  -PM rounds: SBP 140s,10cc BALDO sanguinous, 350cc iliostomy brown liquid output so far this shift, complaining of abdominal pain near ostomy; 0.25 dilaudid   -Hgb stable 8.1 > 8.2  -Mag, phos repleted       3/2  Day  -baseline CXR  -0.5 : 1 ostomy repletions >1L  q shift  -start imodium 2mg q8h--> OP decreased to 660   -start protonix 40 daily to decrease gastric secretions  -metamucil 1 packet daily     - Admission under SICU service    [X] A ten-point review of systems was otherwise negative except as noted above.  [  ] Due to altered mental status/intubation, subjective information was not attained from the patient. History was obtained, to the extent possible, from review of the chart and collateral sources of information.    ================================       MARI LASSITER   [id number]   [birth year]  73yM    Admit Date: 02-29-24  Indication for SICU: Hemodynamic monitoring, Management of high output ileostomy s/p robot-assisted LAR        ============================  HPI   73M with PMH of Rectal CA (follows with Dr. Crowe), RA, BPH, left neck melanoma s/p wide local excision with SLNB (2014), and spinal stenosis, presents to [facility] for elective low anterior resection. Rectal lesion initially found/biopsied via EMR revealing a friable mass 10cm from the dentate line. 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    Patient went to the OR 2/29 for robot-assisted low anterior resection, mobilization of the splenic flexure, ano-colonic anastomosis, and creation of protective loop ileostomy.     OR Stats  OR Time:  6 hours    IV Fluids:  2500           EBL:   250           Blood Products:  none         UOP: 750  Findings   - Robotic assisted Low anterior resection. Mobilization of splenic flexure, recto-sigmoidectomy performed and anal colon anastomosis achieved. Pelvic drain left in place, hemostasis achieved. Loop ileostomy created.    POD1 patient was getting up to ambulate then felt dizzy. He was able to sit in the chair for some time, but then began to feel light headed and was put back into bed. Throughout this time, patient has had persistent hypotension (80s/40s) with normal HR. Patient was experiencing increased sanguinous output from his pelvic BALDO drain in addition to elevated output from his ileostomy. Patient was given total of 2L of crystalloid with improvement in his SBP (90s/60s). Repeat CBC revealed a drop to 8.3 (previously 11.0) over 11 hours.     At the bedside, patient has no complaints at this time, with only some incisional tenderness at his incisions on physical exam. He has been tolerating CLD and low fiber diet, denies nausea and vomiting. His ileostomy is pink and viable appearing.     24 Hour Events  3/2  Night  -PM rounds: SBP 140s,10cc BALDO sanguinous, 350cc iliostomy brown liquid output so far this shift, complaining of abdominal pain near ostomy; 0.25 dilaudid   -Hgb stable 8.1 > 8.2  -1.2L output ileostomy/24 hrs > 500cc bolus   -BALDO 260 sanguinous output/24 hrs   -500cc bolus for repletion & imodium increased to 2mg q8  -Mag, phos repleted     3/2  Day  -baseline CXR  -0.5 : 1 ostomy repletions >1L  q shift  -start imodium 2mg q8h--> OP decreased to 660   -start protonix 40 daily to decrease gastric secretions  -metamucil 1 packet daily    Daily     Daily     Diet, Regular:   Gluten-Gliadin Restricted  No Carbonated Beverages (03-02-24 @ 12:21)      CURRENT MEDS:  Neurologic Medications  acetaminophen     Tablet .. 1000 milliGRAM(s) Oral every 6 hours  gabapentin 600 milliGRAM(s) Oral every 8 hours  ondansetron Injectable 4 milliGRAM(s) IV Push every 8 hours PRN Nausea and/or Vomiting  traMADol 25 milliGRAM(s) Oral every 6 hours PRN Severe Pain (7 - 10)    Respiratory Medications    Cardiovascular Medications    Gastrointestinal Medications  folic acid 1 milliGRAM(s) Oral daily  lactated ringers. 1000 milliLiter(s) IV Continuous <Continuous>  loperamide 2 milliGRAM(s) Oral every 8 hours  pantoprazole    Tablet 40 milliGRAM(s) Oral before breakfast  psyllium Powder 1 Packet(s) Oral daily    Genitourinary Medications  tamsulosin 0.4 milliGRAM(s) Oral at bedtime    Hematologic/Oncologic Medications  enoxaparin Injectable 40 milliGRAM(s) SubCutaneous every 24 hours    Antimicrobial/Immunologic Medications    Endocrine/Metabolic Medications  finasteride 5 milliGRAM(s) Oral daily    Topical/Other Medications  chlorhexidine 2% Cloths 1 Application(s) Topical daily      ICU Vital Signs Last 24 Hrs  T(C): 36.6 (03 Mar 2024 04:00), Max: 36.7 (02 Mar 2024 16:00)  T(F): 97.9 (03 Mar 2024 04:00), Max: 98.1 (02 Mar 2024 16:00)  HR: 56 (03 Mar 2024 06:00) (56 - 93)  BP: 93/52 (03 Mar 2024 06:00) (92/52 - 143/73)  BP(mean): 69 (03 Mar 2024 06:00) (65 - 100)  ABP: --  ABP(mean): --  RR: 12 (03 Mar 2024 06:00) (11 - 19)  SpO2: 97% (03 Mar 2024 06:00) (95% - 100%)    O2 Parameters below as of 03 Mar 2024 04:00  Patient On (Oxygen Delivery Method): room air    I&O's Summary    02 Mar 2024 07:01  -  03 Mar 2024 07:00  --------------------------------------------------------  IN: 4699.8 mL / OUT: 3825 mL / NET: 874.8 mL      I&O's Detail    02 Mar 2024 07:01  -  03 Mar 2024 07:00  --------------------------------------------------------  IN:    IV PiggyBack: 50 mL    IV PiggyBack: 499.8 mL    Lactated Ringers: 2760 mL    Lactated Ringers Bolus: 1000 mL    Oral Fluid: 390 mL  Total IN: 4699.8 mL    OUT:    Drain (mL): 260 mL    Ileostomy (mL): 1210 mL    Indwelling Catheter - Urethral (mL): 2355 mL  Total OUT: 3825 mL    Total NET: 874.8 mL    PHYSICAL EXAM:  General/Neuro  Deficits:  alert & oriented x 3, no focal deficits  Pupils: PERRRL  Lungs:  clear to auscultation, Normal expansion/effort.   Cardiovascular : S1, S2.  Regular rate and rhythm.  Cardiac Rhythm: Normal Sinus Rhythm  GI: Abdomen soft, mildly tender at incisions, Non-distended.    - Ileostomy with dark green thin liquid stool   - Pelvic BALDO with sanguinous discharge  Wound: Robotic ports clean, dry, and intact  Extremities: Extremities warm, pink, well-perfused.   Derm: Good skin turgor, no skin breakdown.    :       Dang catheter in place.    LABS:  CAPILLARY BLOOD GLUCOSE                        8.2    12.00 )-----------( 160      ( 02 Mar 2024 21:31 )             24.6       03-02    142  |  110  |  17  ----------------------------<  116<H>  4.1   |  25  |  0.8    Ca    8.4      02 Mar 2024 21:31  Phos  2.0     03-02  Mg     1.8     03-02      Urinalysis Basic - ( 02 Mar 2024 21:31 )    Color: x / Appearance: x / SG: x / pH: x  Gluc: 116 mg/dL / Ketone: x  / Bili: x / Urobili: x   Blood: x / Protein: x / Nitrite: x   Leuk Esterase: x / RBC: x / WBC x   Sq Epi: x / Non Sq Epi: x / Bacteria: x    [X] A ten-point review of systems was otherwise negative except as noted above.  [  ] Due to altered mental status/intubation, subjective information was not attained from the patient. History was obtained, to the extent possible, from review of the chart and collateral sources of information.    ================================

## 2024-03-03 NOTE — PROGRESS NOTE ADULT - SUBJECTIVE AND OBJECTIVE BOX
COLORECTAL SURGERY PROGRESS NOTE    Patient: MARI LASSITER , 73y ([birth year])Male   MRN: [id number]  Location: [address]  Visit: 02-29-24 Inpatient  Date: 03-03-24 @ 05:40    Hospital Day #: 4  Post-Op Day #: 3    PROCEDURE/DX/INJURIES:   *Rectal cancer   2/29 - s/p robotic LAR, mobilization of splenic flexure, anal colonic anastomosis, loop ileostomy     INTERVAL HX:   Started metamucil and imodium yesterday due to increased ostomy outputs.   Ostomy: 1010 (down from 2450 on 3/2)  BALDO Drain: 240 (down from 790 on 3/2)   Afebrile, SBP 90-140s  C/o abdominal pain overnight that responded to Dilaudid 0.25mg STAT.     PAST MEDICAL & SURGICAL HISTORY:  ·	Skin cancer (melanoma)  ·	Rheumatoid arthritis  ·	Spinal stenosis  ·	History of BPH  ·	History of hay fever  ·	Rectal cancer  ·	S/P bilateral inguinal hernia repair      VITALS:   T(F): 97.9 (03-03-24 @ 04:00), Max: 98.1 (03-02-24 @ 16:00)  HR: 66 (03-03-24 @ 05:00)  BP: 106/55 (03-03-24 @ 05:00)  RR: 16 (03-03-24 @ 05:00)  SpO2: 96% (03-03-24 @ 05:00)      DIET:   Diet, Regular:   Gluten-Gliadin Restricted  No Carbonated Beverages (03-02-24 @ 12:21) [Active]      FLUIDS: lactated ringers.: Solution, 1000 milliLiter(s) infuse at 120 mL/Hr (03-01-24 @ 16:29)      I & O's:    03-01-24 @ 07:01  -  03-02-24 @ 07:00  --------------------------------------------------------  IN:    IV PiggyBack: 333.2 mL    Lactated Ringers: 1560 mL    Lactated Ringers Bolus: 2250 mL    Oral Fluid: 740 mL    PRBCs (Packed Red Blood Cells): 309 mL  Total IN: 5192.2 mL    OUT:    Drain (mL): 790 mL    Ileostomy (mL): 2450 mL    Indwelling Catheter - Urethral (mL): 1605 mL  Total OUT: 4845 mL    Total NET: 347.2 mL      03-02-24 @ 07:01  -  03-03-24 @ 05:40  --------------------------------------------------------  IN:    IV PiggyBack: 50 mL    IV PiggyBack: 499.8 mL    Lactated Ringers: 2520 mL    Lactated Ringers Bolus: 500 mL    Oral Fluid: 270 mL  Total IN: 3839.8 mL    OUT:    Drain (mL): 260 mL    Ileostomy (mL): 1210 mL    Indwelling Catheter - Urethral (mL): 2205 mL  Total OUT: 3675 mL    Total NET: 164.8 mL      PHYSICAL EXAM:  General: NAD, AAOx3, calm and cooperative  HEENT: EOMI, sclera non-icteric.  Heart: Regular rate   Lungs: Breathing comfortably  Abdomen:  Soft, +TTP at ostomy site, nondistended. BALDO w/ serosanguinous output, ileostomy stoma pink, viable w/ liquid brown stool noted in ostomy bag   MSK/Extremities: Warm & well-perfused.   Skin: Warm, dry. No jaundice.     MEDICATIONS  (STANDING):  acetaminophen     Tablet .. 1000 milliGRAM(s) Oral every 6 hours  chlorhexidine 2% Cloths 1 Application(s) Topical daily  enoxaparin Injectable 40 milliGRAM(s) SubCutaneous every 24 hours  finasteride 5 milliGRAM(s) Oral daily  folic acid 1 milliGRAM(s) Oral daily  gabapentin 600 milliGRAM(s) Oral every 8 hours  lactated ringers. 1000 milliLiter(s) (120 mL/Hr) IV Continuous <Continuous>  loperamide 2 milliGRAM(s) Oral every 8 hours  pantoprazole    Tablet 40 milliGRAM(s) Oral before breakfast  psyllium Powder 1 Packet(s) Oral daily  tamsulosin 0.4 milliGRAM(s) Oral at bedtime    MEDICATIONS  (PRN):  ondansetron Injectable 4 milliGRAM(s) IV Push every 8 hours PRN Nausea and/or Vomiting  traMADol 25 milliGRAM(s) Oral every 6 hours PRN Severe Pain (7 - 10)      DVT PROPHYLAXIS: enoxaparin Injectable 40 milliGRAM(s) SubCutaneous every 24 hours    GI PROPHYLAXIS: pantoprazole    Tablet 40 milliGRAM(s) Oral before breakfast    ANTIBIOTICS: -         LAB/STUDIES:                        8.2    12.00 )-----------( 160      ( 02 Mar 2024 21:31 )             24.6       Auto Neutrophil %: 75.6 % (03-02-24 @ 21:31)  Auto Immature Granulocyte %: 0.5 % (03-02-24 @ 21:31)    03-02    142  |  110  |  17  ----------------------------<  116<H>  4.1   |  25  |  0.8      Calcium: 8.4 mg/dL (03-02-24 @ 21:31)        Urinalysis Basic - ( 02 Mar 2024 21:31 )    Color: x / Appearance: x / SG: x / pH: x  Gluc: 116 mg/dL / Ketone: x  / Bili: x / Urobili: x   Blood: x / Protein: x / Nitrite: x   Leuk Esterase: x / RBC: x / WBC x   Sq Epi: x / Non Sq Epi: x / Bacteria: x      IMAGING:  No new imaging obtained in the past 24h       ACCESS/ DEVICES:  [X] Peripheral IV  [ ] Central Venous Line	[ ] R	[ ] L	[ ] IJ	[ ] Fem	[ ] SC	Placed:   [ ] Arterial Line		[ ] R	[ ] L	[ ] Fem	[ ] Rad	[ ] Ax	Placed:   [ ] PICC:					[ ] Mediport  [X]Urinary Catheter,  Date Placed:   [ ] Chest tube: [ ] Right, [ ] Left  [X] BALDO/Jorge Drains  [X] Ostomy

## 2024-03-03 NOTE — PROGRESS NOTE ADULT - ASSESSMENT
73M with PMH of Rectal CA (follows with Dr. Crowe), RA, BPH, left neck melanoma s/p wide local excision with SLNB (2014), and spinal stenosis, now s/p robot-assisted low anterior resection, mobilization of the splenic flexure, ano-colonic anastomosis, and creation of protective loop ileostomy (2/29). Patient was upgraded to SICU post-operatively for hemodynamic monitoring and management of high output ileostomy.     PLAN:   - Continue to monitor outputs   - Started imodium and metamucil   - .5:1 ostomy repletions if >1L q shift   - Trend H/H   - Hemodynamic monitoring   - Low fiber / gluten-free diet, LR @ 120 cc/hr   - *Remove ostomy bar 5 days post-op (3/5)*   - Rest of care per SICU  -----------------------  Surgery - Blue Team  [phone]

## 2024-03-03 NOTE — PROGRESS NOTE ADULT - ASSESSMENT
Assessment and Recommendation:   73M with PMH of Rectal CA (follows with Dr. Crowe), RA, BPH, left neck melanoma s/p wide local excision with SLNB (2014), and spinal stenosis, now s/p robot-assisted low anterior resection, mobilization of the splenic flexure, ano-colonic anastomosis, and creation of protective loop ileostomy (2/29)    NEURO:  #Acute pain  -APAP 1000mg q6  -Gabapentin 600mg TID  -Tramadol 25mg PO q 6 PRN  -Dilaudid IVP 0.25 x1     RESP:   #Oxygenation    - Tolerating RA  #Activity    - increase as tolerated    - Encourage IS-->1750    CARDS:   #acute post-operative hypotension likely 2/2 hypovolemia  -(3/1): 2.75L IVF bolus + 1 U PRBC given with improvement  Imaging:    - EKG (2/15): HR 67, NSR with sinus arrythmia, normal EKG    GI/NUTR:   #Rectal CA s/p LAR with Loop Ileostomy   - Monitor Ileostomy output, replete 0.5:1 prn (repleted 500cc IVF this AM)   - Imodium 2mg q 12 + metamucil 1 packet daily for increased ileostomy output    - Remove red rubber POD5   - R pelvic BALDO with sanguinous output, continue to monitor   #Diet, Regular  #GI Prophylaxis    -Protonix 40  #Bowel regimen    - HOLDING     /RENAL:   #urine output   - indwelling jones (placed in the OR 2/29)   - LR @ 120  #BPH   - [Home med] Tamsulosin 0.4mg    Labs:          BUN/Cr- 14/0.8  -->,  17/0.8  -->          Electrolytes-(03-02 @ 21:31)Na 142 // K 4.1 // Mg 1.8 // Phos 2.0 -Mg repleted, Phos repleted    HEME/ONC:   #DVT ppx   - Lovenox 40mg  #Anemia 2/2 Sanguinous BALDO Drainage   - (3/1) 1u pRBC, post transfusion CBC 8.6    - Hemoglobin stable   #Hx of L Neck Melanoma s/p Excision with SLNB (2014)   - Follows with Dr. Crowe  #RA    Labs: Hb/Hct:  8.1/24.1  -->,  8.2/24.6  -->                      Plts:  149  -->,  160  -->                 PTT/INR:        ID:  #monitor for fever/leukocytosis   WBC- 12.97  --->>,  12.04  --->>,  12.00  --->>  Temp trend- 24hrs T(F): 98.1 (03-02 @ 16:00), Max: 98.1 (03-02 @ 16:00)  Current antibiotics-    ENDO:  #blood glucose monitoring   -FSG ACHS   -on insulin sliding scale     MSK:    #Activity - Ambulate as Tolerated:     Time/Priority:  Routine (03-01-24 @ 10:29)    LINES/DRAINS:  PIV, Jones, Pelvic BALDO    ADVANCED DIRECTIVES:  Full Code  INDICATION FOR SICU:  Hemodynamic monitoring, Management of high output ileostomy s/p robot-assisted LAR         DISPO:   Case discussed with attending Dr. Gilmore MARI LASSITER   [id number]   [birth year]  73yM    Admit Date: 02-29-24  Indication for SICU: Hemodynamic monitoring, Management of high output ileostomy s/p robot-assisted LAR        ============================  HPI   73M with PMH of Rectal CA (follows with Dr. Crowe), RA, BPH, left neck melanoma s/p wide local excision with SLNB (2014), and spinal stenosis, presents to [facility] for elective low anterior resection. Rectal lesion initially found/biopsied via EMR revealing a friable mass 10cm from the dentate line. 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    Patient went to the OR 2/29 for robot-assisted low anterior resection, mobilization of the splenic flexure, ano-colonic anastomosis, and creation of protective loop ileostomy.     OR Stats  OR Time:  6 hours    IV Fluids:  2500           EBL:   250           Blood Products:  none         UOP: 750  Findings   - Robotic assisted Low anterior resection. Mobilization of splenic flexure, recto-sigmoidectomy performed and anal colon anastomosis achieved. Pelvic drain left in place, hemostasis achieved. Loop ileostomy created.    POD1 patient was getting up to ambulate then felt dizzy. He was able to sit in the chair for some time, but then began to feel light headed and was put back into bed. Throughout this time, patient has had persistent hypotension (80s/40s) with normal HR. Patient was experiencing increased sanguinous output from his pelvic BALDO drain in addition to elevated output from his ileostomy. Patient was given total of 2L of crystalloid with improvement in his SBP (90s/60s). Repeat CBC revealed a drop to 8.3 (previously 11.0) over 11 hours.     At the bedside, patient has no complaints at this time, with only some incisional tenderness at his incisions on physical exam. He has been tolerating CLD and low fiber diet, denies nausea and vomiting. His ileostomy is pink and viable appearing.     24 Hour Events  3/2  Night  -PM rounds: SBP 140s,10cc BALDO sanguinous, 350cc iliostomy brown liquid output so far this shift, complaining of abdominal pain near ostomy; 0.25 dilaudid   -Hgb stable 8.1 > 8.2  -1.2L output ileostomy/24 hrs   -BALDO 260 sanguinous output/24 hrs   -500cc bolus for repletion & imodium increased to 2mg q8  -Mag, phos repleted       3/2  Day  -baseline CXR  -0.5 : 1 ostomy repletions >1L  q shift  -start imodium 2mg q8h--> OP decreased to 660   -start protonix 40 daily to decrease gastric secretions  -metamucil 1 packet daily       - Admission under SICU service    [X] A ten-point review of systems was otherwise negative except as noted above.  [  ] Due to altered mental status/intubation, subjective information was not attained from the patient. History was obtained, to the extent possible, from review of the chart and collateral sources of information.    ================================  Assessment and Recommendation:   73M with PMH of Rectal CA (follows with Dr. Crowe), RA, BPH, left neck melanoma s/p wide local excision with SLNB (2014), and spinal stenosis, now s/p robot-assisted low anterior resection, mobilization of the splenic flexure, ano-colonic anastomosis, and creation of protective loop ileostomy (2/29)    NEURO:  #Acute pain  -APAP 1000mg q6  -Gabapentin 600mg TID  -Tramadol 25mg PO q 6 PRN  -Dilaudid IVP 0.25 x1     RESP:   #Oxygenation    - Tolerating RA  #Activity    - increase as tolerated    - Encourage IS-->1750    CARDS:   #acute post-operative hypotension likely 2/2 hypovolemia - improving   -(3/1): 2.75L IVF bolus + 1 U PRBC given with improvement  Imaging:    - EKG (2/15): HR 67, NSR with sinus arrythmia, normal EKG    GI/NUTR:   #Rectal CA s/p LAR with Loop Ileostomy   - Monitor Ileostomy output, replete 0.5:1 prn (repleted 500cc bolus 3/3 AM)   - Imodium 2mg q8 + metamucil 1 packet daily for increased ileostomy output    - Remove red rubber POD5   - R pelvic BALDO with sanguinous output, continue to monitor   #Diet, Regular  #GI Prophylaxis    -Protonix 40  #Bowel regimen    - HOLDING     /RENAL:   #urine output   - indwelling jones (placed in the OR 2/29)   - LR @ 120  #BPH   - [Home med] Tamsulosin 0.4mg    Labs:          BUN/Cr- 14/0.8  -->,  17/0.8  -->          Electrolytes-(03-02 @ 21:31)Na 142 // K 4.1 // Mg 1.8 // Phos 2.0       HEME/ONC:   #DVT ppx   - Lovenox 40mg  #Anemia 2/2 Sanguinous BALDO Drainage   - (3/1) 1u pRBC, post transfusion CBC 8.6    - Hemoglobin stable   #Hx of L Neck Melanoma s/p Excision with SLNB (2014)   - Follows with Dr. Crowe  #RA    Labs: Hb/Hct:  8.1/24.1  -->,  8.2/24.6  -->                      Plts:  149  -->,  160  -->                 PTT/INR:           ID:  #monitor for fever/leukocytosis   WBC- 12.97  --->>,  12.04  --->>,  12.00  --->>  Temp trend- 24hrs T(F): 97.9 (03-03 @ 04:00), Max: 98.1 (03-02 @ 16:00)  Current antibiotics-    ENDO:  #blood glucose monitoring   -FSG ACHS   -on insulin sliding scale     MSK:    #Activity - Ambulate as Tolerated:     Time/Priority:  Routine (03-01-24 @ 10:29)    LINES/DRAINS:  PIV, Jones, Pelvic BALDO    ADVANCED DIRECTIVES:  Full Code  INDICATION FOR SICU:  Hemodynamic monitoring, Management of high output ileostomy s/p robot-assisted LAR         DISPO:   Case discussed with attending Dr. Gilmore   MARI LASSITER   [id number]   [birth year]  73yM    Admit Date: 02-29-24  Indication for SICU: Hemodynamic monitoring, Management of high output ileostomy s/p robot-assisted LAR        ============================  HPI   73M with PMH of Rectal CA (follows with Dr. Crowe), RA, BPH, left neck melanoma s/p wide local excision with SLNB (2014), and spinal stenosis, presents to [facility] for elective low anterior resection. Rectal lesion initially found/biopsied via EMR revealing a friable mass 10cm from the dentate line. 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    Patient went to the OR 2/29 for robot-assisted low anterior resection, mobilization of the splenic flexure, ano-colonic anastomosis, and creation of protective loop ileostomy.     OR Stats  OR Time:  6 hours    IV Fluids:  2500           EBL:   250           Blood Products:  none         UOP: 750  Findings   - Robotic assisted Low anterior resection. Mobilization of splenic flexure, recto-sigmoidectomy performed and anal colon anastomosis achieved. Pelvic drain left in place, hemostasis achieved. Loop ileostomy created.    POD1 patient was getting up to ambulate then felt dizzy. He was able to sit in the chair for some time, but then began to feel light headed and was put back into bed. Throughout this time, patient has had persistent hypotension (80s/40s) with normal HR. Patient was experiencing increased sanguinous output from his pelvic BALDO drain in addition to elevated output from his ileostomy. Patient was given total of 2L of crystalloid with improvement in his SBP (90s/60s). Repeat CBC revealed a drop to 8.3 (previously 11.0) over 11 hours.     At the bedside, patient has no complaints at this time, with only some incisional tenderness at his incisions on physical exam. He has been tolerating CLD and low fiber diet, denies nausea and vomiting. His ileostomy is pink and viable appearing.    73M with PMH of Rectal CA (follows with Dr. Crowe), RA, BPH, left neck melanoma s/p wide local excision with SLNB (2014), and spinal stenosis, now s/p robot-assisted low anterior resection, mobilization of the splenic flexure, ano-colonic anastomosis, and creation of protective loop ileostomy (2/29)    NEURO:  #Acute pain  -APAP 1000mg q6  -Gabapentin 600mg TID  -Tramadol 25mg PO q 6 PRN  -Dilaudid IVP 0.25 x1     RESP:   #Oxygenation    - Tolerating RA  #Activity    - increase as tolerated    - Encourage IS-->1750    CARDS:   #acute post-operative hypotension likely 2/2 hypovolemia - improving   -(3/1): 2.75L IVF bolus + 1 U PRBC given with improvement  Imaging:    - EKG (2/15): HR 67, NSR with sinus arrythmia, normal EKG    GI/NUTR:   #Rectal CA s/p LAR with Loop Ileostomy   - Monitor Ileostomy output, replete 0.5:1 prn (repleted 500cc bolus 3/3 AM)   - Imodium 2mg q8 + metamucil 1 packet daily for increased ileostomy output    - Remove red rubber POD5   - R pelvic BALDO with sanguinous output, continue to monitor   #Diet, Regular  #GI Prophylaxis    -Protonix 40  #Bowel regimen    - HOLDING     /RENAL:   #urine output   - indwelling jones (placed in the OR 2/29)   - LR @ 120  #BPH   - [Home med] Tamsulosin 0.4mg    Labs:          BUN/Cr- 14/0.8  -->,  17/0.8  -->          Electrolytes-(03-02 @ 21:31)Na 142 // K 4.1 // Mg 1.8 // Phos 2.0       HEME/ONC:   #DVT ppx   - Lovenox 40mg  #Anemia 2/2 Sanguinous BALDO Drainage   - (3/1) 1u pRBC, post transfusion CBC 8.6    - Hemoglobin stable   #Hx of L Neck Melanoma s/p Excision with SLNB (2014)   - Follows with Dr. Crowe  #RA    Labs: Hb/Hct:  8.1/24.1  -->,  8.2/24.6  -->                      Plts:  149  -->,  160  -->                 PTT/INR:           ID:  #monitor for fever/leukocytosis   WBC- 12.97  --->>,  12.04  --->>,  12.00  --->>  Temp trend- 24hrs T(F): 97.9 (03-03 @ 04:00), Max: 98.1 (03-02 @ 16:00)  Current antibiotics-    ENDO:  #blood glucose monitoring   -FSG ACHS   -on insulin sliding scale     MSK:    #Activity - Ambulate as Tolerated:     Time/Priority:  Routine (03-01-24 @ 10:29)    LINES/DRAINS:  PIV, Jones, Pelvic BALDO    ADVANCED DIRECTIVES:  Full Code  INDICATION FOR SICU:  Hemodynamic monitoring, Management of high output ileostomy s/p robot-assisted LAR         DISPO:   Case discussed with attending Dr. Gilmore

## 2024-03-03 NOTE — PROGRESS NOTE ADULT - ATTENDING COMMENTS
Critical Care: [id number]   This patient has a high probability of sudden, clinically significant deterioration, which requires the highest level of physician preparedness to intervene urgently. I managed/supervised life or organ supporting interventions that required frequent physician assessment. I devoted my full attention in the ICU to the direct care of this patient for the period of time indicated below. Time I spent with the family or surrogate(s) is included only if the patient was incapable of providing the necessary information or participating in medical decision making. Time devoted to teaching and to any procedures I billed separately is not included.     MARI LASSITER 73y Male admitted to [x ] SICU /[ ] SDU with post-OP hypotension and high ileostomy output, S/P Low anterior resection for the rectal CA with diverting loop ostomy. Persistently hypotensive on the floor requiring upgrade to ICU, electrolytes abnormalities, airway at risk for obstruction, high risk for hemodynamic instability.  Patient is examined and evaluated at the bedside with SICU team. Treatment plan discussed with SICU team, nurses and primary team.   Chest X-ray and all relevant studies reviewed during rounds.  Will continue hemodynamic monitoring as per protocol in SICU.    Neuro:  GCS [ 15]  AAOx3 [x ]  Neurovascular checks as per SICU protocol                 [ ] 3% NaCl     [ ] 2% NaCl                [ ] Keppra  [ ] Lamictal  [ ] Depakote  [ ] Dilantin                Pharmacological Paralysis [ ] Yes  [x ]  No      Sedated/Pain control with                 [ ] Dilaudid drip, [ ]  Ketamine drip, [ ] Fentanyl drip, [ ] Propofol, [ ] Precedex, [ ] Versed drip, [ ] Ativan drip,                           [ ] OxyContin standing,  [ ] OxyContin PRN, [ x] Dilaudid PRN pushes, [ ] Fentanyl PRN pushes, [ ] PCA,                [x ] Tylenol IV/PO, [ x] Gabapentin, [ ]  Ketorolac, [x ] Tramadol,  [ ] Lidoderm Patch       Other Medications               [ ] Seroquel, [ ] Zyprexa, [ ] Haldol,  [ ] Clonazepam [ ] Xanax, [ ] Versed/Ativan PRN, [ ] Valium [x ] None               [ ] Robaxin   [ ] Baclofen  [ ] Flexeril               [ ] CIWA (Ativan/Valium/ Librium)  CV: continue to monitor, received boluses 2750ml initially   On pressors [ ]  Yes  [x ]  No          [ ]  Levophed, [ ] Leonel-Synephrine, [ ] Vasopressin, [ ]  Epinephrine          [ ] Dobutamine, [ ] Milrinone, [ ]  Midodrine,  [ ] Others    Other Cardiac Meds          [ ] Amiodarone IV/PO, [ ] Digoxin, [ ] Cardizem drip, [ ] Cleviprex drip, [ ] Esmolol drip, [ ] Cardene drip  Respiratory: Acute respiratory insufficiency -> continue monitoring                        None Invasive Support  [ x] Incentive Spirometer 1500ml                                 [ ] BiPAP   [ ] CPAP/NIV   [ ] HFNC   [ ] NR Face Mask  [ ] NC  [ ] Trach Caller [x ] Room Air                        Ventilatory support  [ ] Yes [ x] No                            [ ] SBT                                  [ ] PC    [ ] VC   [ ] AC/PRVC   [ ] BiVent/APRV   [ ]CPAP   GI  [ x]  bowel regiment on hold [x ] BM none [x ] Flatus none   [ x] Ostomy 1200ml  [ ] NG tube   [x] BALDO 260ml/24hrs        Prophylaxis [x ] PPI  [ ] H2 Blockers  [ ] Others  Nutrition: continue   [ x] Diet High fiber   [ ] TPN/PPN   [ ] calories count   [ ]  Tube Feeds     Renal: Continue I&Os monitoring, Dang catheter  [x ] Yes  [ ]  No  [ x] Consideration for discontinuation [ ] Taxes cath/PrimaFit  [ ] TOV                                                               [ ] HD/CVVH   [x ] Urine output 2400ml/24hrs       Lytes/Acid-base: replete hypokalemia, hypomagnesemia, hypocalcemia, hypophosphatemia        IV Fluids   [ x] LR@120ml/hr  [ ] NS  [ ] D5W1/2NS [ ] Bicarbonate Drip  [ ] Albumin [ ] Lasix drip/push  [ ] Bumex drip/push  ID: leukocytosis -> continue to monitor:         IV Abx [x ] Yes mariangel-OP, [ ] No;  ID consulted [ ] Yes, [x ] No        Cultures send  [ ] Respiratory   [ ] Blood   [ ] Urine  [ ] Fluids  [ ] Tissue  [ x]  None  Lines:   [ ] Right   [ ] Left  [ ] Bilateral                     [ ] Subclavian TLC        [ ]  Internal Jugular TLC     [ ]  Femoral TLC                     [ ] Subclavian Cordis    [ ] Internal Jugular Cordis   [ ] Femoral Cordis                     [ ] HD catheter    [ ] PICC     [ ]  Midline   [x ] Peripheral IVs                                                 [ ] Right   [ ] Left   [ x] None                     [ ] Radial A-Line    [ ] Femoral A-Line   [ ] Axillary A-Line               Heme: continue to evaluate for acute blood loss anemia- trend Hg/Hct                     AC Reversal Indicated [ ] Yes  [x ] No                                      [ ] Kcentra,  [ ] 3Factors concentrate, [ ] Andexxa                     Transfused  indicated  [ ] Yes, [x ] No    [x ] [id number] prior to arrival ti ICU   [ ] Platelets   [ ] FFPs   [ ] Cryoprecipitate                    Should be started on or continued with  following  [ ] Yes,  [ x] No                               [ ] Lovenox  [ ] Coumadin  [ ] Heparin drip  [ ] DOVACs  [ ] ASA  [ ] Antiplatelets   Endocrine: Prevent and treat hyperglycemia with insulin as needed,                                [x ] Sliding scale,  [ ] Lantus/Regular Insuline                              Insuline drip for hyperglycemia [ ] Yes, [x ] No   PV: follow pulse exam  Skin: decub precautions  DVT Prophylaxis:  [ x] SCDs  [ ] Heparin SQ  [x ] Lovenox  SQ  Stress Gastritis Prophylaxis: PPI/H2 Blockers if indicated  Mobility: patient is evaluated at the bedside with mobility team and the goals for today are discussed with PT [ x] out of bed to chair    PATIENT/FAMILY/SURROGATE CONFERENCE:  [x ] Yes with patient at the bedside. [ ] No  PURPOSE: To obtain necessary information, To discuss treatment options under consideration today.    I saw and evaluated the patient personally. I have reviewed and agree with note above. Treatment plan discussed with SICU team, nurses and primary team at the time of the multidisciplinary rounds. The above note is NOT written at the time of rounds and will reflect all changes throughout management of the patient for the day note is written for.    Alana Gilmore MD, FACS  Trauma/ACS/SCC Attending

## 2024-03-04 LAB
ANION GAP SERPL CALC-SCNC: 9 MMOL/L — SIGNIFICANT CHANGE UP (ref 7–14)
BUN SERPL-MCNC: 11 MG/DL — SIGNIFICANT CHANGE UP (ref 10–20)
CALCIUM SERPL-MCNC: 8.9 MG/DL — SIGNIFICANT CHANGE UP (ref 8.4–10.5)
CHLORIDE SERPL-SCNC: 107 MMOL/L — SIGNIFICANT CHANGE UP (ref 98–110)
CO2 SERPL-SCNC: 27 MMOL/L — SIGNIFICANT CHANGE UP (ref 17–32)
CREAT SERPL-MCNC: 0.7 MG/DL — SIGNIFICANT CHANGE UP (ref 0.7–1.5)
EGFR: 99 ML/MIN/1.73M2 — SIGNIFICANT CHANGE UP
GLUCOSE SERPL-MCNC: 120 MG/DL — HIGH (ref 70–99)
HCT VFR BLD CALC: 25 % — LOW (ref 42–52)
HGB BLD-MCNC: 8.2 G/DL — LOW (ref 14–18)
MAGNESIUM SERPL-MCNC: 2.1 MG/DL — SIGNIFICANT CHANGE UP (ref 1.8–2.4)
MCHC RBC-ENTMCNC: 30.9 PG — SIGNIFICANT CHANGE UP (ref 27–31)
MCHC RBC-ENTMCNC: 32.8 G/DL — SIGNIFICANT CHANGE UP (ref 32–37)
MCV RBC AUTO: 94.3 FL — HIGH (ref 80–94)
NRBC # BLD: 0 /100 WBCS — SIGNIFICANT CHANGE UP (ref 0–0)
PHOSPHATE SERPL-MCNC: 3.1 MG/DL — SIGNIFICANT CHANGE UP (ref 2.1–4.9)
PLATELET # BLD AUTO: 228 K/UL — SIGNIFICANT CHANGE UP (ref 130–400)
PMV BLD: 11 FL — HIGH (ref 7.4–10.4)
POTASSIUM SERPL-MCNC: 4.7 MMOL/L — SIGNIFICANT CHANGE UP (ref 3.5–5)
POTASSIUM SERPL-SCNC: 4.7 MMOL/L — SIGNIFICANT CHANGE UP (ref 3.5–5)
RBC # BLD: 2.65 M/UL — LOW (ref 4.7–6.1)
RBC # FLD: 13.2 % — SIGNIFICANT CHANGE UP (ref 11.5–14.5)
SODIUM SERPL-SCNC: 143 MMOL/L — SIGNIFICANT CHANGE UP (ref 135–146)
WBC # BLD: 9.01 K/UL — SIGNIFICANT CHANGE UP (ref 4.8–10.8)
WBC # FLD AUTO: 9.01 K/UL — SIGNIFICANT CHANGE UP (ref 4.8–10.8)

## 2024-03-04 PROCEDURE — 99291 CRITICAL CARE FIRST HOUR: CPT | Mod: 24

## 2024-03-04 RX ORDER — CHLORHEXIDINE GLUCONATE 213 G/1000ML
1 SOLUTION TOPICAL
Refills: 0 | Status: DISCONTINUED | OUTPATIENT
Start: 2024-03-04 | End: 2024-03-06

## 2024-03-04 RX ORDER — KETOTIFEN FUMARATE 0.34 MG/ML
1 SOLUTION OPHTHALMIC ONCE
Refills: 0 | Status: COMPLETED | OUTPATIENT
Start: 2024-03-04 | End: 2024-03-04

## 2024-03-04 RX ORDER — SODIUM CHLORIDE 9 MG/ML
500 INJECTION, SOLUTION INTRAVENOUS ONCE
Refills: 0 | Status: COMPLETED | OUTPATIENT
Start: 2024-03-04 | End: 2024-03-04

## 2024-03-04 RX ADMIN — KETOTIFEN FUMARATE 1 DROP(S): 0.34 SOLUTION OPHTHALMIC at 13:15

## 2024-03-04 RX ADMIN — GABAPENTIN 600 MILLIGRAM(S): 400 CAPSULE ORAL at 21:44

## 2024-03-04 RX ADMIN — PANTOPRAZOLE SODIUM 40 MILLIGRAM(S): 20 TABLET, DELAYED RELEASE ORAL at 06:09

## 2024-03-04 RX ADMIN — Medication 1000 MILLIGRAM(S): at 12:10

## 2024-03-04 RX ADMIN — Medication 1000 MILLIGRAM(S): at 17:02

## 2024-03-04 RX ADMIN — SODIUM CHLORIDE 500 MILLILITER(S): 9 INJECTION, SOLUTION INTRAVENOUS at 10:27

## 2024-03-04 RX ADMIN — ENOXAPARIN SODIUM 40 MILLIGRAM(S): 100 INJECTION SUBCUTANEOUS at 15:23

## 2024-03-04 RX ADMIN — Medication 1000 MILLIGRAM(S): at 23:23

## 2024-03-04 RX ADMIN — FINASTERIDE 5 MILLIGRAM(S): 5 TABLET, FILM COATED ORAL at 11:58

## 2024-03-04 RX ADMIN — Medication 2 MILLIGRAM(S): at 05:22

## 2024-03-04 RX ADMIN — Medication 2 MILLIGRAM(S): at 11:58

## 2024-03-04 RX ADMIN — Medication 1000 MILLIGRAM(S): at 05:23

## 2024-03-04 RX ADMIN — GABAPENTIN 600 MILLIGRAM(S): 400 CAPSULE ORAL at 13:15

## 2024-03-04 RX ADMIN — GABAPENTIN 600 MILLIGRAM(S): 400 CAPSULE ORAL at 05:23

## 2024-03-04 RX ADMIN — TAMSULOSIN HYDROCHLORIDE 0.4 MILLIGRAM(S): 0.4 CAPSULE ORAL at 21:44

## 2024-03-04 RX ADMIN — Medication 2 MILLIGRAM(S): at 17:03

## 2024-03-04 RX ADMIN — Medication 2 MILLIGRAM(S): at 23:23

## 2024-03-04 RX ADMIN — Medication 1 MILLIGRAM(S): at 11:58

## 2024-03-04 RX ADMIN — Medication 1000 MILLIGRAM(S): at 00:30

## 2024-03-04 RX ADMIN — Medication 1 PACKET(S): at 11:59

## 2024-03-04 NOTE — CHART NOTE - NSCHARTNOTEFT_GEN_A_CORE
PACU ANESTHESIA ADMISSION NOTE      Procedure:   Post op diagnosis:      ____  Intubated  TV:______       Rate: ______      FiO2: ______    _x___  Patent Airway    __x__  Full return of protective reflexes    _x___  Full recovery from anesthesia / back to baseline     Vitals:   T:  98         R: 12                 BP:  112/78                Sat:  99                 P: 80      Mental Status:  ___x_ Awake   __x___ Alert   _____ Drowsy   _____ Sedated    Nausea/Vomiting:  __x__ NO  ______Yes,   See Post - Op Orders          Pain Scale (0-10):  _____    Treatment: __x__ None    ____ See Post - Op/PCA Orders    Post - Operative Fluids:   ____ Oral   _x___ See Post - Op Orders    Plan: Discharge:   ____Home       __x___Floor     _____Critical Care    _____  Other:_________________    Comments:
SICU Transfer Note    MARI LASSITER  73y (1950)  [id number]      Transfer from: SICU  Transfer to: Surgery-BLUE    73M with PMH of Rectal CA (follows with Dr. 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 Rectal lesion initially found/biopsied via EMR revealing a friable mass 10cm from the dentate line. 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    Patient went to the OR 2/29 for robot-assisted low anterior resection, mobilization of the splenic flexure, ano-colonic anastomosis, and creation of protective loop ileostomy.     POD1 patient was getting up to ambulate then felt dizzy. He was able to sit in the chair for some time, but then began to feel light headed and was put back into bed. Throughout this time, patient has had persistent hypotension (80s/40s) with normal HR. Patient was experiencing increased sanguinous output from his pelvic BALDO drain in addition to elevated output from his ileostomy. Patient was given total of 2L of crystalloid with improvement in his SBP (90s/60s). Repeat CBC revealed a drop to 8.3 (previously 11.0) over 11 hours.     At the bedside, patient has no complaints at this time, with only some incisional tenderness at his incisions on physical exam. He has been tolerating CLD and low fiber diet, denies nausea and vomiting. His ileostomy is pink and viable appearing.    Patient received fluid resuscititation to offset high ileostomy output. Patient started on loperamide and metamucil to slow down output. Ileostomy output decreasing in last 24hour from 1300 to 900. BALDO drain with serous output <260cc.    Patient course complicated by retention post void, requiring jones reinsertion 3/4. Patient continues to be on home flomax and finasteride. To be downgraded to floor with indwelling jones catheter.      SICU COURSE:  73y Male HD# 4d  s/p Robot-assisted laparoscopic low anterior resection using da Myrna Xi  Anastomosis, colon to anus  Mobilization of splenic flexure  Robot-assisted creation of loop ileostomy  Block, transversus abdominis plane, bilateral        PAST MEDICAL & SURGICAL HISTORY:  Skin cancer (melanoma)  Rheumatoid arthritis  Spinal stenosis      History of BPH      History of hay fever      Rectal cancer      S/P bilateral inguinal hernia repair        Allergies    No Known Allergies    Intolerances      MEDICATIONS  (STANDING):  acetaminophen     Tablet .. 1000 milliGRAM(s) Oral every 6 hours  chlorhexidine 2% Cloths 1 Application(s) Topical daily  enoxaparin Injectable 40 milliGRAM(s) SubCutaneous every 24 hours  finasteride 5 milliGRAM(s) Oral daily  folic acid 1 milliGRAM(s) Oral daily  gabapentin 600 milliGRAM(s) Oral every 8 hours  loperamide 2 milliGRAM(s) Oral every 6 hours  pantoprazole    Tablet 40 milliGRAM(s) Oral before breakfast  psyllium Powder 1 Packet(s) Oral daily  tamsulosin 0.4 milliGRAM(s) Oral at bedtime    MEDICATIONS  (PRN):  traMADol 25 milliGRAM(s) Oral every 6 hours PRN Severe Pain (7 - 10)      Vital Signs Last 24 Hrs  T(C): 36.5 (04 Mar 2024 04:00), Max: 36.7 (04 Mar 2024 00:00)  T(F): 97.7 (04 Mar 2024 04:00), Max: 98 (04 Mar 2024 00:00)  HR: 63 (04 Mar 2024 07:00) (53 - 90)  BP: 113/56 (04 Mar 2024 07:00) (79/49 - 151/63)  BP(mean): 81 (04 Mar 2024 07:00) (59 - 103)  RR: 18 (04 Mar 2024 07:00) (12 - 31)  SpO2: 97% (04 Mar 2024 07:00) (97% - 100%)    Parameters below as of 03 Mar 2024 20:00  Patient On (Oxygen Delivery Method): room air      I&O's Summary    03 Mar 2024 07:01  -  04 Mar 2024 07:00  --------------------------------------------------------  IN: 1585 mL / OUT: 3770 mL / NET: -2185 mL        LABS  LABS:                        8.3    10.06 )-----------( 191      ( 03 Mar 2024 20:46 )             25.4       03-03    142  |  107  |  14  ----------------------------<  106<H>  4.5   |  25  |  0.7    Ca    8.7      03 Mar 2024 20:46  Phos  2.5     03-03  Mg     2.0     03-03                                    Assessment & Plan:  3M with PMH of Rectal CA (follows with Dr. Crowe), RA, BPH, left neck melanoma s/p wide local excision with SLNB (2014), and spinal stenosis, now s/p robot-assisted low anterior resection, mobilization of the splenic flexure, ano-colonic anastomosis, and creation of protective loop ileostomy (2/29)    3/1 - upgraded to SICU for HYPOtension, acute anemia, high volume ileostomy output    NEURO:  #Acute pain  -APAP 1000mg q6  -Gabapentin 600mg TID  -Tramadol 25mg PO q 6 PRN     RESP:   #Oxygenation    - Tolerating room air  #Activity    - increase as tolerated    - Encourage IS, 2L     CARDS:   #acute post-operative hypotension likely 2/2 hypovolemia  -(3/1): 2.75L IVF bolus + 1 U PRBC given with improvement  Imaging:    - EKG (2/15): HR 67, NSR with sinus arrythmia, normal EKG    GI/NUTR:   #Rectal CA s/p LAR with Loop Ileostomy   - Monitor Ileostomy output, replete 0.5:1 prn (repleted 500cc bolus 3/4 AM)   - Imodium 2mg q8 + metamucil 1 packet daily for increased ileostomy output    - Remove red rubber POD5   - R pelvic BALDO with sanguinous output, continue to monitor   #Diet, Regular, High Fiber   #GI Prophylaxis    -Protonix 40  #Bowel regimen    - start as needed, ileostomy with stool     /RENAL:   #post-operative urinary retention complicated by BPH  -Jones reinserted 03/04 with >1L output   - [Home med] Tamsulosin 0.4mg   - [Home med] Finasteride 5mg    Labs:          BUN/Cr- 14/0.8  -->,  17/0.8  -->          Electrolytes-(03-02 @ 21:31)Na 142 // K 4.1 // Mg 1.8 // Phos 2.0 -Mg repleted, Phos repleted    HEME/ONC:   #DVT ppx   - Lovenox 40mg  #Anemia 2/2 Sanguinous BALDO Drainage   - (3/1) 1u pRBC, post transfusion CBC 8.6    - Hemoglobin stable   #Hx of L Neck Melanoma s/p Excision with SLNB (2014)   - Follows with Dr. Crowe  #RA    Labs: Hb/Hct:  8.1/24.1  -->,  8.2/24.6  -->                      Plts:  149  -->,  160  -->       ID:  #monitor for fever/leukocytosis   WBC- 12.00  --->>,  8.81  --->>,  10.06  --->>  Temp trend- 24hrs T(F): 97.7 (03-04 @ 04:00), Max: 98 (03-04 @ 00:00)  No abx    ENDO:  #blood glucose monitoring   -Routine monitoring via BMP   -Not requiring ISS    MSK:    #Activity - Ambulate as Tolerated:     Time/Priority:  Routine (03-01-24 @ 10:29)    LINES/DRAINS:  PIV, Jones ( placed intraop 2/29 - 3/3; replaced 03/04), Pelvic BALDO    ADVANCED DIRECTIVES:  Full Code  INDICATION FOR SICU:  Hemodynamic monitoring, Management of high output ileostomy s/p robot-assisted LAR                Follow Up:  -8pm labs  -give 72 hours of bladder rest, reattempt TOV  -f/u ileostomy output, titrate up loperamide, metamucil as per primary team  -Remove red rubber POD5 as per primary team      Signed out to: THELMA Bourgeois  Date: 3/4/2024  Time: 1021
informed by nurse pt with low blood pressure and oozing BALDO site  pt seen and examined @ bedside c/o lightheadedness  Denies chest pain or shortness of breath    Vital Signs Last 24 Hrs  T(C): 35.9 (01 Mar 2024 08:13), Max: 36.3 (2024 19:06)  T(F): 96.6 (01 Mar 2024 08:13), Max: 97.4 (2024 19:06)  HR: 73 (01 Mar 2024 14:29) (72 - 85)  BP: 90/55 (01 Mar 2024 14:29) (82/45 - 111/59)  BP(mean): --  RR: 18 (01 Mar 2024 08:13) (18 - 18)  SpO2: 99% (01 Mar 2024 08:13) (97% - 99%)    Parameters below as of 01 Mar 2024 08:13  Patient On (Oxygen Delivery Method): nasal cannula  O2 Flow (L/min): 2    I&O's Detail    2024 07:01  -  01 Mar 2024 07:00  --------------------------------------------------------  IN:    Lactated Ringers: 300 mL  Total IN: 300 mL    OUT:    Drain (mL): 172.5 mL    Ileostomy (mL): 200 mL    Indwelling Catheter - Urethral (mL): 1775 mL  Total OUT: 2147.5 mL    Total NET: -1847.5 mL      01 Mar 2024 07:01  -  01 Mar 2024 15:51  --------------------------------------------------------  IN:    Lactated Ringers Bolus: 1500 mL  Total IN: 1500 mL    OUT:    Drain (mL): 350 mL    Ileostomy (mL): 1250 mL    Indwelling Catheter - Urethral (mL): 600 mL  Total OUT: 2200 mL    Total NET: -700 mL          Urinalysis Basic - ( 01 Mar 2024 01:05 )    Color: x / Appearance: x / SG: x / pH: x  Gluc: 111 mg/dL / Ketone: x  / Bili: x / Urobili: x   Blood: x / Protein: x / Nitrite: x   Leuk Esterase: x / RBC: x / WBC x   Sq Epi: x / Non Sq Epi: x / Bacteria: x                   8.3    13.24 )-----------( 174      (  @ 15:20 )             25.3                11.0   18.89 )-----------( 224      (  @ 11:40 )             33.2                10.9   14.15 )-----------( 189      (  @ 01:05 )             32.4                    141   |  107   |  11                 Ca: 8.6    BMP:   ----------------------------< 111    M.9   (24 @ 01:05)             4.2    |  25    | 0.8                Ph: 3.3      LFT:     TPro: 5.8 / Alb: 3.9 / TBili: 0.5 / DBili: x / AST: 27 / ALT: 14 / AlkPhos: 71   (24 @ 15:56)      Urinalysis Basic - ( 01 Mar 2024 01:05 )    Color: x / Appearance: x / SG: x / pH: x  Gluc: 111 mg/dL / Ketone: x  / Bili: x / Urobili: x   Blood: x / Protein: x / Nitrite: x   Leuk Esterase: x / RBC: x / WBC x   Sq Epi: x / Non Sq Epi: x / Bacteria: x    MEDICATIONS  (STANDING):  acetaminophen     Tablet .. 1000 milliGRAM(s) Oral every 6 hours  chlorhexidine 2% Cloths 1 Application(s) Topical daily  dextrose 5% + sodium chloride 0.45%. 1000 milliLiter(s) (40 mL/Hr) IV Continuous <Continuous>  enoxaparin Injectable 40 milliGRAM(s) SubCutaneous every 24 hours  gabapentin 600 milliGRAM(s) Oral every 8 hours  ketorolac   Injectable 15 milliGRAM(s) IV Push every 6 hours  lactated ringers Bolus 500 milliLiter(s) IV Bolus once  tamsulosin 0.4 milliGRAM(s) Oral at bedtime    MEDICATIONS  (PRN):  ondansetron Injectable 4 milliGRAM(s) IV Push every 8 hours PRN Nausea and/or Vomiting      Diet, Low Fiber (24 @ 09:08)      PHYSICAL EXAM:  General Appearance: pt in  NAD  Chest: Equal expansion bilaterally, equal breath sounds  CV: S1, S2  Abdomen: Soft, NT, ND  no rebound tenderness no guarding + drain with sanguinous output  + functioning ostomy  Extremities: + ROM  Neuro: A&Ox3      A/P 74 y/o male s/p robotic low anterior resection, anal colonic anastomosis, loop ileostomy POD#1  IV boluses given  transfuse one unit of PRBC now  stepdown consult   serial abdominal exams  monitor H/H   monitor drain/ostomy outputs  repeat vitals  continue close observation  Case discussed with Dr Reynolds
HPI/Interval Events: Patient now s/p robotic low anterior resection, anal colonic anastamosis, loop ileostomy. No acute intervening events. Patient feels okay, with no significant complaints. Pain controlled. Tolerating clears, no nausea or vomiting. Stool and gas in bag, BALDO with sanguinous output    MEDICATIONS  (STANDING):  acetaminophen     Tablet .. 1000 milliGRAM(s) Oral every 6 hours  enoxaparin Injectable 40 milliGRAM(s) SubCutaneous every 24 hours  gabapentin 600 milliGRAM(s) Oral every 8 hours  ketorolac   Injectable 15 milliGRAM(s) IV Push every 6 hours  lactated ringers. 1000 milliLiter(s) (100 mL/Hr) IV Continuous <Continuous>  tamsulosin 0.4 milliGRAM(s) Oral at bedtime    MEDICATIONS  (PRN):  HYDROmorphone  Injectable 0.5 milliGRAM(s) IV Push every 4 hours PRN Severe Pain (7 - 10)  ondansetron Injectable 4 milliGRAM(s) IV Push every 8 hours PRN Nausea and/or Vomiting      Vital Signs Last 24 Hrs  T(C): 36.3 (29 Feb 2024 19:06), Max: 36.7 (29 Feb 2024 14:20)  T(F): 97.4 (29 Feb 2024 19:06), Max: 98.1 (29 Feb 2024 14:20)  HR: 84 (29 Feb 2024 19:06) (75 - 90)  BP: 111/59 (29 Feb 2024 19:06) (102/56 - 133/77)  BP(mean): --  RR: 18 (29 Feb 2024 19:06) (12 - 19)  SpO2: 99% (29 Feb 2024 19:06) (96% - 99%)    Parameters below as of 29 Feb 2024 15:20  Patient On (Oxygen Delivery Method): room air        Gen: patient laying in bed, A&Ox3, NAD  HEENT: EOMI b/l  Pulm: regular respiratory rate, no distress  CV: RRR  GI: Abdominal incisions c/d/i. Abdomen soft, non-distended, TTP around incision sites w/o rebound or guarding, +stool and gas in ileostomy bag        I&O's Detail    29 Feb 2024 07:01  -  29 Feb 2024 21:34  --------------------------------------------------------  IN:    Lactated Ringers: 300 mL  Total IN: 300 mL    OUT:    Drain (mL): 100 mL    Indwelling Catheter - Urethral (mL): 875 mL  Total OUT: 975 mL    Total NET: -675 mL          LABS:    02-29    138  |  102  |  13  ----------------------------<  172<H>  3.9   |  24  |  0.8    Ca    8.8      29 Feb 2024 15:56    TPro  5.8<L>  /  Alb  3.9  /  TBili  0.5  /  DBili  x   /  AST  27  /  ALT  14  /  AlkPhos  71  02-29      Urinalysis Basic - ( 29 Feb 2024 15:56 )    Color: x / Appearance: x / SG: x / pH: x  Gluc: 172 mg/dL / Ketone: x  / Bili: x / Urobili: x   Blood: x / Protein: x / Nitrite: x   Leuk Esterase: x / RBC: x / WBC x   Sq Epi: x / Non Sq Epi: x / Bacteria: x        A/P: Patient now s/p robotic low anterior resection, anal colonic anastamosis, loop ileostomy    - CLD  - keep jones for 2 days postop  - monitor bowel function  -   - ostomy nurse consult
It is medically necessary for Mr. Tran to receive a rolling walker due to deconditioning and to help with balance after recent surgery. It was recommended by physical therapy to use at home for safety.

## 2024-03-04 NOTE — PROGRESS NOTE ADULT - SUBJECTIVE AND OBJECTIVE BOX
GENERAL SURGERY PROGRESS NOTE     MARI LASSITER  [address] [place] [address] day :5d    POD: 4D  Procedure: Robot-assisted laparoscopic low anterior resection using da Myrna Xi    Anastomosis, colon to anus    Mobilization of splenic flexure    Robot-assisted creation of loop ileostomy    Block, transversus abdominis plane, bilateral      Surgical Attending: Flynn Lorenzo  Overnight events: Pt is tolerating his high fiber diet without any nausea or vomiting. He is having gas and stool in ostomy appliance. BALDO drain continues to have sanguinous output although quantity has decreased. Pt has passed trial of void.     T(F): 97.7 (03-03-24 @ 16:13), Max: 97.9 (03-03-24 @ 04:00)  HR: 69 (03-03-24 @ 22:00) (56 - 90)  BP: 118/57 (03-03-24 @ 22:00) (79/49 - 122/66)  ABP: --  ABP(mean): --  RR: 14 (03-03-24 @ 22:00) (11 - 25)  SpO2: 97% (03-03-24 @ 22:00) (95% - 100%)    IN'S / OUT's:    03-02-24 @ 07:01  -  03-03-24 @ 07:00  --------------------------------------------------------  IN:    IV PiggyBack: 50 mL    IV PiggyBack: 499.8 mL    Lactated Ringers: 2760 mL    Lactated Ringers Bolus: 1000 mL    Oral Fluid: 390 mL  Total IN: 4699.8 mL    OUT:    Drain (mL): 260 mL    Ileostomy (mL): 1210 mL    Indwelling Catheter - Urethral (mL): 2355 mL  Total OUT: 3825 mL    Total NET: 874.8 mL      03-03-24 @ 07:01  -  03-04-24 @ 00:34  --------------------------------------------------------  IN:    IV PiggyBack: 250 mL    Lactated Ringers: 240 mL    Lactated Ringers: 75 mL    Oral Fluid: 770 mL  Total IN: 1335 mL    OUT:    Drain (mL): 115 mL    Ileostomy (mL): 790 mL    Indwelling Catheter - Urethral (mL): 740 mL    Voided (mL): 725 mL  Total OUT: 2370 mL    Total NET: -1035 mL          PHYSICAL EXAM:  GENERAL: NAD, well-appearing  CHEST/LUNG: Clear to auscultation bilaterally  HEART: Regular rate and rhythm  ABDOMEN: Soft, Nontender, Nondistended; BALDO drain in place to suction with sanguinous output. Ostomy with gas and stool in appliance.   EXTREMITIES:  No clubbing, cyanosis, or edema      LABS  Labs:  CAPILLARY BLOOD GLUCOSE                              8.3    10.06 )-----------( 191      ( 03 Mar 2024 20:46 )             25.4       Auto Neutrophil %: 71.5 % (03-03-24 @ 20:46)  Auto Immature Granulocyte %: 0.4 % (03-03-24 @ 20:46)    03-03    142  |  107  |  14  ----------------------------<  106<H>  4.5   |  25  |  0.7      Calcium: 8.7 mg/dL (03-03-24 @ 20:46)      LFTs:         Coags:            Urinalysis Basic - ( 03 Mar 2024 20:46 )    Color: x / Appearance: x / SG: x / pH: x  Gluc: 106 mg/dL / Ketone: x  / Bili: x / Urobili: x   Blood: x / Protein: x / Nitrite: x   Leuk Esterase: x / RBC: x / WBC x   Sq Epi: x / Non Sq Epi: x / Bacteria: x            RADIOLOGY & ADDITIONAL TESTS:      A/P:  MARI LASSITER is a73M with PMH of Rectal CA (follows with Dr. Crowe), RA, BPH, left neck melanoma s/p wide local excision with SLNB (2014), and spinal stenosis, now s/p robot-assisted low anterior resection, mobilization of the splenic flexure, ano-colonic anastomosis, and creation of protective loop ileostomy (2/29). Patient was upgraded to SICU post-operatively for hemodynamic monitoring and management of high output ileostomy.     PLAN:   - Monitor BALDO drain output quality and quantity  - monitor ostomy output quality and quantity  - Trend H/H  - Daily labs, replete electrolytes as needed  - continue with imodium and metamucil   - multi modal pain control  - encourage ambulation and IS as tolerated   - DVT and GI ppx   - f/u possible downgrade to floor           #DVT ppx: enoxaparin Injectable 40 milliGRAM(s) SubCutaneous every 24 hours    #GI ppx: pantoprazole    Tablet 40 milliGRAM(s) Oral before breakfast    Disposition:  SICU    Above plan to be discussed with Attending Surgeon Dr. Lorenzo , patient, patient family, and rest of health care team.    [email or web address] [id number]

## 2024-03-04 NOTE — PROGRESS NOTE ADULT - SUBJECTIVE AND OBJECTIVE BOX
MARI LASSITER   [id number]   [birth year]  73yM    Admit Date: 02-29-24  Indication for SICU: Hemodynamic monitoring, Management of high output ileostomy s/p robot-assisted LAR        ============================  HPI   73M with PMH of Rectal CA (follows with Dr. Crowe), RA, BPH, left neck melanoma s/p wide local excision with SLNB (2014), and spinal stenosis, presents to [facility] for elective low anterior resection. Rectal lesion initially found/biopsied via EMR revealing a friable mass 10cm from the dentate line. 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    Patient went to the OR 2/29 for robot-assisted low anterior resection, mobilization of the splenic flexure, ano-colonic anastomosis, and creation of protective loop ileostomy.     OR Stats  OR Time:  6 hours    IV Fluids:  2500           EBL:   250           Blood Products:  none         UOP: 750  Findings   - Robotic assisted Low anterior resection. Mobilization of splenic flexure, recto-sigmoidectomy performed and anal colon anastomosis achieved. Pelvic drain left in place, hemostasis achieved. Loop ileostomy created.    POD1 patient was getting up to ambulate then felt dizzy. He was able to sit in the chair for some time, but then began to feel light headed and was put back into bed. Throughout this time, patient has had persistent hypotension (80s/40s) with normal HR. Patient was experiencing increased sanguinous output from his pelvic BALDO drain in addition to elevated output from his ileostomy. Patient was given total of 2L of crystalloid with improvement in his SBP (90s/60s). Repeat CBC revealed a drop to 8.3 (previously 11.0) over 11 hours.     At the bedside, patient has no complaints at this time, with only some incisional tenderness at his incisions on physical exam. He has been tolerating CLD and low fiber diet, denies nausea and vomiting. His ileostomy is pink and viable appearing.     24 Hour Events  3/3  NIGHT  -PM rounds: abd soft, NT, ND, BALDO with minimal sang OP, stoma pink, patent, brown liquid OP, 2L IS   -voided additional 50cc; post-void residual 282--> voided 150 + 160  -15K phos  Day  - Plan to DG 3/4  - change to hgh fiber diet  - D/C Dang  - Incease imodium to 2 gm Q6h  - D/C IVF   - Repeat CBC --> Hgb 7.9 (8.1)   - Voided 125,  --> repeat BS @ 6pm    [X] A ten-point review of systems was otherwise negative except as noted above.  [  ] Due to altered mental status/intubation, subjective information was not attained from the patient. History was obtained, to the extent possible, from review of the chart and collateral sources of information.    ================================   MARI LASSITER   [id number]   [birth year]  73yM    Admit Date: 02-29-24  Indication for SICU: Hemodynamic monitoring, Management of high output ileostomy s/p robot-assisted LAR        ============================  HPI   73M with PMH of Rectal CA (follows with Dr. Crowe), RA, BPH, left neck melanoma s/p wide local excision with SLNB (2014), and spinal stenosis, presents to [facility] for elective low anterior resection. Rectal lesion initially found/biopsied via EMR revealing a friable mass 10cm from the dentate line. 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    Patient went to the OR 2/29 for robot-assisted low anterior resection, mobilization of the splenic flexure, ano-colonic anastomosis, and creation of protective loop ileostomy.     OR Stats  OR Time:  6 hours    IV Fluids:  2500           EBL:   250           Blood Products:  none         UOP: 750  Findings   - Robotic assisted Low anterior resection. Mobilization of splenic flexure, recto-sigmoidectomy performed and anal colon anastomosis achieved. Pelvic drain left in place, hemostasis achieved. Loop ileostomy created.    POD1 patient was getting up to ambulate then felt dizzy. He was able to sit in the chair for some time, but then began to feel light headed and was put back into bed. Throughout this time, patient has had persistent hypotension (80s/40s) with normal HR. Patient was experiencing increased sanguinous output from his pelvic BALDO drain in addition to elevated output from his ileostomy. Patient was given total of 2L of crystalloid with improvement in his SBP (90s/60s). Repeat CBC revealed a drop to 8.3 (previously 11.0) over 11 hours.     At the bedside, patient has no complaints at this time, with only some incisional tenderness at his incisions on physical exam. He has been tolerating CLD and low fiber diet, denies nausea and vomiting. His ileostomy is pink and viable appearing.     24 Hour Events  3/3  NIGHT  -PM rounds: abd soft, NT, ND, BALDO with minimal sang OP, stoma pink, patent, brown liquid OP, 2L IS   -voided additional 50cc; post-void residual 282--> voided 300  -patient noted to be straining, in discomfort, bladder scan > 900, jones inserted with >1L output  -15K phos    Day  - Plan to DG 3/4  - change to hgh fiber diet  - D/C Jones  - Incease imodium to 2 gm Q6h  - D/C IVF   - Repeat CBC --> Hgb 7.9 (8.1)   - Voided 125,  --> repeat BS @ 6pm    [X] A ten-point review of systems was otherwise negative except as noted above.  [  ] Due to altered mental status/intubation, subjective information was not attained from the patient. History was obtained, to the extent possible, from review of the chart and collateral sources of information.    ================================   MARI LASSITER   [id number]   [birth year]  73yM    Admit Date: 02-29-24  Indication for SICU: Hemodynamic monitoring, Management of high output ileostomy s/p robot-assisted LAR        ============================  HPI   73M with PMH of Rectal CA (follows with Dr. Crowe), RA, BPH, left neck melanoma s/p wide local excision with SLNB (2014), and spinal stenosis, presents to [facility] for elective low anterior resection. Rectal lesion initially found/biopsied via EMR revealing a friable mass 10cm from the dentate line. 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    Patient went to the OR 2/29 for robot-assisted low anterior resection, mobilization of the splenic flexure, ano-colonic anastomosis, and creation of protective loop ileostomy.     OR Stats  OR Time:  6 hours    IV Fluids:  2500           EBL:   250           Blood Products:  none         UOP: 750  Findings   - Robotic assisted Low anterior resection. Mobilization of splenic flexure, recto-sigmoidectomy performed and anal colon anastomosis achieved. Pelvic drain left in place, hemostasis achieved. Loop ileostomy created.    POD1 patient was getting up to ambulate then felt dizzy. He was able to sit in the chair for some time, but then began to feel light headed and was put back into bed. Throughout this time, patient has had persistent hypotension (80s/40s) with normal HR. Patient was experiencing increased sanguinous output from his pelvic BALDO drain in addition to elevated output from his ileostomy. Patient was given total of 2L of crystalloid with improvement in his SBP (90s/60s). Repeat CBC revealed a drop to 8.3 (previously 11.0) over 11 hours.     At the bedside, patient has no complaints at this time, with only some incisional tenderness at his incisions on physical exam. He has been tolerating CLD and low fiber diet, denies nausea and vomiting. His ileostomy is pink and viable appearing.     24 Hour Events  3/3  NIGHT  -PM rounds: abd soft, NT, ND, BALDO with minimal sang OP, stoma pink, patent, brown liquid OP, 2L IS   -voided additional 50cc; post-void residual 282--> voided 300  -patient noted to be straining, in discomfort, bladder scan > 900, jones inserted with >1L output  -15K phos    Day  - Plan to DG 3/4  - change to hgh fiber diet  - D/C Jones  - Incease imodium to 2 gm Q6h  - D/C IVF   - Repeat CBC --> Hgb 7.9 (8.1)   - Voided 125,  --> repeat BS @ 6pm    [X] A ten-point review of systems was otherwise negative except as noted above.  [  ] Due to altered mental status/intubation, subjective information was not attained from the patient. History was obtained, to the extent possible, from review of the chart and collateral sources of information.     CURRENT MEDS:   Neurologic Medications  acetaminophen     Tablet .. 1000 milliGRAM(s) Oral every 6 hours  gabapentin 600 milliGRAM(s) Oral every 8 hours  traMADol 25 milliGRAM(s) Oral every 6 hours PRN Severe Pain (7 - 10)    Respiratory Medications    Cardiovascular Medications    Gastrointestinal Medications  folic acid 1 milliGRAM(s) Oral daily  loperamide 2 milliGRAM(s) Oral every 6 hours  pantoprazole    Tablet 40 milliGRAM(s) Oral before breakfast  psyllium Powder 1 Packet(s) Oral daily    Genitourinary Medications  tamsulosin 0.4 milliGRAM(s) Oral at bedtime    Hematologic/Oncologic Medications  enoxaparin Injectable 40 milliGRAM(s) SubCutaneous every 24 hours    Antimicrobial/Immunologic Medications    Endocrine/Metabolic Medications  finasteride 5 milliGRAM(s) Oral daily    Topical/Other Medications  chlorhexidine 2% Cloths 1 Application(s) Topical daily    ICU Vital Signs Last 24 Hrs  T(C): 36.5 (04 Mar 2024 04:00), Max: 36.7 (04 Mar 2024 00:00)  T(F): 97.7 (04 Mar 2024 04:00), Max: 98 (04 Mar 2024 00:00)  HR: 63 (04 Mar 2024 07:00) (53 - 90)  BP: 113/56 (04 Mar 2024 07:00) (79/49 - 151/63)  BP(mean): 81 (04 Mar 2024 07:00) (59 - 103)  ABP: --  ABP(mean): --  RR: 18 (04 Mar 2024 07:00) (11 - 31)  SpO2: 97% (04 Mar 2024 07:00) (97% - 100%)    O2 Parameters below as of 03 Mar 2024 20:00  Patient On (Oxygen Delivery Method): room air              I&O's Summary    03 Mar 2024 07:01  -  04 Mar 2024 07:00  --------------------------------------------------------  IN: 1585 mL / OUT: 3770 mL / NET: -2185 mL      I&O's Detail    03 Mar 2024 07:01  -  04 Mar 2024 07:00  --------------------------------------------------------  IN:    IV PiggyBack: 250 mL    Lactated Ringers: 240 mL    Lactated Ringers: 75 mL    Oral Fluid: 1020 mL  Total IN: 1585 mL    OUT:    Drain (mL): 175 mL    Ileostomy (mL): 890 mL    Indwelling Catheter - Urethral (mL): 1980 mL    Voided (mL): 725 mL  Total OUT: 3770 mL    Total NET: -2185 mL           PHYSICAL EXAM:      a&ox3  follows commands, KRAMER  no acute distress  equal chest rise b/l  abdomen soft, ileostomy producing stool   BALDO w/ sanguinous discharge  extremities soft  urinary cath in place, 40 cc of urine           ================================

## 2024-03-04 NOTE — PROGRESS NOTE ADULT - CRITICAL CARE ATTENDING COMMENT
Critical Care: [id number]   This patient has a high probability of sudden, clinically significant deterioration, which requires the highest level of physician preparedness to intervene urgently. I managed/supervised life or organ supporting interventions that required frequent physician assessment. I have reviewed and agree with note above. I devoted my full attention to the direct care of this patient for the period of time indicated, including reviewing relevant labs and imaging, discussing treatment plan with the SICU team, nurses, and primary team at the time of multidisciplinary rounds, and reviewing findings with patient and family. This does not include time devoted to teaching any trainees and to performing any procedures.    MARI LASSITER 73y Male admitted to SICU with hypovolemia 2/2 high output ileostomy, concurrent urinary retention    Issues we are addressing today:    Neuro: multimodal pain meds, home meds as possible, delirium precautions, sleep meds as needed at night  CV: optimize fluid status  Respiratory: encourage IS  Nutrition: diet as tolerated  Renal: monitor Is &Os, jones for 24h  Heme: continue to evaluate for acute blood loss anemia   Endocrine: Prevent and treat hyperglycemia with insulin as needed    Skin: decub precautions  DVT Prophylaxis   Mobility: PT/OT when safe    safe for transfer out of ICU    The above note is NOT written at the time of rounds and will reflect all changes throughout management of the patient for the day note is written for.    Eros Cobian MD, D.SAYDA

## 2024-03-04 NOTE — CHART NOTE - NSCHARTNOTESELECT_GEN_ALL_CORE
Letter of Medical Necessity/Event Note
surgery post op check/Event Note
4B/4C/Transfer Note
Event Note
Transfer Note

## 2024-03-04 NOTE — PROGRESS NOTE ADULT - ASSESSMENT
Assessment and Recommendation:   73M with PMH of Rectal CA (follows with Dr. Crowe), RA, BPH, left neck melanoma s/p wide local excision with SLNB (2014), and spinal stenosis, now s/p robot-assisted low anterior resection, mobilization of the splenic flexure, ano-colonic anastomosis, and creation of protective loop ileostomy (2/29)    NEURO:  #Acute pain  -APAP 1000mg q6  -Gabapentin 600mg TID  -Tramadol 25mg PO q 6 PRN     RESP:   #Oxygenation    - Tolerating RA  #Activity    - increase as tolerated    - Encourage IS, 2L     CARDS:   #acute post-operative hypotension likely 2/2 hypovolemia  -(3/1): 2.75L IVF bolus + 1 U PRBC given with improvement  Imaging:    - EKG (2/15): HR 67, NSR with sinus arrythmia, normal EKG    GI/NUTR:   #Rectal CA s/p LAR with Loop Ileostomy   - Monitor Ileostomy output, replete 0.5:1 prn (last repleted 500cc bolus 3/3 AM)   - Imodium 2mg q8 + metamucil 1 packet daily for increased ileostomy output    - Remove red rubber POD5   - R pelvic BALDO with sanguinous output, continue to monitor   #Diet, Regular, High Fiber   #GI Prophylaxis    -Protonix 40  #Bowel regimen    - HOLDING     /RENAL:   #urine output  -voiding  #BPH   - [Home med] Tamsulosin 0.4mg   - [Home med] Finasteride 5mg    Labs:          BUN/Cr- 17/0.8  -->,  14/0.7  -->          Electrolytes-(03-03 @ 20:46)Na 142 // K 4.5 // Mg 2.0 // Phos 2.5   #hypophosphatemia- repleted with 15mM NaPhos  HEME/ONC:   #DVT ppx   - Lovenox 40mg  #Anemia 2/2 Sanguinous BALDO Drainage   - (3/1) 1u pRBC, post transfusion CBC 8.6    - Hemoglobin stable   #Hx of L Neck Melanoma s/p Excision with SLNB (2014)   - Follows with Dr. Crowe      ID:  #monitor for fever/leukocytosis   WBC- 12.00  --->>,  8.81  --->>,  10.06  --->>  Temp trend- 24hrs T(F): 97.7 (03-03 @ 16:13), Max: 97.9 (03-03 @ 04:00)    ENDO:  #blood glucose monitoring   -Routine monitoring via BMP   -Not requiring ISS    MSK:    #Activity - Ambulate as Tolerated:     Time/Priority:  Routine (03-01-24 @ 10:29)  #hx spinal stenosis  #hx RA    LINES/DRAINS:  PIV, Dang ( placed intraop 2/29 - 3/3), Pelvic BALDO    ADVANCED DIRECTIVES:  Full Code  INDICATION FOR SICU:  Hemodynamic monitoring, Management of high output ileostomy s/p robot-assisted LAR         DISPO:   Case discussed with attending Dr. Gilmore   MARI LASSITER   [id number]   [birth year]  73yM    Admit Date: 02-29-24  Indication for SICU: Hemodynamic monitoring, Management of high output ileostomy s/p robot-assisted LAR        ============================  HPI   73M with PMH of Rectal CA (follows with Dr. Crowe), RA, BPH, left neck melanoma s/p wide local excision with SLNB (2014), and spinal stenosis, presents to [facility] for elective low anterior resection. Rectal lesion initially found/biopsied via EMR revealing a friable mass 10cm from the dentate line. 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    Patient went to the OR 2/29 for robot-assisted low anterior resection, mobilization of the splenic flexure, ano-colonic anastomosis, and creation of protective loop ileostomy.     OR Stats  OR Time:  6 hours    IV Fluids:  2500           EBL:   250           Blood Products:  none         UOP: 750  Findings   - Robotic assisted Low anterior resection. Mobilization of splenic flexure, recto-sigmoidectomy performed and anal colon anastomosis achieved. Pelvic drain left in place, hemostasis achieved. Loop ileostomy created.    POD1 patient was getting up to ambulate then felt dizzy. He was able to sit in the chair for some time, but then began to feel light headed and was put back into bed. Throughout this time, patient has had persistent hypotension (80s/40s) with normal HR. Patient was experiencing increased sanguinous output from his pelvic BALDO drain in addition to elevated output from his ileostomy. Patient was given total of 2L of crystalloid with improvement in his SBP (90s/60s). Repeat CBC revealed a drop to 8.3 (previously 11.0) over 11 hours.     At the bedside, patient has no complaints at this time, with only some incisional tenderness at his incisions on physical exam. He has been tolerating CLD and low fiber diet, denies nausea and vomiting. His ileostomy is pink and viable appearing.     24 Hour Events  3/3  NIGHT  -PM rounds: abd soft, NT, ND, BALDO with minimal sang OP, stoma pink, patent, brown liquid OP, 2L IS   -voided additional 50cc; post-void residual 282--> voided 150 + 160  -15K phos  Day  - Plan to DG 3/4  - change to hgh fiber diet  - D/C Dang  - Incease imodium to 2 gm Q6h  - D/C IVF   - Repeat CBC --> Hgb 7.9 (8.1)   - Voided 125,  --> repeat BS @ 6pm    [X] A ten-point review of systems was otherwise negative except as noted above.  [  ] Due to altered mental status/intubation, subjective information was not attained from the patient. History was obtained, to the extent possible, from review of the chart and collateral sources of information.    ================================  Assessment and Recommendation:   73M with PMH of Rectal CA (follows with Dr. Crowe), RA, BPH, left neck melanoma s/p wide local excision with SLNB (2014), and spinal stenosis, now s/p robot-assisted low anterior resection, mobilization of the splenic flexure, ano-colonic anastomosis, and creation of protective loop ileostomy (2/29)    NEURO:  #Acute pain  -APAP 1000mg q6  -Gabapentin 600mg TID  -Tramadol 25mg PO q 6 PRN     RESP:   #Oxygenation    - Tolerating RA  #Activity    - increase as tolerated    - Encourage IS, 2L     CARDS:   #acute post-operative hypotension likely 2/2 hypovolemia  -(3/1): 2.75L IVF bolus + 1 U PRBC given with improvement  Imaging:    - EKG (2/15): HR 67, NSR with sinus arrythmia, normal EKG    GI/NUTR:   #Rectal CA s/p LAR with Loop Ileostomy   - Monitor Ileostomy output, replete 0.5:1 prn (repleted 500cc bolus 3/3 AM)   - Imodium 2mg q8 + metamucil 1 packet daily for increased ileostomy output    - Remove red rubber POD5   - R pelvic BALDO with sanguinous output, continue to monitor   #Diet, Regular, High Fiber   #GI Prophylaxis    -Protonix 40  #Bowel regimen    - HOLDING     /RENAL:   #post-operative urinary retention complicated by BPH  -Dang reinserted 03/04 with >1L output   - [Home med] Tamsulosin 0.4mg   - [Home med] Finasteride 5mg    Labs:          BUN/Cr- 14/0.8  -->,  17/0.8  -->          Electrolytes-(03-02 @ 21:31)Na 142 // K 4.1 // Mg 1.8 // Phos 2.0 -Mg repleted, Phos repleted    HEME/ONC:   #DVT ppx   - Lovenox 40mg  #Anemia 2/2 Sanguinous BALDO Drainage   - (3/1) 1u pRBC, post transfusion CBC 8.6    - Hemoglobin stable   #Hx of L Neck Melanoma s/p Excision with SLNB (2014)   - Follows with Dr. Crowe  #RA    Labs: Hb/Hct:  8.1/24.1  -->,  8.2/24.6  -->                      Plts:  149  -->,  160  -->                 PTT/INR:        ID:  #monitor for fever/leukocytosis   WBC- 12.97  --->>,  12.04  --->>,  12.00  --->>  Temp trend- 24hrs T(F): 98.1 (03-02 @ 16:00), Max: 98.1 (03-02 @ 16:00)  Current antibiotics-    ENDO:  #blood glucose monitoring   -Routine monitoring via BMP   -Not requiring ISS    MSK:    #Activity - Ambulate as Tolerated:     Time/Priority:  Routine (03-01-24 @ 10:29)    LINES/DRAINS:  PIV, Dang ( placed intraop 2/29 - 3/3; replaced 03/04), Pelvic BALDO    ADVANCED DIRECTIVES:  Full Code  INDICATION FOR SICU:  Hemodynamic monitoring, Management of high output ileostomy s/p robot-assisted LAR         DISPO:   Case discussed with attending Dr. Gilmore   73M with PMH of Rectal CA (follows with Dr. Crwoe), RA, BPH, left neck melanoma s/p wide local excision with SLNB (2014), and spinal stenosis, now s/p robot-assisted low anterior resection, mobilization of the splenic flexure, ano-colonic anastomosis, and creation of protective loop ileostomy (2/29)    3/1 - upgraded to SICU for HYPOtension, acute anemia, high volume ileostomy output    NEURO:  #Acute pain  -APAP 1000mg q6  -Gabapentin 600mg TID  -Tramadol 25mg PO q 6 PRN     RESP:   #Oxygenation    - Tolerating room air  #Activity    - increase as tolerated    - Encourage IS, 2L     CARDS:   #acute post-operative hypotension likely 2/2 hypovolemia  -(3/1): 2.75L IVF bolus + 1 U PRBC given with improvement  Imaging:    - EKG (2/15): HR 67, NSR with sinus arrythmia, normal EKG    GI/NUTR:   #Rectal CA s/p LAR with Loop Ileostomy   - Monitor Ileostomy output, replete 0.5:1 prn (repleted 500cc bolus 3/3 AM)   - Imodium 2mg q8 + metamucil 1 packet daily for increased ileostomy output    - Remove red rubber POD5   - R pelvic BALDO with sanguinous output, continue to monitor   #Diet, Regular, High Fiber   #GI Prophylaxis    -Protonix 40  #Bowel regimen    - HOLDING     /RENAL:   #post-operative urinary retention complicated by BPH  -Dang reinserted 03/04 with >1L output   - [Home med] Tamsulosin 0.4mg   - [Home med] Finasteride 5mg    Labs:          BUN/Cr- 14/0.8  -->,  17/0.8  -->          Electrolytes-(03-02 @ 21:31)Na 142 // K 4.1 // Mg 1.8 // Phos 2.0 -Mg repleted, Phos repleted    HEME/ONC:   #DVT ppx   - Lovenox 40mg  #Anemia 2/2 Sanguinous BALDO Drainage   - (3/1) 1u pRBC, post transfusion CBC 8.6    - Hemoglobin stable   #Hx of L Neck Melanoma s/p Excision with SLNB (2014)   - Follows with Dr. Crowe  #RA    Labs: Hb/Hct:  8.1/24.1  -->,  8.2/24.6  -->                      Plts:  149  -->,  160  -->                 PTT/INR:        ID:  #monitor for fever/leukocytosis   WBC- 12.97  --->>,  12.04  --->>,  12.00  --->>  Temp trend- 24hrs T(F): 98.1 (03-02 @ 16:00), Max: 98.1 (03-02 @ 16:00)  Current antibiotics-    ENDO:  #blood glucose monitoring   -Routine monitoring via BMP   -Not requiring ISS    MSK:    #Activity - Ambulate as Tolerated:     Time/Priority:  Routine (03-01-24 @ 10:29)    LINES/DRAINS:  PIV, Dang ( placed intraop 2/29 - 3/3; replaced 03/04), Pelvic BALDO    ADVANCED DIRECTIVES:  Full Code  INDICATION FOR SICU:  Hemodynamic monitoring, Management of high output ileostomy s/p robot-assisted LAR         DISPO:   Case discussed with attending Dr. Gilmore

## 2024-03-05 ENCOUNTER — TRANSCRIPTION ENCOUNTER (OUTPATIENT)
Age: 74
End: 2024-03-05

## 2024-03-05 LAB
ANION GAP SERPL CALC-SCNC: 13 MMOL/L — SIGNIFICANT CHANGE UP (ref 7–14)
BASOPHILS # BLD AUTO: 0.04 K/UL — SIGNIFICANT CHANGE UP (ref 0–0.2)
BASOPHILS NFR BLD AUTO: 0.3 % — SIGNIFICANT CHANGE UP (ref 0–1)
BUN SERPL-MCNC: 14 MG/DL — SIGNIFICANT CHANGE UP (ref 10–20)
CALCIUM SERPL-MCNC: 9 MG/DL — SIGNIFICANT CHANGE UP (ref 8.4–10.5)
CHLORIDE SERPL-SCNC: 104 MMOL/L — SIGNIFICANT CHANGE UP (ref 98–110)
CO2 SERPL-SCNC: 23 MMOL/L — SIGNIFICANT CHANGE UP (ref 17–32)
CREAT SERPL-MCNC: 0.8 MG/DL — SIGNIFICANT CHANGE UP (ref 0.7–1.5)
EGFR: 95 ML/MIN/1.73M2 — SIGNIFICANT CHANGE UP
EOSINOPHIL # BLD AUTO: 0.3 K/UL — SIGNIFICANT CHANGE UP (ref 0–0.7)
EOSINOPHIL NFR BLD AUTO: 2.5 % — SIGNIFICANT CHANGE UP (ref 0–8)
GLUCOSE SERPL-MCNC: 106 MG/DL — HIGH (ref 70–99)
HCT VFR BLD CALC: 26.6 % — LOW (ref 42–52)
HGB BLD-MCNC: 8.6 G/DL — LOW (ref 14–18)
IMM GRANULOCYTES NFR BLD AUTO: 0.4 % — HIGH (ref 0.1–0.3)
LYMPHOCYTES # BLD AUTO: 1.45 K/UL — SIGNIFICANT CHANGE UP (ref 1.2–3.4)
LYMPHOCYTES # BLD AUTO: 12.2 % — LOW (ref 20.5–51.1)
MAGNESIUM SERPL-MCNC: 1.9 MG/DL — SIGNIFICANT CHANGE UP (ref 1.8–2.4)
MCHC RBC-ENTMCNC: 30.6 PG — SIGNIFICANT CHANGE UP (ref 27–31)
MCHC RBC-ENTMCNC: 32.3 G/DL — SIGNIFICANT CHANGE UP (ref 32–37)
MCV RBC AUTO: 94.7 FL — HIGH (ref 80–94)
MONOCYTES # BLD AUTO: 1.51 K/UL — HIGH (ref 0.1–0.6)
MONOCYTES NFR BLD AUTO: 12.7 % — HIGH (ref 1.7–9.3)
NEUTROPHILS # BLD AUTO: 8.52 K/UL — HIGH (ref 1.4–6.5)
NEUTROPHILS NFR BLD AUTO: 71.9 % — SIGNIFICANT CHANGE UP (ref 42.2–75.2)
NRBC # BLD: 0 /100 WBCS — SIGNIFICANT CHANGE UP (ref 0–0)
PHOSPHATE SERPL-MCNC: 2.8 MG/DL — SIGNIFICANT CHANGE UP (ref 2.1–4.9)
PLATELET # BLD AUTO: 270 K/UL — SIGNIFICANT CHANGE UP (ref 130–400)
PMV BLD: 11.2 FL — HIGH (ref 7.4–10.4)
POTASSIUM SERPL-MCNC: 4.5 MMOL/L — SIGNIFICANT CHANGE UP (ref 3.5–5)
POTASSIUM SERPL-SCNC: 4.5 MMOL/L — SIGNIFICANT CHANGE UP (ref 3.5–5)
RBC # BLD: 2.81 M/UL — LOW (ref 4.7–6.1)
RBC # FLD: 13 % — SIGNIFICANT CHANGE UP (ref 11.5–14.5)
SODIUM SERPL-SCNC: 140 MMOL/L — SIGNIFICANT CHANGE UP (ref 135–146)
WBC # BLD: 11.87 K/UL — HIGH (ref 4.8–10.8)
WBC # FLD AUTO: 11.87 K/UL — HIGH (ref 4.8–10.8)

## 2024-03-05 RX ORDER — PSYLLIUM SEED (WITH DEXTROSE)
1 POWDER (GRAM) ORAL
Qty: 0 | Refills: 0 | DISCHARGE
Start: 2024-03-05

## 2024-03-05 RX ORDER — LOPERAMIDE HCL 2 MG
2 TABLET ORAL
Refills: 0 | Status: DISCONTINUED | OUTPATIENT
Start: 2024-03-05 | End: 2024-03-06

## 2024-03-05 RX ORDER — LOPERAMIDE HCL 2 MG
4 TABLET ORAL
Refills: 0 | Status: DISCONTINUED | OUTPATIENT
Start: 2024-03-05 | End: 2024-03-06

## 2024-03-05 RX ORDER — LOPERAMIDE HCL 2 MG
2 TABLET ORAL
Qty: 0 | Refills: 0 | DISCHARGE
Start: 2024-03-05

## 2024-03-05 RX ORDER — SODIUM CHLORIDE 9 MG/ML
500 INJECTION, SOLUTION INTRAVENOUS ONCE
Refills: 0 | Status: COMPLETED | OUTPATIENT
Start: 2024-03-05 | End: 2024-03-05

## 2024-03-05 RX ORDER — LOPERAMIDE HCL 2 MG
4 TABLET ORAL EVERY 12 HOURS
Refills: 0 | Status: DISCONTINUED | OUTPATIENT
Start: 2024-03-05 | End: 2024-03-05

## 2024-03-05 RX ORDER — LOPERAMIDE HCL 2 MG
1 TABLET ORAL
Qty: 0 | Refills: 0 | DISCHARGE
Start: 2024-03-05

## 2024-03-05 RX ORDER — LOPERAMIDE HCL 2 MG
2 TABLET ORAL EVERY 12 HOURS
Refills: 0 | Status: DISCONTINUED | OUTPATIENT
Start: 2024-03-05 | End: 2024-03-05

## 2024-03-05 RX ADMIN — Medication 1000 MILLIGRAM(S): at 23:26

## 2024-03-05 RX ADMIN — GABAPENTIN 600 MILLIGRAM(S): 400 CAPSULE ORAL at 21:14

## 2024-03-05 RX ADMIN — GABAPENTIN 600 MILLIGRAM(S): 400 CAPSULE ORAL at 05:59

## 2024-03-05 RX ADMIN — Medication 1000 MILLIGRAM(S): at 12:08

## 2024-03-05 RX ADMIN — Medication 1000 MILLIGRAM(S): at 18:25

## 2024-03-05 RX ADMIN — GABAPENTIN 600 MILLIGRAM(S): 400 CAPSULE ORAL at 14:59

## 2024-03-05 RX ADMIN — TAMSULOSIN HYDROCHLORIDE 0.4 MILLIGRAM(S): 0.4 CAPSULE ORAL at 21:13

## 2024-03-05 RX ADMIN — Medication 4 MILLIGRAM(S): at 21:13

## 2024-03-05 RX ADMIN — FINASTERIDE 5 MILLIGRAM(S): 5 TABLET, FILM COATED ORAL at 12:08

## 2024-03-05 RX ADMIN — CHLORHEXIDINE GLUCONATE 1 APPLICATION(S): 213 SOLUTION TOPICAL at 06:47

## 2024-03-05 RX ADMIN — Medication 2 MILLIGRAM(S): at 14:59

## 2024-03-05 RX ADMIN — PANTOPRAZOLE SODIUM 40 MILLIGRAM(S): 20 TABLET, DELAYED RELEASE ORAL at 05:59

## 2024-03-05 RX ADMIN — Medication 1000 MILLIGRAM(S): at 18:01

## 2024-03-05 RX ADMIN — Medication 1000 MILLIGRAM(S): at 00:23

## 2024-03-05 RX ADMIN — ENOXAPARIN SODIUM 40 MILLIGRAM(S): 100 INJECTION SUBCUTANEOUS at 15:00

## 2024-03-05 RX ADMIN — Medication 1 PACKET(S): at 12:39

## 2024-03-05 RX ADMIN — Medication 2 MILLIGRAM(S): at 05:59

## 2024-03-05 RX ADMIN — SODIUM CHLORIDE 250 MILLILITER(S): 9 INJECTION, SOLUTION INTRAVENOUS at 08:53

## 2024-03-05 RX ADMIN — Medication 1 MILLIGRAM(S): at 12:08

## 2024-03-05 RX ADMIN — Medication 1000 MILLIGRAM(S): at 06:47

## 2024-03-05 RX ADMIN — Medication 1000 MILLIGRAM(S): at 12:38

## 2024-03-05 RX ADMIN — Medication 1000 MILLIGRAM(S): at 05:59

## 2024-03-05 NOTE — DISCHARGE NOTE PROVIDER - NSDCFUSCHEDAPPT_GEN_ALL_CORE_FT
Flynn Lorenzo  [facility]  GENSURG [address]  Scheduled Appointment: 03/19/2024    Yasir Vail  [facility]  Scheduled Appointment: 04/30/2024    [facility]  GASTRO  [address]  Scheduled Appointment: 04/30/2024

## 2024-03-05 NOTE — DISCHARGE NOTE PROVIDER - CARE PROVIDER_API CALL
Flynn Lorenzo  Colon/Rectal Surgery  [address] [place] [address]  Phone: [phone]  Fax: [phone]  Follow Up Time: 2 weeks

## 2024-03-05 NOTE — ADVANCED PRACTICE NURSE CONSULT - RECOMMEDATIONS
Pouch change: 	  -Gently remove pouch with water moistened gauze   -Cleanse stoma site with water moistened gauze, gently pat dry  -Measure stoma  -Trace and cut current size on drainage sytem for each stoma per reference above   -Stretch Dorothy Adapt CeraRing (#0418) onto back of barrier or use stoma paste if needed.  -Apply barrier to patient's skin.    Empty pouch when 1/3 to no more than 1/2 full of effluent/flatus. Change pouching system q 3 - 4 days and prn for leaking.   Continue teaching at bedside until discharge and follow up with  Home health nurse  Case discussed with primary RN.  Ostomy specialist to follow up as needed   Please provide patient with scripts with the following item upon discharge:     Supplies include:  Dorothy  2 3/4” CeraPlus flat barrier #[id number]  Kent 2 3/4"” drainable pouch #[id number]  Droothy Adapt flat CeraRing #[id number]  Kent adhesive remover #0446  Kent Adapt stoma powder #7968  3M Cavilon no-sting barrier film #[id number]  m9 odor eliminator drops #3029

## 2024-03-05 NOTE — ADVANCED PRACTICE NURSE CONSULT - ASSESSMENT
Received patient sitting in chair awake, A&Ox3, made aware of purpose of WOCN visit, agreeable to consult. Ostomy to RLQ with 2-piece pouching system in place, barrier intact.     Type of ostomy: Loop Ileostomy  Location: Right Lower Quadrant  Rolando: Yes  Size: ~57mm  Mucosa: Pink and Moist  Peristomal skin: Unable to visualize, wafer in place and intact  Mucocutaneous junction: Intact  Abdominal contours: Semi-soft  Output: Semi-formed light brown  Midline/lap sites/ drains: BALDO Drain to lower right quadrant below ostomy     Full ostomy lesson and [employer] instructional booklet provided to patient.   Full lesson via [employer] website on application of 2- piece system watched with patient, questions answered during lesson.    Patient did teach back on how to properly open, close and empty pouch.  [employer] initiated with patient to have supplies sent to patients home.

## 2024-03-05 NOTE — PROGRESS NOTE ADULT - SUBJECTIVE AND OBJECTIVE BOX
GENERAL SURGERY PROGRESS NOTE     MARI LASSITER  73y  Male  Hospital day :5d  POD:  Procedure: Robot-assisted laparoscopic low anterior resection using da Myrna Xi    Anastomosis, colon to anus    Mobilization of splenic flexure    Robot-assisted creation of loop ileostomy    Block, transversus abdominis plane, bilateral      OVERNIGHT EVENTS: patient downgraded from SICU. no acute events overnight. Pain is tolerable. Passing gas and stool into bag, output 1.4 L in past 24 hours.     T(F): 96.6 (03-05-24 @ 08:26), Max: 98.3 (03-05-24 @ 04:45)  HR: 66 (03-05-24 @ 08:26) (63 - 89)  BP: 114/62 (03-05-24 @ 08:26) (102/58 - 135/63)  ABP: --  ABP(mean): --  RR: 18 (03-05-24 @ 08:26) (16 - 20)  SpO2: 95% (03-05-24 @ 08:26) (95% - 100%)    DIET/FLUIDS: folic acid 1 milliGRAM(s) Oral daily    NG:                                                                                DRAINS:   03-04-24 @ 07:01  -  03-05-24 @ 07:00  --------------------------------------------------------  OUT: 120 mL      BM:   03-04-24 @ 07:01  -  03-05-24 @ 07:00  --------------------------------------------------------  OUT: 1450 mL      EMESIS:     URINE:   03-04-24 @ 07:01  -  03-05-24 @ 07:00  --------------------------------------------------------  OUT: 1645 mL       GI proph:  pantoprazole    Tablet 40 milliGRAM(s) Oral before breakfast    AC/ proph: enoxaparin Injectable 40 milliGRAM(s) SubCutaneous every 24 hours    ABx:     PHYSICAL EXAM:  GENERAL: NAD  CHEST/LUNG: Non-labored breathing  HEART: Regular rate and rhythm  ABDOMEN: Soft, minimally tender to palpation, Nondistended; gas and stool in ileostomy bag       LABS  Labs:  CAPILLARY BLOOD GLUCOSE                              8.2    9.01  )-----------( 228      ( 04 Mar 2024 20:00 )             25.0         03-04    143  |  107  |  11  ----------------------------<  120<H>  4.7   |  27  |  0.7      Calcium: 8.9 mg/dL (03-04-24 @ 20:00)      LFTs:         Coags:            Urinalysis Basic - ( 04 Mar 2024 20:00 )    Color: x / Appearance: x / SG: x / pH: x  Gluc: 120 mg/dL / Ketone: x  / Bili: x / Urobili: x   Blood: x / Protein: x / Nitrite: x   Leuk Esterase: x / RBC: x / WBC x   Sq Epi: x / Non Sq Epi: x / Bacteria: x            RADIOLOGY & ADDITIONAL TESTS:      A/P

## 2024-03-05 NOTE — DISCHARGE NOTE PROVIDER - NSDCCPCAREPLAN_GEN_ALL_CORE_FT
PRINCIPAL DISCHARGE DIAGNOSIS  Diagnosis: History of low anterior resection of rectum  Assessment and Plan of Treatment: SURGERY DISCHARGE INSTRUCTIONS  FOLLOW-UP - with Dr. Lorenzo in 2 weeks. Call the office to make an appointment or if you have any questions/concerns.  DIET - regular, High fiber diet.   ACTIVITY- No heavy lifting for 4-6 wks over 10-20 lbs. Walking is encouraged. No running or swimming. No driving while taking pain medication.  WOUNDCARE - Some drainage from your incisions or drain sites is normal.  You have no bandages but have purple glue over your incisions instead, this will come off with time. You may shower but no submerging wound under water for 2 weeks (tub bathing). Pat area dry when wet, keep clean and dry. Do not apply powders or lotion to wound area.   COLOSTOMY - If you have a colostomy follow your specific intstructions.  PAIN MEDS - Take over the counter extra strength tylenol 500mg and/or ibprofen 400mg with food every 6 hours for pain.No more than 4g of tylenol in 24hrs or 1g in 4 hrs.   OTHER MEDS - If you have any questions about your other regular home medications please call your primary care physician or the physician who prescribed those medications to you.   If you develop fever, dizziness, chest pain, trouble breathing, nausea, vomiting, increasing abdominal pain, inability to pass bowel movements, redness/pain/discharge from incisions. Please call the office or go to the emergency room immediately.

## 2024-03-05 NOTE — DISCHARGE NOTE NURSING/CASE MANAGEMENT/SOCIAL WORK - PATIENT PORTAL LINK FT
You can access the FollowMyHealth Patient Portal offered by [facility] by registering at the following website: [email or web address]. By joining [employer]’s FollowMyHealth portal, you will also be able to view your health information using other applications (apps) compatible with our system.

## 2024-03-05 NOTE — DISCHARGE NOTE PROVIDER - HOSPITAL COURSE
73M with PMH of Rectal CA, RA, BPH, left neck melanoma s/p wide local excision with SLNB (2014), and spinal stenosis. Presented to the hospital on 2/29/24 and is now s/p robot-assisted low anterior resection, mobilization of the splenic flexure, ano-colonic anastomosis, and creation of protective loop ileostomy. Intra op EBL was 250, , and he received 2.6L of crystalloid. Post op the patient had stool and gas in ostomy appliance and had sanguinous output in BALDO drain. On 3/1, patient continued to have increased sanguinous output from BALDO and increased ostomy output. He was hypotensive to as low as 70s systolic. He received 2L of crystalloid and 1U PRBC. Patient was upgraded to SICU for hemodynamic monitoring and management of high output ileostomy. He was started on metamucil, imodium and high fiber diet for ileostomy output. While in the SICU the patient remained hemodynamically stable. He initially passed TOV however pt complained of discomfort and the urge to urinate. He was bladder scan which demonstrated >900cc. A jones was placed and 1L of urine was removed and the jones remained in place. On 3/4/23 he was downgraded from the SICU to the floor. Pt continued to have adequate ostomy output. He has been ambulating and passed TOV on 3/5. The BALDO drain and ostomy bar were removed on 3/5. Patient is hemodynamically stable and able to be discharged home with VNS for ostomy care.

## 2024-03-05 NOTE — PROGRESS NOTE ADULT - ASSESSMENT
MARI LASSITER is a73M with PMH of Rectal CA (follows with Dr. Crowe), RA, BPH, left neck melanoma s/p wide local excision with SLNB (2014), and spinal stenosis, now s/p robot-assisted low anterior resection, mobilization of the splenic flexure, ano-colonic anastomosis, and creation of protective loop ileostomy (2/29). Patient was upgraded to SICU post-operatively for hemodynamic monitoring and management of high output ileostomy.     PLAN:   - Will remove BALDO drain today and ostomy bar today  - will remove folley today and follow up trial of void  - monitor ostomy output quality and quantity  - Trend H/H  - Daily labs, replete electrolytes as needed  - imodium currently 2 Q6, will increase 2 of the doses to 4  - multi modal pain control  - encourage ambulation and IS as tolerated   - DVT and GI ppx

## 2024-03-05 NOTE — DISCHARGE NOTE PROVIDER - NSDCMRMEDTOKEN_GEN_ALL_CORE_FT
finasteride 5 mg oral tablet: 1 tab(s) orally once a day  folic acid 1 mg oral tablet: 1 tab(s) orally once a day  gabapentin 600 mg oral tablet: 1 tab(s) orally 3 times a day  loperamide 2 mg oral capsule: 2 cap(s) orally 2 times a day  loperamide 2 mg oral capsule: 1 cap(s) orally 2 times a day  methotrexate 15 mg/0.6 mL subcutaneous solution: subcutaneous once a week  psyllium 3.4 g/7 g oral powder for reconstitution: 1 packet(s) orally once a day  saw palmetto 320 mg with phytosterols oral capsule: 2 cap(s) orally once a day  tamsulosin 0.4 mg oral capsule: 1 cap(s) orally once a day   finasteride 5 mg oral tablet: 1 tab(s) orally once a day  folic acid 1 mg oral tablet: 1 tab(s) orally once a day  gabapentin 600 mg oral tablet: 1 tab(s) orally 3 times a day  loperamide 2 mg oral capsule: 2 cap(s) orally 2 times a day  loperamide 2 mg oral capsule: 1 cap(s) orally 2 times a day  Lovenox 40 mg/0.4 mL injectable solution: 40 milligram(s) subcutaneously once a day  methotrexate 15 mg/0.6 mL subcutaneous solution: subcutaneous once a week  psyllium 3.4 g/7 g oral powder for reconstitution: 1 packet(s) orally once a day  saw palmetto 320 mg with phytosterols oral capsule: 2 cap(s) orally once a day  tamsulosin 0.4 mg oral capsule: 1 cap(s) orally once a day

## 2024-03-06 VITALS
TEMPERATURE: 98 F | RESPIRATION RATE: 18 BRPM | DIASTOLIC BLOOD PRESSURE: 70 MMHG | OXYGEN SATURATION: 99 % | HEART RATE: 80 BPM | SYSTOLIC BLOOD PRESSURE: 131 MMHG

## 2024-03-06 RX ORDER — ENOXAPARIN SODIUM 100 MG/ML
40 INJECTION SUBCUTANEOUS
Qty: 11.2 | Refills: 0
Start: 2024-03-06 | End: 2024-04-02

## 2024-03-06 RX ORDER — POTASSIUM PHOSPHATE, MONOBASIC POTASSIUM PHOSPHATE, DIBASIC 236; 224 MG/ML; MG/ML
15 INJECTION, SOLUTION INTRAVENOUS ONCE
Refills: 0 | Status: COMPLETED | OUTPATIENT
Start: 2024-03-06 | End: 2024-03-06

## 2024-03-06 RX ADMIN — Medication 1000 MILLIGRAM(S): at 05:34

## 2024-03-06 RX ADMIN — POTASSIUM PHOSPHATE, MONOBASIC POTASSIUM PHOSPHATE, DIBASIC 63.75 MILLIMOLE(S): 236; 224 INJECTION, SOLUTION INTRAVENOUS at 02:00

## 2024-03-06 RX ADMIN — PANTOPRAZOLE SODIUM 40 MILLIGRAM(S): 20 TABLET, DELAYED RELEASE ORAL at 05:34

## 2024-03-06 RX ADMIN — Medication 2 MILLIGRAM(S): at 13:22

## 2024-03-06 RX ADMIN — FINASTERIDE 5 MILLIGRAM(S): 5 TABLET, FILM COATED ORAL at 13:22

## 2024-03-06 RX ADMIN — GABAPENTIN 600 MILLIGRAM(S): 400 CAPSULE ORAL at 13:22

## 2024-03-06 RX ADMIN — Medication 1 PACKET(S): at 13:23

## 2024-03-06 RX ADMIN — Medication 1000 MILLIGRAM(S): at 00:20

## 2024-03-06 RX ADMIN — GABAPENTIN 600 MILLIGRAM(S): 400 CAPSULE ORAL at 05:34

## 2024-03-06 RX ADMIN — Medication 1000 MILLIGRAM(S): at 06:44

## 2024-03-06 RX ADMIN — Medication 1000 MILLIGRAM(S): at 14:10

## 2024-03-06 RX ADMIN — Medication 2 MILLIGRAM(S): at 02:01

## 2024-03-06 RX ADMIN — Medication 4 MILLIGRAM(S): at 10:36

## 2024-03-06 RX ADMIN — Medication 1 MILLIGRAM(S): at 13:23

## 2024-03-06 RX ADMIN — Medication 1000 MILLIGRAM(S): at 13:30

## 2024-03-06 RX ADMIN — CHLORHEXIDINE GLUCONATE 1 APPLICATION(S): 213 SOLUTION TOPICAL at 06:44

## 2024-03-06 NOTE — PROGRESS NOTE ADULT - ASSESSMENT
MARI LASSITER is a73M with PMH of Rectal CA (follows with Dr. Crowe), RA, BPH, left neck melanoma s/p wide local excision with SLNB (2014), and spinal stenosis, now s/p robot-assisted low anterior resection, mobilization of the splenic flexure, ano-colonic anastomosis, and creation of protective loop ileostomy (2/29). Patient was upgraded to SICU post-operatively for hemodynamic monitoring and management of high output ileostomy.     PLAN:   - monitor ostomy output quality and quantity  - Daily labs, replete electrolytes as needed  - imodium for ileostomy output  - multi modal pain control  - encourage ambulation and IS as tolerated   - DVT and GI ppx   - likely discharge home today

## 2024-03-06 NOTE — PROGRESS NOTE ADULT - SUBJECTIVE AND OBJECTIVE BOX
GENERAL SURGERY PROGRESS NOTE     MARI LASSITER  73y  Male  Hospital day :6d  POD:  Procedure: Robot-assisted laparoscopic low anterior resection using da Myrna Xi    Anastomosis, colon to anus    Mobilization of splenic flexure    Robot-assisted creation of loop ileostomy    Block, transversus abdominis plane, bilateral      OVERNIGHT EVENTS: yesterday patient had high output from ileostomy, imodium dose was increased, output now recorded as 650 in 24 hours. Otherwise recovering well, pain tolerable    T(F): 97.4 (03-06-24 @ 01:00), Max: 99.4 (03-05-24 @ 20:14)  HR: 62 (03-06-24 @ 01:00) (62 - 79)  BP: 119/60 (03-06-24 @ 01:00) (106/61 - 153/70)  ABP: --  ABP(mean): --  RR: 18 (03-06-24 @ 01:00) (18 - 18)  SpO2: 96% (03-06-24 @ 01:00) (95% - 99%)    DIET/FLUIDS: folic acid 1 milliGRAM(s) Oral daily    NG:                                                                                DRAINS:   03-04-24 @ 07:01  -  03-05-24 @ 07:00  --------------------------------------------------------  OUT: 120 mL      BM:   03-04-24 @ 07:01  -  03-05-24 @ 07:00  --------------------------------------------------------  OUT: 1450 mL      EMESIS:     URINE:   03-04-24 @ 07:01  -  03-05-24 @ 07:00  --------------------------------------------------------  OUT: 1645 mL       GI proph:  pantoprazole    Tablet 40 milliGRAM(s) Oral before breakfast    AC/ proph: enoxaparin Injectable 40 milliGRAM(s) SubCutaneous every 24 hours    ABx:     PHYSICAL EXAM:  GENERAL: NAD  CHEST/LUNG: Non-labored breathing  HEART: Regular rate and rhythm  ABDOMEN: Soft, Nontender, Nondistended; +ileostomy with gas and stool, incisions c/d/i      LABS  Labs:  CAPILLARY BLOOD GLUCOSE                              8.6    11.87 )-----------( 270      ( 05 Mar 2024 22:39 )             26.6       Auto Neutrophil %: 71.9 % (03-05-24 @ 22:39)  Auto Immature Granulocyte %: 0.4 % (03-05-24 @ 22:39)    03-05    140  |  104  |  14  ----------------------------<  106<H>  4.5   |  23  |  0.8      Calcium: 9.0 mg/dL (03-05-24 @ 22:39)      LFTs:         Coags:            Urinalysis Basic - ( 05 Mar 2024 22:39 )    Color: x / Appearance: x / SG: x / pH: x  Gluc: 106 mg/dL / Ketone: x  / Bili: x / Urobili: x   Blood: x / Protein: x / Nitrite: x   Leuk Esterase: x / RBC: x / WBC x   Sq Epi: x / Non Sq Epi: x / Bacteria: x            RADIOLOGY & ADDITIONAL TESTS:      A/P

## 2024-03-06 NOTE — PROGRESS NOTE ADULT - PROVIDER SPECIALTY LIST ADULT
Colorectal Surgery
SICU
Colorectal Surgery
SICU
Surgery
Colorectal Surgery
SICU

## 2024-03-08 LAB — SURGICAL PATHOLOGY STUDY: SIGNIFICANT CHANGE UP

## 2024-03-11 ENCOUNTER — NON-APPOINTMENT (OUTPATIENT)
Age: 74
End: 2024-03-11

## 2024-03-13 DIAGNOSIS — Z85.820 PERSONAL HISTORY OF MALIGNANT MELANOMA OF SKIN: ICD-10-CM

## 2024-03-13 DIAGNOSIS — E83.42 HYPOMAGNESEMIA: ICD-10-CM

## 2024-03-13 DIAGNOSIS — N40.1 BENIGN PROSTATIC HYPERPLASIA WITH LOWER URINARY TRACT SYMPTOMS: ICD-10-CM

## 2024-03-13 DIAGNOSIS — R33.8 OTHER RETENTION OF URINE: ICD-10-CM

## 2024-03-13 DIAGNOSIS — M48.00 SPINAL STENOSIS, SITE UNSPECIFIED: ICD-10-CM

## 2024-03-13 DIAGNOSIS — C20 MALIGNANT NEOPLASM OF RECTUM: ICD-10-CM

## 2024-03-13 DIAGNOSIS — D12.8 BENIGN NEOPLASM OF RECTUM: ICD-10-CM

## 2024-03-13 DIAGNOSIS — M06.9 RHEUMATOID ARTHRITIS, UNSPECIFIED: ICD-10-CM

## 2024-03-13 DIAGNOSIS — I95.81 POSTPROCEDURAL HYPOTENSION: ICD-10-CM

## 2024-03-13 DIAGNOSIS — E86.1 HYPOVOLEMIA: ICD-10-CM

## 2024-03-13 DIAGNOSIS — D62 ACUTE POSTHEMORRHAGIC ANEMIA: ICD-10-CM

## 2024-03-18 NOTE — ASU PATIENT PROFILE, ADULT - NS PRO PT RIGHT SUPPORT PERSON
This was a shared visit with the ROSE. I reviewed and verified the documentation.
same name as above

## 2024-03-19 ENCOUNTER — APPOINTMENT (OUTPATIENT)
Dept: SURGERY | Facility: CLINIC | Age: 74
End: 2024-03-19
Payer: MEDICARE

## 2024-03-19 VITALS
DIASTOLIC BLOOD PRESSURE: 68 MMHG | BODY MASS INDEX: 19.77 KG/M2 | TEMPERATURE: 97.2 F | HEIGHT: 75 IN | SYSTOLIC BLOOD PRESSURE: 124 MMHG | WEIGHT: 159 LBS

## 2024-03-19 PROCEDURE — 99024 POSTOP FOLLOW-UP VISIT: CPT

## 2024-03-28 ENCOUNTER — NON-APPOINTMENT (OUTPATIENT)
Age: 74
End: 2024-03-28

## 2024-03-29 ENCOUNTER — INPATIENT (INPATIENT)
Facility: HOSPITAL | Age: 74
LOS: 6 days | Discharge: HOME HEALTH | DRG: 862 | End: 2024-04-05
Attending: COLON & RECTAL SURGERY | Admitting: COLON & RECTAL SURGERY
Payer: MEDICARE

## 2024-03-29 VITALS
TEMPERATURE: 99 F | DIASTOLIC BLOOD PRESSURE: 72 MMHG | HEIGHT: 75 IN | HEART RATE: 98 BPM | SYSTOLIC BLOOD PRESSURE: 118 MMHG | RESPIRATION RATE: 20 BRPM

## 2024-03-29 DIAGNOSIS — M48.00 SPINAL STENOSIS, SITE UNSPECIFIED: ICD-10-CM

## 2024-03-29 DIAGNOSIS — J30.1 ALLERGIC RHINITIS DUE TO POLLEN: ICD-10-CM

## 2024-03-29 DIAGNOSIS — K76.89 OTHER SPECIFIED DISEASES OF LIVER: ICD-10-CM
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
## 2024-03-29 DIAGNOSIS — T81.43XA INFECTION FOLLOWING A PROCEDURE, ORGAN AND SPACE SURGICAL SITE, INITIAL ENCOUNTER: ICD-10-CM
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
## 2024-03-29 DIAGNOSIS — M06.9 RHEUMATOID ARTHRITIS, UNSPECIFIED: ICD-10-CM

## 2024-03-29 DIAGNOSIS — D84.89 OTHER IMMUNODEFICIENCIES: ICD-10-CM

## 2024-03-29 DIAGNOSIS — N40.0 BENIGN PROSTATIC HYPERPLASIA WITHOUT LOWER URINARY TRACT SYMPTOMS: ICD-10-CM

## 2024-03-29 DIAGNOSIS — Z90.49 ACQUIRED ABSENCE OF OTHER SPECIFIED PARTS OF DIGESTIVE TRACT: ICD-10-CM

## 2024-03-29 DIAGNOSIS — K65.1 PERITONEAL ABSCESS: ICD-10-CM

## 2024-03-29 DIAGNOSIS — Z85.820 PERSONAL HISTORY OF MALIGNANT MELANOMA OF SKIN: ICD-10-CM

## 2024-03-29 DIAGNOSIS — T81.44XA SEPSIS FOLLOWING A PROCEDURE, INITIAL ENCOUNTER: ICD-10-CM

## 2024-03-29 DIAGNOSIS — A41.9 SEPSIS, UNSPECIFIED ORGANISM: ICD-10-CM

## 2024-03-29 DIAGNOSIS — C20 MALIGNANT NEOPLASM OF RECTUM: ICD-10-CM

## 2024-03-29 DIAGNOSIS — N32.3 DIVERTICULUM OF BLADDER: ICD-10-CM

## 2024-03-29 DIAGNOSIS — Z11.52 ENCOUNTER FOR SCREENING FOR COVID-19: ICD-10-CM

## 2024-03-29 DIAGNOSIS — Z98.890 OTHER SPECIFIED POSTPROCEDURAL STATES: Chronic | ICD-10-CM

## 2024-03-29 DIAGNOSIS — Z93.2 ILEOSTOMY STATUS: ICD-10-CM

## 2024-03-29 LAB
ALBUMIN SERPL ELPH-MCNC: 3.7 G/DL — SIGNIFICANT CHANGE UP (ref 3.5–5.2)
ALP SERPL-CCNC: 107 U/L — SIGNIFICANT CHANGE UP (ref 30–115)
ALT FLD-CCNC: 8 U/L — SIGNIFICANT CHANGE UP (ref 0–41)
ANION GAP SERPL CALC-SCNC: 9 MMOL/L — SIGNIFICANT CHANGE UP (ref 7–14)
APPEARANCE UR: CLEAR — SIGNIFICANT CHANGE UP
AST SERPL-CCNC: 16 U/L — SIGNIFICANT CHANGE UP (ref 0–41)
BACTERIA # UR AUTO: NEGATIVE /HPF — SIGNIFICANT CHANGE UP
BASOPHILS # BLD AUTO: 0.03 K/UL — SIGNIFICANT CHANGE UP (ref 0–0.2)
BASOPHILS NFR BLD AUTO: 0.2 % — SIGNIFICANT CHANGE UP (ref 0–1)
BILIRUB SERPL-MCNC: 0.4 MG/DL — SIGNIFICANT CHANGE UP (ref 0.2–1.2)
BILIRUB UR-MCNC: NEGATIVE — SIGNIFICANT CHANGE UP
BUN SERPL-MCNC: 18 MG/DL — SIGNIFICANT CHANGE UP (ref 10–20)
CALCIUM SERPL-MCNC: 9.2 MG/DL — SIGNIFICANT CHANGE UP (ref 8.4–10.5)
CAST: 5 /LPF — HIGH (ref 0–4)
CHLORIDE SERPL-SCNC: 98 MMOL/L — SIGNIFICANT CHANGE UP (ref 98–110)
CO2 SERPL-SCNC: 26 MMOL/L — SIGNIFICANT CHANGE UP (ref 17–32)
COLOR SPEC: YELLOW — SIGNIFICANT CHANGE UP
CREAT SERPL-MCNC: 0.9 MG/DL — SIGNIFICANT CHANGE UP (ref 0.7–1.5)
DIFF PNL FLD: NEGATIVE — SIGNIFICANT CHANGE UP
EGFR: 91 ML/MIN/1.73M2 — SIGNIFICANT CHANGE UP
EOSINOPHIL # BLD AUTO: 0 K/UL — SIGNIFICANT CHANGE UP (ref 0–0.7)
EOSINOPHIL NFR BLD AUTO: 0 % — SIGNIFICANT CHANGE UP (ref 0–8)
FLUAV AG NPH QL: SIGNIFICANT CHANGE UP
FLUBV AG NPH QL: SIGNIFICANT CHANGE UP
GLUCOSE SERPL-MCNC: 123 MG/DL — HIGH (ref 70–99)
GLUCOSE UR QL: NEGATIVE MG/DL — SIGNIFICANT CHANGE UP
HCT VFR BLD CALC: 29.5 % — LOW (ref 42–52)
HGB BLD-MCNC: 9.5 G/DL — LOW (ref 14–18)
IMM GRANULOCYTES NFR BLD AUTO: 0.5 % — HIGH (ref 0.1–0.3)
KETONES UR-MCNC: ABNORMAL MG/DL
LACTATE SERPL-SCNC: 1.9 MMOL/L — SIGNIFICANT CHANGE UP (ref 0.7–2)
LEUKOCYTE ESTERASE UR-ACNC: NEGATIVE — SIGNIFICANT CHANGE UP
LYMPHOCYTES # BLD AUTO: 0.91 K/UL — LOW (ref 1.2–3.4)
LYMPHOCYTES # BLD AUTO: 5.8 % — LOW (ref 20.5–51.1)
MCHC RBC-ENTMCNC: 28.4 PG — SIGNIFICANT CHANGE UP (ref 27–31)
MCHC RBC-ENTMCNC: 32.2 G/DL — SIGNIFICANT CHANGE UP (ref 32–37)
MCV RBC AUTO: 88.1 FL — SIGNIFICANT CHANGE UP (ref 80–94)
MONOCYTES # BLD AUTO: 1.47 K/UL — HIGH (ref 0.1–0.6)
MONOCYTES NFR BLD AUTO: 9.4 % — HIGH (ref 1.7–9.3)
NEUTROPHILS # BLD AUTO: 13.15 K/UL — HIGH (ref 1.4–6.5)
NEUTROPHILS NFR BLD AUTO: 84.1 % — HIGH (ref 42.2–75.2)
NITRITE UR-MCNC: NEGATIVE — SIGNIFICANT CHANGE UP
NRBC # BLD: 0 /100 WBCS — SIGNIFICANT CHANGE UP (ref 0–0)
PH UR: 5.5 — SIGNIFICANT CHANGE UP (ref 5–8)
PLATELET # BLD AUTO: 382 K/UL — SIGNIFICANT CHANGE UP (ref 130–400)
PMV BLD: 10.2 FL — SIGNIFICANT CHANGE UP (ref 7.4–10.4)
POTASSIUM SERPL-MCNC: 4.3 MMOL/L — SIGNIFICANT CHANGE UP (ref 3.5–5)
POTASSIUM SERPL-SCNC: 4.3 MMOL/L — SIGNIFICANT CHANGE UP (ref 3.5–5)
PROT SERPL-MCNC: 6.7 G/DL — SIGNIFICANT CHANGE UP (ref 6–8)
PROT UR-MCNC: 30 MG/DL
RBC # BLD: 3.35 M/UL — LOW (ref 4.7–6.1)
RBC # FLD: 14.1 % — SIGNIFICANT CHANGE UP (ref 11.5–14.5)
RBC CASTS # UR COMP ASSIST: 1 /HPF — SIGNIFICANT CHANGE UP (ref 0–4)
RSV RNA NPH QL NAA+NON-PROBE: SIGNIFICANT CHANGE UP
SARS-COV-2 RNA SPEC QL NAA+PROBE: SIGNIFICANT CHANGE UP
SODIUM SERPL-SCNC: 133 MMOL/L — LOW (ref 135–146)
SP GR SPEC: 1.02 — SIGNIFICANT CHANGE UP (ref 1–1.03)
SQUAMOUS # UR AUTO: 1 /HPF — SIGNIFICANT CHANGE UP (ref 0–5)
UROBILINOGEN FLD QL: 0.2 MG/DL — SIGNIFICANT CHANGE UP (ref 0.2–1)
WBC # BLD: 15.64 K/UL — HIGH (ref 4.8–10.8)
WBC # FLD AUTO: 15.64 K/UL — HIGH (ref 4.8–10.8)
WBC UR QL: 2 /HPF — SIGNIFICANT CHANGE UP (ref 0–5)

## 2024-03-29 PROCEDURE — 71045 X-RAY EXAM CHEST 1 VIEW: CPT | Mod: 26

## 2024-03-29 PROCEDURE — 99285 EMERGENCY DEPT VISIT HI MDM: CPT | Mod: FS

## 2024-03-29 RX ORDER — METRONIDAZOLE 500 MG
500 TABLET ORAL ONCE
Refills: 0 | Status: COMPLETED | OUTPATIENT
Start: 2024-03-29 | End: 2024-03-29

## 2024-03-29 RX ORDER — IOHEXOL 300 MG/ML
30 INJECTION, SOLUTION INTRAVENOUS ONCE
Refills: 0 | Status: COMPLETED | OUTPATIENT
Start: 2024-03-29 | End: 2024-03-29

## 2024-03-29 RX ORDER — CEFTRIAXONE 500 MG/1
1000 INJECTION, POWDER, FOR SOLUTION INTRAMUSCULAR; INTRAVENOUS ONCE
Refills: 0 | Status: COMPLETED | OUTPATIENT
Start: 2024-03-29 | End: 2024-03-29

## 2024-03-29 RX ORDER — IBUPROFEN 200 MG
600 TABLET ORAL ONCE
Refills: 0 | Status: COMPLETED | OUTPATIENT
Start: 2024-03-29 | End: 2024-03-29

## 2024-03-29 RX ORDER — SODIUM CHLORIDE 9 MG/ML
1000 INJECTION, SOLUTION INTRAVENOUS ONCE
Refills: 0 | Status: COMPLETED | OUTPATIENT
Start: 2024-03-29 | End: 2024-03-29

## 2024-03-29 RX ORDER — SODIUM CHLORIDE 9 MG/ML
1500 INJECTION, SOLUTION INTRAVENOUS ONCE
Refills: 0 | Status: COMPLETED | OUTPATIENT
Start: 2024-03-29 | End: 2024-03-29

## 2024-03-29 RX ORDER — KETOROLAC TROMETHAMINE 30 MG/ML
15 SYRINGE (ML) INJECTION ONCE
Refills: 0 | Status: DISCONTINUED | OUTPATIENT
Start: 2024-03-29 | End: 2024-03-29

## 2024-03-29 RX ORDER — ACETAMINOPHEN 500 MG
650 TABLET ORAL ONCE
Refills: 0 | Status: COMPLETED | OUTPATIENT
Start: 2024-03-29 | End: 2024-03-29

## 2024-03-29 RX ADMIN — Medication 650 MILLIGRAM(S): at 21:19

## 2024-03-29 RX ADMIN — IOHEXOL 30 MILLILITER(S): 300 INJECTION, SOLUTION INTRAVENOUS at 21:19

## 2024-03-29 RX ADMIN — CEFTRIAXONE 100 MILLIGRAM(S): 500 INJECTION, POWDER, FOR SOLUTION INTRAMUSCULAR; INTRAVENOUS at 19:52

## 2024-03-29 RX ADMIN — Medication 600 MILLIGRAM(S): at 23:20

## 2024-03-29 RX ADMIN — SODIUM CHLORIDE 1000 MILLILITER(S): 9 INJECTION, SOLUTION INTRAVENOUS at 19:53

## 2024-03-29 RX ADMIN — Medication 15 MILLIGRAM(S): at 19:53

## 2024-03-29 RX ADMIN — SODIUM CHLORIDE 1500 MILLILITER(S): 9 INJECTION, SOLUTION INTRAVENOUS at 21:19

## 2024-03-29 NOTE — ED PROVIDER NOTE - PROGRESS NOTE DETAILS
Patient signed out to Dr. Maza pending imaging and reassessment. ck: patient accepted to surgery under dr simmons

## 2024-03-29 NOTE — ED PROVIDER NOTE - PHYSICAL EXAMINATION
CONSTITUTIONAL: non-toxic appearing male, no acute distress  SKIN: skin exam is warm and dry,  HEAD: Normocephalic; atraumatic.  EYES: PERRL, 3 mm bilateral, no nystagmus, EOM intact; conjunctiva and sclera clear.  ENT: dry MM   NECK: ROM intact.  CARD: S1, S2 normal, no murmur  RESP: good air movement bilaterally, no increased wob.   ABD: soft; non-distended; non-tender. +ostomy bag in place with loose brown stool   EXT: Normal ROM.   NEURO: awake, alert, following commands, oriented, grossly unremarkable. No Focal deficits. GCS 15.   PSYCH: Cooperative, appropriate.

## 2024-03-29 NOTE — ED PROVIDER NOTE - OBJECTIVE STATEMENT
74 year old male, past medical history rectal ca s/p rectal mass excision w/ ileostomy, RA, bph, who presents with fever. patient with fever, tmax 104, that began yesterday, last dose tylenol @3pm today. patient presented to urgent care earlier today found to have +ua sent to ed for further eval. reports associated body aches. denies nasal congestion, sore throat, cough, shortness of breath, chest pain, vomiting, urinary symptoms, abd pain.

## 2024-03-29 NOTE — ED PROVIDER NOTE - ATTENDING APP SHARED VISIT CONTRIBUTION OF CARE
74-year-old male past medical history of rectal cancer status postsurgical resection, no chemo or radiation, melanoma, rheumatoid arthritis presents with fever.  The patient states that yesterday he started to have fevers and chills.  Temperature worsened today, Tmax 104.  Patient at baseline takes Tylenol daily for pain.  Went to urgent care center was found to have a urinary tract infection and sent to the ED for further evaluation.  Patient denies any cough or URI symptoms.  No nausea vomiting or diarrhea.  Patient is chronically soft stool through his ostomy.  Denies any recent changes.  No abdominal pain.  Denies any urinary symptoms.  Does report some generalized fatigue.    CONSTITUTIONAL: Well-developed; well-nourished; in no acute distress.   SKIN: warm, dry  HEAD: Normocephalic; atraumatic.  EYES: PERRL, EOMI, no conjunctival erythema  ENT: No nasal discharge; airway clear.  NECK: Supple; non tender.  CARD: S1, S2 normal;  Regular rate and rhythm.   RESP: No wheezes, rales or rhonchi.  ABD: soft non tender, non distended, no rebound or guarding, ostomy to the right abdomen, no blood. no cva tenderness.   EXT: Normal ROM.  5/5 strength in all 4 extremities   LYMPH: No acute cervical adenopathy.  NEURO: Alert, oriented, grossly unremarkable. neurovascularly intact  PSYCH: Cooperative, appropriate.

## 2024-03-29 NOTE — ED PROVIDER NOTE - CLINICAL SUMMARY MEDICAL DECISION MAKING FREE TEXT BOX
pt s/o to me from Dr. Delgado- labs imaging reviewed. abx given, dw surg, will admit for further eval

## 2024-03-30 DIAGNOSIS — T81.43XA INFECTION FOLLOWING A PROCEDURE, ORGAN AND SPACE SURGICAL SITE, INITIAL ENCOUNTER: ICD-10-CM

## 2024-03-30 LAB
ANION GAP SERPL CALC-SCNC: 12 MMOL/L — SIGNIFICANT CHANGE UP (ref 7–14)
ANION GAP SERPL CALC-SCNC: 12 MMOL/L — SIGNIFICANT CHANGE UP (ref 7–14)
APTT BLD: 35 SEC — SIGNIFICANT CHANGE UP (ref 27–39.2)
BASOPHILS # BLD AUTO: 0.02 K/UL — SIGNIFICANT CHANGE UP (ref 0–0.2)
BASOPHILS # BLD AUTO: 0.03 K/UL — SIGNIFICANT CHANGE UP (ref 0–0.2)
BASOPHILS NFR BLD AUTO: 0.1 % — SIGNIFICANT CHANGE UP (ref 0–1)
BASOPHILS NFR BLD AUTO: 0.2 % — SIGNIFICANT CHANGE UP (ref 0–1)
BLD GP AB SCN SERPL QL: SIGNIFICANT CHANGE UP
BUN SERPL-MCNC: 11 MG/DL — SIGNIFICANT CHANGE UP (ref 10–20)
BUN SERPL-MCNC: 13 MG/DL — SIGNIFICANT CHANGE UP (ref 10–20)
CALCIUM SERPL-MCNC: 8.6 MG/DL — SIGNIFICANT CHANGE UP (ref 8.4–10.5)
CALCIUM SERPL-MCNC: 8.7 MG/DL — SIGNIFICANT CHANGE UP (ref 8.4–10.5)
CHLORIDE SERPL-SCNC: 101 MMOL/L — SIGNIFICANT CHANGE UP (ref 98–110)
CHLORIDE SERPL-SCNC: 102 MMOL/L — SIGNIFICANT CHANGE UP (ref 98–110)
CO2 SERPL-SCNC: 23 MMOL/L — SIGNIFICANT CHANGE UP (ref 17–32)
CO2 SERPL-SCNC: 23 MMOL/L — SIGNIFICANT CHANGE UP (ref 17–32)
CREAT SERPL-MCNC: 1 MG/DL — SIGNIFICANT CHANGE UP (ref 0.7–1.5)
CREAT SERPL-MCNC: 1 MG/DL — SIGNIFICANT CHANGE UP (ref 0.7–1.5)
CULTURE RESULTS: SIGNIFICANT CHANGE UP
EGFR: 80 ML/MIN/1.73M2 — SIGNIFICANT CHANGE UP
EGFR: 80 ML/MIN/1.73M2 — SIGNIFICANT CHANGE UP
EOSINOPHIL # BLD AUTO: 0 K/UL — SIGNIFICANT CHANGE UP (ref 0–0.7)
EOSINOPHIL # BLD AUTO: 0 K/UL — SIGNIFICANT CHANGE UP (ref 0–0.7)
EOSINOPHIL NFR BLD AUTO: 0 % — SIGNIFICANT CHANGE UP (ref 0–8)
EOSINOPHIL NFR BLD AUTO: 0 % — SIGNIFICANT CHANGE UP (ref 0–8)
GLUCOSE SERPL-MCNC: 113 MG/DL — HIGH (ref 70–99)
GLUCOSE SERPL-MCNC: 114 MG/DL — HIGH (ref 70–99)
HCT VFR BLD CALC: 24.1 % — LOW (ref 42–52)
HCT VFR BLD CALC: 25.2 % — LOW (ref 42–52)
HGB BLD-MCNC: 7.9 G/DL — LOW (ref 14–18)
HGB BLD-MCNC: 8.1 G/DL — LOW (ref 14–18)
IMM GRANULOCYTES NFR BLD AUTO: 0.6 % — HIGH (ref 0.1–0.3)
IMM GRANULOCYTES NFR BLD AUTO: 0.7 % — HIGH (ref 0.1–0.3)
INR BLD: 1.51 RATIO — HIGH (ref 0.65–1.3)
LYMPHOCYTES # BLD AUTO: 0.53 K/UL — LOW (ref 1.2–3.4)
LYMPHOCYTES # BLD AUTO: 0.91 K/UL — LOW (ref 1.2–3.4)
LYMPHOCYTES # BLD AUTO: 3.3 % — LOW (ref 20.5–51.1)
LYMPHOCYTES # BLD AUTO: 6.8 % — LOW (ref 20.5–51.1)
MAGNESIUM SERPL-MCNC: 1.8 MG/DL — SIGNIFICANT CHANGE UP (ref 1.8–2.4)
MCHC RBC-ENTMCNC: 28.5 PG — SIGNIFICANT CHANGE UP (ref 27–31)
MCHC RBC-ENTMCNC: 28.5 PG — SIGNIFICANT CHANGE UP (ref 27–31)
MCHC RBC-ENTMCNC: 32.1 G/DL — SIGNIFICANT CHANGE UP (ref 32–37)
MCHC RBC-ENTMCNC: 32.8 G/DL — SIGNIFICANT CHANGE UP (ref 32–37)
MCV RBC AUTO: 87 FL — SIGNIFICANT CHANGE UP (ref 80–94)
MCV RBC AUTO: 88.7 FL — SIGNIFICANT CHANGE UP (ref 80–94)
MONOCYTES # BLD AUTO: 1.19 K/UL — HIGH (ref 0.1–0.6)
MONOCYTES # BLD AUTO: 1.28 K/UL — HIGH (ref 0.1–0.6)
MONOCYTES NFR BLD AUTO: 7.3 % — SIGNIFICANT CHANGE UP (ref 1.7–9.3)
MONOCYTES NFR BLD AUTO: 9.5 % — HIGH (ref 1.7–9.3)
NEUTROPHILS # BLD AUTO: 11.15 K/UL — HIGH (ref 1.4–6.5)
NEUTROPHILS # BLD AUTO: 14.4 K/UL — HIGH (ref 1.4–6.5)
NEUTROPHILS NFR BLD AUTO: 82.9 % — HIGH (ref 42.2–75.2)
NEUTROPHILS NFR BLD AUTO: 88.6 % — HIGH (ref 42.2–75.2)
NRBC # BLD: 0 /100 WBCS — SIGNIFICANT CHANGE UP (ref 0–0)
NRBC # BLD: 0 /100 WBCS — SIGNIFICANT CHANGE UP (ref 0–0)
PHOSPHATE SERPL-MCNC: 3.2 MG/DL — SIGNIFICANT CHANGE UP (ref 2.1–4.9)
PLATELET # BLD AUTO: 306 K/UL — SIGNIFICANT CHANGE UP (ref 130–400)
PLATELET # BLD AUTO: 306 K/UL — SIGNIFICANT CHANGE UP (ref 130–400)
PMV BLD: 10.6 FL — HIGH (ref 7.4–10.4)
PMV BLD: 10.7 FL — HIGH (ref 7.4–10.4)
POTASSIUM SERPL-MCNC: 4 MMOL/L — SIGNIFICANT CHANGE UP (ref 3.5–5)
POTASSIUM SERPL-MCNC: 4.5 MMOL/L — SIGNIFICANT CHANGE UP (ref 3.5–5)
POTASSIUM SERPL-SCNC: 4 MMOL/L — SIGNIFICANT CHANGE UP (ref 3.5–5)
POTASSIUM SERPL-SCNC: 4.5 MMOL/L — SIGNIFICANT CHANGE UP (ref 3.5–5)
PROTHROM AB SERPL-ACNC: 17.3 SEC — HIGH (ref 9.95–12.87)
RBC # BLD: 2.77 M/UL — LOW (ref 4.7–6.1)
RBC # BLD: 2.84 M/UL — LOW (ref 4.7–6.1)
RBC # FLD: 14.5 % — SIGNIFICANT CHANGE UP (ref 11.5–14.5)
RBC # FLD: 14.6 % — HIGH (ref 11.5–14.5)
SODIUM SERPL-SCNC: 136 MMOL/L — SIGNIFICANT CHANGE UP (ref 135–146)
SODIUM SERPL-SCNC: 137 MMOL/L — SIGNIFICANT CHANGE UP (ref 135–146)
SPECIMEN SOURCE: SIGNIFICANT CHANGE UP
WBC # BLD: 13.46 K/UL — HIGH (ref 4.8–10.8)
WBC # BLD: 16.25 K/UL — HIGH (ref 4.8–10.8)
WBC # FLD AUTO: 13.46 K/UL — HIGH (ref 4.8–10.8)
WBC # FLD AUTO: 16.25 K/UL — HIGH (ref 4.8–10.8)

## 2024-03-30 PROCEDURE — 85025 COMPLETE CBC W/AUTO DIFF WBC: CPT

## 2024-03-30 PROCEDURE — 87186 SC STD MICRODIL/AGAR DIL: CPT

## 2024-03-30 PROCEDURE — 87070 CULTURE OTHR SPECIMN AEROBIC: CPT

## 2024-03-30 PROCEDURE — 87077 CULTURE AEROBIC IDENTIFY: CPT

## 2024-03-30 PROCEDURE — 86901 BLOOD TYPING SEROLOGIC RH(D): CPT

## 2024-03-30 PROCEDURE — 97162 PT EVAL MOD COMPLEX 30 MIN: CPT | Mod: GP

## 2024-03-30 PROCEDURE — 74177 CT ABD & PELVIS W/CONTRAST: CPT | Mod: 26,MC

## 2024-03-30 PROCEDURE — 86850 RBC ANTIBODY SCREEN: CPT

## 2024-03-30 PROCEDURE — 87205 SMEAR GRAM STAIN: CPT

## 2024-03-30 PROCEDURE — 74150 CT ABDOMEN W/O CONTRAST: CPT

## 2024-03-30 PROCEDURE — 86900 BLOOD TYPING SEROLOGIC ABO: CPT

## 2024-03-30 PROCEDURE — 85610 PROTHROMBIN TIME: CPT

## 2024-03-30 PROCEDURE — 36415 COLL VENOUS BLD VENIPUNCTURE: CPT

## 2024-03-30 PROCEDURE — 87075 CULTR BACTERIA EXCEPT BLOOD: CPT

## 2024-03-30 PROCEDURE — 85027 COMPLETE CBC AUTOMATED: CPT

## 2024-03-30 PROCEDURE — 85730 THROMBOPLASTIN TIME PARTIAL: CPT

## 2024-03-30 PROCEDURE — 83735 ASSAY OF MAGNESIUM: CPT

## 2024-03-30 PROCEDURE — 80048 BASIC METABOLIC PNL TOTAL CA: CPT

## 2024-03-30 PROCEDURE — C1729: CPT

## 2024-03-30 PROCEDURE — 84100 ASSAY OF PHOSPHORUS: CPT

## 2024-03-30 PROCEDURE — C1769: CPT

## 2024-03-30 RX ORDER — SODIUM CHLORIDE 9 MG/ML
1000 INJECTION, SOLUTION INTRAVENOUS
Refills: 0 | Status: DISCONTINUED | OUTPATIENT
Start: 2024-03-30 | End: 2024-04-01

## 2024-03-30 RX ORDER — PIPERACILLIN AND TAZOBACTAM 4; .5 G/20ML; G/20ML
3.38 INJECTION, POWDER, LYOPHILIZED, FOR SOLUTION INTRAVENOUS ONCE
Refills: 0 | Status: COMPLETED | OUTPATIENT
Start: 2024-03-30 | End: 2024-03-30

## 2024-03-30 RX ORDER — ACETAMINOPHEN 500 MG
650 TABLET ORAL ONCE
Refills: 0 | Status: COMPLETED | OUTPATIENT
Start: 2024-03-30 | End: 2024-03-30

## 2024-03-30 RX ORDER — GABAPENTIN 400 MG/1
600 CAPSULE ORAL ONCE
Refills: 0 | Status: COMPLETED | OUTPATIENT
Start: 2024-03-30 | End: 2024-03-30

## 2024-03-30 RX ORDER — FOLIC ACID 0.8 MG
1 TABLET ORAL DAILY
Refills: 0 | Status: DISCONTINUED | OUTPATIENT
Start: 2024-03-30 | End: 2024-04-02

## 2024-03-30 RX ORDER — ENOXAPARIN SODIUM 100 MG/ML
40 INJECTION SUBCUTANEOUS EVERY 24 HOURS
Refills: 0 | Status: DISCONTINUED | OUTPATIENT
Start: 2024-03-30 | End: 2024-03-30

## 2024-03-30 RX ORDER — PIPERACILLIN AND TAZOBACTAM 4; .5 G/20ML; G/20ML
3.38 INJECTION, POWDER, LYOPHILIZED, FOR SOLUTION INTRAVENOUS EVERY 8 HOURS
Refills: 0 | Status: DISCONTINUED | OUTPATIENT
Start: 2024-03-30 | End: 2024-04-02

## 2024-03-30 RX ORDER — ACETAMINOPHEN 500 MG
1000 TABLET ORAL EVERY 8 HOURS
Refills: 0 | Status: DISCONTINUED | OUTPATIENT
Start: 2024-03-30 | End: 2024-04-02

## 2024-03-30 RX ORDER — SODIUM CHLORIDE 9 MG/ML
1000 INJECTION INTRAMUSCULAR; INTRAVENOUS; SUBCUTANEOUS
Refills: 0 | Status: DISCONTINUED | OUTPATIENT
Start: 2024-03-30 | End: 2024-03-30

## 2024-03-30 RX ORDER — FINASTERIDE 5 MG/1
5 TABLET, FILM COATED ORAL DAILY
Refills: 0 | Status: DISCONTINUED | OUTPATIENT
Start: 2024-03-30 | End: 2024-04-02

## 2024-03-30 RX ORDER — METHOTREXATE 2.5 MG/1
0 TABLET ORAL
Refills: 0 | DISCHARGE

## 2024-03-30 RX ORDER — TAMSULOSIN HYDROCHLORIDE 0.4 MG/1
0.4 CAPSULE ORAL AT BEDTIME
Refills: 0 | Status: DISCONTINUED | OUTPATIENT
Start: 2024-03-30 | End: 2024-04-02

## 2024-03-30 RX ORDER — LOPERAMIDE HCL 2 MG
2 TABLET ORAL
Refills: 0 | Status: DISCONTINUED | OUTPATIENT
Start: 2024-03-30 | End: 2024-04-02

## 2024-03-30 RX ADMIN — Medication 650 MILLIGRAM(S): at 09:25

## 2024-03-30 RX ADMIN — Medication 1000 MILLIGRAM(S): at 22:10

## 2024-03-30 RX ADMIN — Medication 650 MILLIGRAM(S): at 09:20

## 2024-03-30 RX ADMIN — Medication 2 MILLIGRAM(S): at 17:38

## 2024-03-30 RX ADMIN — Medication 2 MILLIGRAM(S): at 09:06

## 2024-03-30 RX ADMIN — Medication 1000 MILLIGRAM(S): at 16:47

## 2024-03-30 RX ADMIN — SODIUM CHLORIDE 100 MILLILITER(S): 9 INJECTION, SOLUTION INTRAVENOUS at 12:12

## 2024-03-30 RX ADMIN — SODIUM CHLORIDE 100 MILLILITER(S): 9 INJECTION, SOLUTION INTRAVENOUS at 22:11

## 2024-03-30 RX ADMIN — PIPERACILLIN AND TAZOBACTAM 25 GRAM(S): 4; .5 INJECTION, POWDER, LYOPHILIZED, FOR SOLUTION INTRAVENOUS at 09:06

## 2024-03-30 RX ADMIN — PIPERACILLIN AND TAZOBACTAM 25 GRAM(S): 4; .5 INJECTION, POWDER, LYOPHILIZED, FOR SOLUTION INTRAVENOUS at 17:38

## 2024-03-30 RX ADMIN — Medication 100 MILLIGRAM(S): at 00:23

## 2024-03-30 RX ADMIN — PIPERACILLIN AND TAZOBACTAM 200 GRAM(S): 4; .5 INJECTION, POWDER, LYOPHILIZED, FOR SOLUTION INTRAVENOUS at 02:19

## 2024-03-30 RX ADMIN — Medication 1000 MILLIGRAM(S): at 16:04

## 2024-03-30 RX ADMIN — GABAPENTIN 600 MILLIGRAM(S): 400 CAPSULE ORAL at 12:07

## 2024-03-30 RX ADMIN — FINASTERIDE 5 MILLIGRAM(S): 5 TABLET, FILM COATED ORAL at 12:07

## 2024-03-30 RX ADMIN — SODIUM CHLORIDE 100 MILLILITER(S): 9 INJECTION INTRAMUSCULAR; INTRAVENOUS; SUBCUTANEOUS at 05:04

## 2024-03-30 RX ADMIN — PIPERACILLIN AND TAZOBACTAM 25 GRAM(S): 4; .5 INJECTION, POWDER, LYOPHILIZED, FOR SOLUTION INTRAVENOUS at 22:10

## 2024-03-30 RX ADMIN — TAMSULOSIN HYDROCHLORIDE 0.4 MILLIGRAM(S): 0.4 CAPSULE ORAL at 12:11

## 2024-03-30 NOTE — PROVIDER CONTACT NOTE (MEDICATION) - SITUATION
Pt has fever 102.2 and no tylenol ordered PRN for treatment of fever. tried contacting md multiple times no one answering

## 2024-03-30 NOTE — H&P ADULT - NSHPPHYSICALEXAM_GEN_ALL_CORE
T(C): 37.3 (03-29-24 @ 23:41), Max: 37.9 (03-29-24 @ 22:43)  HR: 84 (03-29-24 @ 23:41) (68 - 98)  BP: 104/58 (03-29-24 @ 23:41) (91/50 - 118/72)  RR: 18 (03-29-24 @ 23:41) (17 - 20)  SpO2: 98% (03-29-24 @ 23:41) (98% - 100%)    General: NAD, AAOx3, calm and cooperative  Abdomen: Soft, non-distended, non-tender, no rebound, no guarding. +BS, ileostomy in place.  Skin: Warm/dry, normal color, no jaundice  Incision/wound: healing well, dressings in place, clean, dry and intact

## 2024-03-30 NOTE — H&P ADULT - ASSESSMENT
ASSESSMENT:  Patient is a 74 year old male, past medical history of rectal ca s/p low anterior resection w/ loop ileostomy creation on feb 29th, RA, bph, who presents with fever. Tmax 104, that began yesterday, last dose tylenol @3pm today. Patient presented to urgent care earlier today found to have +ua sent to ed for further eval. Reports associated body aches. denies nasal congestion, sore throat, cough, shortness of breath, chest pain, vomiting, urinary symptoms, abd pain. Physical exam findings, imaging, and labs as documented above.     PLAN:  - Case and plan discussed with surgical attending Dr. Lorenzo  - Admit to general surgery under Dr. Lorenzo  - C/s IR team for abscess drainage  - Hemodynamic monitoring  - Monitor ileostomy output  - Trend fevers  - IV Abx  - DVT ppx    Above plan discussed with Attending Surgeon Dr. Lorenzo  , patient, patient family, and Primary team

## 2024-03-30 NOTE — H&P ADULT - HISTORY OF PRESENT ILLNESS
Patient is a 74 year old male, past medical history of rectal ca s/p low anterior resection w/ loop ileostomy creation on feb 29th, RA, bph, who presents with fever. Tmax 104, that began yesterday, last dose tylenol @3pm today. Patient presented to urgent care earlier today found to have +ua sent to ed for further eval. Reports associated body aches. denies nasal congestion, sore throat, cough, shortness of breath, chest pain, vomiting, urinary symptoms, abd pain.

## 2024-03-30 NOTE — H&P ADULT - NSHPLABSRESULTS_GEN_ALL_CORE
< from: CT Abdomen and Pelvis w/ Oral Cont and w/ IV Cont (03.30.24 @ 00:04) >    CT ABDOMEN AND PELVIS OC IC   ORDERED BY: NAVDEEP BLISS     PROCEDURE DATE:  03/30/2024          INTERPRETATION:  CLINICAL STATEMENT: Fever. Diarrhea. Rectal cancer. Post   low anterior resection with coloanal anastomosis. Post diverting loop   ileostomy.    TECHNIQUE: Contiguous axial CT images were obtained from the lower chest   to the pubic symphysis following administration of intravenous contrast.    100 cc administered of Omnipaque 350 (0 cc discarded).  Oral contrast was   administered.  Reformatted images in the coronal and sagittal planes were   acquired.    Comparison made with CT abdomen and pelvis 12/2/2023.    FINDINGS:    LOWER CHEST: Unremarkable.    HEPATOBILIARY: Scattered hepatic cysts and subcentimeter hypodensities,   too small to characterize.    SPLEEN: Unremarkable.    PANCREAS: Unremarkable.    ADRENAL GLANDS: Unremarkable.    KIDNEYS: Unremarkable.    ABDOMINOPELVIC NODES: Unremarkable.    PELVIC ORGANS: BPH. Likely reactive thickening of urinary bladder wall.   Urinary bladder diverticulum, unchanged    PERITONEUM/MESENTERY/BOWEL: Post interval low anterior resection with   coloanal anastomosis, diverting loop ileostomy. Deep pelvic abscess   measuring 12.4 x 5.7 x 4.3 cm near expected region of coloanal   anastomosis (series 8, image 65). Inflammatory stranding of pelvis. No   bowel obstruction. No gross free air.    BONES/SOFT TISSUES: Degenerative change, lumbar spine.    OTHER: Vascular calcifications.      IMPRESSION:    Postinterval low anterior resection with coloanal anastomosis, diverting   loop ileostomy. Deep pelvic abscess measuring 12.4 x 5.7 x 4.3 cm near   expected region of coloanal anastomosis.    < end of copied text >

## 2024-03-30 NOTE — CONSULT NOTE ADULT - ATTENDING COMMENTS
Patient is a 74M s/p low anterior resection with handsewn coloanal anastomosis and loop ileostomy on 2/29/2024 for T1N0 rectal cancer who presents with pelvic abscess and drainage per rectum.    Patient seen and examined. Patient reports improvement. Denies nausea vomiting.  Stoma working well    Abdomen - soft, non distended, minimally tender.  Wounds healing well.  Ileostomy pink and viable with gas and stool in the appliance    Vitals labs and images reviewed    PLAN:  - Patient with pelvic abscess likely draining through the anastomosis  - Regular diet   - IV zosyn  - monitor stoma output  - patient will require EUA and transanal drainage of abscess this week

## 2024-03-31 LAB
ANION GAP SERPL CALC-SCNC: 13 MMOL/L — SIGNIFICANT CHANGE UP (ref 7–14)
ANION GAP SERPL CALC-SCNC: 9 MMOL/L — SIGNIFICANT CHANGE UP (ref 7–14)
APTT BLD: 28 SEC — SIGNIFICANT CHANGE UP (ref 27–39.2)
BASOPHILS # BLD AUTO: 0.04 K/UL — SIGNIFICANT CHANGE UP (ref 0–0.2)
BASOPHILS NFR BLD AUTO: 0.3 % — SIGNIFICANT CHANGE UP (ref 0–1)
BUN SERPL-MCNC: 10 MG/DL — SIGNIFICANT CHANGE UP (ref 10–20)
BUN SERPL-MCNC: 11 MG/DL — SIGNIFICANT CHANGE UP (ref 10–20)
CALCIUM SERPL-MCNC: 8.6 MG/DL — SIGNIFICANT CHANGE UP (ref 8.4–10.5)
CALCIUM SERPL-MCNC: 8.8 MG/DL — SIGNIFICANT CHANGE UP (ref 8.4–10.5)
CHLORIDE SERPL-SCNC: 102 MMOL/L — SIGNIFICANT CHANGE UP (ref 98–110)
CHLORIDE SERPL-SCNC: 105 MMOL/L — SIGNIFICANT CHANGE UP (ref 98–110)
CO2 SERPL-SCNC: 22 MMOL/L — SIGNIFICANT CHANGE UP (ref 17–32)
CO2 SERPL-SCNC: 27 MMOL/L — SIGNIFICANT CHANGE UP (ref 17–32)
CREAT SERPL-MCNC: 1 MG/DL — SIGNIFICANT CHANGE UP (ref 0.7–1.5)
CREAT SERPL-MCNC: 1.1 MG/DL — SIGNIFICANT CHANGE UP (ref 0.7–1.5)
EGFR: 71 ML/MIN/1.73M2 — SIGNIFICANT CHANGE UP
EGFR: 80 ML/MIN/1.73M2 — SIGNIFICANT CHANGE UP
EOSINOPHIL # BLD AUTO: 0.01 K/UL — SIGNIFICANT CHANGE UP (ref 0–0.7)
EOSINOPHIL NFR BLD AUTO: 0.1 % — SIGNIFICANT CHANGE UP (ref 0–8)
GLUCOSE SERPL-MCNC: 101 MG/DL — HIGH (ref 70–99)
GLUCOSE SERPL-MCNC: 114 MG/DL — HIGH (ref 70–99)
HCT VFR BLD CALC: 23.2 % — LOW (ref 42–52)
HCT VFR BLD CALC: 25.1 % — LOW (ref 42–52)
HGB BLD-MCNC: 7.4 G/DL — LOW (ref 14–18)
HGB BLD-MCNC: 8.1 G/DL — LOW (ref 14–18)
IMM GRANULOCYTES NFR BLD AUTO: 0.8 % — HIGH (ref 0.1–0.3)
INR BLD: 1.34 RATIO — HIGH (ref 0.65–1.3)
LYMPHOCYTES # BLD AUTO: 0.86 K/UL — LOW (ref 1.2–3.4)
LYMPHOCYTES # BLD AUTO: 6.1 % — LOW (ref 20.5–51.1)
MAGNESIUM SERPL-MCNC: 2.2 MG/DL — SIGNIFICANT CHANGE UP (ref 1.8–2.4)
MCHC RBC-ENTMCNC: 28 PG — SIGNIFICANT CHANGE UP (ref 27–31)
MCHC RBC-ENTMCNC: 28.2 PG — SIGNIFICANT CHANGE UP (ref 27–31)
MCHC RBC-ENTMCNC: 31.9 G/DL — LOW (ref 32–37)
MCHC RBC-ENTMCNC: 32.3 G/DL — SIGNIFICANT CHANGE UP (ref 32–37)
MCV RBC AUTO: 87.5 FL — SIGNIFICANT CHANGE UP (ref 80–94)
MCV RBC AUTO: 87.9 FL — SIGNIFICANT CHANGE UP (ref 80–94)
MONOCYTES # BLD AUTO: 1.53 K/UL — HIGH (ref 0.1–0.6)
MONOCYTES NFR BLD AUTO: 10.9 % — HIGH (ref 1.7–9.3)
NEUTROPHILS # BLD AUTO: 11.45 K/UL — HIGH (ref 1.4–6.5)
NEUTROPHILS NFR BLD AUTO: 81.8 % — HIGH (ref 42.2–75.2)
NRBC # BLD: 0 /100 WBCS — SIGNIFICANT CHANGE UP (ref 0–0)
NRBC # BLD: 0 /100 WBCS — SIGNIFICANT CHANGE UP (ref 0–0)
PHOSPHATE SERPL-MCNC: 3.4 MG/DL — SIGNIFICANT CHANGE UP (ref 2.1–4.9)
PLATELET # BLD AUTO: 313 K/UL — SIGNIFICANT CHANGE UP (ref 130–400)
PLATELET # BLD AUTO: 345 K/UL — SIGNIFICANT CHANGE UP (ref 130–400)
PMV BLD: 10.5 FL — HIGH (ref 7.4–10.4)
PMV BLD: 10.7 FL — HIGH (ref 7.4–10.4)
POTASSIUM SERPL-MCNC: 3.9 MMOL/L — SIGNIFICANT CHANGE UP (ref 3.5–5)
POTASSIUM SERPL-MCNC: 3.9 MMOL/L — SIGNIFICANT CHANGE UP (ref 3.5–5)
POTASSIUM SERPL-SCNC: 3.9 MMOL/L — SIGNIFICANT CHANGE UP (ref 3.5–5)
POTASSIUM SERPL-SCNC: 3.9 MMOL/L — SIGNIFICANT CHANGE UP (ref 3.5–5)
PROTHROM AB SERPL-ACNC: 15.3 SEC — HIGH (ref 9.95–12.87)
RBC # BLD: 2.64 M/UL — LOW (ref 4.7–6.1)
RBC # BLD: 2.87 M/UL — LOW (ref 4.7–6.1)
RBC # FLD: 14.4 % — SIGNIFICANT CHANGE UP (ref 11.5–14.5)
RBC # FLD: 14.6 % — HIGH (ref 11.5–14.5)
SODIUM SERPL-SCNC: 137 MMOL/L — SIGNIFICANT CHANGE UP (ref 135–146)
SODIUM SERPL-SCNC: 141 MMOL/L — SIGNIFICANT CHANGE UP (ref 135–146)
WBC # BLD: 14 K/UL — HIGH (ref 4.8–10.8)
WBC # BLD: 9.25 K/UL — SIGNIFICANT CHANGE UP (ref 4.8–10.8)
WBC # FLD AUTO: 14 K/UL — HIGH (ref 4.8–10.8)
WBC # FLD AUTO: 9.25 K/UL — SIGNIFICANT CHANGE UP (ref 4.8–10.8)

## 2024-03-31 PROCEDURE — 99231 SBSQ HOSP IP/OBS SF/LOW 25: CPT | Mod: GC,24

## 2024-03-31 PROCEDURE — 74150 CT ABDOMEN W/O CONTRAST: CPT | Mod: 26

## 2024-03-31 RX ORDER — GABAPENTIN 400 MG/1
600 CAPSULE ORAL THREE TIMES A DAY
Refills: 0 | Status: DISCONTINUED | OUTPATIENT
Start: 2024-03-31 | End: 2024-04-02

## 2024-03-31 RX ORDER — ENOXAPARIN SODIUM 100 MG/ML
40 INJECTION SUBCUTANEOUS EVERY 24 HOURS
Refills: 0 | Status: DISCONTINUED | OUTPATIENT
Start: 2024-03-31 | End: 2024-04-02

## 2024-03-31 RX ORDER — MAGNESIUM SULFATE 500 MG/ML
2 VIAL (ML) INJECTION ONCE
Refills: 0 | Status: COMPLETED | OUTPATIENT
Start: 2024-03-31 | End: 2024-03-31

## 2024-03-31 RX ADMIN — Medication 25 GRAM(S): at 05:47

## 2024-03-31 RX ADMIN — PIPERACILLIN AND TAZOBACTAM 25 GRAM(S): 4; .5 INJECTION, POWDER, LYOPHILIZED, FOR SOLUTION INTRAVENOUS at 17:43

## 2024-03-31 RX ADMIN — Medication 1 MILLIGRAM(S): at 14:22

## 2024-03-31 RX ADMIN — Medication 1000 MILLIGRAM(S): at 14:23

## 2024-03-31 RX ADMIN — GABAPENTIN 600 MILLIGRAM(S): 400 CAPSULE ORAL at 14:22

## 2024-03-31 RX ADMIN — FINASTERIDE 5 MILLIGRAM(S): 5 TABLET, FILM COATED ORAL at 14:22

## 2024-03-31 RX ADMIN — GABAPENTIN 600 MILLIGRAM(S): 400 CAPSULE ORAL at 23:10

## 2024-03-31 RX ADMIN — PIPERACILLIN AND TAZOBACTAM 25 GRAM(S): 4; .5 INJECTION, POWDER, LYOPHILIZED, FOR SOLUTION INTRAVENOUS at 05:57

## 2024-03-31 RX ADMIN — Medication 1000 MILLIGRAM(S): at 05:53

## 2024-03-31 RX ADMIN — PIPERACILLIN AND TAZOBACTAM 25 GRAM(S): 4; .5 INJECTION, POWDER, LYOPHILIZED, FOR SOLUTION INTRAVENOUS at 23:13

## 2024-03-31 RX ADMIN — Medication 2 MILLIGRAM(S): at 07:47

## 2024-03-31 RX ADMIN — Medication 1000 MILLIGRAM(S): at 23:11

## 2024-03-31 RX ADMIN — Medication 2 MILLIGRAM(S): at 17:43

## 2024-03-31 RX ADMIN — ENOXAPARIN SODIUM 40 MILLIGRAM(S): 100 INJECTION SUBCUTANEOUS at 23:12

## 2024-03-31 RX ADMIN — Medication 1000 MILLIGRAM(S): at 15:15

## 2024-03-31 RX ADMIN — TAMSULOSIN HYDROCHLORIDE 0.4 MILLIGRAM(S): 0.4 CAPSULE ORAL at 23:11

## 2024-03-31 NOTE — HISTORY OF PRESENT ILLNESS
[de-identified] : 75 yo M  EGD 11/2023: Normal esophagus. Erythema in the stomach compatible with non-erosive gastritis. (Biopsy). Normal mucosa in the whole examined duodenum. (Biopsy). Colonoscopy 11/2023: irregular friable mass from dentate line to 10 cm. At dentate line, lesion is 75% circumferential, and towards 10 cm it was 25% circumferential. Biopsies wer taken. Lesion is broad and rises to a polypoid shape. In the distil rectum. Polyp (5 mm) in the transverse colon. (Polypectomy).  Repeat colonoscopy 12/2023: Impressions: Irregular friable rectal mass from dentate line to 10 cm proximally. At dentate line, lesion is 100% circumferential, and towards 10 cm it was 50% circumferential. Lesion is broad, lateral spreading in carpet like pattern. The lesion was examined with white light and NBI. It was noted to be involving thedentate line, part of the lesion was protruding through the anal verge, villous pattern was noted on endoscopic exam. The lesion was marked with the tip of the snare, multiple rosaura points were applied around 5 mm away from the lesion border circumferentially. Using underwater immersion technique, the lesion was removed  using piecemeal fashion. The lesion was removed in forward view and retroflexion using distal attachment. The procedure had to be stopped multiple times for control of bleeding, the tip of the snare was used to coagulate bleeding vessels, and visible vessels at the polypectomy site to secure hemostasis. A small residual tissue was noted by the end of the procedure at the dentate line, tissue was not removed due to prolonged anesthesia and difficult position requiring more anesthesia time. Polypectomy pieces were collected and sent for pathology.   Pathology showed invasive adenocarcinoma, moderately differentiated arising in a villous adenoma with unclear margins due to piecemeal resection. Patient was taken again on January 9, 2024, an attempt to remove the remainder of the lesion. However, this was not possible. His case was discussed at Tumor Board and the recommendation was to complete his staging and then proceed with robot-assisted laparoscopic low anterior resection versus abdominal perineal resection.  IMAGING: CT A/P 12/2/23 IMPRESSION: 1. Lower rectal, noncircumferential intraluminal polypoid mass, measuring up to 6 cm AP; correlation with colonoscopy findings, pathology recommended. 2. No evidence of abdominopelvic metastatic disease.  MRI Pelvis/Rectal/anal 1/17/2024: IMPRESSION: Primary Tumor Location: Mid rectum Stage: T1/T2 (tumor confined to rectal wall)  N0 MRF Status (T3 tumors): Clear (tumor  margin >2 mm from MRF Suspicious extra mesorectal nodes: None EMVI: No  CT Chest 2/16/24: IMPRESSION: No CT evidence of intrathoracic metastatic disease.  He underwent  robotic low anterior resection with hand-sewn colo-anal anastomosis and diverting loop ileostomy on February 29, 2024.   Pathology:  Final Diagnosis Rectosigmoid colon, robotic assisted laparoscopic low anterior resection: -  Infiltrating moderately differentiated colonic adenocarcinoma of rectum, arising in the background of villous adenoma with foci of high-grade dysplasia. -  Tumor infiltrates into the submucosa. -  Infiltrating carcinoma is at about 1.6 cm from the inked distal surgical margin, and at about 0.4 cm from the perirectal soft tissue margin. -  Villous adenoma is at about 0.1-0.2 cm from the inked distal surgical margin. -  Focal changes consistent with previous surgical site are noted (status post endoscopic mucosal resection). -  Thirty-four regional lymph nodes negative for tumor (0/34).  Tumor staging: pT1, pN0  Previous EMR specimens ([id number], and [id number]) are noted. Immunohistochemistry for mismatch repair proteins has been performed on both of the above EMR specimens, showing intact expression of all 4 mismatch repair proteins.  Synoptic Summary 1: Colon and Rectum - Resection Specimen Procedure:  Low anterior resection Macroscopic Evaluation of Mesorectum:    Incomplete Tumor Tumor Site:  Rectum Histologic Type:   Adenocarcinoma Histologic Grade:   G2, moderately differentiated Tumor Size:  2 Centimeters (cm) Tumor Extent:   Invades submucosa Macroscopic Tumor Perforation:   Not identified Lymphovascular Invasion:   Not identified Perineural Invasion:   Not identified Type of Polyp in which Invasive Carcinoma Arose:    Villous adenoma Treatment Effect:   No known presurgical therapy Margins Margin Status for Invasive Carcinoma:    All margins negative for invasive carcinoma Closest Margin(s) to Invasive Carcinoma:    Radial (circumferential) or mesenteric Distance from Invasive Carcinoma to Radial (Circumferential) Margin:  4 mm Distance from Invasive Carcinoma to Distal Margin:    16 mm Margin Status for Non-Invasive Tumor:    All margins negative for high-grade dysplasia / intramucosal carcinoma and low-grade dysplasia  Regional Lymph Nodes Regional Lymph Node Status:   All regional lymph nodes negative for tumor Number of Lymph Nodes Examined:   34 Tumor Deposits:   Not identified Pathologic Stage Classification (pTNM, AJCC 8th Edition) pT Category:   pT1 pN Category:   pN0

## 2024-03-31 NOTE — PROGRESS NOTE ADULT - ASSESSMENT
ASSESSMENT:  Patient is a 74 year old male, past medical history of rectal ca s/p low anterior resection w/ loop ileostomy creation on feb 29th, RA, bph, who presents with fever. Tmax 104, that began yesterday, last dose tylenol @3pm today. Patient presented to urgent care earlier today found to have +ua sent to ed for further eval. Reports associated body aches. denies nasal congestion, sore throat, cough, shortness of breath, chest pain, vomiting, urinary symptoms, abd pain. Physical exam findings, imaging, and labs as documented above.     PLAN:  - Keep NPO except meds  - Keep holding Lovenox for IR drainage today  - F/U drainage of collection with IR today  - F/U AM labs  - Trend temperatures, given Tylenol as needed  - Serial abdominal exams  - Monitor for bowel function  - Trend HR and SBP    [phone]

## 2024-03-31 NOTE — CONSULT NOTE ADULT - ASSESSMENT
ASSESSMENT  This is a 74 year old male, past medical history of rectal ca s/p low anterior resection w/ loop ileostomy creation on feb 29th, RA, bph, who presents with fever.    IMPRESSION  #Pelvic abscess  #Fever  #Rectal invasive adenocarcinoma s/p low anterior resection w/ loop ileostomy creation on feb 29th 2024.  #BPH, spinal stenosis and rheumatoid arthritis on methotrexate    RECOMMENDATIONS  -S/p Attempt by the IR for aspiration of the abscess. No fluid was obtained.   -Blood cultures so far negative.  -For now continue with IV Zosyn 3.375 gram q 8 hours.   -Will likely need prolonged abx course and serial imaging for resolution of the pelvic abscess.   -Offloading. Aspiration precautions.     If any questions, please send a message or call on [employer] Teams  Please continue to update ID with any pertinent new laboratory or radiographic findings.    Justin Swanson M.D  Infectious Diseases Attending/   [employer] at [facility]

## 2024-03-31 NOTE — PATIENT PROFILE ADULT - FALL HARM RISK - UNIVERSAL INTERVENTIONS
Bed in lowest position, wheels locked, appropriate side rails in place/Call bell, personal items and telephone in reach/Instruct patient to call for assistance before getting out of bed or chair/Non-slip footwear when patient is out of bed/[employer] to call system/Physically safe environment - no spills, clutter or unnecessary equipment/Purposeful Proactive Rounding/Room/bathroom lighting operational, light cord in reach

## 2024-03-31 NOTE — CONSULT NOTE ADULT - SUBJECTIVE AND OBJECTIVE BOX
GENERAL SURGERY CONSULT NOTE    Patient: MARI LASSITER , 74y ([birth year])Male   MRN: [id number]  Location: [facility] ED  Visit: 24 Emergency  Date: 24 @ 01:25    HPI:  Patient is a 74 year old male, past medical history of rectal ca s/p low anterior resection w/ loop ileostomy creation on , RA, bph, who presents with fever. Tmax 104, that began yesterday, last dose tylenol @3pm today. Patient presented to urgent care earlier today found to have +ua sent to ed for further eval. Reports associated body aches. denies nasal congestion, sore throat, cough, shortness of breath, chest pain, vomiting, urinary symptoms, abd pain.    PAST MEDICAL & SURGICAL HISTORY:  Skin cancer (melanoma)      Rheumatoid arthritis      Spinal stenosis      History of BPH      History of hay fever      Rectal cancer      S/P bilateral inguinal hernia repair      Home Medications:  finasteride 5 mg oral tablet: 1 tab(s) orally once a day (2024 07:13)  folic acid 1 mg oral tablet: 1 tab(s) orally once a day (2024 07:13)  gabapentin 600 mg oral tablet: 1 tab(s) orally 3 times a day (2024 07:13)  loperamide 2 mg oral capsule: 2 cap(s) orally 2 times a day (05 Mar 2024 12:46)  loperamide 2 mg oral capsule: 1 cap(s) orally 2 times a day (05 Mar 2024 12:46)  methotrexate 15 mg/0.6 mL subcutaneous solution: subcutaneous once a week (2024 07:13)  psyllium 3.4 g/7 g oral powder for reconstitution: 1 packet(s) orally once a day (05 Mar 2024 12:46)  saw palmetto 320 mg with phytosterols oral capsule: 2 cap(s) orally once a day (2024 07:13)  tamsulosin 0.4 mg oral capsule: 1 cap(s) orally once a day (05 Mar 2024 12:46)      VITALS:  T(F): 99.1 (24 @ 23:41), Max: 100.3 (24 @ 22:43)  HR: 84 (03-29-24 @ 23:41) (68 - 98)  BP: 104/58 (24 @ 23:41) (91/50 - 118/72)  RR: 18 (24 @ 23:41) (17 - 20)  SpO2: 98% (24 @ 23:41) (98% - 100%)    PHYSICAL EXAM:  General: NAD, AAOx3, calm and cooperative  Abdomen: Soft, non-distended, non-tender, no rebound, no guarding. +BS, ileostomy in place.  Skin: Warm/dry, normal color, no jaundice  Incision/wound: healing well, dressings in place, clean, dry and intact    MEDICATIONS  (STANDING):    MEDICATIONS  (PRN):      LAB/STUDIES:                        9.5    15.64 )-----------( 382      ( 29 Mar 2024 20:13 )             29.5         133<L>  |  98  |  18  ----------------------------<  123<H>  4.3   |  26  |  0.9    Ca    9.2      29 Mar 2024 20:13    TPro  6.7  /  Alb  3.7  /  TBili  0.4  /  DBili  x   /  AST  16  /  ALT  8   /  AlkPhos  107        LIVER FUNCTIONS - ( 29 Mar 2024 20:13 )  Alb: 3.7 g/dL / Pro: 6.7 g/dL / ALK PHOS: 107 U/L / ALT: 8 U/L / AST: 16 U/L / GGT: x           Urinalysis Basic - ( 29 Mar 2024 20:13 )    Color: Yellow / Appearance: Clear / S.023 / pH: x  Gluc: 123 mg/dL / Ketone: Trace mg/dL  / Bili: Negative / Urobili: 0.2 mg/dL   Blood: x / Protein: 30 mg/dL / Nitrite: Negative   Leuk Esterase: Negative / RBC: 1 /HPF / WBC 2 /HPF   Sq Epi: x / Non Sq Epi: 1 /HPF / Bacteria: Negative /HPF        IMAGING:  < from: CT Abdomen and Pelvis w/ Oral Cont and w/ IV Cont (24 @ 00:04) >  CT ABDOMEN AND PELVIS OC IC   ORDERED BY: NAVDEEP BLISS     PROCEDURE DATE:  2024          INTERPRETATION:  CLINICAL STATEMENT: Fever. Diarrhea. Rectal cancer. Post   low anterior resection with coloanal anastomosis. Post diverting loop   ileostomy.    TECHNIQUE: Contiguous axial CT images were obtained from the lower chest   to the pubic symphysis following administration of intravenous contrast.    100 cc administered of Omnipaque 350 (0 cc discarded).  Oral contrast was   administered.  Reformatted images in the coronal and sagittal planes were   acquired.    Comparison made with CT abdomen and pelvis 2023.    FINDINGS:    LOWER CHEST: Unremarkable.    HEPATOBILIARY: Scattered hepatic cysts and subcentimeter hypodensities,   too small to characterize.    SPLEEN: Unremarkable.    PANCREAS: Unremarkable.    ADRENAL GLANDS: Unremarkable.    KIDNEYS: Unremarkable.    ABDOMINOPELVIC NODES: Unremarkable.    PELVIC ORGANS: BPH. Likely reactive thickening of urinary bladder wall.   Urinary bladder diverticulum, unchanged    PERITONEUM/MESENTERY/BOWEL: Post interval low anterior resection with   coloanal anastomosis, diverting loop ileostomy. Deep pelvic abscess   measuring 12.4 x 5.7 x 4.3 cm near expected region of coloanal   anastomosis (series 8, image 65). Inflammatory stranding of pelvis. No   bowel obstruction. No gross free air.    BONES/SOFT TISSUES: Degenerative change, lumbar spine.    OTHER: Vascular calcifications.      IMPRESSION:    Postinterval low anterior resection with coloanal anastomosis, diverting   loop ileostomy. Deep pelvic abscess measuring 12.4 x 5.7 x 4.3 cm near   expected region of coloanal anastomosis.      ASSESSMENT:  Patient is a 74 year old male, past medical history of rectal ca s/p low anterior resection w/ loop ileostomy creation on , RA, bph, who presents with fever. Tmax 104, that began yesterday, last dose tylenol @3pm today. Patient presented to urgent care earlier today found to have +ua sent to ed for further eval. Reports associated body aches. denies nasal congestion, sore throat, cough, shortness of breath, chest pain, vomiting, urinary symptoms, abd pain. Physical exam findings, imaging, and labs as documented above.     PLAN:  - Case and plan discussed with surgical attending Dr. Lorenzo  - Admit to general surgery under Dr. Lorenzo  - C/s IR team for abscess drainage  - Hemodynamic monitoring  - Monitor ileostomy output  - Trend fevers  - IV Abx  - DVT ppx    Above plan discussed with Attending Surgeon Dr. Lorezno  , patient, patient family, and Primary team  03-30-24 @ 01:25  
MARI LASSITER  74y, Male  Allergies    No Known Allergies    Intolerances        LOS  1d    HPI  HPI:  Patient is a 74 year old male, past medical history of rectal ca s/p low anterior resection w/ loop ileostomy creation on feb 29th, RA, bph, who presents with fever. Tmax 104, that began yesterday, last dose tylenol @3pm today. Patient presented to urgent care earlier today found to have +ua sent to ed for further eval. Reports associated body aches. denies nasal congestion, sore throat, cough, shortness of breath, chest pain, vomiting, urinary symptoms, abd pain. (30 Mar 2024 02:21)      INFECTIOUS DISEASE HISTORY:  Hospital course-  ID consulted for antimicrobial recommendations.     Prior hospital charts reviewed [Yes]  Primary team notes reviewed [Yes]  Other consultant notes reviewed [Yes]    REVIEW OF SYSTEMS:  CONSTITUTIONAL: No fever or chills  HEENT: No sore throat  RESPIRATORY: No cough, no shortness of breath  CARDIOVASCULAR: No chest pain or palpitations  GASTROINTESTINAL: No abdominal or epigastric pain  GENITOURINARY: No dysuria  NEUROLOGICAL: No headache/dizziness  MSK: No joint pain, erythema, or swelling; no back pain  SKIN: No itching, rashes  All other ROS negative except noted above    PAST MEDICAL & SURGICAL HISTORY:  Skin cancer (melanoma)      Rheumatoid arthritis      Spinal stenosis      History of BPH      History of hay fever      Rectal cancer      S/P bilateral inguinal hernia repair    SOCIAL HISTORY:  - No recent travel    FAMILY HISTORY:  No pertinent family history in first degree relatives    ANTIMICROBIALS:  piperacillin/tazobactam IVPB.. 3.375 every 8 hours      ANTIMICROBIALS (past 90 days):  MEDICATIONS  (STANDING):  cefTRIAXone   IVPB   100 mL/Hr IV Intermittent (03-29-24 @ 19:52)    metroNIDAZOLE  IVPB   100 mL/Hr IV Intermittent (03-30-24 @ 00:23)    piperacillin/tazobactam IVPB.   200 mL/Hr IV Intermittent (03-30-24 @ 02:19)    piperacillin/tazobactam IVPB..   25 mL/Hr IV Intermittent (03-30-24 @ 09:06)    piperacillin/tazobactam IVPB..   25 mL/Hr IV Intermittent (03-31-24 @ 05:57)   25 mL/Hr IV Intermittent (03-30-24 @ 22:10)   25 mL/Hr IV Intermittent (03-30-24 @ 17:38)      OTHER MEDS:   MEDICATIONS  (STANDING):  acetaminophen     Tablet .. 1000 every 8 hours  finasteride 5 daily  gabapentin 600 three times a day  loperamide 2 two times a day  tamsulosin 0.4 at bedtime      VITALS:  Vital Signs Last 24 Hrs  T(F): 98.4 (03-31-24 @ 12:30), Max: 102.3 (03-30-24 @ 22:34)    Vital Signs Last 24 Hrs  HR: 90 (03-31-24 @ 13:10) (64 - 95)  BP: 113/60 (03-31-24 @ 13:10) (95/50 - 135/62)  RR: 16 (03-31-24 @ 13:10)  SpO2: 99% (03-31-24 @ 13:10) (97% - 99%)  Wt(kg): --    EXAM:  GENERAL: NAD  HEAD: No head lesions  NECK: Supple  CHEST/LUNG: Clear to auscultation bilaterally  HEART: S1 S2  ABDOMEN: Soft, nontender. Colostomy bag noted.   EXTREMITIES: No clubbing  NERVOUS SYSTEM: Alert and oriented to person  MSK: No joint erythema, swelling or pain  SKIN: No rashes or lesions, no superficial thrombophlebitis    Labs:                        7.4    14.00 )-----------( 313      ( 31 Mar 2024 06:51 )             23.2     03-31    137  |  102  |  11  ----------------------------<  114<H>  3.9   |  22  |  1.1    Ca    8.6      31 Mar 2024 06:51  Phos  3.2     03-30  Mg     1.8     03-30    TPro  6.7  /  Alb  3.7  /  TBili  0.4  /  DBili  x   /  AST  16  /  ALT  8   /  AlkPhos  107  03-29      WBC Trend:  WBC Count: 14.00 (03-31-24 @ 06:51)  WBC Count: 13.46 (03-30-24 @ 21:26)  WBC Count: 16.25 (03-30-24 @ 13:04)  WBC Count: 15.64 (03-29-24 @ 20:13)      Auto Neutrophil #: 11.45 K/uL (03-31-24 @ 06:51)  Auto Neutrophil #: 11.15 K/uL (03-30-24 @ 21:26)  Auto Neutrophil #: 14.40 K/uL (03-30-24 @ 13:04)  Auto Neutrophil #: 13.15 K/uL (03-29-24 @ 20:13)  Auto Neutrophil #: 8.52 K/uL (03-05-24 @ 22:39)    Creatine Trend:  Creatinine: 1.1 (03-31)  Creatinine: 1.0 (03-30)  Creatinine: 1.0 (03-30)  Creatinine: 0.9 (03-29)    Liver Biochemical Testing Trend:  Alanine Aminotransferase (ALT/SGPT): 8 (03-29)  Alanine Aminotransferase (ALT/SGPT): 14 (02-29)  Alanine Aminotransferase (ALT/SGPT): 15 (02-15)  Alanine Aminotransferase (ALT/SGPT): 11 (12-18)  Alanine Aminotransferase (ALT/SGPT): 9 (12-17)  Aspartate Aminotransferase (AST/SGOT): 16 (03-29-24 @ 20:13)  Aspartate Aminotransferase (AST/SGOT): 27 (02-29-24 @ 15:56)  Aspartate Aminotransferase (AST/SGOT): 28 (02-15-24 @ 07:35)  Aspartate Aminotransferase (AST/SGOT): 34 (12-18-23 @ 04:30)  Aspartate Aminotransferase (AST/SGOT): 32 (12-17-23 @ 08:48)  Bilirubin Total: 0.4 (03-29)  Bilirubin Total: 0.5 (02-29)  Bilirubin Total: 0.4 (02-15)  Bilirubin Total: 0.5 (12-18)  Bilirubin Total: 0.6 (12-17)      Trend LDH      Auto Eosinophil %: 0.1 % (03-31-24 @ 06:51)  Auto Eosinophil %: 0.0 % (03-30-24 @ 21:26)  Auto Eosinophil %: 0.0 % (03-30-24 @ 13:04)  Auto Eosinophil %: 0.0 % (03-29-24 @ 20:13)      Urinalysis Basic - ( 31 Mar 2024 06:51 )    Color: x / Appearance: x / SG: x / pH: x  Gluc: 114 mg/dL / Ketone: x  / Bili: x / Urobili: x   Blood: x / Protein: x / Nitrite: x   Leuk Esterase: x / RBC: x / WBC x   Sq Epi: x / Non Sq Epi: x / Bacteria: x      MICROBIOLOGY:    Male    Culture - Urine (collected 29 Mar 2024 20:13)  Source: Clean Catch Clean Catch (Midstream)  Final Report (30 Mar 2024 23:37):    <10,000 CFU/mL Normal Urogenital Patricia    Culture - Blood (collected 29 Mar 2024 20:13)  Source: .Blood Blood  Preliminary Report (31 Mar 2024 04:02):    No growth at 24 hours    Culture - Blood (collected 29 Mar 2024 20:13)  Source: .Blood Blood  Preliminary Report (31 Mar 2024 04:02):    No growth at 24 hours    Lactate, Blood: 1.9 (03-29 @ 20:13)    A1C with Estimated Average Glucose Result: 5.4 % (02-15-24 @ 07:35)      INFLAMMATORY MARKERS      RADIOLOGY & ADDITIONAL TESTS:  I have personally reviewed the imagings.  CXR  Xray Chest 1 View- PORTABLE-Urgent:   ACC: [id number] EXAM:  XR CHEST PORTABLE URGENT 1V   ORDERED BY: NAVDEEP BLISS     PROCEDURE DATE:  03/29/2024          INTERPRETATION:  Clinical History / Reason for exam: Fever    Comparison : Chest radiograph March 2, 2024.    Technique/Positioning: Frontal chest.    Findings:    Support devices: None.    Cardiac/mediastinum/hilum: Unremarkable.    Lung parenchyma/Pleura: Within normal limits.    Skeleton/soft tissues: Stable.    Impression:    No radiographic evidence of acute cardiopulmonary disease.        --- End of Report ---            LUIS ALFREDO CARLSON MD; Attending Radiologist  This document has been electronically signed. Mar 30 2024  7:23AM (03-29-24 @ 19:42)      CT  CT Abdomen and Pelvis w/ Oral Cont and w/ IV Cont:   ACC: [id number] EXAM:  CT ABDOMEN AND PELVIS OC IC   ORDERED BY: NAVDEEP BLISS     PROCEDURE DATE:  03/30/2024          INTERPRETATION:  CLINICAL STATEMENT: Fever. Diarrhea. Rectal cancer. Post   low anterior resection with coloanal anastomosis. Post diverting loop   ileostomy.    TECHNIQUE: Contiguous axial CT images were obtained from the lower chest   to the pubic symphysis following administration of intravenous contrast.    100 cc administered of Omnipaque 350 (0 cc discarded).  Oral contrast was   administered.  Reformatted images in the coronal and sagittal planes were   acquired.    Comparison made with CT abdomen and pelvis 12/2/2023.    FINDINGS:    LOWER CHEST: Unremarkable.    HEPATOBILIARY: Scattered hepatic cysts and subcentimeter hypodensities,   too small to characterize.    SPLEEN: Unremarkable.    PANCREAS: Unremarkable.    ADRENAL GLANDS: Unremarkable.    KIDNEYS: Unremarkable.    ABDOMINOPELVIC NODES: Unremarkable.    PELVIC ORGANS: BPH. Likely reactive thickening of urinary bladder wall.   Urinary bladder diverticulum, unchanged    PERITONEUM/MESENTERY/BOWEL: Post interval low anterior resection with   coloanal anastomosis, diverting loop ileostomy. Deep pelvic abscess   measuring 12.4 x 5.7 x 4.3 cm near expected region of coloanal   anastomosis (series 8, image 65). Inflammatory stranding of pelvis. No   bowel obstruction. No gross free air.    BONES/SOFT TISSUES: Degenerative change, lumbar spine.    OTHER: Vascular calcifications.      IMPRESSION:    Postinterval low anterior resection with coloanal anastomosis, diverting   loop ileostomy. Deep pelvic abscess measuring 12.4 x 5.7 x 4.3 cm near   expected region of coloanal anastomosis.    --- End of Report ---            LSELEY PATIÑO MD; Attending Radiologist  This document has been electronically signed. Mar 30 2024 12:32AM (03-30-24 @ 00:04)      CARDIOLOGY TESTING            
INTERVENTIONAL RADIOLOGY CONSULT:     HPI:  Patient is a 74 year old male, past medical history of rectal ca s/p low anterior resection w/ loop ileostomy creation on feb 29th, RA, bph, who presents with fever. Tmax 104, that began yesterday, last dose tylenol @3pm today. Patient presented to urgent care earlier today found to have +ua sent to ed for further eval. Reports associated body aches. denies nasal congestion, sore throat, cough, shortness of breath, chest pain, vomiting, urinary symptoms, abd pain. (30 Mar 2024 02:21)    --------------------------------------------------------------------------------------------  FAMILY HISTORY:  No pertinent family history in first degree relatives      PAST MEDICAL & SURGICAL HISTORY:  Skin cancer (melanoma)    Rheumatoid arthritis    Spinal stenosis    History of BPH    History of hay fever    Rectal cancer    S/P bilateral inguinal hernia repair      ---------------------------------------------------------------------------------------------  Allergies    No Known Allergies    Intolerances      MEDICATIONS  (STANDING):  acetaminophen     Tablet .. 1000 milliGRAM(s) Oral every 8 hours  dextrose 5% + sodium chloride 0.45%. 1000 milliLiter(s) (100 mL/Hr) IV Continuous <Continuous>  enoxaparin Injectable 40 milliGRAM(s) SubCutaneous every 24 hours  finasteride 5 milliGRAM(s) Oral daily  folic acid 1 milliGRAM(s) Oral daily  loperamide 2 milliGRAM(s) Oral two times a day  tamsulosin 0.4 milliGRAM(s) Oral at bedtime    MEDICATIONS  (PRN):    ---------------------------------------------------------------------------------------------  Vital Signs Last 24 Hrs  T(C): 39 (30 Mar 2024 07:00), Max: 39 (30 Mar 2024 07:00)  T(F): 102.2 (30 Mar 2024 07:00), Max: 102.2 (30 Mar 2024 07:00)  HR: 88 (30 Mar 2024 07:00) (68 - 98)  BP: 97/52 (30 Mar 2024 07:00) (91/50 - 126/69)  BP(mean): 72 (29 Mar 2024 23:41) (72 - 72)  RR: 18 (30 Mar 2024 07:00) (17 - 20)  SpO2: 99% (30 Mar 2024 04:00) (98% - 100%)    Parameters below as of 30 Mar 2024 07:00  Patient On (Oxygen Delivery Method): room air                             9.5    15.64 )-----------( 382      ( 29 Mar 2024 20:13 )             29.5     03-29    133<L>  |  98  |  18  ----------------------------<  123<H>  4.3   |  26  |  0.9    Ca    9.2      29 Mar 2024 20:13    TPro  6.7  /  Alb  3.7  /  TBili  0.4  /  DBili  x   /  AST  16  /  ALT  8   /  AlkPhos  107  03-29      ---------------------------------------------------------------------------------------------  Consult Requested: Abscess drainage  Referring Physician (and service):  colorectal surgeryBj    ASSESSMENT/ PLAN:   74yoM with above PMHx including rectal ca s/p low anterior resection w/ loop ileostomy creation on feb 29th presented with fever, Tmax 104, that the day before. CT scan performed demonstrating a collection adjacent to the rectum consistent with abscess. The patient is currently receiving ABx, HD stable, not on pressors.     - CT reviewed, the majority of the collection is not safely accessible percutaneously. There is a small window for drainage however may not be feasible once patient is placed in the prone position.   - Will attempt drainage tomorrow as the patient received lovenox 9pm last night, time to be determined.   - Please keep NPO, obtain CBC/BMP/INR in the am.   - If the patient acutely worsens clinically please notify IR.     If there are any questions please call [phone], after hours and on weekends please call [phone] and ask for the diagnostics radiology resident on call.

## 2024-04-01 ENCOUNTER — APPOINTMENT (OUTPATIENT)
Age: 74
End: 2024-04-01

## 2024-04-01 LAB
ANION GAP SERPL CALC-SCNC: 12 MMOL/L — SIGNIFICANT CHANGE UP (ref 7–14)
APTT BLD: 34 SEC — SIGNIFICANT CHANGE UP (ref 27–39.2)
BASOPHILS # BLD AUTO: 0.03 K/UL — SIGNIFICANT CHANGE UP (ref 0–0.2)
BASOPHILS NFR BLD AUTO: 0.3 % — SIGNIFICANT CHANGE UP (ref 0–1)
BUN SERPL-MCNC: 8 MG/DL — LOW (ref 10–20)
CALCIUM SERPL-MCNC: 9.1 MG/DL — SIGNIFICANT CHANGE UP (ref 8.4–10.4)
CHLORIDE SERPL-SCNC: 101 MMOL/L — SIGNIFICANT CHANGE UP (ref 98–110)
CO2 SERPL-SCNC: 24 MMOL/L — SIGNIFICANT CHANGE UP (ref 17–32)
CREAT SERPL-MCNC: 0.9 MG/DL — SIGNIFICANT CHANGE UP (ref 0.7–1.5)
EGFR: 91 ML/MIN/1.73M2 — SIGNIFICANT CHANGE UP
EOSINOPHIL # BLD AUTO: 0.11 K/UL — SIGNIFICANT CHANGE UP (ref 0–0.7)
EOSINOPHIL NFR BLD AUTO: 1.2 % — SIGNIFICANT CHANGE UP (ref 0–8)
GLUCOSE SERPL-MCNC: 97 MG/DL — SIGNIFICANT CHANGE UP (ref 70–99)
HCT VFR BLD CALC: 29 % — LOW (ref 42–52)
HGB BLD-MCNC: 9.3 G/DL — LOW (ref 14–18)
IMM GRANULOCYTES NFR BLD AUTO: 0.4 % — HIGH (ref 0.1–0.3)
INR BLD: 1.29 RATIO — SIGNIFICANT CHANGE UP (ref 0.65–1.3)
LYMPHOCYTES # BLD AUTO: 1.4 K/UL — SIGNIFICANT CHANGE UP (ref 1.2–3.4)
LYMPHOCYTES # BLD AUTO: 15.6 % — LOW (ref 20.5–51.1)
MAGNESIUM SERPL-MCNC: 2.1 MG/DL — SIGNIFICANT CHANGE UP (ref 1.8–2.4)
MCHC RBC-ENTMCNC: 27.9 PG — SIGNIFICANT CHANGE UP (ref 27–31)
MCHC RBC-ENTMCNC: 32.1 G/DL — SIGNIFICANT CHANGE UP (ref 32–37)
MCV RBC AUTO: 87.1 FL — SIGNIFICANT CHANGE UP (ref 80–94)
MONOCYTES # BLD AUTO: 1.22 K/UL — HIGH (ref 0.1–0.6)
MONOCYTES NFR BLD AUTO: 13.6 % — HIGH (ref 1.7–9.3)
NEUTROPHILS # BLD AUTO: 6.2 K/UL — SIGNIFICANT CHANGE UP (ref 1.4–6.5)
NEUTROPHILS NFR BLD AUTO: 68.9 % — SIGNIFICANT CHANGE UP (ref 42.2–75.2)
NRBC # BLD: 0 /100 WBCS — SIGNIFICANT CHANGE UP (ref 0–0)
PHOSPHATE SERPL-MCNC: 3.5 MG/DL — SIGNIFICANT CHANGE UP (ref 2.1–4.9)
PLATELET # BLD AUTO: 450 K/UL — HIGH (ref 130–400)
PMV BLD: 10.3 FL — SIGNIFICANT CHANGE UP (ref 7.4–10.4)
POTASSIUM SERPL-MCNC: 4.4 MMOL/L — SIGNIFICANT CHANGE UP (ref 3.5–5)
POTASSIUM SERPL-SCNC: 4.4 MMOL/L — SIGNIFICANT CHANGE UP (ref 3.5–5)
PROTHROM AB SERPL-ACNC: 14.8 SEC — HIGH (ref 9.95–12.87)
RBC # BLD: 3.33 M/UL — LOW (ref 4.7–6.1)
RBC # FLD: 14.6 % — HIGH (ref 11.5–14.5)
SODIUM SERPL-SCNC: 137 MMOL/L — SIGNIFICANT CHANGE UP (ref 135–146)
WBC # BLD: 9 K/UL — SIGNIFICANT CHANGE UP (ref 4.8–10.8)
WBC # FLD AUTO: 9 K/UL — SIGNIFICANT CHANGE UP (ref 4.8–10.8)

## 2024-04-01 RX ORDER — SODIUM CHLORIDE 9 MG/ML
1000 INJECTION, SOLUTION INTRAVENOUS
Refills: 0 | Status: DISCONTINUED | OUTPATIENT
Start: 2024-04-01 | End: 2024-04-02

## 2024-04-01 RX ADMIN — Medication 2 MILLIGRAM(S): at 05:44

## 2024-04-01 RX ADMIN — TAMSULOSIN HYDROCHLORIDE 0.4 MILLIGRAM(S): 0.4 CAPSULE ORAL at 21:51

## 2024-04-01 RX ADMIN — Medication 1000 MILLIGRAM(S): at 00:00

## 2024-04-01 RX ADMIN — ENOXAPARIN SODIUM 40 MILLIGRAM(S): 100 INJECTION SUBCUTANEOUS at 22:06

## 2024-04-01 RX ADMIN — FINASTERIDE 5 MILLIGRAM(S): 5 TABLET, FILM COATED ORAL at 11:10

## 2024-04-01 RX ADMIN — Medication 2 MILLIGRAM(S): at 17:13

## 2024-04-01 RX ADMIN — GABAPENTIN 600 MILLIGRAM(S): 400 CAPSULE ORAL at 13:34

## 2024-04-01 RX ADMIN — Medication 1000 MILLIGRAM(S): at 13:35

## 2024-04-01 RX ADMIN — Medication 1000 MILLIGRAM(S): at 05:44

## 2024-04-01 RX ADMIN — PIPERACILLIN AND TAZOBACTAM 25 GRAM(S): 4; .5 INJECTION, POWDER, LYOPHILIZED, FOR SOLUTION INTRAVENOUS at 21:53

## 2024-04-01 RX ADMIN — Medication 1000 MILLIGRAM(S): at 21:51

## 2024-04-01 RX ADMIN — PIPERACILLIN AND TAZOBACTAM 25 GRAM(S): 4; .5 INJECTION, POWDER, LYOPHILIZED, FOR SOLUTION INTRAVENOUS at 13:34

## 2024-04-01 RX ADMIN — PIPERACILLIN AND TAZOBACTAM 25 GRAM(S): 4; .5 INJECTION, POWDER, LYOPHILIZED, FOR SOLUTION INTRAVENOUS at 05:46

## 2024-04-01 RX ADMIN — Medication 1000 MILLIGRAM(S): at 22:30

## 2024-04-01 RX ADMIN — GABAPENTIN 600 MILLIGRAM(S): 400 CAPSULE ORAL at 05:43

## 2024-04-01 RX ADMIN — SODIUM CHLORIDE 100 MILLILITER(S): 9 INJECTION, SOLUTION INTRAVENOUS at 05:46

## 2024-04-01 RX ADMIN — GABAPENTIN 600 MILLIGRAM(S): 400 CAPSULE ORAL at 21:51

## 2024-04-01 RX ADMIN — Medication 1000 MILLIGRAM(S): at 06:15

## 2024-04-01 NOTE — PROGRESS NOTE ADULT - ASSESSMENT
A/P:  Patient is a 74 year old male, past medical history of rectal ca s/p low anterior resection w/ loop ileostomy creation on feb 29th    PLAN:   - npo/ivf for possible GI intervention   - Trend temperatures, given Tylenol as needed  - Serial abdominal exams  - Monitor for bowel function  - Trend HR and SBP    [phone]        Blue Spectra [phone]

## 2024-04-01 NOTE — HISTORY OF PRESENT ILLNESS
[FreeTextEntry1] : Patient is a 74-year-old male with PMHx of BPH, spinal stenosis, and rheumatoid arthritis on methotrexate who presents for a post-operative office visit, s/p robotic low anterior resection with hand-sewn colo-anal anastomosis and diverting loop ileostomy on February 29, 2024. Pathology showed T1N0 (0/34 ) adenocarcinoma with negative margins. Since the surgery, the patient states overall he feels well, he's tolerating a diet and his stoma works well. He does report low-grade temperatures of 101 approximately three days ago. This has since resolved. He also reports daily serosanguineous drainage from the anus and has started wearing Depends. He denies a family history of colon cancer, rectal cancer, or inflammatory bowel disease.    Previous History On the patient's original evaluation, he was found to have polypoid tissue in the rectum. He was referred to GI for colonoscopy. He underwent colonoscopy on November 30th, 2023 with Dr. Tran, which showed a irregular friable mass from the dentate line to 10cms. HPIs showed villus adenoma. Patient was then referred to Dr. Phill Ibrahim, and underwent EMR on December 15th, 2023. Pathology showed invasive adenocarcinoma, moderately differentiated arising in a villous adenoma with unclear margins due to piecemeal resection. Patient was taken again on January 9, 2024, an attempt to remove the remainder of the lesion. However, this was not possible. His case was discussed at Tumor Board and the recommendation was to complete his staging and then proceed with robot-assisted laparoscopic low anterior resection versus abdominal perineal resection

## 2024-04-01 NOTE — PROGRESS NOTE ADULT - ASSESSMENT
ASSESSMENT  This is a 74 year old male, past medical history of rectal ca s/p low anterior resection w/ loop ileostomy creation on feb 29th, RA, bph, who presents with fever.    < from: CT Abdomen and Pelvis w/ Oral Cont and w/ IV Cont (03.30.24 @ 00:04) >  Postinterval low anterior resection with coloanal anastomosis, diverting   loop ileostomy. Deep pelvic abscess measuring 12.4 x 5.7 x 4.3 cm near   expected region of coloanal anastomosis.    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   Acute  illness  which poses a threat to life or bodily function without treatment   Pelvic abscess measuring 12.4 x 5.7 x 4.3 cm near expected region of coloanal anastomosis.  3/29 S/p Attempt by the IR for aspiration of the abscess. No fluid was obtained.   No ongoing peritonitis  3/29 BCx NG  COVID 19/ Influenza/ RSV NG. 3/29 UCx NG  Rectal invasive adenocarcinoma s/p low anterior resection w/ loop ileostomy creation on feb 29th 2024.  BPH, spinal stenosis and rheumatoid arthritis on methotrexate    RECOMMENDATIONS  -f/u with Sx  -For now continue with IV Zosyn 3.375 gram q 8 hours.   -Will likely need prolonged abx course and serial imaging for resolution of the pelvic abscess.   -Offloading. Aspiration precautions.   -Nutritional evaluation : has been NPO since 3/29

## 2024-04-01 NOTE — PHYSICAL EXAM
[FreeTextEntry1] : General: Awake, Alert, No Acute Distress Respiratory: Normal Respiratory Effort Abdomen: Soft, non-tender, non-distended, well-healing port sites, right low quadrant ileostomy, pink and viable with gas and stool in the appliance.  Rectal: External examination shows edema and swelling of the external hemorrhoids. Gentle palpation shows serosanguineous drainage and edema at the anastomosis.

## 2024-04-01 NOTE — ADDENDUM
[FreeTextEntry1] : I, Shahida Schmitt ([facility]) assisted in filling out this chart under the dictation of Dr. Flynn Lorenzo on 03/19/2024.

## 2024-04-01 NOTE — ASSESSMENT
[FreeTextEntry1] : 74-year-old male with stage 1 rectal adenocarcinoma, s/p robotic low anterior resection with hand-sewn colorectal anastomosis and diverting loopilostomy.  The patient is recovering well from surgery. We discussed the possibility of an anastomotic breakdown leading to this drainage. At this time, we will continue with his current management and allow for time to heal. We discussed that he did not require further treatments in terms of his cancer. He will return in one month.

## 2024-04-02 LAB
ANION GAP SERPL CALC-SCNC: 11 MMOL/L — SIGNIFICANT CHANGE UP (ref 7–14)
BASOPHILS # BLD AUTO: 0 K/UL — SIGNIFICANT CHANGE UP (ref 0–0.2)
BASOPHILS NFR BLD AUTO: 0 % — SIGNIFICANT CHANGE UP (ref 0–1)
BUN SERPL-MCNC: 13 MG/DL — SIGNIFICANT CHANGE UP (ref 10–20)
CALCIUM SERPL-MCNC: 8.7 MG/DL — SIGNIFICANT CHANGE UP (ref 8.4–10.5)
CHLORIDE SERPL-SCNC: 103 MMOL/L — SIGNIFICANT CHANGE UP (ref 98–110)
CO2 SERPL-SCNC: 25 MMOL/L — SIGNIFICANT CHANGE UP (ref 17–32)
CREAT SERPL-MCNC: 0.8 MG/DL — SIGNIFICANT CHANGE UP (ref 0.7–1.5)
EGFR: 94 ML/MIN/1.73M2 — SIGNIFICANT CHANGE UP
EOSINOPHIL # BLD AUTO: 0 K/UL — SIGNIFICANT CHANGE UP (ref 0–0.7)
EOSINOPHIL NFR BLD AUTO: 0 % — SIGNIFICANT CHANGE UP (ref 0–8)
GLUCOSE SERPL-MCNC: 223 MG/DL — HIGH (ref 70–99)
HCT VFR BLD CALC: 26.4 % — LOW (ref 42–52)
HGB BLD-MCNC: 8.4 G/DL — LOW (ref 14–18)
LYMPHOCYTES # BLD AUTO: 0.43 K/UL — LOW (ref 1.2–3.4)
LYMPHOCYTES # BLD AUTO: 10.8 % — LOW (ref 20.5–51.1)
MAGNESIUM SERPL-MCNC: 2 MG/DL — SIGNIFICANT CHANGE UP (ref 1.8–2.4)
MCHC RBC-ENTMCNC: 27.8 PG — SIGNIFICANT CHANGE UP (ref 27–31)
MCHC RBC-ENTMCNC: 31.8 G/DL — LOW (ref 32–37)
MCV RBC AUTO: 87.4 FL — SIGNIFICANT CHANGE UP (ref 80–94)
MONOCYTES # BLD AUTO: 0 K/UL — LOW (ref 0.1–0.6)
MONOCYTES NFR BLD AUTO: 0 % — LOW (ref 1.7–9.3)
NEUTROPHILS # BLD AUTO: 3.59 K/UL — SIGNIFICANT CHANGE UP (ref 1.4–6.5)
NEUTROPHILS NFR BLD AUTO: 89.2 % — HIGH (ref 42.2–75.2)
PHOSPHATE SERPL-MCNC: 2.7 MG/DL — SIGNIFICANT CHANGE UP (ref 2.1–4.9)
PLATELET # BLD AUTO: 446 K/UL — HIGH (ref 130–400)
PMV BLD: 10.5 FL — HIGH (ref 7.4–10.4)
POTASSIUM SERPL-MCNC: 4.9 MMOL/L — SIGNIFICANT CHANGE UP (ref 3.5–5)
POTASSIUM SERPL-SCNC: 4.9 MMOL/L — SIGNIFICANT CHANGE UP (ref 3.5–5)
RBC # BLD: 3.02 M/UL — LOW (ref 4.7–6.1)
RBC # FLD: 14.6 % — HIGH (ref 11.5–14.5)
SODIUM SERPL-SCNC: 139 MMOL/L — SIGNIFICANT CHANGE UP (ref 135–146)
WBC # BLD: 4.02 K/UL — LOW (ref 4.8–10.8)
WBC # FLD AUTO: 4.02 K/UL — LOW (ref 4.8–10.8)

## 2024-04-02 PROCEDURE — 45000 DRAINAGE OF PELVIC ABSCESS: CPT | Mod: 78

## 2024-04-02 RX ORDER — ENOXAPARIN SODIUM 100 MG/ML
40 INJECTION SUBCUTANEOUS EVERY 24 HOURS
Refills: 0 | Status: DISCONTINUED | OUTPATIENT
Start: 2024-04-02 | End: 2024-04-05

## 2024-04-02 RX ORDER — ACETAMINOPHEN 500 MG
1000 TABLET ORAL EVERY 8 HOURS
Refills: 0 | Status: DISCONTINUED | OUTPATIENT
Start: 2024-04-02 | End: 2024-04-05

## 2024-04-02 RX ORDER — LOPERAMIDE HCL 2 MG
2 TABLET ORAL
Refills: 0 | Status: DISCONTINUED | OUTPATIENT
Start: 2024-04-02 | End: 2024-04-05

## 2024-04-02 RX ORDER — GABAPENTIN 400 MG/1
600 CAPSULE ORAL THREE TIMES A DAY
Refills: 0 | Status: DISCONTINUED | OUTPATIENT
Start: 2024-04-02 | End: 2024-04-05

## 2024-04-02 RX ORDER — PIPERACILLIN AND TAZOBACTAM 4; .5 G/20ML; G/20ML
3.38 INJECTION, POWDER, LYOPHILIZED, FOR SOLUTION INTRAVENOUS EVERY 8 HOURS
Refills: 0 | Status: DISCONTINUED | OUTPATIENT
Start: 2024-04-02 | End: 2024-04-05

## 2024-04-02 RX ORDER — FOLIC ACID 0.8 MG
1 TABLET ORAL DAILY
Refills: 0 | Status: DISCONTINUED | OUTPATIENT
Start: 2024-04-02 | End: 2024-04-05

## 2024-04-02 RX ORDER — TAMSULOSIN HYDROCHLORIDE 0.4 MG/1
0.4 CAPSULE ORAL AT BEDTIME
Refills: 0 | Status: DISCONTINUED | OUTPATIENT
Start: 2024-04-02 | End: 2024-04-05

## 2024-04-02 RX ORDER — FINASTERIDE 5 MG/1
5 TABLET, FILM COATED ORAL DAILY
Refills: 0 | Status: DISCONTINUED | OUTPATIENT
Start: 2024-04-02 | End: 2024-04-05

## 2024-04-02 RX ADMIN — Medication 1000 MILLIGRAM(S): at 21:39

## 2024-04-02 RX ADMIN — Medication 1000 MILLIGRAM(S): at 05:12

## 2024-04-02 RX ADMIN — Medication 1000 MILLIGRAM(S): at 22:19

## 2024-04-02 RX ADMIN — PIPERACILLIN AND TAZOBACTAM 25 GRAM(S): 4; .5 INJECTION, POWDER, LYOPHILIZED, FOR SOLUTION INTRAVENOUS at 21:41

## 2024-04-02 RX ADMIN — Medication 1000 MILLIGRAM(S): at 05:53

## 2024-04-02 RX ADMIN — GABAPENTIN 600 MILLIGRAM(S): 400 CAPSULE ORAL at 21:39

## 2024-04-02 RX ADMIN — GABAPENTIN 600 MILLIGRAM(S): 400 CAPSULE ORAL at 05:12

## 2024-04-02 RX ADMIN — PIPERACILLIN AND TAZOBACTAM 25 GRAM(S): 4; .5 INJECTION, POWDER, LYOPHILIZED, FOR SOLUTION INTRAVENOUS at 05:11

## 2024-04-02 RX ADMIN — Medication 2 MILLIGRAM(S): at 05:12

## 2024-04-02 RX ADMIN — GABAPENTIN 600 MILLIGRAM(S): 400 CAPSULE ORAL at 16:54

## 2024-04-02 RX ADMIN — PIPERACILLIN AND TAZOBACTAM 25 GRAM(S): 4; .5 INJECTION, POWDER, LYOPHILIZED, FOR SOLUTION INTRAVENOUS at 16:53

## 2024-04-02 RX ADMIN — Medication 2 MILLIGRAM(S): at 17:00

## 2024-04-02 RX ADMIN — Medication 1 MILLIGRAM(S): at 16:55

## 2024-04-02 RX ADMIN — FINASTERIDE 5 MILLIGRAM(S): 5 TABLET, FILM COATED ORAL at 16:53

## 2024-04-02 RX ADMIN — TAMSULOSIN HYDROCHLORIDE 0.4 MILLIGRAM(S): 0.4 CAPSULE ORAL at 21:38

## 2024-04-02 RX ADMIN — ENOXAPARIN SODIUM 40 MILLIGRAM(S): 100 INJECTION SUBCUTANEOUS at 17:00

## 2024-04-02 RX ADMIN — Medication 1000 MILLIGRAM(S): at 17:55

## 2024-04-02 RX ADMIN — Medication 1000 MILLIGRAM(S): at 16:55

## 2024-04-02 NOTE — CHART NOTE - NSCHARTNOTEFT_GEN_A_CORE
PACU ANESTHESIA ADMISSION NOTE      Procedure:   Post op diagnosis:      ____  Intubated  TV:______       Rate: ______      FiO2: ______    _x___  Patent Airway    __x__  Full return of protective reflexes    _x___  Full recovery from anesthesia / back to baseline     Vitals:   T: 98          R: 12                 BP:112/78                  Sat:99                   P: 80      Mental Status:  ___x_ Awake   __x___ Alert   _____ Drowsy   _____ Sedated    Nausea/Vomiting:  __x__ NO  ______Yes,   See Post - Op Orders          Pain Scale (0-10):  _____    Treatment: __x__ None    ____ See Post - Op/PCA Orders    Post - Operative Fluids:   ____ Oral   _x___ See Post - Op Orders    Plan: Discharge:   ____Home       __x___Floor     _____Critical Care    _____  Other:_________________    Comments:
PACU ANESTHESIA ADMISSION NOTE      Procedure: Exam under anesthesia, rectal      Post op diagnosis:  Pelvic abscess in male        ____  Intubated  TV:______       Rate: ______      FiO2: ______    __x__  Patent Airway    ____  Full return of protective reflexes    ____  Full recovery from anesthesia / back to baseline     Vitals:   T:       98    R:   12               BP:     117/62              Sat:   96%                 P:  72      Mental Status:  __x__ Awake   _____ Alert   _____ Drowsy   _____ Sedated    Nausea/Vomiting:  __x__ NO  ______Yes,   See Post - Op Orders          Pain Scale (0-10):  _____    Treatment: ____ None    ___x_ See Post - Op/PCA Orders    Post - Operative Fluids:   ____ Oral   ___x_ See Post - Op Orders    Plan: Discharge:   ____Home       ___x__Floor     _____Critical Care    _____  Other:_________________    Comments: Uneventful intraoperative course. No anesthesia issues or complications noted.  Patient stable upon arrival to PACU. Report given to RN. Discharge when criteria met.
Spoke with ID to continue zosyn for now and ID will see pt   Continue close monitoring
percutaneous drainage of perirectal collection was attempted. The needle was placed within the collection, fluid was not able to be aspirated and the guidewire was unable to be properly advanced despite multiple adjustments. The procedure was aborted. continue with antibiotics.

## 2024-04-02 NOTE — PRE-ANESTHESIA EVALUATION ADULT - BSA (M2)
2.04
Subjective   Bernice Rai is a 62 y.o. female. Patient is here today for follow-up on her hypertension, hyperlipidemia, diabetes mellitus type 2, obesity, GERD and persistent proteinuria.  Patient's generally been stable.  She also sees nephrology and they recently added in some hydralazine.  She is not having any acute complaints.    Chief Complaint   Patient presents with   • Hyperlipidemia     Hypertension  Follow Up Labs          Vitals:    07/20/22 1417   BP: 162/64   Pulse: 98   Temp: 96.6 °F (35.9 °C)   SpO2: 98%     Body mass index is 46.59 kg/m².  The following portions of the patient's history were reviewed and updated as appropriate: allergies, current medications, past family history, past medical history, past social history, past surgical history and problem list.    Past Medical History:   Diagnosis Date   • Allergic Years ago    Took allergy shots   • Anxiety     Stroke   • Arthritis     FEET   • Asthma     Inhaler   • Clotting disorder (HCC)     Taking asa   • COPD (chronic obstructive pulmonary disease) ([facility])     At times bronchitis   • Depression 10/22/01    Stroke   • Diabetes mellitus (HCC)    • GERD (gastroesophageal reflux disease) 1987    Gerd   • Hyperlipidemia    • Hypertension    • Obesity 1988   • Reflux esophagitis    • Renal insufficiency 2021   • Stroke (HCC)    • Urinary tract infection     Occasionaly   • Visual impairment 10/2001    1/2 vision each eye      Allergies   Allergen Reactions   • Jardiance [Empagliflozin] Itching     Recurrent yeast infections      Social History     Socioeconomic History   • Marital status:    Tobacco Use   • Smoking status: Current Every Day Smoker     Packs/day: 0.50     Years: 40.00     Pack years: 20.00     Types: Cigarettes   • Smokeless tobacco: Never Used   Substance and Sexual Activity   • Alcohol use: No   • Drug use: Never   • Sexual activity: Not Currently     Partners: Male     Birth control/protection: Post-menopausal    
    Current Outpatient Medications:   •  desoximetasone (TOPICORT) 0.25 % ointment, Apply  topically to the appropriate area as directed Every 12 (Twelve) Hours., Disp: 60 g, Rfl: 3  •  albuterol sulfate HFA (Proventil HFA) 108 (90 Base) MCG/ACT inhaler, Inhale 2 puffs Every 6 (Six) Hours As Needed for Wheezing., Disp: 18 g, Rfl: 11  •  amLODIPine (NORVASC) 10 MG tablet, Take 1 tablet by mouth Daily., Disp: 90 tablet, Rfl: 3  •  aspirin 81 MG chewable tablet, Chew 81 mg daily., Disp: , Rfl:   •  atenolol (TENORMIN) 100 MG tablet, Take 1 tablet by mouth Daily., Disp: 90 tablet, Rfl: 3  •  fluticasone (FLONASE) 50 MCG/ACT nasal spray, 2 sprays into the nostril(s) as directed by provider Daily., Disp: 15.8 mL, Rfl: 0  •  Fluticasone Furoate-Vilanterol (Breo Ellipta) 100-25 MCG/INH inhaler, Inhale 1 puff Daily., Disp: 1 each, Rfl: 11  •  hydrALAZINE (APRESOLINE) 50 MG tablet, , Disp: , Rfl:   •  insulin NPH-insulin regular (humuLIN 70/30,novoLIN 70/30) (70-30) 100 UNIT/ML injection, Inject  under the skin. 130 150 daily / sliding scale, Disp: , Rfl:   •  Kerendia 20 MG tablet, Take 1 tablet by mouth Daily., Disp: , Rfl:   •  lisinopril (PRINIVIL,ZESTRIL) 20 MG tablet, Take 1 tablet by mouth 2 (Two) Times a Day., Disp: 180 tablet, Rfl: 3  •  metFORMIN (GLUCOPHAGE) 500 MG tablet, , Disp: , Rfl:   •  pioglitazone (Actos) 30 MG tablet, Take 1 tablet by mouth Daily., Disp: 90 tablet, Rfl: 3  •  PROBIOTIC PRODUCT PO, Take  by mouth., Disp: , Rfl:   •  rosuvastatin (Crestor) 10 MG tablet, Take 1 tablet by mouth Daily., Disp: 30 tablet, Rfl: 5  •  venlafaxine XR (EFFEXOR-XR) 150 MG 24 hr capsule, Take 1 capsule by mouth Daily., Disp: 90 capsule, Rfl: 3     Objective     History of Present Illness     Review of Systems    Physical Exam  Vitals and nursing note reviewed.   Constitutional:       Appearance: Normal appearance.   HENT:      Head: Normocephalic and atraumatic.   Cardiovascular:      Rate and Rhythm: Normal rate and 
regular rhythm.      Heart sounds: Normal heart sounds.   Pulmonary:      Effort: Pulmonary effort is normal. No respiratory distress.      Breath sounds: Normal breath sounds. No wheezing or rales.   Musculoskeletal:         General: Normal range of motion.      Right lower leg: No edema.      Left lower leg: No edema.   Skin:     General: Skin is warm and dry.   Neurological:      General: No focal deficit present.      Mental Status: She is alert and oriented to person, place, and time.   Psychiatric:         Mood and Affect: Mood normal.         Behavior: Behavior normal.         ASSESSMENT CBC was normal.  CMP has a sugar of 136, creatinine 1.1, slightly low total protein of 5.7 that stable and albumin of 3.1 that stable.  Hemoglobin A1c is improved to 6.8.  Urine microalbumin remains quite elevated at 7900.  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     Problems Addressed this Visit        Cardiac and Vasculature    Hyperlipidemia - Primary    Hypertension    Relevant Medications    hydrALAZINE (APRESOLINE) 50 MG tablet       Endocrine and Metabolic    Diabetes mellitus (HCC)    Relevant Medications    pioglitazone (Actos) 30 MG tablet    Obesity due to excess calories       Gastrointestinal Abdominal     Gastroesophageal reflux disease       Genitourinary and Reproductive     Albuminuria    Persistent proteinuria      Diagnoses       Codes Comments    Hyperlipidemia, unspecified hyperlipidemia type    -  Primary ICD-10-CM: E78.5  ICD-9-CM: 272.4     Primary hypertension     ICD-10-CM: I10  ICD-9-CM: 401.9     Type 2 diabetes mellitus with 
other diabetic kidney complication, with long-term current use of insulin ([facility])     ICD-10-CM: E11.29, Z79.4  ICD-9-CM: 250.40, V58.67     Class 3 severe obesity due to excess calories with serious comorbidity and body mass index (BMI) of 45.0 to 49.9 in adult ([facility])     ICD-10-CM: E66.01, Z68.42  ICD-9-CM: 278.01, V85.42     Gastroesophageal reflux disease, unspecified whether esophagitis present     ICD-10-CM: K21.9  ICD-9-CM: 530.81     Albuminuria     ICD-10-CM: R80.9  ICD-9-CM: 791.0     Persistent proteinuria     ICD-10-CM: R80.1  ICD-9-CM: 791.0           PLAN I am going to add in pioglitazone 30 mg daily since the patient cannot tolerate any more metformin.  I would like to recheck her in 3 months with laboratory studies.  She will continue other medicines    There are no Patient Instructions on file for this visit.  Return in about 3 months (around 10/20/2022) for with labs.  
2.04

## 2024-04-02 NOTE — PRE-ANESTHESIA EVALUATION ADULT - NSANTHOSAYNRD_GEN_A_CORE
No. MIGEL screening performed.  STOP BANG Legend: 0-2 = LOW Risk; 3-4 = INTERMEDIATE Risk; 5-8 = HIGH Risk
No. MIGEL screening performed.  STOP BANG Legend: 0-2 = LOW Risk; 3-4 = INTERMEDIATE Risk; 5-8 = HIGH Risk

## 2024-04-03 LAB
ANION GAP SERPL CALC-SCNC: 10 MMOL/L — SIGNIFICANT CHANGE UP (ref 7–14)
BASOPHILS # BLD AUTO: 0.02 K/UL — SIGNIFICANT CHANGE UP (ref 0–0.2)
BASOPHILS NFR BLD AUTO: 0.2 % — SIGNIFICANT CHANGE UP (ref 0–1)
BUN SERPL-MCNC: 18 MG/DL — SIGNIFICANT CHANGE UP (ref 10–20)
CALCIUM SERPL-MCNC: 9.4 MG/DL — SIGNIFICANT CHANGE UP (ref 8.4–10.5)
CHLORIDE SERPL-SCNC: 102 MMOL/L — SIGNIFICANT CHANGE UP (ref 98–110)
CO2 SERPL-SCNC: 27 MMOL/L — SIGNIFICANT CHANGE UP (ref 17–32)
CREAT SERPL-MCNC: 0.9 MG/DL — SIGNIFICANT CHANGE UP (ref 0.7–1.5)
EGFR: 91 ML/MIN/1.73M2 — SIGNIFICANT CHANGE UP
EOSINOPHIL # BLD AUTO: 0.02 K/UL — SIGNIFICANT CHANGE UP (ref 0–0.7)
EOSINOPHIL NFR BLD AUTO: 0.2 % — SIGNIFICANT CHANGE UP (ref 0–8)
GLUCOSE SERPL-MCNC: 107 MG/DL — HIGH (ref 70–99)
GRAM STN FLD: SIGNIFICANT CHANGE UP
HCT VFR BLD CALC: 29 % — LOW (ref 42–52)
HGB BLD-MCNC: 9.2 G/DL — LOW (ref 14–18)
IMM GRANULOCYTES NFR BLD AUTO: 0.7 % — HIGH (ref 0.1–0.3)
LYMPHOCYTES # BLD AUTO: 1.92 K/UL — SIGNIFICANT CHANGE UP (ref 1.2–3.4)
LYMPHOCYTES # BLD AUTO: 19.6 % — LOW (ref 20.5–51.1)
MAGNESIUM SERPL-MCNC: 2.1 MG/DL — SIGNIFICANT CHANGE UP (ref 1.8–2.4)
MCHC RBC-ENTMCNC: 27.6 PG — SIGNIFICANT CHANGE UP (ref 27–31)
MCHC RBC-ENTMCNC: 31.7 G/DL — LOW (ref 32–37)
MCV RBC AUTO: 87.1 FL — SIGNIFICANT CHANGE UP (ref 80–94)
MONOCYTES # BLD AUTO: 0.78 K/UL — HIGH (ref 0.1–0.6)
MONOCYTES NFR BLD AUTO: 8 % — SIGNIFICANT CHANGE UP (ref 1.7–9.3)
NEUTROPHILS # BLD AUTO: 6.99 K/UL — HIGH (ref 1.4–6.5)
NEUTROPHILS NFR BLD AUTO: 71.3 % — SIGNIFICANT CHANGE UP (ref 42.2–75.2)
NRBC # BLD: 0 /100 WBCS — SIGNIFICANT CHANGE UP (ref 0–0)
PHOSPHATE SERPL-MCNC: 2.1 MG/DL — SIGNIFICANT CHANGE UP (ref 2.1–4.9)
PLATELET # BLD AUTO: 605 K/UL — HIGH (ref 130–400)
PMV BLD: 10.1 FL — SIGNIFICANT CHANGE UP (ref 7.4–10.4)
POTASSIUM SERPL-MCNC: 4.6 MMOL/L — SIGNIFICANT CHANGE UP (ref 3.5–5)
POTASSIUM SERPL-SCNC: 4.6 MMOL/L — SIGNIFICANT CHANGE UP (ref 3.5–5)
RBC # BLD: 3.33 M/UL — LOW (ref 4.7–6.1)
RBC # FLD: 14.6 % — HIGH (ref 11.5–14.5)
SODIUM SERPL-SCNC: 139 MMOL/L — SIGNIFICANT CHANGE UP (ref 135–146)
SPECIMEN SOURCE: SIGNIFICANT CHANGE UP
WBC # BLD: 9.8 K/UL — SIGNIFICANT CHANGE UP (ref 4.8–10.8)
WBC # FLD AUTO: 9.8 K/UL — SIGNIFICANT CHANGE UP (ref 4.8–10.8)

## 2024-04-03 RX ADMIN — TAMSULOSIN HYDROCHLORIDE 0.4 MILLIGRAM(S): 0.4 CAPSULE ORAL at 21:55

## 2024-04-03 RX ADMIN — FINASTERIDE 5 MILLIGRAM(S): 5 TABLET, FILM COATED ORAL at 11:28

## 2024-04-03 RX ADMIN — Medication 1000 MILLIGRAM(S): at 06:06

## 2024-04-03 RX ADMIN — Medication 2 MILLIGRAM(S): at 17:23

## 2024-04-03 RX ADMIN — GABAPENTIN 600 MILLIGRAM(S): 400 CAPSULE ORAL at 06:07

## 2024-04-03 RX ADMIN — PIPERACILLIN AND TAZOBACTAM 25 GRAM(S): 4; .5 INJECTION, POWDER, LYOPHILIZED, FOR SOLUTION INTRAVENOUS at 13:41

## 2024-04-03 RX ADMIN — PIPERACILLIN AND TAZOBACTAM 25 GRAM(S): 4; .5 INJECTION, POWDER, LYOPHILIZED, FOR SOLUTION INTRAVENOUS at 06:07

## 2024-04-03 RX ADMIN — GABAPENTIN 600 MILLIGRAM(S): 400 CAPSULE ORAL at 14:13

## 2024-04-03 RX ADMIN — Medication 1000 MILLIGRAM(S): at 13:40

## 2024-04-03 RX ADMIN — Medication 1000 MILLIGRAM(S): at 14:40

## 2024-04-03 RX ADMIN — PIPERACILLIN AND TAZOBACTAM 25 GRAM(S): 4; .5 INJECTION, POWDER, LYOPHILIZED, FOR SOLUTION INTRAVENOUS at 21:56

## 2024-04-03 RX ADMIN — Medication 2 MILLIGRAM(S): at 06:07

## 2024-04-03 RX ADMIN — ENOXAPARIN SODIUM 40 MILLIGRAM(S): 100 INJECTION SUBCUTANEOUS at 17:23

## 2024-04-03 RX ADMIN — Medication 1000 MILLIGRAM(S): at 21:55

## 2024-04-03 RX ADMIN — Medication 1 MILLIGRAM(S): at 11:28

## 2024-04-03 RX ADMIN — GABAPENTIN 600 MILLIGRAM(S): 400 CAPSULE ORAL at 21:55

## 2024-04-03 RX ADMIN — Medication 1000 MILLIGRAM(S): at 06:37

## 2024-04-03 NOTE — PROGRESS NOTE ADULT - ASSESSMENT
This is a 74 year old male, past medical history of rectal ca s/p low anterior resection w/ loop ileostomy creation on feb 29th, RA, bph, who presents with fever.    < from: CT Abdomen and Pelvis w/ Oral Cont and w/ IV Cont (03.30.24 @ 00:04) >  Postinterval low anterior resection with coloanal anastomosis, diverting   loop ileostomy. Deep pelvic abscess measuring 12.4 x 5.7 x 4.3 cm near   expected region of coloanal anastomosis.    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   Acute  illness  which poses a threat to life or bodily function without treatment   Pelvic abscess measuring 12.4 x 5.7 x 4.3 cm near expected region of coloanal anastomosis.  4/2 S/p drainage by Sx  4/2 Abscess : PMNs, no org, cultures p  3/29 S/p Attempt by the IR for aspiration of the abscess. No fluid was obtained.   No ongoing peritonitis  3/29 BCx NG  COVID 19/ Influenza/ RSV NG. 3/29 UCx NG  Rectal invasive adenocarcinoma s/p low anterior resection w/ loop ileostomy creation on feb 29th 2024.  BPH, spinal stenosis and rheumatoid arthritis on methotrexate    RECOMMENDATIONS  -midline  -f/u abscess cultures  - IV Zosyn 3.375 gram q 8 hours.   -Will likely need prolonged abx course and serial imaging for resolution of the pelvic abscess.   -Offloading. Aspiration precautions.

## 2024-04-04 LAB
ANION GAP SERPL CALC-SCNC: 12 MMOL/L — SIGNIFICANT CHANGE UP (ref 7–14)
BASOPHILS # BLD AUTO: 0.03 K/UL — SIGNIFICANT CHANGE UP (ref 0–0.2)
BASOPHILS NFR BLD AUTO: 0.4 % — SIGNIFICANT CHANGE UP (ref 0–1)
BUN SERPL-MCNC: 21 MG/DL — HIGH (ref 10–20)
CALCIUM SERPL-MCNC: 9.1 MG/DL — SIGNIFICANT CHANGE UP (ref 8.4–10.5)
CHLORIDE SERPL-SCNC: 104 MMOL/L — SIGNIFICANT CHANGE UP (ref 98–110)
CO2 SERPL-SCNC: 26 MMOL/L — SIGNIFICANT CHANGE UP (ref 17–32)
CREAT SERPL-MCNC: 1.1 MG/DL — SIGNIFICANT CHANGE UP (ref 0.7–1.5)
CULTURE RESULTS: SIGNIFICANT CHANGE UP
CULTURE RESULTS: SIGNIFICANT CHANGE UP
EGFR: 71 ML/MIN/1.73M2 — SIGNIFICANT CHANGE UP
EOSINOPHIL # BLD AUTO: 0.12 K/UL — SIGNIFICANT CHANGE UP (ref 0–0.7)
EOSINOPHIL NFR BLD AUTO: 1.6 % — SIGNIFICANT CHANGE UP (ref 0–8)
GLUCOSE SERPL-MCNC: 99 MG/DL — SIGNIFICANT CHANGE UP (ref 70–99)
GRAM STN FLD: ABNORMAL
HCT VFR BLD CALC: 27.3 % — LOW (ref 42–52)
HGB BLD-MCNC: 8.6 G/DL — LOW (ref 14–18)
IMM GRANULOCYTES NFR BLD AUTO: 0.8 % — HIGH (ref 0.1–0.3)
LYMPHOCYTES # BLD AUTO: 1.84 K/UL — SIGNIFICANT CHANGE UP (ref 1.2–3.4)
LYMPHOCYTES # BLD AUTO: 23.9 % — SIGNIFICANT CHANGE UP (ref 20.5–51.1)
MAGNESIUM SERPL-MCNC: 2 MG/DL — SIGNIFICANT CHANGE UP (ref 1.8–2.4)
MCHC RBC-ENTMCNC: 27.8 PG — SIGNIFICANT CHANGE UP (ref 27–31)
MCHC RBC-ENTMCNC: 31.5 G/DL — LOW (ref 32–37)
MCV RBC AUTO: 88.3 FL — SIGNIFICANT CHANGE UP (ref 80–94)
MONOCYTES # BLD AUTO: 0.72 K/UL — HIGH (ref 0.1–0.6)
MONOCYTES NFR BLD AUTO: 9.3 % — SIGNIFICANT CHANGE UP (ref 1.7–9.3)
NEUTROPHILS # BLD AUTO: 4.94 K/UL — SIGNIFICANT CHANGE UP (ref 1.4–6.5)
NEUTROPHILS NFR BLD AUTO: 64 % — SIGNIFICANT CHANGE UP (ref 42.2–75.2)
NRBC # BLD: 0 /100 WBCS — SIGNIFICANT CHANGE UP (ref 0–0)
PHOSPHATE SERPL-MCNC: 3.2 MG/DL — SIGNIFICANT CHANGE UP (ref 2.1–4.9)
PLATELET # BLD AUTO: 563 K/UL — HIGH (ref 130–400)
PMV BLD: 10.2 FL — SIGNIFICANT CHANGE UP (ref 7.4–10.4)
POTASSIUM SERPL-MCNC: 4.9 MMOL/L — SIGNIFICANT CHANGE UP (ref 3.5–5)
POTASSIUM SERPL-SCNC: 4.9 MMOL/L — SIGNIFICANT CHANGE UP (ref 3.5–5)
RBC # BLD: 3.09 M/UL — LOW (ref 4.7–6.1)
RBC # FLD: 14.9 % — HIGH (ref 11.5–14.5)
SODIUM SERPL-SCNC: 142 MMOL/L — SIGNIFICANT CHANGE UP (ref 135–146)
SPECIMEN SOURCE: SIGNIFICANT CHANGE UP
SPECIMEN SOURCE: SIGNIFICANT CHANGE UP
WBC # BLD: 7.71 K/UL — SIGNIFICANT CHANGE UP (ref 4.8–10.8)
WBC # FLD AUTO: 7.71 K/UL — SIGNIFICANT CHANGE UP (ref 4.8–10.8)

## 2024-04-04 RX ADMIN — PIPERACILLIN AND TAZOBACTAM 25 GRAM(S): 4; .5 INJECTION, POWDER, LYOPHILIZED, FOR SOLUTION INTRAVENOUS at 21:16

## 2024-04-04 RX ADMIN — Medication 1000 MILLIGRAM(S): at 21:16

## 2024-04-04 RX ADMIN — TAMSULOSIN HYDROCHLORIDE 0.4 MILLIGRAM(S): 0.4 CAPSULE ORAL at 21:16

## 2024-04-04 RX ADMIN — GABAPENTIN 600 MILLIGRAM(S): 400 CAPSULE ORAL at 21:16

## 2024-04-04 RX ADMIN — Medication 2 MILLIGRAM(S): at 17:16

## 2024-04-04 RX ADMIN — Medication 1000 MILLIGRAM(S): at 14:23

## 2024-04-04 RX ADMIN — PIPERACILLIN AND TAZOBACTAM 25 GRAM(S): 4; .5 INJECTION, POWDER, LYOPHILIZED, FOR SOLUTION INTRAVENOUS at 14:23

## 2024-04-04 RX ADMIN — ENOXAPARIN SODIUM 40 MILLIGRAM(S): 100 INJECTION SUBCUTANEOUS at 17:16

## 2024-04-04 RX ADMIN — Medication 2 MILLIGRAM(S): at 06:17

## 2024-04-04 RX ADMIN — FINASTERIDE 5 MILLIGRAM(S): 5 TABLET, FILM COATED ORAL at 11:34

## 2024-04-04 RX ADMIN — Medication 1 MILLIGRAM(S): at 11:32

## 2024-04-04 RX ADMIN — GABAPENTIN 600 MILLIGRAM(S): 400 CAPSULE ORAL at 06:17

## 2024-04-04 RX ADMIN — Medication 85 MILLIMOLE(S): at 00:13

## 2024-04-04 RX ADMIN — PIPERACILLIN AND TAZOBACTAM 25 GRAM(S): 4; .5 INJECTION, POWDER, LYOPHILIZED, FOR SOLUTION INTRAVENOUS at 06:19

## 2024-04-04 RX ADMIN — GABAPENTIN 600 MILLIGRAM(S): 400 CAPSULE ORAL at 14:45

## 2024-04-04 RX ADMIN — Medication 1000 MILLIGRAM(S): at 06:18

## 2024-04-04 NOTE — PROGRESS NOTE ADULT - ASSESSMENT
ASSESSMENT:  Patient is a 74 year old male, past medical history of rectal ca s/p low anterior resection w/ loop ileostomy creation on feb 29th. Back with pelvic abscess, now s/p EUA and penrose drain placement.     PLAN:   - F/U ID for antibiotic recommendations on discharge, awaiting OR culture results  - Trend temperatures, give Tylenol as needed  - Serial abdominal exams  - Monitor ostomy output  - Hemodynamic monitoring  - Continue regular diet as tolerated  - Monitor labs, replete as needed    [phone]

## 2024-04-04 NOTE — PROGRESS NOTE ADULT - ASSESSMENT
74 year old male, past medical history of rectal ca s/p low anterior resection w/ loop ileostomy creation on feb 29th, RA, bph, who presents with fever.    < from: CT Abdomen and Pelvis w/ Oral Cont and w/ IV Cont (03.30.24 @ 00:04) >  Postinterval low anterior resection with coloanal anastomosis, diverting   loop ileostomy. Deep pelvic abscess measuring 12.4 x 5.7 x 4.3 cm near   expected region of coloanal anastomosis.    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   Acute  illness  which poses a threat to life or bodily function without treatment   Pelvic abscess measuring 12.4 x 5.7 x 4.3 cm near expected region of coloanal anastomosis.  4/2 S/p drainage by Sx  4/2 Abscess : PMNs, no org, cultures p  3/29 S/p Attempt by the IR for aspiration of the abscess. No fluid was obtained.   No ongoing peritonitis  3/29 BCx NG  COVID 19/ Influenza/ RSV NG. 3/29 UCx NG  Rectal invasive adenocarcinoma s/p low anterior resection w/ loop ileostomy creation on feb 29th 2024.  BPH, spinal stenosis and rheumatoid arthritis on methotrexate    RECOMMENDATIONS  -midline  -f/u abscess cultures  - IV Zosyn 3.375 gram q 8 hours.   -Will likely need prolonged abx course and serial imaging for resolution of the pelvic abscess.   -Offloading. Aspiration precautions.

## 2024-04-05 ENCOUNTER — TRANSCRIPTION ENCOUNTER (OUTPATIENT)
Age: 74
End: 2024-04-05

## 2024-04-05 VITALS
DIASTOLIC BLOOD PRESSURE: 59 MMHG | RESPIRATION RATE: 18 BRPM | SYSTOLIC BLOOD PRESSURE: 111 MMHG | HEART RATE: 73 BPM | TEMPERATURE: 97 F

## 2024-04-05 LAB
-  AMOXICILLIN/CLAVULANIC ACID: SIGNIFICANT CHANGE UP
-  AMOXICILLIN/CLAVULANIC ACID: SIGNIFICANT CHANGE UP
-  AMPICILLIN/SULBACTAM: SIGNIFICANT CHANGE UP
-  AMPICILLIN/SULBACTAM: SIGNIFICANT CHANGE UP
-  AMPICILLIN: SIGNIFICANT CHANGE UP
-  AMPICILLIN: SIGNIFICANT CHANGE UP
-  AZTREONAM: SIGNIFICANT CHANGE UP
-  AZTREONAM: SIGNIFICANT CHANGE UP
-  CEFAZOLIN: SIGNIFICANT CHANGE UP
-  CEFAZOLIN: SIGNIFICANT CHANGE UP
-  CEFEPIME: SIGNIFICANT CHANGE UP
-  CEFEPIME: SIGNIFICANT CHANGE UP
-  CEFOXITIN: SIGNIFICANT CHANGE UP
-  CEFOXITIN: SIGNIFICANT CHANGE UP
-  CEFTRIAXONE: SIGNIFICANT CHANGE UP
-  CEFTRIAXONE: SIGNIFICANT CHANGE UP
-  CIPROFLOXACIN: SIGNIFICANT CHANGE UP
-  CIPROFLOXACIN: SIGNIFICANT CHANGE UP
-  ERTAPENEM: SIGNIFICANT CHANGE UP
-  ERTAPENEM: SIGNIFICANT CHANGE UP
-  GENTAMICIN: SIGNIFICANT CHANGE UP
-  GENTAMICIN: SIGNIFICANT CHANGE UP
-  IMIPENEM: SIGNIFICANT CHANGE UP
-  LEVOFLOXACIN: SIGNIFICANT CHANGE UP
-  LEVOFLOXACIN: SIGNIFICANT CHANGE UP
-  MEROPENEM: SIGNIFICANT CHANGE UP
-  MEROPENEM: SIGNIFICANT CHANGE UP
-  PIPERACILLIN/TAZOBACTAM: SIGNIFICANT CHANGE UP
-  PIPERACILLIN/TAZOBACTAM: SIGNIFICANT CHANGE UP
-  TOBRAMYCIN: SIGNIFICANT CHANGE UP
-  TOBRAMYCIN: SIGNIFICANT CHANGE UP
-  TRIMETHOPRIM/SULFAMETHOXAZOLE: SIGNIFICANT CHANGE UP
-  TRIMETHOPRIM/SULFAMETHOXAZOLE: SIGNIFICANT CHANGE UP
METHOD TYPE: SIGNIFICANT CHANGE UP
METHOD TYPE: SIGNIFICANT CHANGE UP

## 2024-04-05 RX ORDER — ERTAPENEM SODIUM 1 G/1
1 INJECTION, POWDER, LYOPHILIZED, FOR SOLUTION INTRAMUSCULAR; INTRAVENOUS
Qty: 0 | Refills: 0 | DISCHARGE
Start: 2024-04-05 | End: 2024-04-18

## 2024-04-05 RX ORDER — ERTAPENEM SODIUM 1 G/1
1000 INJECTION, POWDER, LYOPHILIZED, FOR SOLUTION INTRAMUSCULAR; INTRAVENOUS EVERY 24 HOURS
Refills: 0 | Status: DISCONTINUED | OUTPATIENT
Start: 2024-04-05 | End: 2024-04-05

## 2024-04-05 RX ADMIN — GABAPENTIN 600 MILLIGRAM(S): 400 CAPSULE ORAL at 12:00

## 2024-04-05 RX ADMIN — Medication 1000 MILLIGRAM(S): at 12:20

## 2024-04-05 RX ADMIN — Medication 1 MILLIGRAM(S): at 12:00

## 2024-04-05 RX ADMIN — ERTAPENEM SODIUM 120 MILLIGRAM(S): 1 INJECTION, POWDER, LYOPHILIZED, FOR SOLUTION INTRAMUSCULAR; INTRAVENOUS at 12:01

## 2024-04-05 RX ADMIN — FINASTERIDE 5 MILLIGRAM(S): 5 TABLET, FILM COATED ORAL at 12:00

## 2024-04-05 RX ADMIN — PIPERACILLIN AND TAZOBACTAM 25 GRAM(S): 4; .5 INJECTION, POWDER, LYOPHILIZED, FOR SOLUTION INTRAVENOUS at 05:41

## 2024-04-05 RX ADMIN — GABAPENTIN 600 MILLIGRAM(S): 400 CAPSULE ORAL at 05:40

## 2024-04-05 RX ADMIN — Medication 2 MILLIGRAM(S): at 05:40

## 2024-04-05 RX ADMIN — Medication 1000 MILLIGRAM(S): at 05:41

## 2024-04-05 RX ADMIN — Medication 1000 MILLIGRAM(S): at 12:00

## 2024-04-05 NOTE — DISCHARGE NOTE PROVIDER - NSDCFUSCHEDAPPT_GEN_ALL_CORE_FT
Flynn Lorenzo  [facility]  [address] [address]  Scheduled Appointment: 04/17/2024    Yasir Vail  [facility]  Scheduled Appointment: 04/30/2024    [facility]  [address]  Scheduled Appointment: 04/30/2024

## 2024-04-05 NOTE — PROGRESS NOTE ADULT - ASSESSMENT
A/P:  Patient is a 74 year old male, past medical history of rectal ca s/p low anterior resection w/ loop ileostomy creation on feb 29th. Back with pelvic abscess, now s/p EUA and penrose drain placement.     PLAN:   - pending final cultures to  discharge antibiotic recommendations from ID   - PT consult   - Trend temperatures, give Tylenol as needed  - Serial abdominal exams  - Monitor ostomy output  - Hemodynamic monitoring  - Continue regular diet as tolerated  - Monitor labs, replete as needed    [employer] [phone]

## 2024-04-05 NOTE — PROGRESS NOTE ADULT - CARDIOVASCULAR
normal/regular rate and rhythm/S1 S2 present/no gallops/no rub/no murmur
normal/regular rate and rhythm/S1 S2 present/no gallops/no rub/no murmur
no new murmurs
normal/regular rate and rhythm/S1 S2 present/no gallops/no rub/no murmur

## 2024-04-05 NOTE — PROGRESS NOTE ADULT - SUBJECTIVE AND OBJECTIVE BOX
MARI LASSITER  74y, Male    All available historical data reviewed    OVERNIGHT EVENTS:  s/p drainage of abscess  feels well and has no new complaints  No fevers   no pain at site    ROS:  General: Denies rigors, nightsweats  HEENT: Denies headache, rhinorrhea, sore throat, eye pain  CV: Denies CP, palpitations  PULM: Denies wheezing, hemoptysis  GI: Denies hematemesis, hematochezia, melena  : Denies discharge, hematuria  MSK: Denies arthralgias, myalgias  SKIN: Denies rash, lesions  NEURO: weakness  PSYCH: Denies depression, anxiety    VITALS:  T(F): 96.6, Max: 98.2 (04-02-24 @ 11:24)  HR: 70  BP: 133/70  RR: 18Vital Signs Last 24 Hrs  T(C): 35.9 (03 Apr 2024 07:40), Max: 36.8 (02 Apr 2024 11:24)  T(F): 96.6 (03 Apr 2024 07:40), Max: 98.2 (02 Apr 2024 11:24)  HR: 70 (03 Apr 2024 07:40) (56 - 76)  BP: 133/70 (03 Apr 2024 07:40) (106/60 - 133/70)  BP(mean): 72 (02 Apr 2024 11:27) (72 - 72)  RR: 18 (03 Apr 2024 07:40) (16 - 18)  SpO2: 99% (02 Apr 2024 13:30) (97% - 99%)    Parameters below as of 02 Apr 2024 13:30  Patient On (Oxygen Delivery Method): room air        TESTS & MEASUREMENTS:                        8.4    4.02  )-----------( 446      ( 02 Apr 2024 22:37 )             26.4     04-02    139  |  103  |  13  ----------------------------<  223<H>  4.9   |  25  |  0.8    Ca    8.7      02 Apr 2024 22:37  Phos  2.7     04-02  Mg     2.0     04-02          Culture - Abscess with Gram Stain (collected 04-02-24 @ 16:33)  Source: .Abscess None  Gram Stain (04-03-24 @ 04:10):    Few polymorphonuclear leukocytes seen per low power field    No organisms seen per oil power field    Culture - Urine (collected 03-29-24 @ 20:13)  Source: Clean Catch Clean Catch (Midstream)  Final Report (03-30-24 @ 23:37):    <10,000 CFU/mL Normal Urogenital Patricia    Culture - Blood (collected 03-29-24 @ 20:13)  Source: .Blood Blood  Preliminary Report (04-03-24 @ 04:01):    No growth at 4 days    Culture - Blood (collected 03-29-24 @ 20:13)  Source: .Blood Blood  Preliminary Report (04-03-24 @ 04:01):    No growth at 4 days      Urinalysis Basic - ( 02 Apr 2024 22:37 )    Color: x / Appearance: x / SG: x / pH: x  Gluc: 223 mg/dL / Ketone: x  / Bili: x / Urobili: x   Blood: x / Protein: x / Nitrite: x   Leuk Esterase: x / RBC: x / WBC x   Sq Epi: x / Non Sq Epi: x / Bacteria: x          RADIOLOGY & ADDITIONAL TESTS:  Personal review of radiological diagnostics performed  Echo and EKG results noted when applicable.     MEDICATIONS:  acetaminophen     Tablet .. 1000 milliGRAM(s) Oral every 8 hours  enoxaparin Injectable 40 milliGRAM(s) SubCutaneous every 24 hours  finasteride 5 milliGRAM(s) Oral daily  folic acid 1 milliGRAM(s) Oral daily  gabapentin 600 milliGRAM(s) Oral three times a day  loperamide 2 milliGRAM(s) Oral two times a day  piperacillin/tazobactam IVPB.. 3.375 Gram(s) IV Intermittent every 8 hours  tamsulosin 0.4 milliGRAM(s) Oral at bedtime      ANTIBIOTICS:  piperacillin/tazobactam IVPB.. 3.375 Gram(s) IV Intermittent every 8 hours    
GENERAL SURGERY PROGRESS NOTE     MARI LASSITER  74y  Male  Hospital day :2d  POD:31d    OVERNIGHT EVENTS:  - No nausea or vomiting  - Febrile to 102.3 F, Tylenol given  - No abdominal pain  - No gas, brown liquid discharge per rectum    T(F): 102.3 (03-30-24 @ 22:34), Max: 102.3 (03-30-24 @ 22:34)  HR: 95 (03-30-24 @ 15:15) (80 - 95)  BP: 133/63 (03-30-24 @ 15:15) (97/52 - 133/63)  RR: 18 (03-30-24 @ 15:15) (18 - 18)  SpO2: 99% (03-30-24 @ 04:00) (99% - 99%)    DIET/FLUIDS: dextrose 5% + sodium chloride 0.45%. 1000 milliLiter(s) IV Continuous <Continuous>  folic acid 1 milliGRAM(s) Oral daily  magnesium sulfate  IVPB 2 Gram(s) IV Intermittent once    BM:   03-29-24 @ 07:01  -  03-30-24 @ 07:00  --------------------------------------------------------  OUT: 100 mL    ABx: piperacillin/tazobactam IVPB.. 3.375 Gram(s) IV Intermittent every 8 hours      PHYSICAL EXAM:  GENERAL: NAD  CHEST/LUNG: Clear to auscultation bilaterally  HEART: Regular rate and rhythm  ABDOMEN: Soft, Nontender, Nondistended;       LABS  Labs:               8.1    13.46 )-----------( 306      ( 30 Mar 2024 21:26 )             25.2       Auto Neutrophil %: 82.9 % (03-30-24 @ 21:26)  Auto Immature Granulocyte %: 0.7 % (03-30-24 @ 21:26)  Auto Neutrophil %: 88.6 % (03-30-24 @ 13:04)  Auto Immature Granulocyte %: 0.6 % (03-30-24 @ 13:04)    03-30    136  |  101  |  11  ----------------------------<  114<H>  4.0   |  23  |  1.0      Magnesium: 1.8 mg/dL (03-30-24 @ 21:26)      LFTs:             6.7  | 0.4  | 16       ------------------[107     ( 29 Mar 2024 20:13 )  3.7  | x    | 8           Lipase:x      Amylase:x         Lactate, Blood: 1.9 mmol/L (03-29-24 @ 20:13)      Coags:     17.30  ----< 1.51    ( 30 Mar 2024 13:04 )     35.0                Urinalysis Basic - ( 30 Mar 2024 21:26 )    Color: x / Appearance: x / SG: x / pH: x  Gluc: 114 mg/dL / Ketone: x  / Bili: x / Urobili: x   Blood: x / Protein: x / Nitrite: x   Leuk Esterase: x / RBC: x / WBC x   Sq Epi: x / Non Sq Epi: x / Bacteria: x        Culture - Urine (collected 29 Mar 2024 20:13)  Source: Clean Catch Clean Catch (Midstream)  Final Report (30 Mar 2024 23:37):    <10,000 CFU/mL Normal Urogenital Patricia          RADIOLOGY & ADDITIONAL TESTS:  No new imaging.    
GENERAL SURGERY PROGRESS NOTE    Patient: MARI LASSITER , 74y ([birth year])Male   MRN: [id number]  Location: [address]  Visit: 03-30-24 Inpatient  Date: 04-03-24 @ 07:39    Hospital Day #: 6  Post-Op Day #: 34    Procedure/Dx/Injuries: S/p rectal mass excision (2/29/24), now back with deep pelvic abscess s/p EUA and penrose drain.     Events of past 24 hours:  - EUA and penrose drain placement.  - tolerating regular diet, ostomy functioning.     PAST MEDICAL & SURGICAL HISTORY:  Skin cancer (melanoma)  Rheumatoid arthritis  Spinal stenosis  History of BPH  History of hay fever  Rectal cancer  S/P bilateral inguinal hernia repair        Vitals:   T(F): 96.8 (04-03-24 @ 00:00), Max: 98.2 (04-02-24 @ 11:24)  HR: 56 (04-03-24 @ 00:00)  BP: 106/60 (04-03-24 @ 00:00)  RR: 18 (04-03-24 @ 00:00)  SpO2: 99% (04-02-24 @ 13:30)      Diet, Regular:   Gluten-Gliadin Restricted      Fluids:     I & O's:  04-02-24 @ 07:01  -  04-03-24 @ 07:00  --------------------------------------------------------  IN:    Oral Fluid: 480 mL  Total IN: 480 mL    OUT:    Colostomy (mL): 300 mL    Ileostomy (mL): 270 mL    Voided (mL): 1150 mL  Total OUT: 1720 mL    Total NET: -1240 mL      Bowel Movement: : [] YES   Flatus: : [] YES       MEDICATIONS  (STANDING):  acetaminophen     Tablet .. 1000 milliGRAM(s) Oral every 8 hours  enoxaparin Injectable 40 milliGRAM(s) SubCutaneous every 24 hours  finasteride 5 milliGRAM(s) Oral daily  folic acid 1 milliGRAM(s) Oral daily  gabapentin 600 milliGRAM(s) Oral three times a day  loperamide 2 milliGRAM(s) Oral two times a day  piperacillin/tazobactam IVPB.. 3.375 Gram(s) IV Intermittent every 8 hours  tamsulosin 0.4 milliGRAM(s) Oral at bedtime    MEDICATIONS  (PRN):  DVT PROPHYLAXIS: enoxaparin Injectable 40 milliGRAM(s) SubCutaneous every 24 hours  GI PROPHYLAXIS:   ANTICOAGULATION:   ANTIBIOTICS:  piperacillin/tazobactam IVPB.. 3.375 Gram(s)        LAB/STUDIES:  Labs:  CAPILLARY BLOOD GLUCOSE                8.4    4.02  )-----------( 446      ( 02 Apr 2024 22:37 )             26.4       Auto Neutrophil %: 89.2 % (04-02-24 @ 22:37)    04-02    139  |  103  |  13  ----------------------------<  223<H>  4.9   |  25  |  0.8      Calcium: 8.7 mg/dL (04-02-24 @ 22:37)  LFTs:  Coags:     14.80  ----< 1.29    ( 01 Apr 2024 18:12 )     34.0      Urinalysis Basic - ( 02 Apr 2024 22:37 )    Color: x / Appearance: x / SG: x / pH: x  Gluc: 223 mg/dL / Ketone: x  / Bili: x / Urobili: x   Blood: x / Protein: x / Nitrite: x   Leuk Esterase: x / RBC: x / WBC x   Sq Epi: x / Non Sq Epi: x / Bacteria: x    Culture - Abscess with Gram Stain (collected 02 Apr 2024 16:33)  Source: .Abscess None  Gram Stain (03 Apr 2024 04:10):    Few polymorphonuclear leukocytes seen per low power field    No organisms seen per oil power field      ASSESSMENT:  Patient is a 74 year old male, past medical history of rectal ca s/p low anterior resection w/ loop ileostomy creation on feb 29th. Back with pelvic abscess, now s/p EUA and penrose drain placement.     PLAN:   - Trend temperatures, given Tylenol as needed  - Serial abdominal exams  - Monitor for bowel function  - Trend HR and SBP  - Monitor ostomy output  - Regular diet    [employer] [id number]  
GENERAL SURGERY PROGRESS NOTE     MARI LASSITER  74y  Male  Hospital day :6d  POD:35d  Procedure: Exam under anesthesia, rectal      OVERNIGHT EVENTS:  - No nausea or vomiting  - Ileostomy functional with gas and stool  - Penrose drain in pelvic collection  - Tolerating a regular diet    T(F): 97.2 (04-03-24 @ 22:34), Max: 97.4 (04-03-24 @ 15:35)  HR: 59 (04-03-24 @ 22:34) (59 - 72)  BP: 111/58 (04-03-24 @ 22:34) (110/72 - 133/70)  RR: 18 (04-03-24 @ 22:34) (18 - 18)    DIET/FLUIDS: folic acid 1 milliGRAM(s) Oral daily  sodium phosphate 30 milliMole(s)/500 mL IVPB 30 milliMole(s) IV Intermittent once    BM:   04-02-24 @ 07:01  -  04-03-24 @ 07:00  --------------------------------------------------------  OUT: 570 mL     AC/ proph: enoxaparin Injectable 40 milliGRAM(s) SubCutaneous every 24 hours    ABx: piperacillin/tazobactam IVPB.. 3.375 Gram(s) IV Intermittent every 8 hours      PHYSICAL EXAM:  GENERAL: NAD  CHEST/LUNG: Clear to auscultation bilaterally  HEART: Regular rate and rhythm  ABDOMEN: Soft, Nontender, Nondistended; Ileostomy appliance in place with pink and patent stoma, liquid stool and gas in bag      LABS  Labs:               9.2    9.80  )-----------( 605      ( 03 Apr 2024 22:21 )             29.0       Auto Neutrophil %: 71.3 % (04-03-24 @ 22:21)  Auto Immature Granulocyte %: 0.7 % (04-03-24 @ 22:21)    04-03    139  |  102  |  18  ----------------------------<  107<H>  4.6   |  27  |  0.9      Calcium: 9.4 mg/dL (04-03-24 @ 22:21)      Urinalysis Basic - ( 03 Apr 2024 22:21 )    Color: x / Appearance: x / SG: x / pH: x  Gluc: 107 mg/dL / Ketone: x  / Bili: x / Urobili: x   Blood: x / Protein: x / Nitrite: x   Leuk Esterase: x / RBC: x / WBC x   Sq Epi: x / Non Sq Epi: x / Bacteria: x        Culture - Abscess with Gram Stain (collected 02 Apr 2024 16:33)  Source: .Abscess None  Gram Stain (03 Apr 2024 04:10):    Few polymorphonuclear leukocytes seen per low power field    No organisms seen per oil power field          RADIOLOGY & ADDITIONAL TESTS:  No new imaging
GENERAL SURGERY PROGRESS NOTE     MARI LASSITER  [address] [place] [address] day :6d    24 hour events:  - No nausea or vomiting  - Ileostomy functional with gas and stool  - Penrose drain in pelvic collection  - Tolerating a regular diet    PHYSICAL EXAM:  GENERAL: NAD, well-appearing  CHEST/LUNG: Clear to auscultation bilaterally  HEART: Regular rate and rhythm  ABDOMEN: Soft, Nontender, Nondistended; Ileostomy appliance in place with pink and patent stoma, liquid stool and gas in bag  EXTREMITIES:  No clubbing, cyanosis, or edema        T(F): 96.8 (04-05-24 @ 00:00), Max: 96.9 (04-04-24 @ 15:00)  HR: 60 (04-05-24 @ 00:00) (60 - 71)  BP: 113/70 (04-05-24 @ 00:00) (96/60 - 113/70)  ABP: --  ABP(mean): --  RR: 18 (04-05-24 @ 00:00) (18 - 18)  SpO2: --    IN'S / OUT's:    04-03-24 @ 07:01  -  04-04-24 @ 07:00  --------------------------------------------------------  IN:    Oral Fluid: 720 mL  Total IN: 720 mL    OUT:    Ileostomy (mL): 1550 mL    Voided (mL): 700 mL  Total OUT: 2250 mL    Total NET: -1530 mL          LABS  Labs:  CAPILLARY BLOOD GLUCOSE                              8.6    7.71  )-----------( 563      ( 04 Apr 2024 21:09 )             27.3       Auto Neutrophil %: 64.0 % (04-04-24 @ 21:09)  Auto Immature Granulocyte %: 0.8 % (04-04-24 @ 21:09)    04-04    142  |  104  |  21<H>  ----------------------------<  99  4.9   |  26  |  1.1      Calcium: 9.1 mg/dL (04-04-24 @ 21:09)      LFTs:         Coags:            Urinalysis Basic - ( 04 Apr 2024 21:09 )    Color: x / Appearance: x / SG: x / pH: x  Gluc: 99 mg/dL / Ketone: x  / Bili: x / Urobili: x   Blood: x / Protein: x / Nitrite: x   Leuk Esterase: x / RBC: x / WBC x   Sq Epi: x / Non Sq Epi: x / Bacteria: x        Culture - Abscess with Gram Stain (collected 02 Apr 2024 16:33)  Source: .Abscess None  Gram Stain (04 Apr 2024 13:53):    Few polymorphonuclear leukocytes seen per low power field    No organisms seen per oil power field  Preliminary Report (04 Apr 2024 13:53):    Rare Proteus mirabilis          RADIOLOGY & ADDITIONAL TESTS:      
GENERAL SURGERY PROGRESS NOTE     MARI LASSITER  [address] [place] [address] day :3d    24 hour events:  tolerating RD  +g/+bm  ileostomy functioning     PHYSICAL EXAM:  GENERAL: NAD, well-appearing  CHEST/LUNG: Clear to auscultation bilaterally  HEART: Regular rate and rhythm  ABDOMEN: Soft, Nontender, Nondistended; ileostomy   EXTREMITIES:  No clubbing, cyanosis, or edema        T(F): 97.7 (04-02-24 @ 06:47), Max: 98.2 (04-01-24 @ 07:32)  HR: 66 (04-02-24 @ 06:47) (66 - 101)  BP: 97/68 (04-02-24 @ 06:47) (91/55 - 108/69)  ABP: --  ABP(mean): --  RR: 18 (04-02-24 @ 06:47) (18 - 18)  SpO2: 97% (04-02-24 @ 06:47) (97% - 97%)    IN'S / OUT's:    04-01-24 @ 07:01  -  04-02-24 @ 07:00  --------------------------------------------------------  IN:    dextrose 5% + sodium chloride 0.45%: 700 mL    IV PiggyBack: 200 mL    Lactated Ringers: 1410 mL    Oral Fluid: 15 mL  Total IN: 2325 mL    OUT:    Voided (mL): 1600 mL  Total OUT: 1600 mL    Total NET: 725 mL          LABS  Labs:  CAPILLARY BLOOD GLUCOSE                              9.3    9.00  )-----------( 450      ( 01 Apr 2024 18:12 )             29.0       Auto Neutrophil %: 68.9 % (04-01-24 @ 18:12)  Auto Immature Granulocyte %: 0.4 % (04-01-24 @ 18:12)    04-01    137  |  101  |  8<L>  ----------------------------<  97  4.4   |  24  |  0.9      Calcium: 9.1 mg/dL (04-01-24 @ 18:12)      LFTs:         Coags:     14.80  ----< 1.29    ( 01 Apr 2024 18:12 )     34.0                Urinalysis Basic - ( 01 Apr 2024 18:12 )    Color: x / Appearance: x / SG: x / pH: x  Gluc: 97 mg/dL / Ketone: x  / Bili: x / Urobili: x   Blood: x / Protein: x / Nitrite: x   Leuk Esterase: x / RBC: x / WBC x   Sq Epi: x / Non Sq Epi: x / Bacteria: x            
GENERAL SURGERY PROGRESS NOTE     MARI LASSITER  [address] [place] [address] day :2d    24 hour events:  IR procedure aborted due to failure to access abscess site  ileostomy with stool and gas  tolerating CLD    PHYSICAL EXAM:  GENERAL: NAD, well-appearing  CHEST/LUNG: Clear to auscultation bilaterally  HEART: Regular rate and rhythm  ABDOMEN: Soft, Nontender, Nondistended; ileostomy   EXTREMITIES:  No clubbing, cyanosis, or edema        T(F): 97.8 (04-01-24 @ 00:00), Max: 99.1 (03-31-24 @ 07:35)  HR: 77 (04-01-24 @ 00:00) (64 - 90)  BP: 106/53 (04-01-24 @ 00:00) (95/50 - 135/62)  ABP: --  ABP(mean): --  RR: 18 (04-01-24 @ 00:00) (16 - 18)  SpO2: 99% (03-31-24 @ 13:10) (97% - 99%)    IN'S / OUT's:    03-30-24 @ 07:01  -  03-31-24 @ 07:00  --------------------------------------------------------  IN:  Total IN: 0 mL    OUT:    Colostomy (mL): 275 mL    Voided (mL): 575 mL  Total OUT: 850 mL    Total NET: -850 mL      03-31-24 @ 07:01  -  04-01-24 @ 04:02  --------------------------------------------------------  IN:  Total IN: 0 mL    OUT:    Colostomy (mL): 50 mL  Total OUT: 50 mL    Total NET: -50 mL          LABS  Labs:  CAPILLARY BLOOD GLUCOSE                              8.1    9.25  )-----------( 345      ( 31 Mar 2024 20:00 )             25.1       Auto Neutrophil %: 81.8 % (03-31-24 @ 06:51)  Auto Immature Granulocyte %: 0.8 % (03-31-24 @ 06:51)    03-31    141  |  105  |  10  ----------------------------<  101<H>  3.9   |  27  |  1.0      Calcium: 8.8 mg/dL (03-31-24 @ 20:00)      LFTs:     Lactate, Blood: 1.9 mmol/L (03-29-24 @ 20:13)      Coags:     15.30  ----< 1.34    ( 31 Mar 2024 06:51 )     28.0                Urinalysis Basic - ( 31 Mar 2024 20:00 )    Color: x / Appearance: x / SG: x / pH: x  Gluc: 101 mg/dL / Ketone: x  / Bili: x / Urobili: x   Blood: x / Protein: x / Nitrite: x   Leuk Esterase: x / RBC: x / WBC x   Sq Epi: x / Non Sq Epi: x / Bacteria: x        Culture - Urine (collected 29 Mar 2024 20:13)  Source: Clean Catch Clean Catch (Midstream)  Final Report (30 Mar 2024 23:37):    <10,000 CFU/mL Normal Urogenital Patricia    Culture - Blood (collected 29 Mar 2024 20:13)  Source: .Blood Blood  Preliminary Report (01 Apr 2024 04:01):    No growth at 48 Hours    Culture - Blood (collected 29 Mar 2024 20:13)  Source: .Blood Blood  Preliminary Report (01 Apr 2024 04:01):    No growth at 48 Hours          RADIOLOGY & ADDITIONAL TESTS:      
  MARI LASSITER  74y, Male    All available historical data reviewed    OVERNIGHT EVENTS:  feels well and has no new complaints  No fevers      ROS:  General: Denies rigors, nightsweats  HEENT: Denies headache, rhinorrhea, sore throat, eye pain  CV: Denies CP, palpitations  PULM: Denies wheezing, hemoptysis  GI: Denies hematemesis, hematochezia, melena  : Denies discharge, hematuria  MSK: Denies arthralgias, myalgias  SKIN: Denies rash, lesions  NEURO: Denies paresthesias, weakness  PSYCH: Denies depression, anxiety    VITALS:  T(F): 96.8, Max: 96.9 (04-04-24 @ 15:00)  HR: 60  BP: 113/70  RR: 18Vital Signs Last 24 Hrs  T(C): 36 (05 Apr 2024 00:00), Max: 36.1 (04 Apr 2024 15:00)  T(F): 96.8 (05 Apr 2024 00:00), Max: 96.9 (04 Apr 2024 15:00)  HR: 60 (05 Apr 2024 00:00) (60 - 63)  BP: 113/70 (05 Apr 2024 00:00) (108/60 - 113/70)  BP(mean): --  RR: 18 (05 Apr 2024 00:00) (18 - 18)  SpO2: --        TESTS & MEASUREMENTS:                        8.6    7.71  )-----------( 563      ( 04 Apr 2024 21:09 )             27.3     04-04    142  |  104  |  21<H>  ----------------------------<  99  4.9   |  26  |  1.1    Ca    9.1      04 Apr 2024 21:09  Phos  3.2     04-04  Mg     2.0     04-04          Culture - Abscess with Gram Stain (collected 04-02-24 @ 16:33)  Source: .Abscess None  Gram Stain (04-04-24 @ 13:53):    Few polymorphonuclear leukocytes seen per low power field    No organisms seen per oil power field  Preliminary Report (04-04-24 @ 13:53):    Rare Proteus mirabilis    Culture - Urine (collected 03-29-24 @ 20:13)  Source: Clean Catch Clean Catch (Midstream)  Final Report (03-30-24 @ 23:37):    <10,000 CFU/mL Normal Urogenital Patricia    Culture - Blood (collected 03-29-24 @ 20:13)  Source: .Blood Blood  Final Report (04-04-24 @ 04:01):    No growth at 5 days    Culture - Blood (collected 03-29-24 @ 20:13)  Source: .Blood Blood  Final Report (04-04-24 @ 04:01):    No growth at 5 days      Urinalysis Basic - ( 04 Apr 2024 21:09 )    Color: x / Appearance: x / SG: x / pH: x  Gluc: 99 mg/dL / Ketone: x  / Bili: x / Urobili: x   Blood: x / Protein: x / Nitrite: x   Leuk Esterase: x / RBC: x / WBC x   Sq Epi: x / Non Sq Epi: x / Bacteria: x          RADIOLOGY & ADDITIONAL TESTS:  Personal review of radiological diagnostics performed  Echo and EKG results noted when applicable.     MEDICATIONS:  acetaminophen     Tablet .. 1000 milliGRAM(s) Oral every 8 hours  enoxaparin Injectable 40 milliGRAM(s) SubCutaneous every 24 hours  finasteride 5 milliGRAM(s) Oral daily  folic acid 1 milliGRAM(s) Oral daily  gabapentin 600 milliGRAM(s) Oral three times a day  loperamide 2 milliGRAM(s) Oral two times a day  piperacillin/tazobactam IVPB.. 3.375 Gram(s) IV Intermittent every 8 hours  tamsulosin 0.4 milliGRAM(s) Oral at bedtime      ANTIBIOTICS:  piperacillin/tazobactam IVPB.. 3.375 Gram(s) IV Intermittent every 8 hours    
  MARI LASSITER  74y, Male    All available historical data reviewed    OVERNIGHT EVENTS:  feels well and has no new complaints  No fevers      ROS:  General: Denies rigors, nightsweats  HEENT: Denies headache, rhinorrhea, sore throat, eye pain  CV: Denies CP, palpitations  PULM: Denies wheezing, hemoptysis  GI: Denies hematemesis, hematochezia, melena  : Denies discharge, hematuria  MSK: Denies arthralgias, myalgias  SKIN: Denies rash, lesions  NEURO: Denies paresthesias, weakness  PSYCH: Denies depression, anxiety    VITALS:  T(F): 96.6, Max: 97.4 (04-03-24 @ 15:35)  HR: 71  BP: 96/60  RR: 18Vital Signs Last 24 Hrs  T(C): 35.9 (04 Apr 2024 07:30), Max: 36.3 (03 Apr 2024 15:35)  T(F): 96.6 (04 Apr 2024 07:30), Max: 97.4 (03 Apr 2024 15:35)  HR: 71 (04 Apr 2024 07:30) (59 - 72)  BP: 96/60 (04 Apr 2024 07:30) (96/60 - 111/58)  BP(mean): --  RR: 18 (04 Apr 2024 07:30) (18 - 18)  SpO2: --        TESTS & MEASUREMENTS:                        9.2    9.80  )-----------( 605      ( 03 Apr 2024 22:21 )             29.0     04-03    139  |  102  |  18  ----------------------------<  107<H>  4.6   |  27  |  0.9    Ca    9.4      03 Apr 2024 22:21  Phos  2.1     04-03  Mg     2.1     04-03          Culture - Abscess with Gram Stain (collected 04-02-24 @ 16:33)  Source: .Abscess None  Gram Stain (04-03-24 @ 04:10):    Few polymorphonuclear leukocytes seen per low power field    No organisms seen per oil power field    Culture - Urine (collected 03-29-24 @ 20:13)  Source: Clean Catch Clean Catch (Midstream)  Final Report (03-30-24 @ 23:37):    <10,000 CFU/mL Normal Urogenital Patricia    Culture - Blood (collected 03-29-24 @ 20:13)  Source: .Blood Blood  Final Report (04-04-24 @ 04:01):    No growth at 5 days    Culture - Blood (collected 03-29-24 @ 20:13)  Source: .Blood Blood  Final Report (04-04-24 @ 04:01):    No growth at 5 days      Urinalysis Basic - ( 03 Apr 2024 22:21 )    Color: x / Appearance: x / SG: x / pH: x  Gluc: 107 mg/dL / Ketone: x  / Bili: x / Urobili: x   Blood: x / Protein: x / Nitrite: x   Leuk Esterase: x / RBC: x / WBC x   Sq Epi: x / Non Sq Epi: x / Bacteria: x          RADIOLOGY & ADDITIONAL TESTS:  Personal review of radiological diagnostics performed  Echo and EKG results noted when applicable.     MEDICATIONS:  acetaminophen     Tablet .. 1000 milliGRAM(s) Oral every 8 hours  enoxaparin Injectable 40 milliGRAM(s) SubCutaneous every 24 hours  finasteride 5 milliGRAM(s) Oral daily  folic acid 1 milliGRAM(s) Oral daily  gabapentin 600 milliGRAM(s) Oral three times a day  loperamide 2 milliGRAM(s) Oral two times a day  piperacillin/tazobactam IVPB.. 3.375 Gram(s) IV Intermittent every 8 hours  tamsulosin 0.4 milliGRAM(s) Oral at bedtime      ANTIBIOTICS:  piperacillin/tazobactam IVPB.. 3.375 Gram(s) IV Intermittent every 8 hours    
  MARI LASSITER  74y, Male    All available historical data reviewed    OVERNIGHT EVENTS:  none    ROS:  General: Denies rigors, nightsweats  HEENT: Denies headache, rhinorrhea, sore throat, eye pain  CV: Denies CP, palpitations  PULM: Denies wheezing, hemoptysis  GI: Denies hematemesis, hematochezia, melena  : Denies discharge, hematuria  MSK: Denies arthralgias, myalgias  SKIN: Denies rash, lesions  NEURO: weakness  PSYCH: Denies depression, anxiety    VITALS:  T(F): 98.2, Max: 98.4 (03-31-24 @ 12:30)  HR: 101  BP: 108/69  RR: 18Vital Signs Last 24 Hrs  T(C): 36.8 (01 Apr 2024 07:32), Max: 36.9 (31 Mar 2024 12:30)  T(F): 98.2 (01 Apr 2024 07:32), Max: 98.4 (31 Mar 2024 12:30)  HR: 101 (01 Apr 2024 07:32) (66 - 101)  BP: 108/69 (01 Apr 2024 07:32) (97/68 - 135/62)  BP(mean): 72 (31 Mar 2024 11:25) (72 - 72)  RR: 18 (01 Apr 2024 07:32) (16 - 18)  SpO2: 99% (31 Mar 2024 13:10) (97% - 99%)    Parameters below as of 31 Mar 2024 13:10  Patient On (Oxygen Delivery Method): room air        TESTS & MEASUREMENTS:                        8.1    9.25  )-----------( 345      ( 31 Mar 2024 20:00 )             25.1     03-31    141  |  105  |  10  ----------------------------<  101<H>  3.9   |  27  |  1.0    Ca    8.8      31 Mar 2024 20:00  Phos  3.4     03-31  Mg     2.2     03-31          Culture - Urine (collected 03-29-24 @ 20:13)  Source: Clean Catch Clean Catch (Midstream)  Final Report (03-30-24 @ 23:37):    <10,000 CFU/mL Normal Urogenital Patricia    Culture - Blood (collected 03-29-24 @ 20:13)  Source: .Blood Blood  Preliminary Report (04-01-24 @ 04:01):    No growth at 48 Hours    Culture - Blood (collected 03-29-24 @ 20:13)  Source: .Blood Blood  Preliminary Report (04-01-24 @ 04:01):    No growth at 48 Hours      Urinalysis Basic - ( 31 Mar 2024 20:00 )    Color: x / Appearance: x / SG: x / pH: x  Gluc: 101 mg/dL / Ketone: x  / Bili: x / Urobili: x   Blood: x / Protein: x / Nitrite: x   Leuk Esterase: x / RBC: x / WBC x   Sq Epi: x / Non Sq Epi: x / Bacteria: x          RADIOLOGY & ADDITIONAL TESTS:  Personal review of radiological diagnostics performed  Echo and EKG results noted when applicable.     MEDICATIONS:  acetaminophen     Tablet .. 1000 milliGRAM(s) Oral every 8 hours  dextrose 5% + sodium chloride 0.45%. 1000 milliLiter(s) IV Continuous <Continuous>  enoxaparin Injectable 40 milliGRAM(s) SubCutaneous every 24 hours  finasteride 5 milliGRAM(s) Oral daily  folic acid 1 milliGRAM(s) Oral daily  gabapentin 600 milliGRAM(s) Oral three times a day  loperamide 2 milliGRAM(s) Oral two times a day  piperacillin/tazobactam IVPB.. 3.375 Gram(s) IV Intermittent every 8 hours  tamsulosin 0.4 milliGRAM(s) Oral at bedtime      ANTIBIOTICS:  piperacillin/tazobactam IVPB.. 3.375 Gram(s) IV Intermittent every 8 hours

## 2024-04-05 NOTE — PROGRESS NOTE ADULT - ASSESSMENT
74 year old male, past medical history of rectal ca s/p low anterior resection w/ loop ileostomy creation on feb 29th, RA, bph, who presents with fever.    < from: CT Abdomen and Pelvis w/ Oral Cont and w/ IV Cont (03.30.24 @ 00:04) >  Postinterval low anterior resection with coloanal anastomosis, diverting   loop ileostomy. Deep pelvic abscess measuring 12.4 x 5.7 x 4.3 cm near   expected region of coloanal anastomosis.    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   Acute  illness  which poses a threat to life or bodily function without treatment   Pelvic abscess measuring 12.4 x 5.7 x 4.3 cm near expected region of coloanal anastomosis.  4/2 S/p drainage by Sx  4/2 Abscess : PMNs, no org, P mirabilis  3/29 S/p Attempt by the IR for aspiration of the abscess. No fluid was obtained.   No ongoing peritonitis  3/29 BCx NG  COVID 19/ Influenza/ RSV NG. 3/29 UCx NG  Rectal invasive adenocarcinoma s/p low anterior resection w/ loop ileostomy creation on feb 29th 2024.  BPH, spinal stenosis and rheumatoid arthritis on methotrexate    RECOMMENDATIONS  -f/u final sensitivities of P mirabilis  - IV Zosyn 3.375 gram q 8 hours.   -Will likely need prolonged abx course and serial imaging for resolution of the pelvic abscess.   -Offloading. Aspiration precautions.  74 year old male, past medical history of rectal ca s/p low anterior resection w/ loop ileostomy creation on feb 29th, RA, bph, who presents with fever.    < from: CT Abdomen and Pelvis w/ Oral Cont and w/ IV Cont (03.30.24 @ 00:04) >  Postinterval low anterior resection with coloanal anastomosis, diverting   loop ileostomy. Deep pelvic abscess measuring 12.4 x 5.7 x 4.3 cm near   expected region of coloanal anastomosis.    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   Acute  illness  which poses a threat to life or bodily function without treatment   Pelvic abscess measuring 12.4 x 5.7 x 4.3 cm near expected region of coloanal anastomosis.  4/2 S/p drainage by Sx  4/2 Abscess : PMNs, no org, P mirabilis  3/29 S/p Attempt by the IR for aspiration of the abscess. No fluid was obtained.   No ongoing peritonitis  3/29 BCx NG  COVID 19/ Influenza/ RSV NG. 3/29 UCx NG  Rectal invasive adenocarcinoma s/p low anterior resection w/ loop ileostomy creation on feb 29th 2024.  BPH, spinal stenosis and rheumatoid arthritis on methotrexate    RECOMMENDATIONS  -f/u final sensitivities of P mirabilis  -start Ertapenem 1 gm iv q24h for 2 weeks starting 4/5  - d/c Zosyn    -obtain a CT pelvis before stopping Ertapenem    f/u with Dr Hawkins [id number] at [address] [facility] on tuesday via telehealth . Pt will be called  between 10-12.30 am.  [address] [facility]  [phone]  Fax [phone]    Please do not hesitate to recall ID if any questions arise either through [employer] or through [employer]

## 2024-04-05 NOTE — DISCHARGE NOTE NURSING/CASE MANAGEMENT/SOCIAL WORK - PATIENT PORTAL LINK FT
You can access the FollowMyHealth Patient Portal offered by [facility] by registering at the following website: [email or web address]. By joining [employer]’s FollowMyHealth portal, you will also be able to view your health information using other applications (apps) compatible with our system.

## 2024-04-05 NOTE — PROGRESS NOTE ADULT - ATTENDING COMMENTS
Patient is a 74M s/p low anterior resection with handsewn coloanal anastomosis and loop ileostomy on 2/29/2024 for T1N0 rectal cancer who presents with pelvic abscess and drainage per rectum.    POD 1 s/p EUA and transanal drainage of abscess    Patient seen and examined. Patient reports improvement. Denies nausea vomiting.  Tolerating a diet, stoma working well    Abdomen - soft, non distended, minimally tender.  Wounds healing well.  Ileostomy pink and viable with gas and stool in the appliance    Vitals labs and images reviewed    PLAN:  - Regular diet  - IR - unable to drain abscess  - f/u ID  - IV zosyn  - monitor stoma output  - f/u cultures  - DC home once there is an outpatient antibiotic regimen
Patient is a 74M s/p low anterior resection with handsewn coloanal anastomosis and loop ileostomy on 2/29/2024 for T1N0 rectal cancer who presents with pelvic abscess and drainage per rectum.    POD 3 s/p EUA and transanal drainage of abscess    Patient seen and examined. Patient reports improvement. Denies nausea vomiting.  Tolerating a diet, stoma working well    Abdomen - soft, non distended, minimally tender.  Wounds healing well.  Ileostomy pink and viable with gas and stool in the appliance    Vitals labs and images reviewed    PLAN:  - Regular diet  - IR - unable to drain abscess  - f/u ID - Ertapenem x 2 weeks  - monitor stoma output  - f/u cultures  - penrose to be removed as outpatient  - DC home
Patient is a 74M s/p low anterior resection with handsewn coloanal anastomosis and loop ileostomy on 2/29/2024 for T1N0 rectal cancer who presents with pelvic abscess and drainage per rectum.    Patient seen and examined. Patient reports improvement. Denies nausea vomiting.  Stoma working well    Abdomen - soft, non distended, minimally tender.  Wounds healing well.  Ileostomy pink and viable with gas and stool in the appliance    Vitals labs and images reviewed    PLAN:  - Patient with pelvic abscess likely draining through the anastomosis  - NPO  - IVF   - IR - unable to drain abscess  - f/u ID  - IV zosyn  - monitor stoma output  -  EUA and transanal drainage of abscess today.  All risks benefits and alternatives discussed
Patient is a 74M s/p low anterior resection with handsewn coloanal anastomosis and loop ileostomy on 2/29/2024 for T1N0 rectal cancer who presents with pelvic abscess and drainage per rectum.    POD 2 s/p EUA and transanal drainage of abscess    Patient seen and examined. Patient reports improvement. Denies nausea vomiting.  Tolerating a diet, stoma working well    Abdomen - soft, non distended, minimally tender.  Wounds healing well.  Ileostomy pink and viable with gas and stool in the appliance    Vitals labs and images reviewed    PLAN:  - Regular diet  - IR - unable to drain abscess  - f/u ID  - IV zosyn  - monitor stoma output  - f/u cultures  - penrose to be removed as outpatient  - DC home once there is an outpatient antibiotic regimen
Patient is a 74M s/p low anterior resection with handsewn coloanal anastomosis and loop ileostomy on 2/29/2024 for T1N0 rectal cancer who presents with pelvic abscess and drainage per rectum.    Patient seen and examined. Patient reports improvement. Denies nausea vomiting.  Stoma working well    Abdomen - soft, non distended, minimally tender.  Wounds healing well.  Ileostomy pink and viable with gas and stool in the appliance    Vitals labs and images reviewed    PLAN:  - Patient with pelvic abscess likely draining through the anastomosis  - Regular diet   - IR - unable to drain abscess  - f/u ID  - IV zosyn  - monitor stoma output  - patient will require EUA and transanal drainage of abscess this week
as above.    Admitted to pelvic abscess. Febrile overnight. Comfortable. Feels much better since admission.    Plan for IR drainage today  NPO  hold lovenox until procedure   IV hydration

## 2024-04-05 NOTE — DISCHARGE NOTE PROVIDER - CARE PROVIDER_API CALL
Flynn Lorenzo  Colon/Rectal Surgery  [address] [facility], [address] [place] [address]  Phone: [phone]  Fax: [phone]  Follow Up Time:     Darci Hawkins  Infectious Disease  [address] [place] [address]  Phone: [phone]  Fax: [phone]  Follow Up Time:

## 2024-04-05 NOTE — PROGRESS NOTE ADULT - RESPIRATORY
normal/clear to auscultation bilaterally/no wheezes/no rales/no rhonchi
shallow
normal/clear to auscultation bilaterally/no wheezes/no rales/no rhonchi
shallow

## 2024-04-05 NOTE — PHYSICAL THERAPY INITIAL EVALUATION ADULT - ADDITIONAL COMMENTS
Patient lives with wife in house with 3 steps to enter and 12 steps to bedroom. Was independent in ADLs and ambulation no AD.

## 2024-04-05 NOTE — PROGRESS NOTE ADULT - TIME BILLING
Counseled patient about diagnostic testing and treatment plan. All questions answered. Abnormal lab/radiographical/microbiological data reviewed.

## 2024-04-05 NOTE — PROGRESS NOTE ADULT - PROVIDER SPECIALTY LIST ADULT
Colorectal Surgery
Infectious Disease
Surgery
Colorectal Surgery
Surgery
Infectious Disease

## 2024-04-05 NOTE — PROGRESS NOTE ADULT - NEUROLOGICAL
normal/cranial nerves II-XII intact/sensation intact

## 2024-04-05 NOTE — DISCHARGE NOTE PROVIDER - NSDCFUADDINST_GEN_ALL_CORE_FT
Follow up with Dr Lorenzo as scheduled  Follow up with Dr Hawkins ( ID)  Follow up with your PMD    Resume home medications  continue antibiotic per IR recommendation  no strenuous activity    if experience fever, shortness of breath, chest pain, dizziness, vomiting, abdominal pain call MD or return to ED

## 2024-04-05 NOTE — DISCHARGE NOTE PROVIDER - CARE PROVIDERS DIRECT ADDRESSES
[email or web address]

## 2024-04-05 NOTE — DISCHARGE NOTE NURSING/CASE MANAGEMENT/SOCIAL WORK - NSDCPEFALRISK_GEN_ALL_CORE
For information on Fall & Injury Prevention, visit: [email or web address] OR  [email or web address] OR  https://www.cdc.gov/steadi/patient.html no

## 2024-04-05 NOTE — DISCHARGE NOTE PROVIDER - NSDCMRMEDTOKEN_GEN_ALL_CORE_FT
acetaminophen 500 mg oral tablet: 2 tab(s) orally every 8 hours  finasteride 5 mg oral tablet: 1 tab(s) orally once a day  folic acid 1 mg oral tablet: 1 tab(s) orally once a day  gabapentin 600 mg oral tablet: 1 tab(s) orally 3 times a day  loperamide 2 mg oral capsule: 2 cap(s) orally 2 times a day  loperamide 2 mg oral capsule: 1 cap(s) orally 2 times a day  Lovenox 40 mg/0.4 mL injectable solution: 40 milligram(s) subcutaneously once a day  tamsulosin 0.4 mg oral capsule: 1 cap(s) orally once a day   acetaminophen 500 mg oral tablet: 2 tab(s) orally every 8 hours as needed for  pain  ertapenem 1 g injection: 1 gram(s) injectable once a day  finasteride 5 mg oral tablet: 1 tab(s) orally once a day  folic acid 1 mg oral tablet: 1 tab(s) orally once a day  gabapentin 600 mg oral tablet: 1 tab(s) orally 3 times a day  loperamide 2 mg oral capsule: 2 cap(s) orally 2 times a day  loperamide 2 mg oral capsule: 1 cap(s) orally 2 times a day  Lovenox 40 mg/0.4 mL injectable solution: 40 milligram(s) subcutaneously once a day  tamsulosin 0.4 mg oral capsule: 1 cap(s) orally once a day

## 2024-04-05 NOTE — PROGRESS NOTE ADULT - GASTROINTESTINAL
normal/soft/nontender/nondistended/normal active bowel sounds
soft/nontender/no guarding/no rigidity
watery BM through colostomy/soft/nontender/no guarding/no rigidity
soft/nontender/no guarding

## 2024-04-05 NOTE — PROGRESS NOTE ADULT - EYES
PERRL/EOMI/conjunctiva clear/normal

## 2024-04-05 NOTE — PHYSICAL THERAPY INITIAL EVALUATION ADULT - PERTINENT HX OF CURRENT PROBLEM, REHAB EVAL
Patient is a 74 year old male, past medical history of rectal ca s/p low anterior resection w/ loop ileostomy creation on feb 29th. Back with pelvic abscess, now s/p EUA and penrose drain placement.

## 2024-04-05 NOTE — DISCHARGE NOTE PROVIDER - HOSPITAL COURSE
Patient is a 74M s/p low anterior resection with handsewn coloanal anastomosis and loop ileostomy on 2/29/2024 for T1N0 rectal cancer who presents with pelvic abscess and drainage per rectum.  pt admitted to surgery service and started on IV antibiotic. IR and ID consulted.   On 3/31/24 Attempt by the IR for aspiration of the abscess. No fluid was obtained.   On 4/2/24 pt underwent examination under anesthesia and insertion of penrose drain   On 4/5/24 pt without complaints, tolerated po diet, voided and ambulated, vital signs stable and afebrile. Labs reviewed   pt discharged home with VNS in stable condition. pt advised to follow up with Dr Lorenzo as scheduled  antibiotic per ID . Resume home medications. precaution provided   Patient is a 74M s/p low anterior resection with handsewn coloanal anastomosis and loop ileostomy on 2/29/2024 for T1N0 rectal cancer who presents with pelvic abscess and drainage per rectum.  pt admitted to surgery service and started on IV antibiotic. IR and ID consulted.   On 3/31/24 Attempt by the IR for aspiration of the abscess. No fluid was obtained.   On 4/2/24 pt underwent examination under anesthesia and insertion of penrose drain   On 4/5/24 pt without complaints, tolerated po diet, voided and ambulated, vital signs stable and afebrile. Labs reviewed   pt discharged home with VNS in stable condition. pt advised to follow up with Dr Lorenzo as scheduled, ID and PMD  antibiotic per ID . Resume home medications. precaution provided

## 2024-04-05 NOTE — PROGRESS NOTE ADULT - MUSCULOSKELETAL
normal/ROM intact/normal gait/strength 5/5 bilateral upper extremities/strength 5/5 bilateral lower extremities
decreased strength

## 2024-04-05 NOTE — PHYSICAL THERAPY INITIAL EVALUATION ADULT - PLANNED THERAPY INTERVENTIONS, PT EVAL
No acute PT intervention needed at this time. Patient independent in bed mobility and transfers, supervision in ambulation for safety. Will benefit from Home PT for home exercises and home assessment.

## 2024-04-05 NOTE — PROGRESS NOTE ADULT - REASON FOR ADMISSION
pelvic abscess

## 2024-04-08 LAB
CULTURE RESULTS: ABNORMAL
ORGANISM # SPEC MICROSCOPIC CNT: ABNORMAL
ORGANISM # SPEC MICROSCOPIC CNT: ABNORMAL
ORGANISM # SPEC MICROSCOPIC CNT: SIGNIFICANT CHANGE UP
SPECIMEN SOURCE: SIGNIFICANT CHANGE UP

## 2024-04-08 NOTE — CDI QUERY NOTE - NSCDIOTHERTXTBX_GEN_ALL_CORE_HH
Clinical documentation and/or evidence of the patient’s presentation, evaluation, and medical management, as evidenced below, may support a diagnosis that is not documented in the medical record.  In order to ensure accurate coding and accuracy of the clinical record, the documentation in this patient’s medical record requires additional clarification.      If you think the supporting documentation and/or clinical evidence supports a more specific diagnosis, please include more specific documentation of a diagnosis associated with these findings in your progress note and/or discharge summary.    Sepsis is noted on MAR as indication for administration of Flagyl.     Please clarify if the patient was found to have one of the following:    • Sepsis was treated and resolved  • Sepsis was ruled out  • Other (specify)    Supporting documentation and/or clinical evidence:     3/30 H&P Adult: … presents with fever. Tmax 104, that began yesterday, last dose tylenol @3pm today … Patient with pelvic abscess likely draining through the anastomosis    3/31 Consult Note Adult-Infectious Disease Attending: … Pelvic abscess. Fever … S/p Attempt by the IR for aspiration of the abscess. No fluid was obtained. -Blood cultures so far negative.-For now continue with IV Zosyn 3.375 gram q 8 hours. -Will likely need prolonged abx course and serial imaging for resolution of the pelvic abscess    4/5 Discharge Note Provider: … presents with pelvic abscess and drainage per rectum. pt admitted to surgery service and started on IV antibiotic. IR and ID consulted. On 3/31/24 Attempt by the IR for aspiration of the abscess … PRINCIPAL DISCHARGE DIAGNOSIS: Post procedural intraabdominal abscess    Per Nursing VS flowsheet: 3/30 Temp 95.2, 102.2, 102    Lab findings: WBC 3/29-15.64    Per MAR: Flagyl IVPB. Indication: sepsis. Administered 3/30                 Rocephin IVPB. Indication: UTI. Administered 3/29                 Zosyn IVPB. Indication: Intra-abdominal infection. Administered 3/30-4/5                 Invanz IVPB. Indication: Intra-abdominal infection. Administered 4/5      Thank you,  Kavitha Horton RN [facility] CCDS  [phone]

## 2024-04-17 ENCOUNTER — APPOINTMENT (OUTPATIENT)
Dept: SURGERY | Facility: CLINIC | Age: 74
End: 2024-04-17
Payer: MEDICARE

## 2024-04-17 VITALS
OXYGEN SATURATION: 99 % | WEIGHT: 158 LBS | DIASTOLIC BLOOD PRESSURE: 68 MMHG | BODY MASS INDEX: 19.65 KG/M2 | SYSTOLIC BLOOD PRESSURE: 110 MMHG | HEART RATE: 76 BPM | TEMPERATURE: 98 F | HEIGHT: 75 IN

## 2024-04-17 PROCEDURE — 99024 POSTOP FOLLOW-UP VISIT: CPT

## 2024-04-23 ENCOUNTER — APPOINTMENT (OUTPATIENT)
Dept: GASTROENTEROLOGY | Facility: CLINIC | Age: 74
End: 2024-04-23
Payer: MEDICARE

## 2024-04-23 ENCOUNTER — OUTPATIENT (OUTPATIENT)
Dept: OUTPATIENT SERVICES | Facility: HOSPITAL | Age: 74
LOS: 1 days | End: 2024-04-23
Payer: MEDICARE

## 2024-04-23 VITALS
OXYGEN SATURATION: 98 % | HEIGHT: 75 IN | WEIGHT: 156 LBS | SYSTOLIC BLOOD PRESSURE: 118 MMHG | HEART RATE: 63 BPM | BODY MASS INDEX: 19.4 KG/M2 | DIASTOLIC BLOOD PRESSURE: 69 MMHG | TEMPERATURE: 97.9 F

## 2024-04-23 DIAGNOSIS — Z00.00 ENCOUNTER FOR GENERAL ADULT MEDICAL EXAMINATION WITHOUT ABNORMAL FINDINGS: ICD-10-CM

## 2024-04-23 DIAGNOSIS — Z98.890 OTHER SPECIFIED POSTPROCEDURAL STATES: Chronic | ICD-10-CM

## 2024-04-23 PROCEDURE — 99214 OFFICE O/P EST MOD 30 MIN: CPT

## 2024-04-23 NOTE — ED PROVIDER NOTE - DATE/TIME 2
Addended by: ZEN SEVILLA on: 4/23/2024 09:19 AM     Modules accepted: Orders    
Addended by: ZEN SEVILLA on: 4/23/2024 10:31 AM     Modules accepted: Orders    
30-Mar-2024 01:31

## 2024-04-23 NOTE — PHYSICAL EXAM
[Alert] : alert [Normal Voice/Communication] : normal voice/communication [Healthy Appearing] : healthy appearing [No Acute Distress] : no acute distress [Sclera] : the sclera and conjunctiva were normal [Hearing Threshold Finger Rub Not [place]] : hearing was normal [Normal Lips/Gums] : the lips and gums were normal [Oropharynx] : the oropharynx was normal [Normal Appearance] : the appearance of the neck was normal [No Neck Mass] : no neck mass was observed [No Respiratory Distress] : no respiratory distress [No Acc Muscle Use] : no accessory muscle use [Respiration, Rhythm And Depth] : normal respiratory rhythm and effort [Auscultation Breath Sounds / Voice Sounds] : lungs were clear to auscultation bilaterally [Heart Rate And Rhythm] : heart rate was normal and rhythm regular [Normal S1, S2] : normal S1 and S2 [Murmurs] : no murmurs [Bowel Sounds] : normal bowel sounds [Abdomen Tenderness] : non-tender [No Masses] : no abdominal mass palpated [Abdomen Soft] : soft [] : no hepatosplenomegaly [No CVA Tenderness] : no CVA  tenderness [Abnormal Walk] : normal gait [Oriented To Time, Place, And Person] : oriented to person, place, and time

## 2024-04-24 NOTE — PHYSICAL EXAM
[FreeTextEntry1] : General: Awake, Alert, No Acute Distress Respiratory: Normal Respiratory Effort Abdomen: Soft, non-tender, non-distended, well-healed port sites, and right low quadrant ileostomy with gas and stool in the appliance.  Rectal: External examination. Shows resolution of previously swollen hemorrhoids. A penrose drain was in place. This was removed. Minimal drainage noted.

## 2024-04-24 NOTE — HISTORY OF PRESENT ILLNESS
[FreeTextEntry1] : Patient is a 74-year-old male with PMHx of BPH, spinal stenosis, and rheumatoid arthritis on methotrexate who presents for a post-operative office visit, s/p robotic low anterior resection with hand-sewn colo-anal anastomosis and diverting loop ileostomy on February 29, 2024. Pathology showed T1N0 (0/34 ) adenocarcinoma with negative margins. Patient was recently admitted to [facility] on March 30th, for a pelvic abscess that had drained through the anastomosis, he was treated with IV antibiotics as well as an examination under anesthesia with aspiration and placement of Penrose drain. His symptoms resolved. He presents today for a follow-up. Patient denies recent fevers, chills, nausea, or vomiting. He reports that his perianal drainage has decreased significantly. He's tolerating a diet and his stoma works well. He denies a family history of colon cancer, rectal cancer, or inflammatory bowel disease.    Previous History On the patient's original evaluation, he was found to have polypoid tissue in the rectum. He was referred to GI for a colonoscopy. He underwent a colonoscopy on November 30th, 2023 with Dr. Tran, which showed an irregular friable mass from the dentate line to 10 cm. HPIs showed villous adenoma. Patient was then referred to Dr. Phill Ibrahim and underwent an EMR on December 15th, 2023. Pathology showed invasive adenocarcinoma, moderately differentiated arising in a villous adenoma with unclear margins due to piecemeal resection. Patient was taken again on January 9, 2024, in an attempt to remove the remainder of the lesion. However, this was not possible. His case was discussed at Tumor Board and the recommendation was to complete his staging and then proceed with robot-assisted laparoscopic low anterior resection versus abdominal perineal resection

## 2024-04-24 NOTE — ASSESSMENT
[FreeTextEntry1] : 74-year-old male with stage one rectal adenocarcinoma, s/p robotic low anterior resection with hand zone coloanal anastomosis and diverting loop ileostomy, complicated by pelvic abscess, s/p examination under anesthesia and Penrose drain placement.   The patient continues to do well. We discussed that he will require more time for this area to heal. Once it is healed, we will plan for ileostomy closure. We will see him back in three months.

## 2024-04-25 ENCOUNTER — RESULT REVIEW (OUTPATIENT)
Age: 74
End: 2024-04-25

## 2024-04-25 ENCOUNTER — OUTPATIENT (OUTPATIENT)
Dept: OUTPATIENT SERVICES | Facility: HOSPITAL | Age: 74
LOS: 1 days | End: 2024-04-25
Payer: MEDICARE

## 2024-04-25 DIAGNOSIS — Z00.8 ENCOUNTER FOR OTHER GENERAL EXAMINATION: ICD-10-CM

## 2024-04-25 DIAGNOSIS — M79.631 PAIN IN RIGHT FOREARM: ICD-10-CM

## 2024-04-25 PROCEDURE — 93971 EXTREMITY STUDY: CPT | Mod: 26,RT

## 2024-04-25 PROCEDURE — 93971 EXTREMITY STUDY: CPT | Mod: RT

## 2024-04-26 DIAGNOSIS — M79.631 PAIN IN RIGHT FOREARM: ICD-10-CM

## 2024-04-29 ENCOUNTER — APPOINTMENT (OUTPATIENT)
Dept: SURGERY | Facility: CLINIC | Age: 74
End: 2024-04-29
Payer: MEDICARE

## 2024-04-29 VITALS
BODY MASS INDEX: 19.77 KG/M2 | HEART RATE: 67 BPM | SYSTOLIC BLOOD PRESSURE: 128 MMHG | WEIGHT: 159 LBS | HEIGHT: 75 IN | OXYGEN SATURATION: 98 % | DIASTOLIC BLOOD PRESSURE: 80 MMHG

## 2024-04-29 PROCEDURE — 99203 OFFICE O/P NEW LOW 30 MIN: CPT

## 2024-04-29 PROCEDURE — 99213 OFFICE O/P EST LOW 20 MIN: CPT

## 2024-04-29 NOTE — HISTORY OF PRESENT ILLNESS
[de-identified] : This patient reports a several week history of a subcutaneous mass on his right forearm.  Ultrasound showed indeterminate lesion with some possible vascularity.  The patient reports that this lesion is asymptomatic.  Of note, he had colon surgery couple of months ago with extended hospitalization requiring multiple IVs and PICC lines for antibiotics.  This lesion appeared after this episode.

## 2024-04-29 NOTE — ASSESSMENT
[FreeTextEntry1] : This patient has a very small mass in his right forearm.  It is unclear what the etiology is but due to his recent hospitalization and multiple IVs and PICC lines this may be related to something that was done during his hospitalization.  I discussion with the patient at the management.  I do not think there is any urgency to excise it at this point but it is easily excisable.  I will see the patient back in about 4 weeks.  If the lesion does not improve we will excise it at that time in the office.

## 2024-04-29 NOTE — PHYSICAL EXAM
[de-identified] : He has about a 1/2 cm subcutaneous mass which is freely mobile in his right volar forearm.  This is nontender and nonfluctuant.

## 2024-05-05 NOTE — ASSESSMENT
[FreeTextEntry1] :  74-year-old male with PMHx of BPH, spinal stenosis, and rheumatoid arthritis on methotrexate who presents for follow up, s/p robotic low anterior resection with hand-sewn colo-anal anastomosis and diverting loop ileostomy on February 29, 2024. Pathology showed T1N0 (0/34 ) adenocarcinoma with negative margins. Patient was recently admitted to [facility] on March 30th, for a pelvic abscess that had drained through the anastomosis, he was treated with IV antibiotics as well as an examination under anesthesia with aspiration and placement of Penrose drain. His symptoms resolved.   #history of rectal adenocarcinoma s/p resection complicated with pelvic abscess  Rec: Follow up with surgery for ileostomy reversal after resolution of pelvis abscess. Patient has an appointment in 2 months.  Follow up with GI in 4 months.  #Qustionable diagnosis of celiac disease.  Antitissue transglutaminase weakly positive, rest of ABs were negative.  Endoscopy negative for celiac, but patient was on gluten free diet.  HLA DQ 2 positive HLA DQ 8 negative   Patient continued to be on gluten free diet. Discussed with the patient to reconsider gluten challenge and to confirm the diagnosis with repeating tests while on gluten, patient would like this to be done after ileostomy reversal

## 2024-05-05 NOTE — HISTORY OF PRESENT ILLNESS
[FreeTextEntry1] :  74-year-old male with PMHx of BPH, spinal stenosis, and rheumatoid arthritis on methotrexate who presents for follow up, s/p robotic low anterior resection with hand-sewn colo-anal anastomosis and diverting loop ileostomy on February 29, 2024. Pathology showed T1N0 (0/34 ) adenocarcinoma with negative margins. Patient was recently admitted to [facility] on March 30th, for a pelvic abscess that had drained through the anastomosis, he was treated with IV antibiotics as well as an examination under anesthesia with aspiration and placement of Penrose drain. His symptoms resolved. He presents today for a follow-up. Patient denies recent fevers, chills, nausea, or vomiting. He reports that his perianal drainage has resolved. He's tolerating a diet, and his stoma works well. He denies a family history of colon cancer, rectal cancer, or inflammatory bowel disease.    Previous History On the patient's original evaluation, he was found to have polypoid tissue in the rectum. He was referred to GI for a colonoscopy. He underwent a colonoscopy on November 30th, 2023 with Dr. Tran, which showed an irregular friable mass from the dentate line to 10 cm. HPIs showed villous adenoma. Patient was then referred to Dr. Phill Ibrahim and underwent an EMR on December 15th, 2023. Pathology showed invasive adenocarcinoma, moderately differentiated arising in a villous adenoma with unclear margins due to piecemeal resection. Patient was taken again on January 9, 2024, in an attempt to remove the remainder of the lesion. However, this was not possible. His case was discussed at Tumor Board and the recommendation was to complete his staging and then proceed with robot-assisted laparoscopic low anterior resection versus abdominal perineal resection.

## 2024-05-05 NOTE — REVIEW OF SYSTEMS
[Fever] : no fever [Chills] : no chills [Recent Weight Gain (___ Lbs)] : no recent weight gain [Recent Weight Loss (___ Lbs)] : no recent weight loss [Red Eyes] : eyes not red [Scleral Icterus (Yellow Eyes)] : no scleral icterus [Cough] : no cough [SOB on Exertion] : no shortness of breath during exertion [Abdominal Pain] : no abdominal pain [Vomiting] : no vomiting [Constipation] : no constipation [Diarrhea] : no diarrhea [Heartburn] : no heartburn [Melena (black stool)] : no melena [Bleeding] : no bleeding [Bloating (gassiness)] : no bloating [Arthralgias (joint pain)] : no arthralgias [Confused] : no confusion [Convulsions] : no convulsions [Swollen Glands] : no swollen glands

## 2024-05-08 DIAGNOSIS — C20 MALIGNANT NEOPLASM OF RECTUM: ICD-10-CM

## 2024-05-09 ENCOUNTER — RESULT REVIEW (OUTPATIENT)
Age: 74
End: 2024-05-09

## 2024-05-09 ENCOUNTER — OUTPATIENT (OUTPATIENT)
Dept: OUTPATIENT SERVICES | Facility: HOSPITAL | Age: 74
LOS: 1 days | End: 2024-05-09
Payer: MEDICARE

## 2024-05-09 DIAGNOSIS — Z00.8 ENCOUNTER FOR OTHER GENERAL EXAMINATION: ICD-10-CM

## 2024-05-09 DIAGNOSIS — Z98.890 OTHER SPECIFIED POSTPROCEDURAL STATES: Chronic | ICD-10-CM

## 2024-05-09 DIAGNOSIS — M79.621 PAIN IN RIGHT UPPER ARM: ICD-10-CM

## 2024-05-09 PROCEDURE — A9579: CPT

## 2024-05-09 PROCEDURE — 73218 MRI UPPER EXTREMITY W/O DYE: CPT | Mod: RT

## 2024-05-09 PROCEDURE — 73218 MRI UPPER EXTREMITY W/O DYE: CPT | Mod: 26,RT,MH

## 2024-05-10 DIAGNOSIS — M79.621 PAIN IN RIGHT UPPER ARM: ICD-10-CM

## 2024-05-20 ENCOUNTER — APPOINTMENT (OUTPATIENT)
Dept: SURGERY | Facility: CLINIC | Age: 74
End: 2024-05-20
Payer: MEDICARE

## 2024-05-20 VITALS
BODY MASS INDEX: 19.89 KG/M2 | HEIGHT: 75 IN | WEIGHT: 160 LBS | SYSTOLIC BLOOD PRESSURE: 120 MMHG | DIASTOLIC BLOOD PRESSURE: 78 MMHG | OXYGEN SATURATION: 98 % | TEMPERATURE: 97.1 F | HEART RATE: 72 BPM

## 2024-05-20 PROCEDURE — 99213 OFFICE O/P EST LOW 20 MIN: CPT

## 2024-05-21 ENCOUNTER — OUTPATIENT (OUTPATIENT)
Dept: OUTPATIENT SERVICES | Facility: HOSPITAL | Age: 74
LOS: 1 days | End: 2024-05-21
Payer: MEDICARE

## 2024-05-21 ENCOUNTER — APPOINTMENT (OUTPATIENT)
Age: 74
End: 2024-05-21
Payer: MEDICARE

## 2024-05-21 ENCOUNTER — LABORATORY RESULT (OUTPATIENT)
Age: 74
End: 2024-05-21

## 2024-05-21 VITALS
OXYGEN SATURATION: 99 % | TEMPERATURE: 98.2 F | SYSTOLIC BLOOD PRESSURE: 126 MMHG | DIASTOLIC BLOOD PRESSURE: 72 MMHG | HEIGHT: 74.5 IN | WEIGHT: 165 LBS | HEART RATE: 76 BPM | RESPIRATION RATE: 14 BRPM | BODY MASS INDEX: 20.95 KG/M2

## 2024-05-21 DIAGNOSIS — Z98.890 OTHER SPECIFIED POSTPROCEDURAL STATES: Chronic | ICD-10-CM

## 2024-05-21 DIAGNOSIS — C20 MALIGNANT NEOPLASM OF RECTUM: ICD-10-CM

## 2024-05-21 LAB
ALBUMIN SERPL ELPH-MCNC: 4.6 G/DL
ALP BLD-CCNC: 99 U/L
ALT SERPL-CCNC: 27 U/L
AST SERPL-CCNC: 37 U/L
BILIRUB DIRECT SERPL-MCNC: <0.2 MG/DL
BILIRUB INDIRECT SERPL-MCNC: >0.3 MG/DL
BILIRUB SERPL-MCNC: 0.5 MG/DL
HCT VFR BLD CALC: 35.5 %
HGB BLD-MCNC: 11.7 G/DL
IRON SATN MFR SERPL: 27 %
IRON SERPL-MCNC: 98 UG/DL
MCHC RBC-ENTMCNC: 28.5 PG
MCHC RBC-ENTMCNC: 33 G/DL
MCV RBC AUTO: 86.4 FL
PLATELET # BLD AUTO: 210 K/UL
PMV BLD: 9.9 FL
PROT SERPL-MCNC: 7.3 G/DL
RBC # BLD: 4.11 M/UL
RBC # FLD: 15.9 %
TIBC SERPL-MCNC: 368 UG/DL
UIBC SERPL-MCNC: 270 UG/DL
WBC # FLD AUTO: 4.42 K/UL

## 2024-05-21 PROCEDURE — 83540 ASSAY OF IRON: CPT

## 2024-05-21 PROCEDURE — 80076 HEPATIC FUNCTION PANEL: CPT

## 2024-05-21 PROCEDURE — 82378 CARCINOEMBRYONIC ANTIGEN: CPT

## 2024-05-21 PROCEDURE — 83921 ORGANIC ACID SINGLE QUANT: CPT

## 2024-05-21 PROCEDURE — 85027 COMPLETE CBC AUTOMATED: CPT

## 2024-05-21 PROCEDURE — 99205 OFFICE O/P NEW HI 60 MIN: CPT

## 2024-05-21 PROCEDURE — 82728 ASSAY OF FERRITIN: CPT

## 2024-05-21 PROCEDURE — 82607 VITAMIN B-12: CPT

## 2024-05-21 PROCEDURE — 80048 BASIC METABOLIC PNL TOTAL CA: CPT

## 2024-05-21 PROCEDURE — 82746 ASSAY OF FOLIC ACID SERUM: CPT

## 2024-05-21 PROCEDURE — 83550 IRON BINDING TEST: CPT

## 2024-05-21 NOTE — CONSULT LETTER
[Dear  ___] : Dear  [unfilled], [Courtesy Letter:] : I had the pleasure of seeing your patient, [unfilled], in my office today. [Please see my note below.] : Please see my note below. [Sincerely,] : Sincerely, [FreeTextEntry3] : Bertram Bang DO Attending Physician, Hematology/ Medical Oncology [phone] office

## 2024-05-21 NOTE — ASSESSMENT
[FreeTextEntry1] : 75 yo man with ECOG 1, is found to have stage 1, rectal moderately differentiated adenocarcinoma, T1NOMO, MMR proficient, margins are negative, 0/34 nodes are involved, no LVI identified: a) this is stage 1 RECTAL adenocarcinoma with no adverse high risk features: b) the data to administer adjuvant chemotherapy to improve survival is NOT strong: do not recommend it c)surveillance should include careful GI f/u with colonoscopy within one year after the surgery and then depending on the findings of that procedure; no strong data to get CT CAP   #ANEMIA: most likely due to surgery and infection/ abscess/ antibiotics; will check labs today he has been taking iron tabs which he started independently: told him to stop  # abscess; willl coordinate f/u with GI and surgery

## 2024-05-21 NOTE — HISTORY OF PRESENT ILLNESS
[de-identified] : 75 yo man with ECOG 2 is evaluated for the management of RECTAL cancer and anemia. He underwent  robotic assisted laparoscopic low anterior resection 2/29/24 which revealed : Infiltrating moderately differentiated colonic adenocarcinoma of rectum,  arising in the background of villous adenoma with foci of high-grade dysplasia. Tumor infiltrates into the submucosa.  Infiltrating carcinoma is at about 1.6 cm from the inked distal surgical margin, and at about 0.4 cm from the perirectal soft tissue margin.   Villous adenoma is at about 0.1-0.2 cm from the inked distal surgica margin.  Focal changes consistent with previous surgical site are noted (status post endoscopic mucosal resection).Thirty-four regional lymph nodes negative for tumor (0/34). Tumor staging: pT1, pN0, stage 1 rectal cancer, MMR proficient with no high risk features

## 2024-05-22 DIAGNOSIS — C20 MALIGNANT NEOPLASM OF RECTUM: ICD-10-CM

## 2024-05-22 LAB
ANION GAP SERPL CALC-SCNC: 13 MMOL/L
BUN SERPL-MCNC: 16 MG/DL
CALCIUM SERPL-MCNC: 9.9 MG/DL
CEA SERPL-MCNC: 2.6 NG/ML
CHLORIDE SERPL-SCNC: 106 MMOL/L
CO2 SERPL-SCNC: 22 MMOL/L
CREAT SERPL-MCNC: 0.8 MG/DL
EGFR: 94 ML/MIN/1.73M2
FERRITIN SERPL-MCNC: 69 NG/ML
FOLATE SERPL-MCNC: >20 NG/ML
GLUCOSE SERPL-MCNC: 99 MG/DL
POTASSIUM SERPL-SCNC: 4.2 MMOL/L
SODIUM SERPL-SCNC: 141 MMOL/L
VIT B12 SERPL-MCNC: 713 PG/ML

## 2024-05-23 ENCOUNTER — NON-APPOINTMENT (OUTPATIENT)
Age: 74
End: 2024-05-23

## 2024-05-28 ENCOUNTER — APPOINTMENT (OUTPATIENT)
Dept: SURGERY | Facility: CLINIC | Age: 74
End: 2024-05-28
Payer: MEDICARE

## 2024-05-28 VITALS
OXYGEN SATURATION: 98 % | TEMPERATURE: 97 F | HEART RATE: 72 BPM | HEIGHT: 74.5 IN | BODY MASS INDEX: 20.95 KG/M2 | SYSTOLIC BLOOD PRESSURE: 106 MMHG | WEIGHT: 165 LBS | DIASTOLIC BLOOD PRESSURE: 68 MMHG

## 2024-05-28 LAB — METHYLMALONATE SERPL-SCNC: 132 NMOL/L

## 2024-05-28 PROCEDURE — 99024 POSTOP FOLLOW-UP VISIT: CPT

## 2024-05-29 ENCOUNTER — NON-APPOINTMENT (OUTPATIENT)
Age: 74
End: 2024-05-29

## 2024-05-31 ENCOUNTER — NON-APPOINTMENT (OUTPATIENT)
Age: 74
End: 2024-05-31

## 2024-05-31 NOTE — PHYSICAL EXAM
[de-identified] : He has a subcutaneous mass in his right forearm which appears to be about half the size of the previous visit.  There are no overlying skin changes.  It is nontender.

## 2024-05-31 NOTE — ASSESSMENT
[FreeTextEntry1] : This patient has a resolving right forearm mass.  I believe this is likely due to some minor trauma to the area.  It is shrinking in size and therefore very low risk for malignancy.  I will see the patient back in another 3 months to reevaluate.  I instructed him that if it grows at all to come back for an earlier appointment and we will excise it.

## 2024-05-31 NOTE — HISTORY OF PRESENT ILLNESS
[de-identified] : The patient is here for follow-up for subcutaneous mass on his forearm.  Please see my prior notes for full details of his history.  He reports that over the last several weeks this mass has substantially decreased in size.  He has no other complaints.

## 2024-06-04 NOTE — HISTORY OF PRESENT ILLNESS
[FreeTextEntry1] : Patient is a 74-year-old male with PMHx of BPH, spinal stenosis, and rheumatoid arthritis on methotrexate who presents for a post-operative office visit, s/p robotic low anterior resection with hand-sewn colo-anal anastomosis and diverting loop ileostomy on February 29, 2024. Pathology showed T1N0 (0/34 ) adenocarcinoma with negative margins. Patient was recently admitted to [facility] on March 30th, for a pelvic abscess that had drained through the anastomosis, he was treated with IV antibiotics as well as an examination under anesthesia with aspiration and placement of Penrose drain. The patient had the Penrose drain removed on his last office visit. Today he reports pressure in the rectum, but he denies pain or drainage. Patient denies recent fevers, chills, nausea, or vomiting. He reports that his perianal drainage has decreased significantly. He's tolerating a diet and his stoma works well. He denies a family history of colon cancer, rectal cancer, or inflammatory bowel disease.

## 2024-06-04 NOTE — ASSESSMENT
[FreeTextEntry1] : 74-year-old male with stage 1 rectal adenocarcinoma, s/p robotic low anterior resection with hand-sewn coloanal anastomosis and diverting loop ileostomy, complicated by pelvic abscess, s/p examination under anesthesia and Penrose drain placement.  The patient continues to do well. At this time, we will proceed with preparation for ileostomy closure. I recommend flexible sigmoidoscopy. He will return after his procedure.

## 2024-06-04 NOTE — PHYSICAL EXAM
[FreeTextEntry1] : General: Awake, Alert, No Acute Distress Respiratory: Normal Respiratory Effort Abdomen: Soft, non-tender, non-distended, right lower quadrant ileostomy, pink and viable with gas and stool in the appliance.  Rectal: External examination shows no obvious drainage, erythema, or induration. Palpation of the anastomosis appears to be intact.

## 2024-06-04 NOTE — ADDENDUM
[FreeTextEntry1] : I, Shahida Schmitt ([employer] [facility]) assisted in filling out this chart under the dictation of Dr. Flynn Lorenzo on 05/28/2024.

## 2024-06-06 ENCOUNTER — APPOINTMENT (OUTPATIENT)
Age: 74
End: 2024-06-06
Payer: MEDICARE

## 2024-06-06 VITALS
WEIGHT: 166 LBS | RESPIRATION RATE: 16 BRPM | HEART RATE: 78 BPM | SYSTOLIC BLOOD PRESSURE: 125 MMHG | DIASTOLIC BLOOD PRESSURE: 86 MMHG | BODY MASS INDEX: 21.08 KG/M2 | TEMPERATURE: 98.4 F | HEIGHT: 74.5 IN | OXYGEN SATURATION: 99 %

## 2024-06-06 PROCEDURE — 99215 OFFICE O/P EST HI 40 MIN: CPT

## 2024-06-06 NOTE — ASSESSMENT
[FreeTextEntry1] : 73 yo man with ECOG 1, is found to have stage 1, rectal moderately differentiated adenocarcinoma, T1NOMO, MMR proficient, margins are negative, 0/34 nodes are involved, no LVI identified: a) this is stage 1 RECTAL adenocarcinoma with no adverse high risk features: b) the data to administer adjuvant chemotherapy to improve survival is NOT strong: do not recommend it c)surveillance should include careful GI f/u with colonoscopy within one year after the surgery and then depending on the findings of that procedure; no strong data to get CT CAP   #ANEMIA: most likely due to surgery and infection/ abscess/ antibiotics; hg has increased by 1 gram in 4 weeks, which is a good development; ferritin and iron saturation are within normal values (low limit), and I do not recommend iron replacement; will repeat iron labs/ cbc every 6 weeks w /f/u in 3 months  addressed pt's weakness: in my opinion, it is not due to anemia or iron levels; however, the recent finging of CANCER diagnosis, surgery, then subsequient development of an abscess, and antibiotics, all contribute to weakness he has been taking iron tabs which he started independently: told him to stop  # abscess; willl coordinate f/u with GI and surgery

## 2024-06-06 NOTE — HISTORY OF PRESENT ILLNESS
[de-identified] : 75 yo man with ECOG 2 is evaluated for the management of RECTAL cancer and anemia. He underwent  robotic assisted laparoscopic low anterior resection 2/29/24 which revealed : Infiltrating moderately differentiated colonic adenocarcinoma of rectum,  arising in the background of villous adenoma with foci of high-grade dysplasia. Tumor infiltrates into the submucosa.  Infiltrating carcinoma is at about 1.6 cm from the inked distal surgical margin, and at about 0.4 cm from the perirectal soft tissue margin.   Villous adenoma is at about 0.1-0.2 cm from the inked distal surgica margin.  Focal changes consistent with previous surgical site are noted (status post endoscopic mucosal resection).Thirty-four regional lymph nodes negative for tumor (0/34). Tumor staging: pT1, pN0, stage 1 rectal cancer, MMR proficient with no high risk features

## 2024-06-06 NOTE — CONSULT LETTER
[Dear  ___] : Dear  [unfilled], [Courtesy Letter:] : I had the pleasure of seeing your patient, [unfilled], in my office today. [Please see my note below.] : Please see my note below. [Sincerely,] : Sincerely, [FreeTextEntry3] : Bertram Bang DO Attending Physician, Hematology/ Medical Oncology [phone] office

## 2024-06-10 ENCOUNTER — NON-APPOINTMENT (OUTPATIENT)
Age: 74
End: 2024-06-10

## 2024-06-17 ENCOUNTER — APPOINTMENT (OUTPATIENT)
Dept: SURGERY | Facility: HOSPITAL | Age: 74
End: 2024-06-17

## 2024-06-17 ENCOUNTER — NON-APPOINTMENT (OUTPATIENT)
Age: 74
End: 2024-06-17

## 2024-06-17 ENCOUNTER — TRANSCRIPTION ENCOUNTER (OUTPATIENT)
Age: 74
End: 2024-06-17

## 2024-06-17 ENCOUNTER — OUTPATIENT (OUTPATIENT)
Dept: OUTPATIENT SERVICES | Facility: HOSPITAL | Age: 74
LOS: 1 days | Discharge: HOME | End: 2024-06-17
Payer: MEDICARE

## 2024-06-17 VITALS
RESPIRATION RATE: 18 BRPM | HEART RATE: 62 BPM | OXYGEN SATURATION: 98 % | SYSTOLIC BLOOD PRESSURE: 134 MMHG | DIASTOLIC BLOOD PRESSURE: 61 MMHG

## 2024-06-17 VITALS
WEIGHT: 164.91 LBS | SYSTOLIC BLOOD PRESSURE: 140 MMHG | TEMPERATURE: 98 F | OXYGEN SATURATION: 98 % | DIASTOLIC BLOOD PRESSURE: 77 MMHG | HEIGHT: 75 IN | RESPIRATION RATE: 18 BRPM | HEART RATE: 63 BPM

## 2024-06-17 DIAGNOSIS — C21.8 MALIGNANT NEOPLASM OF OVERLAPPING SITES OF RECTUM, ANUS AND ANAL CANAL: ICD-10-CM

## 2024-06-17 DIAGNOSIS — Z90.49 ACQUIRED ABSENCE OF OTHER SPECIFIED PARTS OF DIGESTIVE TRACT: Chronic | ICD-10-CM

## 2024-06-17 DIAGNOSIS — Z98.890 OTHER SPECIFIED POSTPROCEDURAL STATES: Chronic | ICD-10-CM

## 2024-06-17 PROCEDURE — 45330 DIAGNOSTIC SIGMOIDOSCOPY: CPT

## 2024-06-17 RX ORDER — TAMSULOSIN HYDROCHLORIDE 0.4 MG/1
1 CAPSULE ORAL
Qty: 0 | Refills: 0 | DISCHARGE

## 2024-06-17 RX ORDER — ACETAMINOPHEN 500 MG
2 TABLET ORAL
Qty: 0 | Refills: 0 | DISCHARGE

## 2024-06-17 RX ORDER — FINASTERIDE 5 MG/1
1 TABLET, FILM COATED ORAL
Refills: 0 | DISCHARGE

## 2024-06-17 RX ORDER — FOLIC ACID 0.8 MG
1 TABLET ORAL
Refills: 0 | DISCHARGE

## 2024-06-17 RX ORDER — GABAPENTIN 400 MG/1
1 CAPSULE ORAL
Refills: 0 | DISCHARGE

## 2024-06-17 NOTE — ASU DISCHARGE PLAN (ADULT/PEDIATRIC) - NS MD DC FALL RISK RISK
For information on Fall & Injury Prevention, visit: [email or web address] OR  [email or web address] OR  https://www.cdc.gov/steadi/patient.html

## 2024-06-17 NOTE — H&P ADULT - ASSESSMENT
74M with history of robotic LAR with handsewn coloanal anastomosis and DLI  for rectal cancer presents for flexible sigmoidoscopy prior to ileostomy closure

## 2024-06-17 NOTE — ASU PATIENT PROFILE, ADULT - NSICDXPASTSURGICALHX_GEN_ALL_CORE_FT
PAST SURGICAL HISTORY:  H/O neck surgery cancer    History of bowel resection     S/P bilateral inguinal hernia repair

## 2024-06-17 NOTE — ASU PATIENT PROFILE, ADULT - FALL HARM RISK - UNIVERSAL INTERVENTIONS
Bed in lowest position, wheels locked, appropriate side rails in place/Call bell, personal items and telephone in reach/Instruct patient to call for assistance before getting out of bed or chair/Non-slip footwear when patient is out of bed/[place] to call system/Physically safe environment - no spills, clutter or unnecessary equipment/Purposeful Proactive Rounding/Room/bathroom lighting operational, light cord in reach

## 2024-06-17 NOTE — ASU PATIENT PROFILE, ADULT - TEACHING/LEARNING OTHER LEARNERS
[facility]  Cardiology Consultation    Eva Ho  : 1934  PCP: Deo Lozoya [facility]*  [facility] [place] [address]  Transthoracic Echocardiogram (TTE)    Patient: Eva Ho      Study Date/Time:      Mar 15 2023 8:28AM  MRN:     [id number]               FIN#:                 [id number]  :     Chaka Kenny V     Referring Physician:  Chaka Kenny     Attending Physician:  Abhijit Land  Performing Physician: Tiera Najera MD  Diagnostic Physician: Tiera Najera Axis (Degrees) 35    R Axis (Degrees) 26    T Axis (spouse

## 2024-06-19 ENCOUNTER — APPOINTMENT (OUTPATIENT)
Dept: SURGERY | Facility: CLINIC | Age: 74
End: 2024-06-19

## 2024-06-19 VITALS
HEART RATE: 74 BPM | HEIGHT: 74.5 IN | DIASTOLIC BLOOD PRESSURE: 84 MMHG | OXYGEN SATURATION: 96 % | SYSTOLIC BLOOD PRESSURE: 122 MMHG | BODY MASS INDEX: 20.95 KG/M2 | TEMPERATURE: 97.1 F | WEIGHT: 165 LBS

## 2024-06-19 DIAGNOSIS — C20 MALIGNANT NEOPLASM OF RECTUM: ICD-10-CM

## 2024-06-19 PROCEDURE — 99214 OFFICE O/P EST MOD 30 MIN: CPT

## 2024-06-20 PROBLEM — C20 PRIMARY MALIGNANT NEOPLASM OF RECTUM: Status: ACTIVE | Noted: 2024-01-15

## 2024-06-20 NOTE — ASSESSMENT
[FreeTextEntry1] : 74-year-old male with stage one rectal adenocarcinoma, s/p robotic low anterior resection with hand sewn coloanal anastomosis and diverting loop ileostomy complicated by pelvic abscess, s/p examination under anesthesia and penrose drain placement.   The patient is doing well. His anastomosis appears to have healed on flexible sigmoidoscopy. We will now plan for ileostomy closure. All risks, benefits, and alternatives to ileostomy closure are possible exploratory laparotomy we've discussed with the patient, including bleeding, infection, damage to the surrounding structures, anastomotic stricture, anastomotic leak, anastomotic leak in the pelvis, cardiopulmonary events, venous thromboembolic events, hernia formation, neuropathy. We expect a 3-7 day hospital stay and a 6-8 week recovery. We discussed that he will require management of his bowel movements after. Patient expressed understanding and was in agreement with the plan.

## 2024-06-20 NOTE — ADDENDUM
[FreeTextEntry1] : I, Shahida Schmitt ([facility]) assisted in filling out this chart under the dictation of Dr. Flynn Lorenzo on 06/20/2024.

## 2024-06-20 NOTE — HISTORY OF PRESENT ILLNESS
[FreeTextEntry1] : Patient is a 74-year-old male with PMHx of BPH, spinal stenosis, and rheumatoid arthritis on methotrexate who presents for a post-operative office visit, s/p robotic low anterior resection with hand-sewn colo-anal anastomosis and diverting loop ileostomy on February 29, 2024. Pathology showed T1N0 (0/34 ) adenocarcinoma with negative margins. Patient was recently admitted to [facility] on March 30th, for a pelvic abscess that had drained through the anastomosis, he was treated with IV antibiotics as well as an examination under anesthesia with aspiration and placement of Penrose drain. The patient had the Penrose drain removed on his last office visit. Patient recently on the when flexible sigmoidoscopy, which showed intact anastomosis with granulation tissue present. He presents today to discuss ileostomy closure. Patient denies recent fevers, chills, nausea, or vomiting. He reports that his perianal drainage has decreased significantly. He's tolerating a diet and his stoma works well. He denies a family history of colon cancer, rectal cancer, or inflammatory bowel disease.

## 2024-06-21 ENCOUNTER — NON-APPOINTMENT (OUTPATIENT)
Age: 74
End: 2024-06-21

## 2024-06-22 DIAGNOSIS — Z98.0 INTESTINAL BYPASS AND ANASTOMOSIS STATUS: ICD-10-CM

## 2024-06-22 DIAGNOSIS — C20 MALIGNANT NEOPLASM OF RECTUM: ICD-10-CM

## 2024-06-27 ENCOUNTER — NON-APPOINTMENT (OUTPATIENT)
Age: 74
End: 2024-06-27

## 2024-07-08 ENCOUNTER — APPOINTMENT (OUTPATIENT)
Dept: GASTROENTEROLOGY | Facility: CLINIC | Age: 74
End: 2024-07-08
Payer: MEDICARE

## 2024-07-08 VITALS
SYSTOLIC BLOOD PRESSURE: 130 MMHG | HEIGHT: 74.5 IN | WEIGHT: 164 LBS | DIASTOLIC BLOOD PRESSURE: 80 MMHG | HEART RATE: 80 BPM | BODY MASS INDEX: 20.83 KG/M2 | OXYGEN SATURATION: 98 %

## 2024-07-08 DIAGNOSIS — C20 MALIGNANT NEOPLASM OF RECTUM: ICD-10-CM

## 2024-07-08 PROCEDURE — 99214 OFFICE O/P EST MOD 30 MIN: CPT

## 2024-07-14 ENCOUNTER — OUTPATIENT (OUTPATIENT)
Dept: OUTPATIENT SERVICES | Facility: HOSPITAL | Age: 74
LOS: 1 days | End: 2024-07-14
Payer: MEDICARE

## 2024-07-14 DIAGNOSIS — M54.2 CERVICALGIA: ICD-10-CM

## 2024-07-14 DIAGNOSIS — Z90.49 ACQUIRED ABSENCE OF OTHER SPECIFIED PARTS OF DIGESTIVE TRACT: Chronic | ICD-10-CM

## 2024-07-14 DIAGNOSIS — Z98.890 OTHER SPECIFIED POSTPROCEDURAL STATES: Chronic | ICD-10-CM

## 2024-07-14 DIAGNOSIS — Z00.8 ENCOUNTER FOR OTHER GENERAL EXAMINATION: ICD-10-CM

## 2024-07-14 PROCEDURE — 70491 CT SOFT TISSUE NECK W/DYE: CPT | Mod: 26,MH

## 2024-07-14 PROCEDURE — 70491 CT SOFT TISSUE NECK W/DYE: CPT

## 2024-07-15 DIAGNOSIS — M54.2 CERVICALGIA: ICD-10-CM

## 2024-07-16 ENCOUNTER — APPOINTMENT (OUTPATIENT)
Dept: OTOLARYNGOLOGY | Facility: CLINIC | Age: 74
End: 2024-07-16

## 2024-07-16 ENCOUNTER — NON-APPOINTMENT (OUTPATIENT)
Age: 74
End: 2024-07-16

## 2024-07-16 VITALS — WEIGHT: 160 LBS | HEIGHT: 75 IN | BODY MASS INDEX: 19.89 KG/M2

## 2024-07-16 DIAGNOSIS — M54.2 CERVICALGIA: ICD-10-CM

## 2024-07-16 DIAGNOSIS — Z85.820 OTHER SPECIFIED POSTPROCEDURAL STATES: ICD-10-CM

## 2024-07-16 DIAGNOSIS — Z98.890 OTHER SPECIFIED POSTPROCEDURAL STATES: ICD-10-CM

## 2024-07-16 DIAGNOSIS — R22.1 LOCALIZED SWELLING, MASS AND LUMP, NECK: ICD-10-CM

## 2024-07-16 PROCEDURE — 31575 DIAGNOSTIC LARYNGOSCOPY: CPT

## 2024-07-16 PROCEDURE — 99204 OFFICE O/P NEW MOD 45 MIN: CPT | Mod: 25

## 2024-07-17 ENCOUNTER — OUTPATIENT (OUTPATIENT)
Dept: OUTPATIENT SERVICES | Facility: HOSPITAL | Age: 74
LOS: 1 days | End: 2024-07-17
Payer: MEDICARE

## 2024-07-17 VITALS
HEART RATE: 65 BPM | RESPIRATION RATE: 16 BRPM | SYSTOLIC BLOOD PRESSURE: 133 MMHG | OXYGEN SATURATION: 99 % | WEIGHT: 160.06 LBS | HEIGHT: 75 IN | TEMPERATURE: 98 F | DIASTOLIC BLOOD PRESSURE: 77 MMHG

## 2024-07-17 DIAGNOSIS — Z98.890 OTHER SPECIFIED POSTPROCEDURAL STATES: Chronic | ICD-10-CM

## 2024-07-17 DIAGNOSIS — C21.8 MALIGNANT NEOPLASM OF OVERLAPPING SITES OF RECTUM, ANUS AND ANAL CANAL: ICD-10-CM

## 2024-07-17 DIAGNOSIS — Z90.49 ACQUIRED ABSENCE OF OTHER SPECIFIED PARTS OF DIGESTIVE TRACT: Chronic | ICD-10-CM

## 2024-07-17 DIAGNOSIS — Z01.818 ENCOUNTER FOR OTHER PREPROCEDURAL EXAMINATION: ICD-10-CM

## 2024-07-17 LAB
A1C WITH ESTIMATED AVERAGE GLUCOSE RESULT: 6.1 % — HIGH (ref 4–5.6)
ALBUMIN SERPL ELPH-MCNC: 4.6 G/DL — SIGNIFICANT CHANGE UP (ref 3.5–5.2)
ALP SERPL-CCNC: 88 U/L — SIGNIFICANT CHANGE UP (ref 30–115)
ALT FLD-CCNC: 20 U/L — SIGNIFICANT CHANGE UP (ref 0–41)
ANION GAP SERPL CALC-SCNC: 14 MMOL/L — SIGNIFICANT CHANGE UP (ref 7–14)
APPEARANCE UR: CLEAR — SIGNIFICANT CHANGE UP
APTT BLD: 37.8 SEC — SIGNIFICANT CHANGE UP (ref 27–39.2)
AST SERPL-CCNC: 30 U/L — SIGNIFICANT CHANGE UP (ref 0–41)
BASOPHILS # BLD AUTO: 0.02 K/UL — SIGNIFICANT CHANGE UP (ref 0–0.2)
BASOPHILS NFR BLD AUTO: 0.4 % — SIGNIFICANT CHANGE UP (ref 0–1)
BILIRUB SERPL-MCNC: 0.5 MG/DL — SIGNIFICANT CHANGE UP (ref 0.2–1.2)
BILIRUB UR-MCNC: NEGATIVE — SIGNIFICANT CHANGE UP
BLD GP AB SCN SERPL QL: SIGNIFICANT CHANGE UP
BUN SERPL-MCNC: 16 MG/DL — SIGNIFICANT CHANGE UP (ref 10–20)
CALCIUM SERPL-MCNC: 9.7 MG/DL — SIGNIFICANT CHANGE UP (ref 8.4–10.5)
CHLORIDE SERPL-SCNC: 103 MMOL/L — SIGNIFICANT CHANGE UP (ref 98–110)
CO2 SERPL-SCNC: 22 MMOL/L — SIGNIFICANT CHANGE UP (ref 17–32)
COLOR SPEC: YELLOW — SIGNIFICANT CHANGE UP
CREAT SERPL-MCNC: 0.8 MG/DL — SIGNIFICANT CHANGE UP (ref 0.7–1.5)
DIFF PNL FLD: NEGATIVE — SIGNIFICANT CHANGE UP
EGFR: 94 ML/MIN/1.73M2 — SIGNIFICANT CHANGE UP
EOSINOPHIL # BLD AUTO: 0.07 K/UL — SIGNIFICANT CHANGE UP (ref 0–0.7)
EOSINOPHIL NFR BLD AUTO: 1.3 % — SIGNIFICANT CHANGE UP (ref 0–8)
ESTIMATED AVERAGE GLUCOSE: 128 MG/DL — HIGH (ref 68–114)
GLUCOSE SERPL-MCNC: 87 MG/DL — SIGNIFICANT CHANGE UP (ref 70–99)
GLUCOSE UR QL: NEGATIVE MG/DL — SIGNIFICANT CHANGE UP
HCT VFR BLD CALC: 38.4 % — LOW (ref 42–52)
HGB BLD-MCNC: 12.5 G/DL — LOW (ref 14–18)
IMM GRANULOCYTES NFR BLD AUTO: 0.4 % — HIGH (ref 0.1–0.3)
INR BLD: 1.06 RATIO — SIGNIFICANT CHANGE UP (ref 0.65–1.3)
KETONES UR-MCNC: NEGATIVE MG/DL — SIGNIFICANT CHANGE UP
LEUKOCYTE ESTERASE UR-ACNC: NEGATIVE — SIGNIFICANT CHANGE UP
LYMPHOCYTES # BLD AUTO: 1.18 K/UL — LOW (ref 1.2–3.4)
LYMPHOCYTES # BLD AUTO: 21.9 % — SIGNIFICANT CHANGE UP (ref 20.5–51.1)
MCHC RBC-ENTMCNC: 28.2 PG — SIGNIFICANT CHANGE UP (ref 27–31)
MCHC RBC-ENTMCNC: 32.6 G/DL — SIGNIFICANT CHANGE UP (ref 32–37)
MCV RBC AUTO: 86.7 FL — SIGNIFICANT CHANGE UP (ref 80–94)
MONOCYTES # BLD AUTO: 0.49 K/UL — SIGNIFICANT CHANGE UP (ref 0.1–0.6)
MONOCYTES NFR BLD AUTO: 9.1 % — SIGNIFICANT CHANGE UP (ref 1.7–9.3)
MRSA PCR RESULT.: NEGATIVE — SIGNIFICANT CHANGE UP
NEUTROPHILS # BLD AUTO: 3.62 K/UL — SIGNIFICANT CHANGE UP (ref 1.4–6.5)
NEUTROPHILS NFR BLD AUTO: 66.9 % — SIGNIFICANT CHANGE UP (ref 42.2–75.2)
NITRITE UR-MCNC: NEGATIVE — SIGNIFICANT CHANGE UP
NRBC # BLD: 0 /100 WBCS — SIGNIFICANT CHANGE UP (ref 0–0)
PH UR: 6 — SIGNIFICANT CHANGE UP (ref 5–8)
PLATELET # BLD AUTO: 213 K/UL — SIGNIFICANT CHANGE UP (ref 130–400)
PMV BLD: 10.6 FL — HIGH (ref 7.4–10.4)
POTASSIUM SERPL-MCNC: 4.2 MMOL/L — SIGNIFICANT CHANGE UP (ref 3.5–5)
POTASSIUM SERPL-SCNC: 4.2 MMOL/L — SIGNIFICANT CHANGE UP (ref 3.5–5)
PROT SERPL-MCNC: 7.2 G/DL — SIGNIFICANT CHANGE UP (ref 6–8)
PROT UR-MCNC: NEGATIVE MG/DL — SIGNIFICANT CHANGE UP
PROTHROM AB SERPL-ACNC: 12.1 SEC — SIGNIFICANT CHANGE UP (ref 9.95–12.87)
RBC # BLD: 4.43 M/UL — LOW (ref 4.7–6.1)
RBC # FLD: 14.6 % — HIGH (ref 11.5–14.5)
SODIUM SERPL-SCNC: 139 MMOL/L — SIGNIFICANT CHANGE UP (ref 135–146)
SP GR SPEC: 1.01 — SIGNIFICANT CHANGE UP (ref 1–1.03)
UROBILINOGEN FLD QL: 0.2 MG/DL — SIGNIFICANT CHANGE UP (ref 0.2–1)
WBC # BLD: 5.4 K/UL — SIGNIFICANT CHANGE UP (ref 4.8–10.8)
WBC # FLD AUTO: 5.4 K/UL — SIGNIFICANT CHANGE UP (ref 4.8–10.8)

## 2024-07-17 PROCEDURE — 87640 STAPH A DNA AMP PROBE: CPT

## 2024-07-17 PROCEDURE — 85025 COMPLETE CBC W/AUTO DIFF WBC: CPT

## 2024-07-17 PROCEDURE — 86901 BLOOD TYPING SEROLOGIC RH(D): CPT

## 2024-07-17 PROCEDURE — 93005 ELECTROCARDIOGRAM TRACING: CPT

## 2024-07-17 PROCEDURE — 99214 OFFICE O/P EST MOD 30 MIN: CPT | Mod: 25

## 2024-07-17 PROCEDURE — 87641 MR-STAPH DNA AMP PROBE: CPT

## 2024-07-17 PROCEDURE — 81003 URINALYSIS AUTO W/O SCOPE: CPT

## 2024-07-17 PROCEDURE — 86850 RBC ANTIBODY SCREEN: CPT

## 2024-07-17 PROCEDURE — 83036 HEMOGLOBIN GLYCOSYLATED A1C: CPT

## 2024-07-17 PROCEDURE — 93010 ELECTROCARDIOGRAM REPORT: CPT

## 2024-07-17 PROCEDURE — 85730 THROMBOPLASTIN TIME PARTIAL: CPT

## 2024-07-17 PROCEDURE — 36415 COLL VENOUS BLD VENIPUNCTURE: CPT

## 2024-07-17 PROCEDURE — 85610 PROTHROMBIN TIME: CPT

## 2024-07-17 PROCEDURE — 86900 BLOOD TYPING SEROLOGIC ABO: CPT

## 2024-07-17 PROCEDURE — 80053 COMPREHEN METABOLIC PANEL: CPT

## 2024-07-17 NOTE — H&P PST ADULT - HISTORY OF PRESENT ILLNESS
73 Y/O MALE PT TO PAST WITH HX COLON CA             PT NOW FOR SCHEDULED PROCEDURE ( ILEOSTOMY CLOSURE, ) . PT DENIES ANY CP SOB PALP COUGH DYSURIA FEVER URI.   Anesthesia Alert  NO--Difficult Airway  NO--History of neck surgery or radiation  NO--Limited ROM of neck  NO--History of Malignant hyperthermia  NO--Personal or family history of Pseudocholinesterase deficiency.  NO--Prior Anesthesia Complication  NO--Latex Allergy  NO--Loose teeth  NO--History of Rheumatoid Arthritis  NO--MIGEL  NO--Bleeding risk  NO--Other_____   73 Y/O MALE PT TO PAST WITH HX RECTAL CA  , INITIALLY PRESENTED WITH PERSISTENT DIARRHEA, ABD PAIN, RECTAL BLEEDING LAST YEAR   DENIES BLEEDING  PAIN AT PRESENT  S/P SX 2/24 ( PROCTECTOMY 0          PT NOW FOR SCHEDULED PROCEDURE ( ILEOSTOMY CLOSURE, ) . PT DENIES ANY CP SOB PALP COUGH DYSURIA FEVER URI.   Anesthesia Alert  NO--Difficult Airway  NO--History of neck surgery or radiation  NO--Limited ROM of neck  NO--History of Malignant hyperthermia  NO--Personal or family history of Pseudocholinesterase deficiency.  NO--Prior Anesthesia Complication  NO--Latex Allergy  NO--Loose teeth  NO--History of Rheumatoid Arthritis  NO--MIGEL  NO--Bleeding risk  NO--Other_____  RCRI CLASS I  DASI 6/7 METS   75 Y/O MALE PT TO PAST WITH HX RECTAL CA  , INITIALLY PRESENTED WITH PERSISTENT DIARRHEA, ABD PAIN, RECTAL BLEEDING LAST YEAR   DENIES BLEEDING  PAIN AT PRESENT  S/P SX 2/24 ( PROCTECTOMY )    PT NOW FOR SCHEDULED PROCEDURE ( ILEOSTOMY CLOSURE, ) . PT DENIES ANY CP SOB PALP COUGH DYSURIA FEVER URI.   Anesthesia Alert  CLASS IV  + RA - FULL ROM NECK  ENLARGED LYMPH NODE  LEFT NECK ( F/U WITH ENT DR CASTELLON)   NO--History of neck surgery or radiation  NO--Limited ROM of neck  NO--History of Malignant hyperthermia  NO--Personal or family history of Pseudocholinesterase deficiency.  NO--Prior Anesthesia Complication  NO--Latex Allergy  NO--Loose teeth  NO--MIGEL  NO--Bleeding risk  NO--Other_____  RCRI CLASS I  DASI 6/7 METS

## 2024-07-17 NOTE — H&P PST ADULT - ATTENDING COMMENTS
74M s/p robotic LAR with handsewn coloanal anastomosis and diverting loop ileostomy presents for ileostomy closure possible exploratory laparotomy.

## 2024-07-18 ENCOUNTER — LABORATORY RESULT (OUTPATIENT)
Age: 74
End: 2024-07-18

## 2024-07-18 ENCOUNTER — APPOINTMENT (OUTPATIENT)
Age: 74
End: 2024-07-18

## 2024-07-18 DIAGNOSIS — Z01.818 ENCOUNTER FOR OTHER PREPROCEDURAL EXAMINATION: ICD-10-CM

## 2024-07-18 DIAGNOSIS — C21.8 MALIGNANT NEOPLASM OF OVERLAPPING SITES OF RECTUM, ANUS AND ANAL CANAL: ICD-10-CM

## 2024-07-18 LAB
HCT VFR BLD CALC: 37.5 %
HGB BLD-MCNC: 12.5 G/DL
IRON SATN MFR SERPL: 45 %
IRON SERPL-MCNC: 162 UG/DL
MCHC RBC-ENTMCNC: 29.1 PG
MCHC RBC-ENTMCNC: 33.3 G/DL
MCV RBC AUTO: 87.4 FL
PLATELET # BLD AUTO: 209 K/UL
PMV BLD: 10 FL
RBC # BLD: 4.29 M/UL
RBC # FLD: 14.6 %
TIBC SERPL-MCNC: 357 UG/DL
UIBC SERPL-MCNC: 195 UG/DL
WBC # FLD AUTO: 5.44 K/UL

## 2024-07-19 LAB — FERRITIN SERPL-MCNC: 58 NG/ML

## 2024-07-22 ENCOUNTER — NON-APPOINTMENT (OUTPATIENT)
Age: 74
End: 2024-07-22

## 2024-07-25 ENCOUNTER — RESULT REVIEW (OUTPATIENT)
Age: 74
End: 2024-07-25

## 2024-07-25 ENCOUNTER — OUTPATIENT (OUTPATIENT)
Dept: OUTPATIENT SERVICES | Facility: HOSPITAL | Age: 74
LOS: 1 days | Discharge: HOME | End: 2024-07-25
Payer: MEDICARE

## 2024-07-25 DIAGNOSIS — Z98.890 OTHER SPECIFIED POSTPROCEDURAL STATES: Chronic | ICD-10-CM

## 2024-07-25 DIAGNOSIS — Z90.49 ACQUIRED ABSENCE OF OTHER SPECIFIED PARTS OF DIGESTIVE TRACT: Chronic | ICD-10-CM

## 2024-07-25 DIAGNOSIS — R22.1 LOCALIZED SWELLING, MASS AND LUMP, NECK: ICD-10-CM

## 2024-07-25 PROCEDURE — 76942 ECHO GUIDE FOR BIOPSY: CPT

## 2024-07-25 PROCEDURE — 88341 IMHCHEM/IMCYTCHM EA ADD ANTB: CPT | Mod: 26

## 2024-07-25 PROCEDURE — 88173 CYTOPATH EVAL FNA REPORT: CPT

## 2024-07-25 PROCEDURE — 88173 CYTOPATH EVAL FNA REPORT: CPT | Mod: 26

## 2024-07-25 PROCEDURE — 88342 IMHCHEM/IMCYTCHM 1ST ANTB: CPT

## 2024-07-25 PROCEDURE — 88342 IMHCHEM/IMCYTCHM 1ST ANTB: CPT | Mod: 26

## 2024-07-25 PROCEDURE — 76942 ECHO GUIDE FOR BIOPSY: CPT | Mod: 26

## 2024-07-25 PROCEDURE — 88341 IMHCHEM/IMCYTCHM EA ADD ANTB: CPT

## 2024-07-25 PROCEDURE — 20206 BIOPSY MUSCLE PERQ NEEDLE: CPT

## 2024-07-31 ENCOUNTER — RESULT REVIEW (OUTPATIENT)
Age: 74
End: 2024-07-31

## 2024-07-31 ENCOUNTER — APPOINTMENT (OUTPATIENT)
Dept: SURGERY | Facility: HOSPITAL | Age: 74
End: 2024-07-31

## 2024-07-31 ENCOUNTER — INPATIENT (INPATIENT)
Facility: HOSPITAL | Age: 74
LOS: 2 days | Discharge: HOME HEALTH | DRG: 376 | End: 2024-08-03
Attending: COLON & RECTAL SURGERY | Admitting: COLON & RECTAL SURGERY
Payer: MEDICARE

## 2024-07-31 VITALS
DIASTOLIC BLOOD PRESSURE: 78 MMHG | SYSTOLIC BLOOD PRESSURE: 132 MMHG | HEIGHT: 75 IN | WEIGHT: 160.06 LBS | RESPIRATION RATE: 15 BRPM | OXYGEN SATURATION: 99 % | TEMPERATURE: 98 F | HEART RATE: 72 BPM

## 2024-07-31 DIAGNOSIS — Z98.890 OTHER SPECIFIED POSTPROCEDURAL STATES: Chronic | ICD-10-CM

## 2024-07-31 DIAGNOSIS — C21.8 MALIGNANT NEOPLASM OF OVERLAPPING SITES OF RECTUM, ANUS AND ANAL CANAL: ICD-10-CM

## 2024-07-31 DIAGNOSIS — Z90.49 ACQUIRED ABSENCE OF OTHER SPECIFIED PARTS OF DIGESTIVE TRACT: Chronic | ICD-10-CM

## 2024-07-31 LAB
ANION GAP SERPL CALC-SCNC: 12 MMOL/L — SIGNIFICANT CHANGE UP (ref 7–14)
BASOPHILS # BLD AUTO: 0.02 K/UL — SIGNIFICANT CHANGE UP (ref 0–0.2)
BASOPHILS NFR BLD AUTO: 0.2 % — SIGNIFICANT CHANGE UP (ref 0–1)
BUN SERPL-MCNC: 9 MG/DL — LOW (ref 10–20)
CALCIUM SERPL-MCNC: 9.9 MG/DL — SIGNIFICANT CHANGE UP (ref 8.4–10.4)
CHLORIDE SERPL-SCNC: 103 MMOL/L — SIGNIFICANT CHANGE UP (ref 98–110)
CO2 SERPL-SCNC: 25 MMOL/L — SIGNIFICANT CHANGE UP (ref 17–32)
CREAT SERPL-MCNC: 1 MG/DL — SIGNIFICANT CHANGE UP (ref 0.7–1.5)
EGFR: 80 ML/MIN/1.73M2 — SIGNIFICANT CHANGE UP
EGFR: 80 ML/MIN/1.73M2 — SIGNIFICANT CHANGE UP
EOSINOPHIL # BLD AUTO: 0 K/UL — SIGNIFICANT CHANGE UP (ref 0–0.7)
EOSINOPHIL NFR BLD AUTO: 0 % — SIGNIFICANT CHANGE UP (ref 0–8)
GLUCOSE BLDC GLUCOMTR-MCNC: 97 MG/DL — SIGNIFICANT CHANGE UP (ref 70–99)
GLUCOSE SERPL-MCNC: 146 MG/DL — HIGH (ref 70–99)
HCT VFR BLD CALC: 36.5 % — LOW (ref 42–52)
HGB BLD-MCNC: 12.1 G/DL — LOW (ref 14–18)
IMM GRANULOCYTES NFR BLD AUTO: 0.6 % — HIGH (ref 0.1–0.3)
LYMPHOCYTES # BLD AUTO: 0.67 K/UL — LOW (ref 1.2–3.4)
LYMPHOCYTES # BLD AUTO: 6.1 % — LOW (ref 20.5–51.1)
MAGNESIUM SERPL-MCNC: 2.1 MG/DL — SIGNIFICANT CHANGE UP (ref 1.8–2.4)
MCHC RBC-ENTMCNC: 29.9 PG — SIGNIFICANT CHANGE UP (ref 27–31)
MCHC RBC-ENTMCNC: 33.2 G/DL — SIGNIFICANT CHANGE UP (ref 32–37)
MCV RBC AUTO: 90.1 FL — SIGNIFICANT CHANGE UP (ref 80–94)
MONOCYTES # BLD AUTO: 0.58 K/UL — SIGNIFICANT CHANGE UP (ref 0.1–0.6)
MONOCYTES NFR BLD AUTO: 5.3 % — SIGNIFICANT CHANGE UP (ref 1.7–9.3)
NEUTROPHILS # BLD AUTO: 9.6 K/UL — HIGH (ref 1.4–6.5)
NEUTROPHILS NFR BLD AUTO: 87.8 % — HIGH (ref 42.2–75.2)
NON-GYNECOLOGICAL CYTOLOGY STUDY: SIGNIFICANT CHANGE UP
NRBC # BLD: 0 /100 WBCS — SIGNIFICANT CHANGE UP (ref 0–0)
NRBC BLD-RTO: 0 /100 WBCS — SIGNIFICANT CHANGE UP (ref 0–0)
PHOSPHATE SERPL-MCNC: 3.2 MG/DL — SIGNIFICANT CHANGE UP (ref 2.1–4.9)
PLATELET # BLD AUTO: 218 K/UL — SIGNIFICANT CHANGE UP (ref 130–400)
PMV BLD: 10.8 FL — HIGH (ref 7.4–10.4)
POTASSIUM SERPL-MCNC: 3.9 MMOL/L — SIGNIFICANT CHANGE UP (ref 3.5–5)
POTASSIUM SERPL-SCNC: 3.9 MMOL/L — SIGNIFICANT CHANGE UP (ref 3.5–5)
RBC # BLD: 4.05 M/UL — LOW (ref 4.7–6.1)
RBC # FLD: 14 % — SIGNIFICANT CHANGE UP (ref 11.5–14.5)
SODIUM SERPL-SCNC: 140 MMOL/L — SIGNIFICANT CHANGE UP (ref 135–146)
WBC # BLD: 10.94 K/UL — HIGH (ref 4.8–10.8)
WBC # FLD AUTO: 10.94 K/UL — HIGH (ref 4.8–10.8)

## 2024-07-31 PROCEDURE — 93005 ELECTROCARDIOGRAM TRACING: CPT

## 2024-07-31 PROCEDURE — 85025 COMPLETE CBC W/AUTO DIFF WBC: CPT

## 2024-07-31 PROCEDURE — 88304 TISSUE EXAM BY PATHOLOGIST: CPT | Mod: 26

## 2024-07-31 PROCEDURE — 83735 ASSAY OF MAGNESIUM: CPT

## 2024-07-31 PROCEDURE — 80048 BASIC METABOLIC PNL TOTAL CA: CPT

## 2024-07-31 PROCEDURE — 84100 ASSAY OF PHOSPHORUS: CPT

## 2024-07-31 PROCEDURE — 97161 PT EVAL LOW COMPLEX 20 MIN: CPT | Mod: GP

## 2024-07-31 PROCEDURE — 82962 GLUCOSE BLOOD TEST: CPT

## 2024-07-31 PROCEDURE — 44620 REPAIR BOWEL OPENING: CPT

## 2024-07-31 PROCEDURE — 88304 TISSUE EXAM BY PATHOLOGIST: CPT

## 2024-07-31 PROCEDURE — C1889: CPT

## 2024-07-31 RX ORDER — ACETAMINOPHEN 500 MG/5ML
1000 LIQUID (ML) ORAL ONCE
Refills: 0 | Status: COMPLETED | OUTPATIENT
Start: 2024-07-31 | End: 2024-07-31

## 2024-07-31 RX ORDER — GABAPENTIN 400 MG/1
300 CAPSULE ORAL ONCE
Refills: 0 | Status: COMPLETED | OUTPATIENT
Start: 2024-07-31 | End: 2024-07-31

## 2024-07-31 RX ORDER — HEPARIN SODIUM 1000 [USP'U]/ML
5000 INJECTION INTRAVENOUS; SUBCUTANEOUS ONCE
Refills: 0 | Status: COMPLETED | OUTPATIENT
Start: 2024-07-31 | End: 2024-07-31

## 2024-07-31 RX ORDER — KETOROLAC TROMETHAMINE 30 MG/ML
15 INJECTION, SOLUTION INTRAMUSCULAR; INTRAVENOUS EVERY 6 HOURS
Refills: 0 | Status: DISCONTINUED | OUTPATIENT
Start: 2024-07-31 | End: 2024-08-03

## 2024-07-31 RX ORDER — ACETAMINOPHEN 500 MG/5ML
650 LIQUID (ML) ORAL EVERY 6 HOURS
Refills: 0 | Status: DISCONTINUED | OUTPATIENT
Start: 2024-07-31 | End: 2024-08-03

## 2024-07-31 RX ORDER — HYDROMORPHONE/SOD CHLOR,ISO/PF 2 MG/10 ML
1 SYRINGE (ML) INJECTION
Refills: 0 | Status: DISCONTINUED | OUTPATIENT
Start: 2024-07-31 | End: 2024-07-31

## 2024-07-31 RX ORDER — FINASTERIDE 1 MG/1
5 TABLET, FILM COATED ORAL DAILY
Refills: 0 | Status: DISCONTINUED | OUTPATIENT
Start: 2024-07-31 | End: 2024-08-03

## 2024-07-31 RX ORDER — ONDANSETRON HCL/PF 4 MG/2 ML
4 VIAL (ML) INJECTION ONCE
Refills: 0 | Status: DISCONTINUED | OUTPATIENT
Start: 2024-07-31 | End: 2024-07-31

## 2024-07-31 RX ORDER — FOLIC ACID
10 POWDER (GRAM) MISCELLANEOUS
Refills: 0 | DISCHARGE

## 2024-07-31 RX ORDER — ENOXAPARIN SODIUM 100 MG/ML
40 INJECTION SUBCUTANEOUS EVERY 24 HOURS
Refills: 0 | Status: DISCONTINUED | OUTPATIENT
Start: 2024-07-31 | End: 2024-08-03

## 2024-07-31 RX ORDER — HYDROMORPHONE/SOD CHLOR,ISO/PF 2 MG/10 ML
0.5 SYRINGE (ML) INJECTION EVERY 6 HOURS
Refills: 0 | Status: DISCONTINUED | OUTPATIENT
Start: 2024-07-31 | End: 2024-08-02

## 2024-07-31 RX ORDER — HYDROMORPHONE/SOD CHLOR,ISO/PF 2 MG/10 ML
0.5 SYRINGE (ML) INJECTION
Refills: 0 | Status: DISCONTINUED | OUTPATIENT
Start: 2024-07-31 | End: 2024-07-31

## 2024-07-31 RX ORDER — SODIUM CHLORIDE 9 G/1000ML
1000 INJECTION, SOLUTION INTRAVENOUS
Refills: 0 | Status: ACTIVE | OUTPATIENT
Start: 2024-07-31 | End: 2025-06-29

## 2024-07-31 RX ORDER — TAMSULOSIN HYDROCHLORIDE 0.4 MG/1
0.4 CAPSULE ORAL AT BEDTIME
Refills: 0 | Status: DISCONTINUED | OUTPATIENT
Start: 2024-07-31 | End: 2024-08-03

## 2024-07-31 RX ORDER — GABAPENTIN 400 MG/1
600 CAPSULE ORAL THREE TIMES A DAY
Refills: 0 | Status: DISCONTINUED | OUTPATIENT
Start: 2024-07-31 | End: 2024-08-03

## 2024-07-31 RX ADMIN — Medication 650 MILLIGRAM(S): at 17:23

## 2024-07-31 RX ADMIN — Medication 1000 MILLIGRAM(S): at 10:07

## 2024-07-31 RX ADMIN — GABAPENTIN 600 MILLIGRAM(S): 400 CAPSULE ORAL at 21:10

## 2024-07-31 RX ADMIN — TAMSULOSIN HYDROCHLORIDE 0.4 MILLIGRAM(S): 0.4 CAPSULE ORAL at 21:10

## 2024-07-31 RX ADMIN — GABAPENTIN 300 MILLIGRAM(S): 400 CAPSULE ORAL at 10:08

## 2024-07-31 RX ADMIN — HEPARIN SODIUM 5000 UNIT(S): 1000 INJECTION INTRAVENOUS; SUBCUTANEOUS at 10:08

## 2024-07-31 RX ADMIN — FINASTERIDE 5 MILLIGRAM(S): 1 TABLET, FILM COATED ORAL at 21:09

## 2024-07-31 RX ADMIN — ENOXAPARIN SODIUM 40 MILLIGRAM(S): 100 INJECTION SUBCUTANEOUS at 17:23

## 2024-07-31 RX ADMIN — Medication 1000 MILLIGRAM(S): at 10:37

## 2024-08-01 LAB
ANION GAP SERPL CALC-SCNC: 15 MMOL/L — HIGH (ref 7–14)
BASOPHILS # BLD AUTO: 0.04 K/UL — SIGNIFICANT CHANGE UP (ref 0–0.2)
BASOPHILS NFR BLD AUTO: 0.4 % — SIGNIFICANT CHANGE UP (ref 0–1)
BUN SERPL-MCNC: 12 MG/DL — SIGNIFICANT CHANGE UP (ref 10–20)
CALCIUM SERPL-MCNC: 9.5 MG/DL — SIGNIFICANT CHANGE UP (ref 8.4–10.5)
CHLORIDE SERPL-SCNC: 103 MMOL/L — SIGNIFICANT CHANGE UP (ref 98–110)
CO2 SERPL-SCNC: 25 MMOL/L — SIGNIFICANT CHANGE UP (ref 17–32)
CREAT SERPL-MCNC: 0.9 MG/DL — SIGNIFICANT CHANGE UP (ref 0.7–1.5)
EGFR: 91 ML/MIN/1.73M2 — SIGNIFICANT CHANGE UP
EGFR: 91 ML/MIN/1.73M2 — SIGNIFICANT CHANGE UP
EOSINOPHIL # BLD AUTO: 0.03 K/UL — SIGNIFICANT CHANGE UP (ref 0–0.7)
EOSINOPHIL NFR BLD AUTO: 0.3 % — SIGNIFICANT CHANGE UP (ref 0–8)
GLUCOSE SERPL-MCNC: 129 MG/DL — HIGH (ref 70–99)
HCT VFR BLD CALC: 38.5 % — LOW (ref 42–52)
HGB BLD-MCNC: 12.6 G/DL — LOW (ref 14–18)
IMM GRANULOCYTES NFR BLD AUTO: 0.4 % — HIGH (ref 0.1–0.3)
LYMPHOCYTES # BLD AUTO: 0.95 K/UL — LOW (ref 1.2–3.4)
LYMPHOCYTES # BLD AUTO: 9 % — LOW (ref 20.5–51.1)
MAGNESIUM SERPL-MCNC: 2 MG/DL — SIGNIFICANT CHANGE UP (ref 1.8–2.4)
MCHC RBC-ENTMCNC: 29.6 PG — SIGNIFICANT CHANGE UP (ref 27–31)
MCHC RBC-ENTMCNC: 32.7 G/DL — SIGNIFICANT CHANGE UP (ref 32–37)
MCV RBC AUTO: 90.4 FL — SIGNIFICANT CHANGE UP (ref 80–94)
MONOCYTES # BLD AUTO: 1.13 K/UL — HIGH (ref 0.1–0.6)
MONOCYTES NFR BLD AUTO: 10.7 % — HIGH (ref 1.7–9.3)
NEUTROPHILS # BLD AUTO: 8.41 K/UL — HIGH (ref 1.4–6.5)
NEUTROPHILS NFR BLD AUTO: 79.2 % — HIGH (ref 42.2–75.2)
NRBC # BLD: 0 /100 WBCS — SIGNIFICANT CHANGE UP (ref 0–0)
NRBC BLD-RTO: 0 /100 WBCS — SIGNIFICANT CHANGE UP (ref 0–0)
PHOSPHATE SERPL-MCNC: 2.7 MG/DL — SIGNIFICANT CHANGE UP (ref 2.1–4.9)
PLATELET # BLD AUTO: 239 K/UL — SIGNIFICANT CHANGE UP (ref 130–400)
PMV BLD: 11.3 FL — HIGH (ref 7.4–10.4)
POTASSIUM SERPL-MCNC: 4 MMOL/L — SIGNIFICANT CHANGE UP (ref 3.5–5)
POTASSIUM SERPL-SCNC: 4 MMOL/L — SIGNIFICANT CHANGE UP (ref 3.5–5)
RBC # BLD: 4.26 M/UL — LOW (ref 4.7–6.1)
RBC # FLD: 14.1 % — SIGNIFICANT CHANGE UP (ref 11.5–14.5)
SODIUM SERPL-SCNC: 143 MMOL/L — SIGNIFICANT CHANGE UP (ref 135–146)
WBC # BLD: 10.6 K/UL — SIGNIFICANT CHANGE UP (ref 4.8–10.8)
WBC # FLD AUTO: 10.6 K/UL — SIGNIFICANT CHANGE UP (ref 4.8–10.8)

## 2024-08-01 RX ADMIN — KETOROLAC TROMETHAMINE 15 MILLIGRAM(S): 30 INJECTION, SOLUTION INTRAMUSCULAR; INTRAVENOUS at 18:53

## 2024-08-01 RX ADMIN — Medication 650 MILLIGRAM(S): at 13:04

## 2024-08-01 RX ADMIN — Medication 650 MILLIGRAM(S): at 23:13

## 2024-08-01 RX ADMIN — GABAPENTIN 600 MILLIGRAM(S): 400 CAPSULE ORAL at 05:23

## 2024-08-01 RX ADMIN — Medication 650 MILLIGRAM(S): at 00:01

## 2024-08-01 RX ADMIN — FINASTERIDE 5 MILLIGRAM(S): 1 TABLET, FILM COATED ORAL at 11:33

## 2024-08-01 RX ADMIN — Medication 650 MILLIGRAM(S): at 17:02

## 2024-08-01 RX ADMIN — Medication 650 MILLIGRAM(S): at 05:23

## 2024-08-01 RX ADMIN — TAMSULOSIN HYDROCHLORIDE 0.4 MILLIGRAM(S): 0.4 CAPSULE ORAL at 21:24

## 2024-08-01 RX ADMIN — ENOXAPARIN SODIUM 40 MILLIGRAM(S): 100 INJECTION SUBCUTANEOUS at 17:03

## 2024-08-01 RX ADMIN — GABAPENTIN 600 MILLIGRAM(S): 400 CAPSULE ORAL at 21:23

## 2024-08-01 RX ADMIN — GABAPENTIN 600 MILLIGRAM(S): 400 CAPSULE ORAL at 13:13

## 2024-08-01 RX ADMIN — KETOROLAC TROMETHAMINE 15 MILLIGRAM(S): 30 INJECTION, SOLUTION INTRAMUSCULAR; INTRAVENOUS at 19:23

## 2024-08-01 RX ADMIN — Medication 650 MILLIGRAM(S): at 11:33

## 2024-08-02 ENCOUNTER — TRANSCRIPTION ENCOUNTER (OUTPATIENT)
Age: 74
End: 2024-08-02

## 2024-08-02 LAB — SURGICAL PATHOLOGY STUDY: SIGNIFICANT CHANGE UP

## 2024-08-02 PROCEDURE — 93010 ELECTROCARDIOGRAM REPORT: CPT

## 2024-08-02 PROCEDURE — 99233 SBSQ HOSP IP/OBS HIGH 50: CPT | Mod: 24

## 2024-08-02 RX ORDER — OXYCODONE HYDROCHLORIDE 30 MG/1
1 TABLET ORAL
Qty: 15 | Refills: 0
Start: 2024-08-02 | End: 2024-08-06

## 2024-08-02 RX ORDER — ONDANSETRON HCL/PF 4 MG/2 ML
4 VIAL (ML) INJECTION EVERY 6 HOURS
Refills: 0 | Status: DISCONTINUED | OUTPATIENT
Start: 2024-08-02 | End: 2024-08-02

## 2024-08-02 RX ORDER — OXYCODONE HYDROCHLORIDE 30 MG/1
5 TABLET ORAL EVERY 6 HOURS
Refills: 0 | Status: DISCONTINUED | OUTPATIENT
Start: 2024-08-02 | End: 2024-08-03

## 2024-08-02 RX ORDER — SODIUM PHOSPHATE,DIBASIC DIHYD
15 POWDER (GRAM) MISCELLANEOUS ONCE
Refills: 0 | Status: COMPLETED | OUTPATIENT
Start: 2024-08-02 | End: 2024-08-02

## 2024-08-02 RX ADMIN — Medication 40 MILLIGRAM(S): at 10:08

## 2024-08-02 RX ADMIN — Medication 650 MILLIGRAM(S): at 11:50

## 2024-08-02 RX ADMIN — Medication 650 MILLIGRAM(S): at 23:59

## 2024-08-02 RX ADMIN — ENOXAPARIN SODIUM 40 MILLIGRAM(S): 100 INJECTION SUBCUTANEOUS at 17:30

## 2024-08-02 RX ADMIN — GABAPENTIN 600 MILLIGRAM(S): 400 CAPSULE ORAL at 21:19

## 2024-08-02 RX ADMIN — TAMSULOSIN HYDROCHLORIDE 0.4 MILLIGRAM(S): 0.4 CAPSULE ORAL at 21:19

## 2024-08-02 RX ADMIN — Medication 650 MILLIGRAM(S): at 17:28

## 2024-08-02 RX ADMIN — Medication 650 MILLIGRAM(S): at 05:22

## 2024-08-02 RX ADMIN — FINASTERIDE 5 MILLIGRAM(S): 1 TABLET, FILM COATED ORAL at 11:50

## 2024-08-02 RX ADMIN — GABAPENTIN 600 MILLIGRAM(S): 400 CAPSULE ORAL at 05:22

## 2024-08-02 RX ADMIN — GABAPENTIN 600 MILLIGRAM(S): 400 CAPSULE ORAL at 13:47

## 2024-08-02 RX ADMIN — Medication 63.75 MILLIMOLE(S): at 05:28

## 2024-08-03 VITALS
HEART RATE: 75 BPM | OXYGEN SATURATION: 96 % | DIASTOLIC BLOOD PRESSURE: 80 MMHG | RESPIRATION RATE: 18 BRPM | SYSTOLIC BLOOD PRESSURE: 136 MMHG | TEMPERATURE: 98 F

## 2024-08-03 RX ADMIN — Medication 40 MILLIGRAM(S): at 11:18

## 2024-08-03 RX ADMIN — Medication 650 MILLIGRAM(S): at 11:18

## 2024-08-03 RX ADMIN — GABAPENTIN 600 MILLIGRAM(S): 400 CAPSULE ORAL at 13:15

## 2024-08-03 RX ADMIN — Medication 650 MILLIGRAM(S): at 11:20

## 2024-08-03 RX ADMIN — FINASTERIDE 5 MILLIGRAM(S): 1 TABLET, FILM COATED ORAL at 11:18

## 2024-08-03 RX ADMIN — Medication 650 MILLIGRAM(S): at 06:14

## 2024-08-03 RX ADMIN — Medication 650 MILLIGRAM(S): at 05:09

## 2024-08-03 RX ADMIN — GABAPENTIN 600 MILLIGRAM(S): 400 CAPSULE ORAL at 06:15

## 2024-08-06 ENCOUNTER — NON-APPOINTMENT (OUTPATIENT)
Age: 74
End: 2024-08-06

## 2024-08-08 ENCOUNTER — APPOINTMENT (OUTPATIENT)
Dept: OTOLARYNGOLOGY | Facility: CLINIC | Age: 74
End: 2024-08-08

## 2024-08-08 PROBLEM — C77.9 MELANOMA METASTATIC TO LYMPH NODE: Status: ACTIVE | Noted: 2024-08-08

## 2024-08-08 PROBLEM — H61.23 BILATERAL IMPACTED CERUMEN: Status: ACTIVE | Noted: 2024-08-08

## 2024-08-08 PROBLEM — H93.8X3 CLOGGED EAR, BILATERAL: Status: ACTIVE | Noted: 2024-08-08

## 2024-08-08 PROBLEM — C43.4 MELANOMA OF LEFT SIDE OF NECK: Status: ACTIVE | Noted: 2024-08-08

## 2024-08-08 PROCEDURE — G2211 COMPLEX E/M VISIT ADD ON: CPT

## 2024-08-08 PROCEDURE — 99215 OFFICE O/P EST HI 40 MIN: CPT

## 2024-08-08 NOTE — REASON FOR VISIT
[Subsequent Evaluation] : a subsequent evaluation for [FreeTextEntry2] : neck mass ,  neck discomfort

## 2024-08-08 NOTE — PHYSICAL EXAM
[Normal] : mucosa is normal [Midline] : trachea located in midline position [de-identified] : IIB mass [de-identified] : cerumen impaction in both ears removed with microsuction.

## 2024-08-08 NOTE — HISTORY OF PRESENT ILLNESS
[de-identified] : Oncology Summary Stage: [id number] Site: Left neck Pathology: Melanoma Treatment: WLE w/ negative SLNB 2014 Nusrat Disease status: Annabelle recurrence [FreeTextEntry1] : Patient returns today c/o neck mass,  neck discomfort. States that he has a lump on neck under his skin. Does not notice any change in size. Notices that when he lays down mass will feel bigger than standing up. Symptoms are the same as last visit. Currently has rectal cancer. No chemo or radiation was deemed necessary. No trouble swallowing.  Had lump on neck biopsy 2 weeks ago.

## 2024-08-08 NOTE — ASSESSMENT
[FreeTextEntry1] : Bx results reviewed c/w nodally metastatic melanoma, initially treated 2014 Follow staging with PET/CT Plan for neck dissection, parotidectomy, possible immunotherapy Bradu referral for skin checks

## 2024-08-12 DIAGNOSIS — Z43.2 ENCOUNTER FOR ATTENTION TO ILEOSTOMY: ICD-10-CM

## 2024-08-12 DIAGNOSIS — M06.9 RHEUMATOID ARTHRITIS, UNSPECIFIED: ICD-10-CM

## 2024-08-12 DIAGNOSIS — C43.9 MALIGNANT MELANOMA OF SKIN, UNSPECIFIED: ICD-10-CM

## 2024-08-13 ENCOUNTER — APPOINTMENT (OUTPATIENT)
Dept: GASTROENTEROLOGY | Facility: CLINIC | Age: 74
End: 2024-08-13

## 2024-08-13 VITALS — HEIGHT: 75 IN | WEIGHT: 164 LBS | BODY MASS INDEX: 20.39 KG/M2

## 2024-08-13 VITALS
TEMPERATURE: 98 F | OXYGEN SATURATION: 98 % | DIASTOLIC BLOOD PRESSURE: 69 MMHG | HEART RATE: 70 BPM | SYSTOLIC BLOOD PRESSURE: 115 MMHG

## 2024-08-13 PROCEDURE — 99212 OFFICE O/P EST SF 10 MIN: CPT

## 2024-08-19 ENCOUNTER — APPOINTMENT (OUTPATIENT)
Dept: SURGERY | Facility: CLINIC | Age: 74
End: 2024-08-19

## 2024-08-19 VITALS
BODY MASS INDEX: 20.51 KG/M2 | SYSTOLIC BLOOD PRESSURE: 134 MMHG | HEIGHT: 75 IN | TEMPERATURE: 97 F | OXYGEN SATURATION: 98 % | WEIGHT: 165 LBS | HEART RATE: 67 BPM | DIASTOLIC BLOOD PRESSURE: 84 MMHG

## 2024-08-19 PROCEDURE — 99212 OFFICE O/P EST SF 10 MIN: CPT

## 2024-08-19 NOTE — HISTORY OF PRESENT ILLNESS
[FreeTextEntry1] : 74-year-old male with PMHx of BPH, spinal stenosis, and rheumatoid arthritis on methotrexate who presents for follow up, s/p robotic low anterior resection with hand-sewn colo-anal anastomosis and diverting loop ileostomy on February 29, 2024 with eventual reversal on 7/31/2024. Pathology showed T1N0 (0/34 ) adenocarcinoma with negative margins.  He is here for a follow-up for change in bowel habits.  He states that since the surgery he has had issues with fecal incontinence and has to wear adult diapers to sleep.  Otherwise denies overt signs of GI bleeding, abdominal pain, nausea, vomiting, fever, chills or any other complaints at this time.

## 2024-08-19 NOTE — ASSESSMENT
[FreeTextEntry1] : This patient had a mass on his right forearm that has subsequently disappeared.  It is most likely related as a mention earlier to an IV that he had when he was hospitalized several months ago.  No further treatment or follow-up is needed for this region.  He is currently undergoing treatment for metastatic melanoma in his left neck which is clearly unrelated to this mass.  I be happy to see the patient back if any other issues arise.

## 2024-08-19 NOTE — ASSESSMENT
[FreeTextEntry1] : 74-year-old male with PMHx of BPH, spinal stenosis, and rheumatoid arthritis on methotrexate who presents for follow up, s/p robotic low anterior resection with hand-sewn colo-anal anastomosis and diverting loop ileostomy on February 29, 2024 with eventual reversal on 7/31/2024. Pathology showed T1N0 (0/34 ) adenocarcinoma with negative margins.  He is here for a follow-up for change in bowel habits.  He states that since the surgery he has had issues with fecal incontinence and has to wear adult diapers to sleep.  Otherwise denies overt signs of GI bleeding, abdominal pain, nausea, vomiting, fever, chills or any other complaints at this time.    Rectal adenocarcinoma T1N0, S/P low anterior resection with colo-anal anastomosis and diverting loop ileostomy - F/U with Dr. Lorenzo for postsurgical follow-up - Saw Hem/Onc and no Chemotherapy was recommended  -Recommend fiber supplements to help bulk up the stool. -Advised the patient to keep a diary of stool habits. - Repeat colonoscopy in 1 year   RTC in 2 months to evaluate and workup for continued fecal incontinence if persists.

## 2024-08-19 NOTE — HISTORY OF PRESENT ILLNESS
[de-identified] : We have been following this patient for several months for a lesion on his right arm that was originally about a 1 cm subcutaneous mass.  I believe this was related to an IV that he had placed during her hospitalization prior to that.  It has been continually decreasing in size since that point.  He now reports that is difficult to even feel there was any mass there.  He was recently diagnosed with bhavani metastases of melanoma in his neck.  He does have history of a melanoma in that region.  He has no other complaints at this time.

## 2024-08-19 NOTE — PHYSICAL EXAM
[Alert] : alert [Normal Voice/Communication] : normal voice/communication [No Acute Distress] : no acute distress [Well Developed] : well developed [Sclera] : the sclera and conjunctiva were normal [Hearing Threshold Finger Rub Not [place]] : hearing was normal [Normal Lips/Gums] : the lips and gums were normal [Oropharynx] : the oropharynx was normal [Normal Appearance] : the appearance of the neck was normal [No Neck Mass] : no neck mass was observed [No Respiratory Distress] : no respiratory distress [No Acc Muscle Use] : no accessory muscle use [Respiration, Rhythm And Depth] : normal respiratory rhythm and effort [Auscultation Breath Sounds / Voice Sounds] : lungs were clear to auscultation bilaterally [Heart Rate And Rhythm] : heart rate was normal and rhythm regular [Normal S1, S2] : normal S1 and S2 [Murmurs] : no murmurs [Bowel Sounds] : normal bowel sounds [Abdomen Tenderness] : non-tender [No Masses] : no abdominal mass palpated [Abdomen Soft] : soft [] : no hepatosplenomegaly [Oriented To Time, Place, And Person] : oriented to person, place, and time [de-identified] : Thin

## 2024-08-20 ENCOUNTER — APPOINTMENT (OUTPATIENT)
Dept: SURGERY | Facility: CLINIC | Age: 74
End: 2024-08-20
Payer: MEDICARE

## 2024-08-20 VITALS
HEIGHT: 75 IN | TEMPERATURE: 97 F | SYSTOLIC BLOOD PRESSURE: 126 MMHG | WEIGHT: 165 LBS | DIASTOLIC BLOOD PRESSURE: 74 MMHG | HEART RATE: 73 BPM | OXYGEN SATURATION: 98 % | BODY MASS INDEX: 20.51 KG/M2

## 2024-08-20 PROCEDURE — 99024 POSTOP FOLLOW-UP VISIT: CPT

## 2024-08-20 NOTE — ASU PATIENT PROFILE, ADULT - NSICDXPASTSURGICALHX_GEN_ALL_CORE_FT
PAST SURGICAL HISTORY:  H/O neck surgery cancer    History of bowel resection     History of reversal of ileostomy     S/P bilateral inguinal hernia repair

## 2024-08-21 ENCOUNTER — APPOINTMENT (OUTPATIENT)
Dept: OTOLARYNGOLOGY | Facility: HOSPITAL | Age: 74
End: 2024-08-21

## 2024-08-21 ENCOUNTER — RESULT REVIEW (OUTPATIENT)
Age: 74
End: 2024-08-21

## 2024-08-21 ENCOUNTER — INPATIENT (INPATIENT)
Facility: HOSPITAL | Age: 74
LOS: 0 days | Discharge: HOME HEALTH | DRG: 581 | End: 2024-08-22
Attending: STUDENT IN AN ORGANIZED HEALTH CARE EDUCATION/TRAINING PROGRAM | Admitting: STUDENT IN AN ORGANIZED HEALTH CARE EDUCATION/TRAINING PROGRAM
Payer: MEDICARE

## 2024-08-21 VITALS
HEIGHT: 75 IN | OXYGEN SATURATION: 98 % | TEMPERATURE: 98 F | DIASTOLIC BLOOD PRESSURE: 72 MMHG | WEIGHT: 167.99 LBS | HEART RATE: 55 BPM | RESPIRATION RATE: 18 BRPM | SYSTOLIC BLOOD PRESSURE: 134 MMHG

## 2024-08-21 DIAGNOSIS — Z98.890 OTHER SPECIFIED POSTPROCEDURAL STATES: Chronic | ICD-10-CM

## 2024-08-21 DIAGNOSIS — Z90.49 ACQUIRED ABSENCE OF OTHER SPECIFIED PARTS OF DIGESTIVE TRACT: Chronic | ICD-10-CM

## 2024-08-21 DIAGNOSIS — R22.1 LOCALIZED SWELLING, MASS AND LUMP, NECK: ICD-10-CM

## 2024-08-21 LAB — BLD GP AB SCN SERPL QL: SIGNIFICANT CHANGE UP

## 2024-08-21 PROCEDURE — 86900 BLOOD TYPING SEROLOGIC ABO: CPT

## 2024-08-21 PROCEDURE — 86850 RBC ANTIBODY SCREEN: CPT

## 2024-08-21 PROCEDURE — 85027 COMPLETE CBC AUTOMATED: CPT

## 2024-08-21 PROCEDURE — C9399: CPT

## 2024-08-21 PROCEDURE — 36415 COLL VENOUS BLD VENIPUNCTURE: CPT

## 2024-08-21 PROCEDURE — 38724 REMOVAL OF LYMPH NODES NECK: CPT | Mod: LT

## 2024-08-21 PROCEDURE — 88307 TISSUE EXAM BY PATHOLOGIST: CPT

## 2024-08-21 PROCEDURE — C1889: CPT

## 2024-08-21 PROCEDURE — 42415 EXCISE PAROTID GLAND/LESION: CPT | Mod: LT

## 2024-08-21 PROCEDURE — 86901 BLOOD TYPING SEROLOGIC RH(D): CPT

## 2024-08-21 PROCEDURE — 80048 BASIC METABOLIC PNL TOTAL CA: CPT

## 2024-08-21 PROCEDURE — 88307 TISSUE EXAM BY PATHOLOGIST: CPT | Mod: 26

## 2024-08-21 RX ORDER — GABAPENTIN 100 MG
600 CAPSULE ORAL THREE TIMES A DAY
Refills: 0 | Status: DISCONTINUED | OUTPATIENT
Start: 2024-08-21 | End: 2024-08-21

## 2024-08-21 RX ORDER — ACETAMINOPHEN 325 MG/1
650 TABLET ORAL EVERY 6 HOURS
Refills: 0 | Status: DISCONTINUED | OUTPATIENT
Start: 2024-08-21 | End: 2024-08-21

## 2024-08-21 RX ORDER — KETOROLAC TROMETHAMINE 30 MG/ML
15 INJECTION, SOLUTION INTRAMUSCULAR EVERY 6 HOURS
Refills: 0 | Status: DISCONTINUED | OUTPATIENT
Start: 2024-08-21 | End: 2024-08-22

## 2024-08-21 RX ORDER — HYDROMORPHONE HYDROCHLORIDE 2 MG/1
0.5 TABLET ORAL
Refills: 0 | Status: DISCONTINUED | OUTPATIENT
Start: 2024-08-21 | End: 2024-08-21

## 2024-08-21 RX ORDER — TAMSULOSIN HYDROCHLORIDE 0.4 MG/1
0.4 CAPSULE ORAL AT BEDTIME
Refills: 0 | Status: DISCONTINUED | OUTPATIENT
Start: 2024-08-21 | End: 2024-08-22

## 2024-08-21 RX ORDER — OXYCODONE HYDROCHLORIDE 5 MG/1
5 TABLET ORAL EVERY 6 HOURS
Refills: 0 | Status: DISCONTINUED | OUTPATIENT
Start: 2024-08-21 | End: 2024-08-22

## 2024-08-21 RX ORDER — KETOROLAC TROMETHAMINE 30 MG/ML
15 INJECTION, SOLUTION INTRAMUSCULAR EVERY 6 HOURS
Refills: 0 | Status: DISCONTINUED | OUTPATIENT
Start: 2024-08-21 | End: 2024-08-21

## 2024-08-21 RX ORDER — FINASTERIDE 1 MG/1
5 TABLET, FILM COATED ORAL DAILY
Refills: 0 | Status: DISCONTINUED | OUTPATIENT
Start: 2024-08-21 | End: 2024-08-21

## 2024-08-21 RX ORDER — GABAPENTIN 100 MG
600 CAPSULE ORAL THREE TIMES A DAY
Refills: 0 | Status: DISCONTINUED | OUTPATIENT
Start: 2024-08-21 | End: 2024-08-22

## 2024-08-21 RX ORDER — BACITRACIN 500 UNIT/G
1 OINTMENT (GRAM) TOPICAL
Refills: 0 | Status: DISCONTINUED | OUTPATIENT
Start: 2024-08-21 | End: 2024-08-22

## 2024-08-21 RX ORDER — FINASTERIDE 1 MG/1
5 TABLET, FILM COATED ORAL DAILY
Refills: 0 | Status: DISCONTINUED | OUTPATIENT
Start: 2024-08-21 | End: 2024-08-22

## 2024-08-21 RX ORDER — OXYCODONE HYDROCHLORIDE 5 MG/1
5 TABLET ORAL EVERY 6 HOURS
Refills: 0 | Status: DISCONTINUED | OUTPATIENT
Start: 2024-08-21 | End: 2024-08-21

## 2024-08-21 RX ORDER — ONDANSETRON 2 MG/ML
4 INJECTION, SOLUTION INTRAMUSCULAR; INTRAVENOUS ONCE
Refills: 0 | Status: DISCONTINUED | OUTPATIENT
Start: 2024-08-21 | End: 2024-08-21

## 2024-08-21 RX ORDER — TAMSULOSIN HYDROCHLORIDE 0.4 MG/1
0.4 CAPSULE ORAL AT BEDTIME
Refills: 0 | Status: DISCONTINUED | OUTPATIENT
Start: 2024-08-21 | End: 2024-08-21

## 2024-08-21 RX ORDER — ACETAMINOPHEN 325 MG/1
650 TABLET ORAL EVERY 6 HOURS
Refills: 0 | Status: DISCONTINUED | OUTPATIENT
Start: 2024-08-21 | End: 2024-08-22

## 2024-08-21 RX ORDER — FENTANYL CITRATE 50 UG/ML
25 INJECTION INTRAMUSCULAR; INTRAVENOUS
Refills: 0 | Status: DISCONTINUED | OUTPATIENT
Start: 2024-08-21 | End: 2024-08-21

## 2024-08-21 RX ADMIN — Medication 600 MILLIGRAM(S): at 21:03

## 2024-08-21 RX ADMIN — TAMSULOSIN HYDROCHLORIDE 0.4 MILLIGRAM(S): 0.4 CAPSULE ORAL at 21:03

## 2024-08-21 RX ADMIN — Medication 1 APPLICATION(S): at 18:02

## 2024-08-21 NOTE — PRE-ANESTHESIA EVALUATION ADULT - NSANTHPMHFT_GEN_ALL_CORE
METS>4 without CP/palpitations/sob  Denies orthopnea    hx neck mass with LN biopsy but no chemo/radiation, hx RA. Per ENT neck mass not impinging on airway

## 2024-08-21 NOTE — PATIENT PROFILE ADULT - FALL HARM RISK - HARM RISK INTERVENTIONS

## 2024-08-21 NOTE — CHART NOTE - NSCHARTNOTEFT_GEN_A_CORE
PACU ANESTHESIA ADMISSION NOTE      Procedure:   Post op diagnosis:      ____  Intubated  TV:______       Rate: ______      FiO2: ______    __x__  Patent Airway    __x__  Full return of protective reflexes    __x__  Full recovery from anesthesia / back to baseline status    Vitals:  T(C): 36.8 (08-21-24 @ 10:23), Max: 36.8 (08-21-24 @ 10:23)  HR: 55 (08-21-24 @ 10:23) (55 - 55)  BP: 134/72 (08-21-24 @ 10:23) (134/72 - 134/72)  RR: 18 (08-21-24 @ 10:23) (18 - 18)  SpO2: 98% (08-21-24 @ 10:23) (98% - 98%)    Mental Status:  __x__ Awake   ___x__ Alert   _____ Drowsy   _____ Sedated    Nausea/Vomiting:  __x__ NO  ______Yes,   See Post - Op Orders          Pain Scale (0-10):  _____    Treatment: ____ None    __x__ See Post - Op/PCA Orders    Post - Operative Fluids:   ____ Oral   __x__ See Post - Op Orders    Plan: Discharge:   ____Home       _____Floor     _____Critical Care    _____  Other:_________________    Comments: Patient had smooth intraoperative event, no anesthesia complication.  PACU Vital signs: HR:  75          BP:  136/75      /          RR: 15            O2 Sat:     100  %     Temp 36.2

## 2024-08-21 NOTE — PROGRESS NOTE ADULT - ASSESSMENT
Pt is a 74y Male w/ h/o melanoma, s/p WLE in 2014; now with bhavani recurrence, s/p mariangel parotid LN and level 2/3 LND, POD #0 - stable    ·	cont current meds, pain control prn  ·	straight cath if retaining  ·	AM labs  ·	monitor sinai drain output, D/C in AM  ·	D/C home tomorrow

## 2024-08-21 NOTE — ASU PREOP CHECKLIST - LOOSE TEETH
Chief complaint:   Chief Complaint   Patient presents with   • Tick Bite       Vitals:  Visit Vitals  BP (!) 127/96 (BP Location: RUE - Right upper extremity, Patient Position: Sitting, Cuff Size: Large Adult)   Pulse 77   Temp 98.1 °F (36.7 °C) (Oral)   Resp 18   Ht 5' 9\" (1.753 m)   Wt 90.7 kg (200 lb)   SpO2 98%   BMI 29.53 kg/m²       HISTORY OF PRESENT ILLNESS     HPI  Patient is a pleasant 47-year-old male who presents for evaluation of several days of subjective pruritic rash about the left anterior lateral chest.  He states he does work outside at times however he is concerned about a possible tick bite.  He has never seen a tick on him he did not remove a tick and has no known tick exposure that he is aware of.  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 30 g 0   • doxycycline hyclate (VIBRAMYCIN) 100 MG capsule Take 2 capsules by mouth 1 time for 1 dose. 2 capsule 0   • lisinopril (ZESTRIL) 20 MG tablet Take 1 tablet by mouth once daily 90 tablet 0   • PARoxetine (PAXIL) 20 MG tablet Take 1 tablet by mouth daily. 90 tablet 3   • levothyroxine 150 MCG tablet Take 1 tablet by mouth daily. 90 tablet 3   • triamcinolone (ARISTOCORT) 0.1 % ointment Apply topically 2 times daily as needed (contact dermatitis). 30 g 0     No current facility-administered medications for this visit.       Allergies:  ALLERGIES:   Allergen Reactions    • Amoxicillin HIVES       Past Medical  History/Surgeries:  Past Medical History:   Diagnosis Date   • Abdominal pain, right upper quadrant 3/20/2014   • Allergy    • Blood in stool 1/16/2014   • Depression    • Hypothyroidism        Past Surgical History:   Procedure Laterality Date   • Nasal/sinus endoscopy  2008       Family History:  Family History   Problem Relation Age of Onset   • Ulcerative Colitis Father    • Hypertension Sister    • Cancer, Colon Paternal Grandfather 60       Social History:  Social History     Tobacco Use   • Smoking status: Never   • Smokeless tobacco: Never   Substance Use Topics   • Alcohol use: Yes     Alcohol/week: 1.0 standard drink of alcohol     Types: 1 Standard drinks or equivalent per week     Comment: rarely       REVIEW OF SYSTEMS     Review of Systems   Skin: Positive for rash.       PHYSICAL EXAM     Physical Exam  Vitals and nursing note reviewed.   Constitutional:       General: He is not in acute distress.     Appearance: Normal appearance. He is not ill-appearing, toxic-appearing or diaphoretic.   HENT:      Head: Normocephalic and atraumatic.   Chest:       Neurological:      Mental Status: He is alert.         ASSESSMENT/PLAN     Vital signs stable, nontoxic, not in acute distress seems more consistent with a nonspecific bug bite without signs of early infection.  We discussed risks benefits alternatives.  Did offer prophylactic doxy for potential tick bite however unlikely seems more consistent with bite reaction as he only has itching.  Discussed she develop any pain fevers chills constitutional symptoms would like him to be re-evaluated considered for additional medication.  He understands and agrees to plan    Prasad was seen today for tick bite.    Diagnoses and all orders for this visit:    Bug bite, initial encounter    Other orders  -     triamcinolone (ARISTOCORT) 0.1 % ointment; Apply topically 3 times daily for 7 days.  -     doxycycline hyclate  (VIBRAMYCIN) 100 MG capsule; Take 2 capsules by mouth 1 time for 1 dose.        Supervising physician Dr. Regalado      no

## 2024-08-22 ENCOUNTER — TRANSCRIPTION ENCOUNTER (OUTPATIENT)
Age: 74
End: 2024-08-22

## 2024-08-22 LAB
ANION GAP SERPL CALC-SCNC: 10 MMOL/L — SIGNIFICANT CHANGE UP (ref 7–14)
BUN SERPL-MCNC: 20 MG/DL — SIGNIFICANT CHANGE UP (ref 10–20)
CALCIUM SERPL-MCNC: 9.5 MG/DL — SIGNIFICANT CHANGE UP (ref 8.4–10.5)
CHLORIDE SERPL-SCNC: 107 MMOL/L — SIGNIFICANT CHANGE UP (ref 98–110)
CO2 SERPL-SCNC: 24 MMOL/L — SIGNIFICANT CHANGE UP (ref 17–32)
CREAT SERPL-MCNC: 1 MG/DL — SIGNIFICANT CHANGE UP (ref 0.7–1.5)
EGFR: 80 ML/MIN/1.73M2 — SIGNIFICANT CHANGE UP
GLUCOSE SERPL-MCNC: 104 MG/DL — HIGH (ref 70–99)
HCT VFR BLD CALC: 35.1 % — LOW (ref 42–52)
HGB BLD-MCNC: 11.4 G/DL — LOW (ref 14–18)
MCHC RBC-ENTMCNC: 29.6 PG — SIGNIFICANT CHANGE UP (ref 27–31)
MCHC RBC-ENTMCNC: 32.5 G/DL — SIGNIFICANT CHANGE UP (ref 32–37)
MCV RBC AUTO: 91.2 FL — SIGNIFICANT CHANGE UP (ref 80–94)
NRBC # BLD: 0 /100 WBCS — SIGNIFICANT CHANGE UP (ref 0–0)
PLATELET # BLD AUTO: 278 K/UL — SIGNIFICANT CHANGE UP (ref 130–400)
PMV BLD: 10.8 FL — HIGH (ref 7.4–10.4)
POTASSIUM SERPL-MCNC: 4.8 MMOL/L — SIGNIFICANT CHANGE UP (ref 3.5–5)
POTASSIUM SERPL-SCNC: 4.8 MMOL/L — SIGNIFICANT CHANGE UP (ref 3.5–5)
RBC # BLD: 3.85 M/UL — LOW (ref 4.7–6.1)
RBC # FLD: 14 % — SIGNIFICANT CHANGE UP (ref 11.5–14.5)
SODIUM SERPL-SCNC: 141 MMOL/L — SIGNIFICANT CHANGE UP (ref 135–146)
WBC # BLD: 10.69 K/UL — SIGNIFICANT CHANGE UP (ref 4.8–10.8)
WBC # FLD AUTO: 10.69 K/UL — SIGNIFICANT CHANGE UP (ref 4.8–10.8)

## 2024-08-22 RX ORDER — BACITRACIN 500 UNIT/G
1 OINTMENT (GRAM) TOPICAL
Qty: 1 | Refills: 0
Start: 2024-08-22 | End: 2024-08-28

## 2024-08-22 RX ADMIN — Medication 600 MILLIGRAM(S): at 21:09

## 2024-08-22 RX ADMIN — Medication 600 MILLIGRAM(S): at 13:25

## 2024-08-22 RX ADMIN — FINASTERIDE 5 MILLIGRAM(S): 1 TABLET, FILM COATED ORAL at 11:33

## 2024-08-22 RX ADMIN — Medication 1 APPLICATION(S): at 05:31

## 2024-08-22 RX ADMIN — TAMSULOSIN HYDROCHLORIDE 0.4 MILLIGRAM(S): 0.4 CAPSULE ORAL at 21:09

## 2024-08-22 RX ADMIN — Medication 1 APPLICATION(S): at 17:43

## 2024-08-22 RX ADMIN — Medication 600 MILLIGRAM(S): at 05:31

## 2024-08-22 NOTE — DISCHARGE NOTE PROVIDER - NSDCFUSCHEDAPPT_GEN_ALL_CORE_FT
Bertram Bang  [facility]  Scheduled Appointment: 08/27/2024    Bertram Bang  [facility]  [address]  Scheduled Appointment: 08/27/2024    Bertram Bang  [facility]  Scheduled Appointment: 09/16/2024    [facility]  [address]  [address]  Scheduled Appointment: 09/16/2024    Bertram Bang  [facility]  Scheduled Appointment: 09/18/2024    Bertram Bang  [facility]  [address]  [address]  Scheduled Appointment: 09/18/2024    Flynn Lorenzo  [facility]  [address]  Scheduled Appointment: 11/19/2024     Bertram Bang  [facility]  Scheduled Appointment: 08/27/2024    Bertram Bang  [facility]  [address]  [address]  Scheduled Appointment: 08/27/2024    Alfred Antoine  [facility]  [address]  Scheduled Appointment: 08/29/2024    Bertram Bang  [facility]  Scheduled Appointment: 09/16/2024    [facility]  [address]  [address]  Scheduled Appointment: 09/16/2024    Bertram Bang  [facility]  Scheduled Appointment: 09/18/2024    Bertram Bang  [facility]  [address]  [address]  Scheduled Appointment: 09/18/2024    Flynn Lorenzo  [facility]  [address]  Scheduled Appointment: 11/19/2024

## 2024-08-22 NOTE — DISCHARGE NOTE NURSING/CASE MANAGEMENT/SOCIAL WORK - PATIENT PORTAL LINK FT
You can access the FollowMyHealth Patient Portal offered by [facility] by registering at the following website: [email or web address]. By joining [employer]’s FollowMyHealth portal, you will also be able to view your health information using other applications (apps) compatible with our system.

## 2024-08-22 NOTE — DISCHARGE NOTE PROVIDER - NSDCMRMEDTOKEN_GEN_ALL_CORE_FT
finasteride 5 mg oral tablet: 1 tab(s) orally once a day  gabapentin 600 mg oral tablet: 1 tab(s) orally 3 times a day  tamsulosin 0.4 mg oral capsule: 1 cap(s) orally once a day   bacitracin 500 units/g topical ointment: Apply topically to affected area every 12 hours  finasteride 5 mg oral tablet: 1 tab(s) orally once a day  gabapentin 600 mg oral tablet: 1 tab(s) orally 3 times a day  tamsulosin 0.4 mg oral capsule: 1 cap(s) orally once a day

## 2024-08-22 NOTE — DISCHARGE NOTE PROVIDER - NSDCHHNEEDSERVICEOTHER_GEN_ALL_CORE_FT
Patient with prior home health care services for prior ostomy site. Has visiting nurse MWF-- Aquacel placed to wound bed.  Patient with home care for ileostomy site wound prior  to this hospital stay. Spoke with nurse-- Aquacel to wound bed, gauze and tegaderm.

## 2024-08-22 NOTE — DISCHARGE NOTE PROVIDER - NSDCFUADDAPPT_GEN_ALL_CORE_FT
Follow-up in the office with Dr. Antoine.    Follow-up in the office with Dr. Antoine on Thursday, 9/29/24 at 1:30pm.    Follow-up in the office with Dr. Antoine on Thursday, 9/29/24 at 1:30pm.  Follow-up in the office with private urologist,  Dr. Shaikh

## 2024-08-22 NOTE — DISCHARGE NOTE PROVIDER - HOSPITAL COURSE
Patient is a 74y M with history of melanoma, s/p Wide Local Excision in 2014; now with bhavani recurrence, s/p mariangel parotid LN and level 2/3 LND on 8/21/24. Patient admitted for observation post-operatively with L neck sinai drain in place.   Patient had urinary retention post-operatively requiring straight catheterization x ____.   Patient's BALDO drain d/c'd on POD#____.  Patient discharged home on POD#____. Patient is a 74y M with history of rectal adenocarcinoma, s/p resection with diverting loop ileostomy with subsequent ileostomy closure, hx melanoma s/p Wide Local Excision in 2014 now with bhavani recurrence, s/p mariangel parotid LN and level 2/3 LND on 8/21/24. Patient admitted for observation post-operatively with L neck sinai drain in place.   Patient had urinary retention post-operatively requiring straight catheterization x ____.   Patient's BALDO drain d/c'd on POD#____.  Patient discharged home on POD#____. Patient is a 74y M with history of rectal adenocarcinoma, s/p resection with diverting loop ileostomy with subsequent ileostomy closure, hx melanoma s/p Wide Local Excision in 2014 now with bhavani recurrence, s/p mariangel parotid LN and level 2/3 LND on 8/21/24. Patient admitted for observation post-operatively with L neck sinai drain in place.   Patient had urinary retention post-operatively requiring straight catheterization twice. On POD#1, patient voided.   Patient's BALDO drain d/c'd on POD#1  Patient discharged home on POD#1.

## 2024-08-22 NOTE — DISCHARGE NOTE PROVIDER - CARE PROVIDER_API CALL
Alfred Antoine  Head/Neck Surgery  [address] [place] [id number]  Phone: [phone]  Fax: [phone]  Follow Up Time:

## 2024-08-22 NOTE — PROGRESS NOTE ADULT - ASSESSMENT
Pt is a 74y Male w/ h/o melanoma, s/p WLE in 2014; now with bhavani recurrence, s/p mariangel parotid LN and level 2/3 LND, POD #1.    ·	Pt states he voided earlier today AM, Bladder scan at bedside currently showing approx 300cc, patient without urge to void. Will repeat bladder scan and CIC if retaining. RN aware   ·	Monitor sinai drain output, will plan to d/c prior to discharge home  ·	Cont current meds, pain control prn  ·	Anticipate d/c home later today  Pt is a 74y Male w/ h/o melanoma, s/p WLE in 2014; now with bhavani recurrence, s/p mariangel parotid LN and level 2/3 LND, POD #1.    ·	Pt states he voided earlier today AM, Bladder scan at bedside currently showing approx 300cc, patient without urge to void. Will repeat bladder scan and CIC if retaining. RN aware   ·	Monitor sinai drain output, will plan to d/c prior to discharge home  ·	Cont current meds, pain control prn  ·	Pt states he has current home care for prior ileostomy site--s/w visiting nurse, states places Aquacel on wound bed with guaze and Tegaderm over site.   ·	Anticipate d/c home later today  Pt is a 74y Male w/ h/o melanoma, s/p WLE in 2014; now with bhavani recurrence, s/p mariangel parotid LN and level 2/3 LND, POD #1.    ·	Pt states he voided earlier today AM, Bladder scan at bedside currently showing approx 300cc, patient without urge to void. Will repeat bladder scan and CIC if retaining. RN aware   ·	Monitor sinai drain output, will plan to d/c prior to discharge home  ·	Cont current meds, pain control prn  ·	Pt states he has current home care for prior ileostomy site--s/w visiting nurse, states places Aquacel on wound bed with guaze and Tegaderm over site.   ·	Follow-up appointment made for 9/29/24 at 1:30pm   ·	Anticipate d/c home later today  Pt is a 74y Male w/ h/o melanoma, s/p WLE in 2014; now with bhavani recurrence, s/p mariangel parotid LN and level 2/3 LND, POD #1.    ·	Pt states he voided earlier today AM, Bladder scan at bedside currently showing approx 300cc, patient without urge to void. Will repeat bladder scan and CIC if retaining. RN aware   ·	Monitor sinai drain output, will plan to d/c prior to discharge home  ·	Cont current meds, pain control prn  ·	Pt states he has current home care for prior ileostomy site--s/w visiting nurse, states places Aquacel on wound bed with guaze and Tegaderm over site.   ·	Follow-up appointment made for 9/29/24 at 1:30pm   ·	Anticipate d/c home later today     ADDENDUM:  BALDO d/c'd    ADDENDUM:  Patient voided throughout the day   Post-void bladder scans ranging from 180-270cc. Patient comfortable.   Will d/c home

## 2024-08-22 NOTE — DISCHARGE NOTE PROVIDER - NSDCFUADDINST_GEN_ALL_CORE_FT
Call the office of present to the ER if experiencing fevers >101.4F, bleeding that does not stop.   Call the office of present to the ER if experiencing fevers >101.4F, bleeding that does not stop, inability to void.

## 2024-08-22 NOTE — PROGRESS NOTE ADULT - SUBJECTIVE AND OBJECTIVE BOX
ENT DAILY PROGRESS NOTE    Pt is a 74y Male w/ h/o melanoma, s/p WLE in 2014; now with bhavani recurrence, s/p mariangel parotid LN and level 2/3 LND, POD #0 - seen and examined at bedside. PT doing well, no complaints. PT went into UR in the PACU (1L), s/p straight cath. Pt states this normally happens to him after anesthesia, has BPH.       REVIEW OF SYSTEMS   [x] A ten-point review of systems was otherwise negative except as noted.    Allergies    No Known Allergies    Intolerances      MEDICATIONS:  acetaminophen  Tablet .. 650 milliGRAM(s) Oral every 6 hours PRN  bacitracin  Ointment 1 Application(s) Topical two times a day  finasteride 5 milliGRAM(s) Oral daily  gabapentin 600 milliGRAM(s) Oral three times a day  ketorolac  Injectable 15 milliGRAM(s) IV Push every 6 hours PRN  oxyCODONE  IR 5 milliGRAM(s) Oral every 6 hours PRN  tamsulosin 0.4 milliGRAM(s) Oral at bedtime      Vital Signs Last 24 Hrs  T(C): 36.6 (21 Aug 2024 18:11), Max: 36.8 (21 Aug 2024 10:23)  T(F): 97.8 (21 Aug 2024 18:11), Max: 98.2 (21 Aug 2024 10:23)  HR: 69 (21 Aug 2024 18:11) (55 - 73)  BP: 127/70 (21 Aug 2024 18:11) (127/70 - 154/85)  RR: 18 (21 Aug 2024 18:11) (16 - 20)  SpO2: 98% (21 Aug 2024 18:11) (98% - 100%)    Parameters below as of 21 Aug 2024 15:30  Patient On (Oxygen Delivery Method): room air      08-21 @ 07:01  -  08-21 @ 19:04  --------------------------------------------------------  IN:  Total IN: 0 mL    OUT:    Intermittent Catheterization - Urethral (mL): 900 mL    Voided (mL): 45 mL  Total OUT: 945 mL    Total NET: -945 mL      PHYSICAL EXAM:    GEN: NAD, awake and alert. No drooling or pooling of secretions. No stridor or stertor. Good vocal quality, no hoarseness.   SKIN: Good color, non diaphoretic  HEENT: Oral mucosa pink and moist. No erythema or edema noted to buccal mucosa, tongue, FOM, uvula or posterior oropharynx. Uvula midline  NECK: Trachea midline. Neck incision C/D/I, some blood noted - area cleaned. + sinai drain from the left neck with bloody drainage.  RESP: Non-labored breathing. No use of accessory muscles.  CARDIO: +S1/S2  ABDO: Soft, NT.  EXT: KRAMER x 4    
ENT PROGRESS NOTE    Pt is a 74y Male w/ h/o melanoma, s/p WLE in 2014; now with bhavani recurrence, s/p mariangel parotid LN and level 2/3 LND, POD #1. Pt seen and examined at bedside today AM. Pt states he voided today AM. No complaints at this time.     REVIEW OF SYSTEMS   [x] A ten-point review of systems was otherwise negative except as noted.      Allergies  No Known Allergies    MEDICATIONS:  acetaminophen     Tablet .. 650 milliGRAM(s) Oral every 6 hours PRN  bacitracin   Ointment 1 Application(s) Topical two times a day  finasteride 5 milliGRAM(s) Oral daily  gabapentin 600 milliGRAM(s) Oral three times a day  ketorolac   Injectable 15 milliGRAM(s) IV Push every 6 hours PRN  oxyCODONE    IR 5 milliGRAM(s) Oral every 6 hours PRN  tamsulosin 0.4 milliGRAM(s) Oral at bedtime      Vital Signs Last 24 Hrs  T(C): 37 (22 Aug 2024 08:21), Max: 37 (21 Aug 2024 21:00)  T(F): 98.6 (22 Aug 2024 08:21), Max: 98.6 (21 Aug 2024 21:00)  HR: 94 (22 Aug 2024 08:21) (63 - 94)  BP: 95/63 (22 Aug 2024 08:21) (92/57 - 154/85)  BP(mean): 74 (22 Aug 2024 08:21) (74 - 74)  RR: 18 (22 Aug 2024 08:21) (16 - 20)  SpO2: 98% (22 Aug 2024 05:00) (98% - 100%)    Parameters below as of 21 Aug 2024 15:30  Patient On (Oxygen Delivery Method): room air      08-21 @ 07:01  -  08-22 @ 07:00  --------------------------------------------------------  IN:  Total IN: 0 mL    OUT:    Drain (mL): 52.5 mL    Intermittent Catheterization - Urethral (mL): 2325 mL    Voided (mL): 45 mL  Total OUT: 2422.5 mL    Total NET: -2422.5 mL    PHYSICAL EXAM:  GEN: NAD. No drooling or pooling of secretions. No stridor. Good vocal quality, no hoarseness.   SKIN: Good color, non diaphoretic  NECK: +L neck incision intact; +L neck drain with approx 15cc SS output in bulb (52.5cc/shift)   RESP: Non-labored breathing  ABDO: Soft, NT  EXT: KRAMER x 4    LABS:  CBC-                        11.4   10.69 )-----------( 278      ( 22 Aug 2024 05:37 )             35.1     BMP/CMP-  22 Aug 2024 05:37    141    |  107    |  20     ----------------------------<  104    4.8     |  24     |  1.0      Ca    9.5        22 Aug 2024 05:37

## 2024-08-23 VITALS
SYSTOLIC BLOOD PRESSURE: 128 MMHG | OXYGEN SATURATION: 98 % | TEMPERATURE: 98 F | HEART RATE: 89 BPM | DIASTOLIC BLOOD PRESSURE: 69 MMHG | RESPIRATION RATE: 18 BRPM

## 2024-08-26 LAB — SURGICAL PATHOLOGY STUDY: SIGNIFICANT CHANGE UP

## 2024-08-27 ENCOUNTER — APPOINTMENT (OUTPATIENT)
Age: 74
End: 2024-08-27
Payer: MEDICARE

## 2024-08-27 ENCOUNTER — LABORATORY RESULT (OUTPATIENT)
Age: 74
End: 2024-08-27

## 2024-08-27 ENCOUNTER — OUTPATIENT (OUTPATIENT)
Dept: OUTPATIENT SERVICES | Facility: HOSPITAL | Age: 74
LOS: 1 days | End: 2024-08-27
Payer: MEDICARE

## 2024-08-27 ENCOUNTER — NON-APPOINTMENT (OUTPATIENT)
Age: 74
End: 2024-08-27

## 2024-08-27 VITALS
TEMPERATURE: 98.1 F | WEIGHT: 164 LBS | RESPIRATION RATE: 16 BRPM | BODY MASS INDEX: 20.83 KG/M2 | SYSTOLIC BLOOD PRESSURE: 132 MMHG | DIASTOLIC BLOOD PRESSURE: 77 MMHG | OXYGEN SATURATION: 98 % | HEART RATE: 75 BPM | HEIGHT: 74.5 IN

## 2024-08-27 DIAGNOSIS — Z93.2 ILEOSTOMY STATUS: ICD-10-CM

## 2024-08-27 DIAGNOSIS — Z98.890 OTHER SPECIFIED POSTPROCEDURAL STATES: Chronic | ICD-10-CM

## 2024-08-27 DIAGNOSIS — R33.9 RETENTION OF URINE, UNSPECIFIED: ICD-10-CM

## 2024-08-27 DIAGNOSIS — C20 MALIGNANT NEOPLASM OF RECTUM: ICD-10-CM

## 2024-08-27 DIAGNOSIS — N40.1 BENIGN PROSTATIC HYPERPLASIA WITH LOWER URINARY TRACT SYMPTOMS: ICD-10-CM

## 2024-08-27 DIAGNOSIS — Z87.891 PERSONAL HISTORY OF NICOTINE DEPENDENCE: ICD-10-CM

## 2024-08-27 DIAGNOSIS — C43.4 MALIGNANT MELANOMA OF SCALP AND NECK: ICD-10-CM

## 2024-08-27 DIAGNOSIS — Z85.048 PERSONAL HISTORY OF OTHER MALIGNANT NEOPLASM OF RECTUM, RECTOSIGMOID JUNCTION, AND ANUS: ICD-10-CM

## 2024-08-27 DIAGNOSIS — Z90.49 ACQUIRED ABSENCE OF OTHER SPECIFIED PARTS OF DIGESTIVE TRACT: Chronic | ICD-10-CM

## 2024-08-27 DIAGNOSIS — M48.00 SPINAL STENOSIS, SITE UNSPECIFIED: ICD-10-CM

## 2024-08-27 LAB
HCT VFR BLD CALC: 34.8 %
HGB BLD-MCNC: 11.3 G/DL
MCHC RBC-ENTMCNC: 29.5 PG
MCHC RBC-ENTMCNC: 32.5 G/DL
MCV RBC AUTO: 90.9 FL
PLATELET # BLD AUTO: 240 K/UL
PMV BLD: 10.3 FL
RBC # BLD: 3.83 M/UL
RBC # FLD: 13.7 %
WBC # FLD AUTO: 6.01 K/UL

## 2024-08-27 PROCEDURE — 99215 OFFICE O/P EST HI 40 MIN: CPT

## 2024-08-27 PROCEDURE — G2211 COMPLEX E/M VISIT ADD ON: CPT

## 2024-08-27 PROCEDURE — 84480 ASSAY TRIIODOTHYRONINE (T3): CPT

## 2024-08-27 PROCEDURE — 84100 ASSAY OF PHOSPHORUS: CPT

## 2024-08-27 PROCEDURE — 83540 ASSAY OF IRON: CPT

## 2024-08-27 PROCEDURE — 83550 IRON BINDING TEST: CPT

## 2024-08-27 PROCEDURE — 80076 HEPATIC FUNCTION PANEL: CPT

## 2024-08-27 PROCEDURE — 84443 ASSAY THYROID STIM HORMONE: CPT

## 2024-08-27 PROCEDURE — 80048 BASIC METABOLIC PNL TOTAL CA: CPT

## 2024-08-27 PROCEDURE — 85027 COMPLETE CBC AUTOMATED: CPT

## 2024-08-27 PROCEDURE — 82728 ASSAY OF FERRITIN: CPT

## 2024-08-27 PROCEDURE — 84439 ASSAY OF FREE THYROXINE: CPT

## 2024-08-27 NOTE — CONSULT LETTER
[Dear  ___] : Dear  [unfilled], [Courtesy Letter:] : I had the pleasure of seeing your patient, [unfilled], in my office today. [Please see my note below.] : Please see my note below. [Sincerely,] : Sincerely, [FreeTextEntry3] : Bertram Ernandez NP  Hematology/ Medical Oncology [phone] office

## 2024-08-27 NOTE — HISTORY OF PRESENT ILLNESS
[de-identified] : 73 yo man with ECOG 2 is evaluated for the management of RECTAL cancer and anemia. He underwent  robotic assisted laparoscopic low anterior resection 2/29/24 which revealed : Infiltrating moderately differentiated colonic adenocarcinoma of rectum,  arising in the background of villous adenoma with foci of high-grade dysplasia. Tumor infiltrates into the submucosa.  Infiltrating carcinoma is at about 1.6 cm from the inked distal surgical margin, and at about 0.4 cm from the perirectal soft tissue margin.   Villous adenoma is at about 0.1-0.2 cm from the inked distal surgica margin.  Focal changes consistent with previous surgical site are noted (status post endoscopic mucosal resection).Thirty-four regional lymph nodes negative for tumor (0/34). Tumor staging: pT1, pN0, stage 1 rectal cancer, MMR proficient with no high risk features  [de-identified] : 8/27/24 Patient is here for a follow-up visit for rectal cancer and newly diagnosed metastatic melanoma.  He is s/p closure of loop ileostomy on July 25, 2024.  He is feeling well with no new complaints.  Patient states that back in June his salazar noticed a neck lump.  He had CT imaging back in 07/2024 due to development of neck lump.  Subsequent PET/CT imaging showed FDG Avid left neck mass which was biopsied and significant for metastatic melanoma.  Reviewed most recent CBC, which is stable.  Patient denies fever, chills, nausea, vomiting, dyspnea or bleeding.   PET/CT (8.14.2024)IMPRESSION:1.  2.2 x 1.6 cm centrally photopenic left neck mass with peripheral FDG avidity (SUV max 3.2) consistent with biopsy-proven metastatic disease.2.  No additional sites of abnormal FDG uptake. CT Neck Soft Tissue (7.14.2024) IMPRESSION:Left level II station ovoid enhancing lesion measuring 2.2 cm. It is located just under the parotid gland. Diagnostic considerations include an exophytic parotid mass versus an enlarged abnormal lymph node. Consider tissue sampling.    NECK DISSECTION 8.21.24 urgical Pathology Report - Auth (Verified)  Specimen(s) Submitted 1  Left mariangel parotid lymph node 2  Left level 2 lymph node 3  Left level 3 lymph node  Final Diagnosis 1.  Left periparotid lymph node, left neck dissection: -  One large lymph node extensively replaced by metastatic melanoma, with  foci of tumor in the regional soft tissue. -  Two additional tiny lymph nodes, 1 with metastatic melanoma (total 2/3 lymph nodes with metastatic melanoma). -  Small fragments of unremarkable parotid gland parenchyma are identified.  Comment: Based on gross measurements and distribution of the tumor in the microscopic sections, the metastatic focus is about 3 cm in maximum dimension. Matted lymph nodes are not identified.  2.  Left level 2 lymph node, left neck dissection: - Metastatic melanoma in 1 of 3 lymph nodes (1/3).  3.  Left level 3 lymph node, left neck dissection: -  Fourteen lymph nodes negative for tumor (0/14).  Comment: Previous specimens (biopsy, outside slide review, [id number]; resection of malignant melanoma, [id number]; and fine-needle aspiration of left neck lymph node, [id number]) are noted.

## 2024-08-27 NOTE — REVIEW OF SYSTEMS
[Diarrhea: Grade 0] : Diarrhea: Grade 0 [Negative] : Allergic/Immunologic [Recent Change In Weight] : ~T no recent weight change [Easy Bleeding] : no tendency for easy bleeding [FreeTextEntry7] : bowel urgency

## 2024-08-27 NOTE — PHYSICAL EXAM
[Restricted in physically strenuous activity but ambulatory and able to carry out work of a light or sedentary nature] : Status 1- Restricted in physically strenuous activity but ambulatory and able to carry out work of a light or sedentary nature, e.g., light house work, office work [Normal] : affect appropriate [de-identified] : left neck scar ; abdominal incision is healing from ileostomy reversal

## 2024-08-27 NOTE — ASSESSMENT
[FreeTextEntry1] : # stage 1, rectal moderately differentiated adenocarcinoma, T1NOMO, MMR proficient, margins are negative, 0/34 nodes are involved, no LVI identified - this is stage 1 RECTAL adenocarcinoma with no adverse high risk features - the data to administer adjuvant chemotherapy to improve survival is NOT strong: do not recommend it  - surveillance should include careful GI f/u with colonoscopy within one year after the surgery and then depending on the findings of that procedure; no strong data to get CT CAP  - PET/CT 8.14.2024 show 2.2 x 1.6 cm centrally photopenic left neck mass with peripheral FDG avidity (SUV max 3.2) consistent with biopsy-proven metastatic disease   # Metastatic Melanoma, likely at least Stage IIIB, dx 08/2024 ; first diagnosed by DERM in 2014  - s/p resection in 2014 followed by surveillance  - s/p Left neck dissection including left inferior parotidectomy as well as dissection of left levels 2 and 3 on 8.21.2024  - reviewed radiology, pathology and lab workup and had a discussion regarding implications of diagnosis, prognosis and options for management including but not limited to immunotherapy  - Discussed risks of immunotherapy including but not limited to fatigue, diarrhea, rashes, lung effects, kidney damage, elevated liver enzymes, loss of appetite, hypothyroidism, adrenal insufficiency, allergic reactions and death to name a few.  - refer to [facility], Dr. Boothe, for consultation; requested Navigators for assistance with scheduling + Rx given  - MR Head ordered to be done prior to next visit, Rx given  - send NGS to determine BRAF mutation status   # Anemia  - most likely due to surgery and infection/ abscess/ antibiotics  - ferritin and iron saturation are within normal values (low limit)  - do not recommend iron replacement at this time; he has been taking iron tabs which he started independently: told him to stop   RTC in 4 weeks, sooner if needed  seen/ examined w/ NP [facility]; note reviewed case discussed agree w/plan Melanoma, s/p diagnosis in 2014 was in remission; Noted a lesion in the neck by a salazar and bx showed melanoma; referred to ENT and it appears that neck dissection suggests N3C melanoma, which is at least stage IIIB (vs stage IIIC) stage PET reviewed; will get MR HEAD W/WO IVC to r/o brain mets send NGS/ mmr/ [facility] eval asap  will need management with adjuvant systemic therapy ;will send NGS; if no mutations, proceed with immunotherapy f/u in one month

## 2024-08-28 DIAGNOSIS — C20 MALIGNANT NEOPLASM OF RECTUM: ICD-10-CM

## 2024-08-28 LAB
ALBUMIN SERPL ELPH-MCNC: 4.6 G/DL
ALP BLD-CCNC: 78 U/L
ALT SERPL-CCNC: 8 U/L
ANION GAP SERPL CALC-SCNC: 14 MMOL/L
AST SERPL-CCNC: 19 U/L
BILIRUB DIRECT SERPL-MCNC: <0.2 MG/DL
BILIRUB INDIRECT SERPL-MCNC: >0.2 MG/DL
BILIRUB SERPL-MCNC: 0.4 MG/DL
BUN SERPL-MCNC: 19 MG/DL
CALCIUM SERPL-MCNC: 9.9 MG/DL
CHLORIDE SERPL-SCNC: 102 MMOL/L
CO2 SERPL-SCNC: 23 MMOL/L
CREAT SERPL-MCNC: 0.8 MG/DL
EGFR: 94 ML/MIN/1.73M2
FERRITIN SERPL-MCNC: 49 NG/ML
GLUCOSE SERPL-MCNC: 87 MG/DL
IRON SATN MFR SERPL: 21 %
IRON SERPL-MCNC: 75 UG/DL
PHOSPHATE SERPL-MCNC: 3.2 MG/DL
POTASSIUM SERPL-SCNC: 4.6 MMOL/L
PROT SERPL-MCNC: 7.1 G/DL
SODIUM SERPL-SCNC: 139 MMOL/L
T3 SERPL-MCNC: 109 NG/DL
T4 FREE SERPL-MCNC: 1.2 NG/DL
TIBC SERPL-MCNC: 365 UG/DL
TSH SERPL-ACNC: 1.5 UIU/ML
UIBC SERPL-MCNC: 290 UG/DL

## 2024-08-28 NOTE — ASSESSMENT
[FreeTextEntry1] : 74-year-old male with stage 1 rectal adenocarcinoma, s/p robotic, low anterior resection with hand-sewn coloanal anastomosis and diverting loop ileostomy. Now s/p ileostomy closure.   The patient is recovering well from surgery. I recommend that he turn to a high-fiber diet. Additionally, he will start a fiber supplement, including Benefiber. Unfortunately, he has developed metastatic melanoma and is undergoing surgery tomorrow to remove a neck mass. We will see him back in three months.

## 2024-08-28 NOTE — ADDENDUM
[FreeTextEntry1] :  I, Shahida Schmitt ([facility]) assisted in filling out this chart under the dictation of Flynn Lorenzo on 08/20/2024

## 2024-08-28 NOTE — PHYSICAL EXAM
[facility] PHYSICAL THERAPY  [address] [place] [address] 201,[facility], [address] [facility] - Phone: [phone]  Fax: [phone] OF CARE/RECERTIFICATION FOR PHYSICAL THERAPY          Patient Name: Bird Forte : 1955   Treatment/Medical Diagnosis: Balance problem [R26.89]   Onset Date: Chronic    Referral Source: Reji Andersen MD Start of Care [facility]): 18   Prior Hospitalization: See Medical History Provider #: [id number]   Prior Level of Function: Chronic falls, Achilles Injury Rupture , Amb with SPC (not present at eval), BL use of UE's with stairs   Comorbidities: Chronic Falls, Hx of Lupus, Fibromyalgia, HBP Latex Allergy, Asthma, Sjogrens Disease, Arthritis   Medications: Verified on Patient Summary List   Visits from [facility]: 8 Missed Visits: CX: 1  NS: 1     Short Term Goals: To be accomplished in  4  treatments:  Pt to demonstrate independence with HEP to improve functional mobility for ADLs- goal progressing  Pt will be able to perform gait with head turns using SPC with minimal sway and deviation to improve FGA. - goal progressing, improved but not met        . Long Term Goals: To be accomplished in  8  treatments:  Pt will improve FGA by 7 points in order to demonstrate improved functional ADL's and decrease fall risk.- goal met  Pt to report +5 or > on GROC to improve functional mobility for ADLs- goal progressing +4  Patient will be independent with long term HEP in order to prepare for DC to home- will assess at DC  Pt will be able to asc/desc a 5 flight of stairs with minimal UE assit in order to safely ambulate throughout the home- goal not met, but improved      Key Functional Changes/Progress: Pt has been seen for 8 visits since her IE on 18. Pt reports 75% overall improvement with her walking and balance since beginning PT.  PT has been focused on static and dynamic standing balance with various foot positions and EO/EC, gait training with head turns vertical and horizontal, stair negotations, as well as LE strengthening. Pt has progressed fair in therapy and continues to be limited with standing and walking tolerance. Pt contiues to intermittently use the [facility] when arriving to therapy, stating \"I didn't think I had time to grab it because I was already running late\". Pt has been educated on the importance of use of SPC for safety and to prevent falls, however continues to present without the cane over several visits. Pt has improved FGA to 11/30, compared to 4/30 at . Pt would benefit from continues skilled PT to address balance deficits, decreased safety with stair negotiation and to improve functional ADL's. Problem List: pain affecting function, decrease ROM, decrease strength, impaired gait/ balance, decrease ADL/ functional abilitiies, decrease activity tolerance, decrease flexibility/ joint mobility and decrease transfer abilities   Treatment Plan may include any combination of the following: Therapeutic exercise, Therapeutic activities, Neuromuscular re-education, Physical agent/modality, Gait/balance training, Manual therapy, Patient education, Functional mobility training, Home safety training and Stair training  Patient Goal(s) has been updated and includes:      Goals for this certification period include and are to be achieved in   2   treatments:  Continue with the above unmet goals. Frequency / Duration:   Patient to be seen   1   times per week for   2   treatments:  G-Codes (GP): Mobility [id number] Current  CL= 60-79%   Goal  CJ= 20-39%. The severity rating is based on the FOTO Score    Assessments/Recommendations: Continue skilled PT 2 additional visits to address above unmet goals, then reassess for continuation or DC from therapy. If you have any questions/comments please contact us directly at [phone]. Thank you for allowing us to assist in the care of your patient.     Therapist Signature: Jag Nick MERARY Schroeder, DPT Date: 4/5/2531   Certification Period:  Reporting Period: 6/9/18-9/7/18 6/8/18-7/6/18 Time: 7:39 AM   NOTE TO PHYSICIAN:  PLEASE COMPLETE THE ORDERS BELOW AND FAX TO   [facility] Physical Therapy: [phone]  If you are unable to process this request in 24 hours please contact our office: [phone]    ___ I have read the above report and request that my patient continue as recommended.   ___ I have read the above report and request that my patient continue therapy with the following changes/special instructions: ________________________________________________   ___ I have read the above report and request that my patient be discharged from therapy.      Physician Signature:        Date:       Time: [FreeTextEntry1] : General: Awake, Alert, No Acute Distress Respiratory: Normal respiratory Effort Abdomen: Soft, non-tender, non-distended, nearly healed, right lower quadrant, ileostomy site with a small amount of granulation tissue, dressing replaced.

## 2024-08-28 NOTE — END OF VISIT
[FreeTextEntry3] : Documented by Shahida Schmitt acting as a scribe for Flynn Lorenzo on 08/20/2024   All medical record entries made by the Scribe were at my, Dr. Flynn Lorenzo direction and personally dictated by me on 08/20/2024 . I have reviewed the chart and agree that the record accurately reflects my personal performance of the history, physical exam, assessment and plan. I have also personally directed, reviewed, and agreed with the chart.

## 2024-08-28 NOTE — PHYSICAL EXAM
[FreeTextEntry1] : General: Awake, Alert, No Acute Distress Respiratory: Normal respiratory Effort Abdomen: Soft, non-tender, non-distended, nearly healed, right lower quadrant, ileostomy site with a small amount of granulation tissue, dressing replaced.

## 2024-08-28 NOTE — HISTORY OF PRESENT ILLNESS
[FreeTextEntry1] : Patient is a 74-year-old male with PMHx of BPH, spinal stenosis, and rheumatoid arthritis on methotrexate who presents for a post-operative office visit, s/p robotic low anterior resection with hand-sewn colo-anal anastomosis and diverting loop ileostomy on February 29, 2024. Pathology showed T1N0 (0/34 ) adenocarcinoma with negative margins. Patient is now s/p closure of loop ileostomy on July 25, 2024. Pathology was benign patient reports having four to five bowel movements daily he does report significant clustering he denies significant accidents. Patient denies recent fevers, chills, nausea, or vomiting. He reports that his perianal drainage has decreased significantly. He's tolerating a diet and his stoma works well. He denies a family history of colon cancer, rectal cancer, or inflammatory bowel disease.

## 2024-08-28 NOTE — ADDENDUM
[FreeTextEntry1] :  I, Shahida Schmitt ([facility]) assisted in filling out this chart under the dictation of Flynn Lorenzo on 08/20/2024

## 2024-08-29 ENCOUNTER — APPOINTMENT (OUTPATIENT)
Dept: OTOLARYNGOLOGY | Facility: CLINIC | Age: 74
End: 2024-08-29
Payer: MEDICARE

## 2024-08-29 DIAGNOSIS — C77.9 SECONDARY AND UNSPECIFIED MALIGNANT NEOPLASM OF LYMPH NODE, UNSPECIFIED: ICD-10-CM

## 2024-08-29 DIAGNOSIS — C43.4 MALIGNANT MELANOMA OF SCALP AND NECK: ICD-10-CM

## 2024-08-29 PROCEDURE — 99024 POSTOP FOLLOW-UP VISIT: CPT

## 2024-08-29 NOTE — REASON FOR VISIT
[Post-Operative Visit] : a post-operative visit [FreeTextEntry2] : s/p mariangel parotid LN and level 2/3 LND on 8/21/24

## 2024-08-29 NOTE — ASSESSMENT
[FreeTextEntry1] : Path reviewed  3/20 LN with metastatic melanoma Consideration of adjuvant RT for 3+ nodes but everything removed cleanly so will be a tumor discussion Seeing BRENTON Bang pending for immunotherapy choices

## 2024-08-29 NOTE — HISTORY OF PRESENT ILLNESS
[de-identified] : Oncology Summary Stage: [id number] (stage IIIB) Site: Left neck Pathology: Melanoma Treatment: WLE w/ negative SLNB 2014 [place], left parotidectomy and neck dissection 8/2024 (3/20 LN positive) Disease status: Postoperative [FreeTextEntry1] : Patient returns today s/p mariangel parotid LN and level 2/3 LND on 8/21/24. He has been doing well without complaints. No further complaints. Has some numbness of the left ear. No change in voice.

## 2024-08-29 NOTE — PHYSICAL EXAM
[Normal] : mucosa is normal [Midline] : trachea located in midline position [de-identified] : Healted left neck incision.

## 2024-08-31 ENCOUNTER — RESULT REVIEW (OUTPATIENT)
Age: 74
End: 2024-08-31

## 2024-08-31 ENCOUNTER — OUTPATIENT (OUTPATIENT)
Dept: OUTPATIENT SERVICES | Facility: HOSPITAL | Age: 74
LOS: 1 days | End: 2024-08-31
Payer: MEDICARE

## 2024-08-31 DIAGNOSIS — Z90.49 ACQUIRED ABSENCE OF OTHER SPECIFIED PARTS OF DIGESTIVE TRACT: Chronic | ICD-10-CM

## 2024-08-31 DIAGNOSIS — M79.673 PAIN IN UNSPECIFIED FOOT: ICD-10-CM

## 2024-08-31 DIAGNOSIS — M25.559 PAIN IN UNSPECIFIED HIP: ICD-10-CM

## 2024-08-31 DIAGNOSIS — Z98.890 OTHER SPECIFIED POSTPROCEDURAL STATES: Chronic | ICD-10-CM

## 2024-08-31 PROCEDURE — 73130 X-RAY EXAM OF HAND: CPT | Mod: 50

## 2024-08-31 PROCEDURE — 73630 X-RAY EXAM OF FOOT: CPT | Mod: 26,50

## 2024-08-31 PROCEDURE — 73630 X-RAY EXAM OF FOOT: CPT | Mod: 50

## 2024-08-31 PROCEDURE — 73130 X-RAY EXAM OF HAND: CPT | Mod: 26,50

## 2024-09-01 DIAGNOSIS — M79.673 PAIN IN UNSPECIFIED FOOT: ICD-10-CM

## 2024-09-01 DIAGNOSIS — M25.559 PAIN IN UNSPECIFIED HIP: ICD-10-CM

## 2024-09-11 ENCOUNTER — NON-APPOINTMENT (OUTPATIENT)
Age: 74
End: 2024-09-11

## 2024-09-12 ENCOUNTER — RESULT REVIEW (OUTPATIENT)
Age: 74
End: 2024-09-12

## 2024-09-12 ENCOUNTER — OUTPATIENT (OUTPATIENT)
Dept: OUTPATIENT SERVICES | Facility: HOSPITAL | Age: 74
LOS: 1 days | End: 2024-09-12
Payer: MEDICARE

## 2024-09-12 DIAGNOSIS — C43.4 MALIGNANT MELANOMA OF SCALP AND NECK: ICD-10-CM

## 2024-09-12 DIAGNOSIS — Z98.890 OTHER SPECIFIED POSTPROCEDURAL STATES: Chronic | ICD-10-CM

## 2024-09-12 DIAGNOSIS — Z00.8 ENCOUNTER FOR OTHER GENERAL EXAMINATION: ICD-10-CM

## 2024-09-12 DIAGNOSIS — Z90.49 ACQUIRED ABSENCE OF OTHER SPECIFIED PARTS OF DIGESTIVE TRACT: Chronic | ICD-10-CM

## 2024-09-12 PROCEDURE — A9579: CPT

## 2024-09-12 PROCEDURE — 70552 MRI BRAIN STEM W/DYE: CPT | Mod: 26,MH

## 2024-09-12 PROCEDURE — 70552 MRI BRAIN STEM W/DYE: CPT

## 2024-09-13 ENCOUNTER — OUTPATIENT (OUTPATIENT)
Dept: OUTPATIENT SERVICES | Facility: HOSPITAL | Age: 74
LOS: 1 days | End: 2024-09-13
Payer: MEDICARE

## 2024-09-13 ENCOUNTER — APPOINTMENT (OUTPATIENT)
Age: 74
End: 2024-09-13

## 2024-09-13 VITALS — HEART RATE: 70 BPM | SYSTOLIC BLOOD PRESSURE: 147 MMHG | DIASTOLIC BLOOD PRESSURE: 82 MMHG | TEMPERATURE: 98 F

## 2024-09-13 DIAGNOSIS — Z98.890 OTHER SPECIFIED POSTPROCEDURAL STATES: Chronic | ICD-10-CM

## 2024-09-13 DIAGNOSIS — Z90.49 ACQUIRED ABSENCE OF OTHER SPECIFIED PARTS OF DIGESTIVE TRACT: Chronic | ICD-10-CM

## 2024-09-13 DIAGNOSIS — C20 MALIGNANT NEOPLASM OF RECTUM: ICD-10-CM

## 2024-09-13 DIAGNOSIS — C43.4 MALIGNANT MELANOMA OF SCALP AND NECK: ICD-10-CM

## 2024-09-13 PROCEDURE — 96365 THER/PROPH/DIAG IV INF INIT: CPT

## 2024-09-13 RX ORDER — IRON SUCROSE 20 MG/ML
200 INJECTION, SOLUTION INTRAVENOUS ONCE
Refills: 0 | Status: COMPLETED | OUTPATIENT
Start: 2024-09-13 | End: 2024-09-13

## 2024-09-13 RX ADMIN — IRON SUCROSE 200 MILLIGRAM(S): 20 INJECTION, SOLUTION INTRAVENOUS at 16:34

## 2024-09-13 RX ADMIN — IRON SUCROSE 100 MILLIGRAM(S): 20 INJECTION, SOLUTION INTRAVENOUS at 16:04

## 2024-09-14 DIAGNOSIS — C20 MALIGNANT NEOPLASM OF RECTUM: ICD-10-CM

## 2024-09-16 ENCOUNTER — LABORATORY RESULT (OUTPATIENT)
Age: 74
End: 2024-09-16

## 2024-09-16 ENCOUNTER — APPOINTMENT (OUTPATIENT)
Age: 74
End: 2024-09-16

## 2024-09-16 ENCOUNTER — OUTPATIENT (OUTPATIENT)
Dept: OUTPATIENT SERVICES | Facility: HOSPITAL | Age: 74
LOS: 1 days | End: 2024-09-16
Payer: MEDICARE

## 2024-09-16 DIAGNOSIS — Z98.890 OTHER SPECIFIED POSTPROCEDURAL STATES: Chronic | ICD-10-CM

## 2024-09-16 DIAGNOSIS — Z90.49 ACQUIRED ABSENCE OF OTHER SPECIFIED PARTS OF DIGESTIVE TRACT: Chronic | ICD-10-CM

## 2024-09-16 DIAGNOSIS — C20 MALIGNANT NEOPLASM OF RECTUM: ICD-10-CM

## 2024-09-16 PROCEDURE — 80076 HEPATIC FUNCTION PANEL: CPT

## 2024-09-16 PROCEDURE — 82378 CARCINOEMBRYONIC ANTIGEN: CPT

## 2024-09-16 PROCEDURE — 82728 ASSAY OF FERRITIN: CPT

## 2024-09-16 PROCEDURE — 80048 BASIC METABOLIC PNL TOTAL CA: CPT

## 2024-09-16 PROCEDURE — 85027 COMPLETE CBC AUTOMATED: CPT

## 2024-09-16 PROCEDURE — 83540 ASSAY OF IRON: CPT

## 2024-09-16 PROCEDURE — 83550 IRON BINDING TEST: CPT

## 2024-09-16 PROCEDURE — 36415 COLL VENOUS BLD VENIPUNCTURE: CPT

## 2024-09-17 DIAGNOSIS — C20 MALIGNANT NEOPLASM OF RECTUM: ICD-10-CM

## 2024-09-17 LAB
ALBUMIN SERPL ELPH-MCNC: 4.5 G/DL
ALP BLD-CCNC: 78 U/L
ALT SERPL-CCNC: 11 U/L
ANION GAP SERPL CALC-SCNC: 13 MMOL/L
AST SERPL-CCNC: 21 U/L
BILIRUB DIRECT SERPL-MCNC: <0.2 MG/DL
BILIRUB INDIRECT SERPL-MCNC: >0.4 MG/DL
BILIRUB SERPL-MCNC: 0.6 MG/DL
BUN SERPL-MCNC: 14 MG/DL
CALCIUM SERPL-MCNC: 9.4 MG/DL
CEA SERPL-MCNC: 2.1 NG/ML
CHLORIDE SERPL-SCNC: 107 MMOL/L
CO2 SERPL-SCNC: 24 MMOL/L
CREAT SERPL-MCNC: 0.8 MG/DL
EGFR: 94 ML/MIN/1.73M2
FERRITIN SERPL-MCNC: 182 NG/ML
GLUCOSE SERPL-MCNC: 97 MG/DL
HCT VFR BLD CALC: 34.5 %
HGB BLD-MCNC: 11.2 G/DL
IRON SATN MFR SERPL: 31 %
IRON SERPL-MCNC: 109 UG/DL
MCHC RBC-ENTMCNC: 29.8 PG
MCHC RBC-ENTMCNC: 32.5 G/DL
MCV RBC AUTO: 91.8 FL
PLATELET # BLD AUTO: 226 K/UL
PMV BLD: 10.2 FL
POTASSIUM SERPL-SCNC: 4.6 MMOL/L
PROT SERPL-MCNC: 6.7 G/DL
RBC # BLD: 3.76 M/UL
RBC # FLD: 13.2 %
SODIUM SERPL-SCNC: 144 MMOL/L
TIBC SERPL-MCNC: 353 UG/DL
UIBC SERPL-MCNC: 244 UG/DL
WBC # FLD AUTO: 4.82 K/UL

## 2024-09-18 ENCOUNTER — OUTPATIENT (OUTPATIENT)
Dept: OUTPATIENT SERVICES | Facility: HOSPITAL | Age: 74
LOS: 1 days | End: 2024-09-18
Payer: MEDICARE

## 2024-09-18 ENCOUNTER — APPOINTMENT (OUTPATIENT)
Age: 74
End: 2024-09-18
Payer: MEDICARE

## 2024-09-18 ENCOUNTER — APPOINTMENT (OUTPATIENT)
Dept: SURGERY | Facility: CLINIC | Age: 74
End: 2024-09-18

## 2024-09-18 VITALS
OXYGEN SATURATION: 97 % | WEIGHT: 159 LBS | DIASTOLIC BLOOD PRESSURE: 60 MMHG | SYSTOLIC BLOOD PRESSURE: 120 MMHG | HEIGHT: 74.5 IN | HEART RATE: 79 BPM | BODY MASS INDEX: 20.19 KG/M2

## 2024-09-18 VITALS
HEIGHT: 74.5 IN | DIASTOLIC BLOOD PRESSURE: 71 MMHG | WEIGHT: 162 LBS | TEMPERATURE: 97.6 F | BODY MASS INDEX: 20.57 KG/M2 | OXYGEN SATURATION: 96 % | SYSTOLIC BLOOD PRESSURE: 123 MMHG | RESPIRATION RATE: 16 BRPM | HEART RATE: 69 BPM

## 2024-09-18 DIAGNOSIS — C20 MALIGNANT NEOPLASM OF RECTUM: ICD-10-CM

## 2024-09-18 DIAGNOSIS — Z90.49 ACQUIRED ABSENCE OF OTHER SPECIFIED PARTS OF DIGESTIVE TRACT: Chronic | ICD-10-CM

## 2024-09-18 DIAGNOSIS — Z98.890 OTHER SPECIFIED POSTPROCEDURAL STATES: Chronic | ICD-10-CM

## 2024-09-18 DIAGNOSIS — C21.8 MALIGNANT NEOPLASM OF OVERLAPPING SITES OF RECTUM, ANUS AND ANAL CANAL: ICD-10-CM

## 2024-09-18 PROCEDURE — 99024 POSTOP FOLLOW-UP VISIT: CPT

## 2024-09-18 PROCEDURE — 99215 OFFICE O/P EST HI 40 MIN: CPT

## 2024-09-18 PROCEDURE — G2211 COMPLEX E/M VISIT ADD ON: CPT

## 2024-09-18 NOTE — HISTORY OF PRESENT ILLNESS
[de-identified] : 73 yo man with ECOG 2 is evaluated for the management of RECTAL cancer and anemia. He underwent  robotic assisted laparoscopic low anterior resection 2/29/24 which revealed : Infiltrating moderately differentiated colonic adenocarcinoma of rectum,  arising in the background of villous adenoma with foci of high-grade dysplasia. Tumor infiltrates into the submucosa.  Infiltrating carcinoma is at about 1.6 cm from the inked distal surgical margin, and at about 0.4 cm from the perirectal soft tissue margin.   Villous adenoma is at about 0.1-0.2 cm from the inked distal surgica margin.  Focal changes consistent with previous surgical site are noted (status post endoscopic mucosal resection).Thirty-four regional lymph nodes negative for tumor (0/34). Tumor staging: pT1, pN0, stage 1 rectal cancer, MMR proficient with no high risk features  [de-identified] : 8/27/24 Patient is here for a follow-up visit for rectal cancer and newly diagnosed metastatic melanoma.  He is s/p closure of loop ileostomy on July 25, 2024.  He is feeling well with no new complaints.  Patient states that back in June his salazar noticed a neck lump.  He had CT imaging back in 07/2024 due to development of neck lump.  Subsequent PET/CT imaging showed FDG Avid left neck mass which was biopsied and significant for metastatic melanoma.  Reviewed most recent CBC, which is stable.  Patient denies fever, chills, nausea, vomiting, dyspnea or bleeding.   PET/CT (8.14.2024)IMPRESSION:1.  2.2 x 1.6 cm centrally photopenic left neck mass with peripheral FDG avidity (SUV max 3.2) consistent with biopsy-proven metastatic disease.2.  No additional sites of abnormal FDG uptake. CT Neck Soft Tissue (7.14.2024) IMPRESSION:Left level II station ovoid enhancing lesion measuring 2.2 cm. It is located just under the parotid gland. Diagnostic considerations include an exophytic parotid mass versus an enlarged abnormal lymph node. Consider tissue sampling.    NECK DISSECTION 8.21.24 urgical Pathology Report - Auth (Verified)  Specimen(s) Submitted 1  Left mariangel parotid lymph node 2  Left level 2 lymph node 3  Left level 3 lymph node  Final Diagnosis 1.  Left periparotid lymph node, left neck dissection: -  One large lymph node extensively replaced by metastatic melanoma, with  foci of tumor in the regional soft tissue. -  Two additional tiny lymph nodes, 1 with metastatic melanoma (total 2/3 lymph nodes with metastatic melanoma). -  Small fragments of unremarkable parotid gland parenchyma are identified.  Comment: Based on gross measurements and distribution of the tumor in the microscopic sections, the metastatic focus is about 3 cm in maximum dimension. Matted lymph nodes are not identified.  2.  Left level 2 lymph node, left neck dissection: - Metastatic melanoma in 1 of 3 lymph nodes (1/3).  3.  Left level 3 lymph node, left neck dissection: -  Fourteen lymph nodes negative for tumor (0/14).  Comment: Previous specimens (biopsy, outside slide review, [id number]; resection of malignant melanoma, [id number]; and fine-needle aspiration of left neck lymph node, [id number]) are noted.  9/18/24

## 2024-09-18 NOTE — PHYSICAL EXAM
[Restricted in physically strenuous activity but ambulatory and able to carry out work of a light or sedentary nature] : Status 1- Restricted in physically strenuous activity but ambulatory and able to carry out work of a light or sedentary nature, e.g., light house work, office work [Normal] : affect appropriate [de-identified] : left neck scar ; abdominal incision is healing from ileostomy reversal

## 2024-09-18 NOTE — ASSESSMENT
[FreeTextEntry1] : # stage 1, rectal moderately differentiated adenocarcinoma, T1NOMO, MMR proficient, margins are negative, 0/34 nodes are involved, no LVI identified - this is stage 1 RECTAL adenocarcinoma with no adverse high risk features - the data to administer adjuvant chemotherapy to improve survival is NOT strong: do not recommend it  - surveillance should include careful GI f/u with colonoscopy within one year after the surgery and then depending on the findings of that procedure; no strong data to get CT CAP  - PET/CT 8.14.2024 show 2.2 x 1.6 cm centrally photopenic left neck mass with peripheral FDG avidity (SUV max 3.2) consistent with biopsy-proven metastatic disease   # Metastatic Melanoma, likely at least Stage IIIB vs IIIC, dx 08/2024 ; first diagnosed by DERM in 2014  - s/p resection in 2014 followed by surveillance  - pt was seen after the dissection of nodes (see below); neoadjuvant chemo evaluation was not done - s/p Left neck dissection including left inferior parotidectomy as well as dissection of left levels 2 and 3 on 8.21.2024  - reviewed radiology, pathology and lab workup and had a discussion regarding implications of diagnosis, prognosis and options for management including but not limited to immunotherapy  - Discussed risks of immunotherapy including but not limited to fatigue, diarrhea, rashes, lung effects, kidney damage, elevated liver enzymes, loss of appetite, hypothyroidism, adrenal insufficiency, allergic reactions and death to name a few.  - seen by  DERM, Dr. Boothe, for consultation;  - MR Head 09/2024: no evidence of brain mets - send NGS to determine BRAF mutation status : BRAF (+)  # Anemia  - most likely due to surgery and infection/ abscess/ antibiotics  - ferritin and iron saturation are within normal values (low limit)  - do not recommend iron replacement at this time; he has been taking iron tabs which he started independently: told him to stop   Melanoma, s/p diagnosis in 2014 was in remission; Noted a lesion in the neck by a salazar and bx showed melanoma; referred to ENT and it appears that neck dissection suggests N3C melanoma, which is at least stage IIIB (vs stage IIIC) stage PET reviewed; MR HEAD W/WO IVC : no brain mets send NGS/ mmr/ MSI: BRAF (+) Dermatology eval appreciated pt is referred by ENT to see RADONC: evaluation is 9/19/24, will coordinate adjuvant therapy with RADONC in my opinion, systemic therapy would be indicated to start as soon as possible; in view of ECOG 2 would not offer double immunotherapy at this time: would start with a single agent immunotherapy , such as keytruda or opdivo

## 2024-09-18 NOTE — HISTORY OF PRESENT ILLNESS
[de-identified] : 73 yo man with ECOG 2 is evaluated for the management of RECTAL cancer and anemia. He underwent  robotic assisted laparoscopic low anterior resection 2/29/24 which revealed : Infiltrating moderately differentiated colonic adenocarcinoma of rectum,  arising in the background of villous adenoma with foci of high-grade dysplasia. Tumor infiltrates into the submucosa.  Infiltrating carcinoma is at about 1.6 cm from the inked distal surgical margin, and at about 0.4 cm from the perirectal soft tissue margin.   Villous adenoma is at about 0.1-0.2 cm from the inked distal surgica margin.  Focal changes consistent with previous surgical site are noted (status post endoscopic mucosal resection).Thirty-four regional lymph nodes negative for tumor (0/34). Tumor staging: pT1, pN0, stage 1 rectal cancer, MMR proficient with no high risk features  [de-identified] : 8/27/24 Patient is here for a follow-up visit for rectal cancer and newly diagnosed metastatic melanoma.  He is s/p closure of loop ileostomy on July 25, 2024.  He is feeling well with no new complaints.  Patient states that back in June his salazar noticed a neck lump.  He had CT imaging back in 07/2024 due to development of neck lump.  Subsequent PET/CT imaging showed FDG Avid left neck mass which was biopsied and significant for metastatic melanoma.  Reviewed most recent CBC, which is stable.  Patient denies fever, chills, nausea, vomiting, dyspnea or bleeding.   PET/CT (8.14.2024)IMPRESSION:1.  2.2 x 1.6 cm centrally photopenic left neck mass with peripheral FDG avidity (SUV max 3.2) consistent with biopsy-proven metastatic disease.2.  No additional sites of abnormal FDG uptake. CT Neck Soft Tissue (7.14.2024) IMPRESSION:Left level II station ovoid enhancing lesion measuring 2.2 cm. It is located just under the parotid gland. Diagnostic considerations include an exophytic parotid mass versus an enlarged abnormal lymph node. Consider tissue sampling.    NECK DISSECTION 8.21.24 urgical Pathology Report - Auth (Verified)  Specimen(s) Submitted 1  Left mariangel parotid lymph node 2  Left level 2 lymph node 3  Left level 3 lymph node  Final Diagnosis 1.  Left periparotid lymph node, left neck dissection: -  One large lymph node extensively replaced by metastatic melanoma, with  foci of tumor in the regional soft tissue. -  Two additional tiny lymph nodes, 1 with metastatic melanoma (total 2/3 lymph nodes with metastatic melanoma). -  Small fragments of unremarkable parotid gland parenchyma are identified.  Comment: Based on gross measurements and distribution of the tumor in the microscopic sections, the metastatic focus is about 3 cm in maximum dimension. Matted lymph nodes are not identified.  2.  Left level 2 lymph node, left neck dissection: - Metastatic melanoma in 1 of 3 lymph nodes (1/3).  3.  Left level 3 lymph node, left neck dissection: -  Fourteen lymph nodes negative for tumor (0/14).  Comment: Previous specimens (biopsy, outside slide review, [id number]; resection of malignant melanoma, [id number]; and fine-needle aspiration of left neck lymph node, [id number]) are noted.  9/18/24

## 2024-09-18 NOTE — PHYSICAL EXAM
[Restricted in physically strenuous activity but ambulatory and able to carry out work of a light or sedentary nature] : Status 1- Restricted in physically strenuous activity but ambulatory and able to carry out work of a light or sedentary nature, e.g., light house work, office work [Normal] : affect appropriate [de-identified] : left neck scar ; abdominal incision is healing from ileostomy reversal

## 2024-09-19 ENCOUNTER — APPOINTMENT (OUTPATIENT)
Dept: RADIATION ONCOLOGY | Facility: HOSPITAL | Age: 74
End: 2024-09-19

## 2024-09-19 ENCOUNTER — NON-APPOINTMENT (OUTPATIENT)
Age: 74
End: 2024-09-19

## 2024-09-19 ENCOUNTER — OUTPATIENT (OUTPATIENT)
Dept: OUTPATIENT SERVICES | Facility: HOSPITAL | Age: 74
LOS: 1 days | End: 2024-09-19
Payer: MEDICARE

## 2024-09-19 ENCOUNTER — APPOINTMENT (OUTPATIENT)
Dept: RADIATION ONCOLOGY | Facility: HOSPITAL | Age: 74
End: 2024-09-19
Payer: MEDICARE

## 2024-09-19 VITALS
HEIGHT: 74.5 IN | TEMPERATURE: 97.9 F | BODY MASS INDEX: 20.59 KG/M2 | SYSTOLIC BLOOD PRESSURE: 125 MMHG | RESPIRATION RATE: 16 BRPM | HEART RATE: 81 BPM | WEIGHT: 162.13 LBS | OXYGEN SATURATION: 96 % | DIASTOLIC BLOOD PRESSURE: 75 MMHG

## 2024-09-19 DIAGNOSIS — Z98.890 OTHER SPECIFIED POSTPROCEDURAL STATES: Chronic | ICD-10-CM

## 2024-09-19 DIAGNOSIS — Z90.49 ACQUIRED ABSENCE OF OTHER SPECIFIED PARTS OF DIGESTIVE TRACT: Chronic | ICD-10-CM

## 2024-09-19 DIAGNOSIS — C77.9 SECONDARY AND UNSPECIFIED MALIGNANT NEOPLASM OF LYMPH NODE, UNSPECIFIED: ICD-10-CM

## 2024-09-19 DIAGNOSIS — C43.4 MALIGNANT MELANOMA OF SCALP AND NECK: ICD-10-CM

## 2024-09-19 PROCEDURE — 99203 OFFICE O/P NEW LOW 30 MIN: CPT

## 2024-09-19 RX ORDER — PSYLLIUM SEED (WITH DEXTROSE)
POWDER (GRAM) ORAL
Refills: 0 | Status: ACTIVE | COMMUNITY

## 2024-09-19 RX ORDER — UBIDECARENONE 30 MG
CAPSULE ORAL
Refills: 0 | Status: ACTIVE | COMMUNITY

## 2024-09-19 RX ORDER — ERGOCALCIFEROL (VITAMIN D2) 1250 MCG
50000 CAPSULE ORAL
Refills: 0 | Status: ACTIVE | COMMUNITY

## 2024-09-19 RX ORDER — TAMSULOSIN HYDROCHLORIDE 0.4 MG/1
0.4 CAPSULE ORAL
Refills: 0 | Status: ACTIVE | COMMUNITY

## 2024-09-19 NOTE — VITALS
[Date: ____________] : Patient's last distress assessment performed on [unfilled]. [3 - Distress Level] : Distress Level: 3 [Referred Patient  to social work for follow-up] : Patient was referred to social work for follow-up [Patient given social work contact information and resource sheet] : Patient was given social work contact information and resource sheet [Maximal Pain Intensity: 0/10] : 0/10 [80: Normal activity with effort; some signs or symptoms of disease.] : 80: Normal activity with effort; some signs or symptoms of disease.

## 2024-09-20 ENCOUNTER — APPOINTMENT (OUTPATIENT)
Age: 74
End: 2024-09-20

## 2024-09-20 NOTE — PHYSICAL EXAM
[General Appearance - Well Developed] : well developed [General Appearance - Alert] : alert [General Appearance - In No Acute Distress] : in no acute distress [Examination Of The Oral Cavity] : the lips and gums were normal [Oropharynx] : The oropharynx was normal [] : no respiratory distress [de-identified] : left neck incision healing well

## 2024-09-20 NOTE — END OF VISIT
[FreeTextEntry3] : MD Note: I personally performed the evaluation and management services for this patient. This includes conducting the examination, assessing all conditions, and establishing the plan of care. Today, my ACP, Maddy Sims, was here to observe my evaluation and management services for this patient. I agree with the documentation above, which I have reviewed and edited where appropriate. [Time Spent: ___ minutes] : I have spent [unfilled] minutes of time on the encounter which excludes teaching and separately reported services.

## 2024-09-20 NOTE — PHYSICAL EXAM
[General Appearance - Well Developed] : well developed [General Appearance - Alert] : alert [General Appearance - In No Acute Distress] : in no acute distress [Examination Of The Oral Cavity] : the lips and gums were normal [Oropharynx] : The oropharynx was normal [] : no respiratory distress [de-identified] : left neck incision healing well

## 2024-09-20 NOTE — HISTORY OF PRESENT ILLNESS
[FreeTextEntry1] : Mr.Joseph Tran is 74 yr old male here for initial consultation. He has been following up with [facility], Dr Ventura for rectal cancer and now with newly diagnosed Metastatic Melanoma. He is s/p closure of loop ileostomy on July 25, 2024. He continues routine exams with GI/ concerning his rectal cancer.   He had a diagnosis of melanoma in 2014, diagnosed by Dermatology, had resection and was under surveillance. Patient states that back in June/2024 his salazar noticed a left neck lump. He had CT imaging back in 07/2024 due to development of neck lump. Subsequent PET/CT imaging showed FDG Avid left neck mass which was biopsied with /ENT and significant for metastatic melanoma.  He has discussed systemic therapy with [facility]. He is recovering well since his surgery on 8/21/24 with . He denies any dysphagia or loss of appetite. He does have some mild numbness below left ear into upper jaw, but it is not too bothersome.  CT Neck 7/14/2024: IMPRESSION: Left level II station ovoid enhancing lesion measuring 2.2 cm. It is located just under the parotid gland. Diagnostic considerations include an exophytic parotid mass versus an enlarged abnormal lymph node. Consider tissue sampling.  FNA Pathology: 7/25/2024: Specimen(s) Submitted Left neck lymph node fluid Final Diagnosis LEFT NECK LYMPH NODES; U/S GUIDED, FNA: POSITIVE FOR MALIGNANT CELLS. - Metastatic malignant melanoma. See note. Note: immunostains performed on block 1A showing the tumor cells are positive for Sox 10 and Melan-A supporting the diagnosis.   Petscan 8/14/2024: IMPRESSION: 1.  2.2 x 1.6 cm centrally photopenic left neck mass with  peripheral FDG avidity (SUV max 3.2) consistent with biopsy-proven metastatic disease. 2.  No additional sites of abnormal FDG uptake.   Pathology: 8/21/2024: Final Diagnosis 1.  Left periparotid lymph node, left neck dissection: -  One large lymph node extensively replaced by metastatic melanoma, with foci of tumor in the regional soft tissue. -  Two additional tiny lymph nodes, 1 with metastatic melanoma (total 2/3 lymph nodes with metastatic melanoma). -  Small fragments of unremarkable parotid gland parenchyma are identified. Comment: Based on gross measurements and distribution of the tumor in the microscopic sections, the metastatic focus is about 3 cm in maximum dimension. Matted lymph nodes are not identified. 2.  Left level 2 lymph node, left neck dissection: - Metastatic melanoma in 1 of 3 lymph nodes (1/3). 3.  Left level 3 lymph node, left neck dissection: -  Fourteen lymph nodes negative for tumor (0/14).  MRI Head: 9/12/2024: IMPRESSION: No evidence of intracranial metastatic disease. Unremarkable study.

## 2024-09-20 NOTE — HISTORY OF PRESENT ILLNESS
[FreeTextEntry1] : Mr.Joseph Tran is 74 yr old male here for initial consultation. He has been following up with [facility], Dr Ventura for rectal cancer and now with newly diagnosed Metastatic Melanoma. He is s/p closure of loop ileostomy on July 25, 2024. He continues routine exams with GI/ concerning his rectal cancer.   He had a diagnosis of melanoma in 2014, diagnosed by Dermatology, had resection and was under surveillance. Patient states that back in June/2024 his salazar noticed a left neck lump. He had CT imaging back in 07/2024 due to development of neck lump. Subsequent PET/CT imaging showed FDG Avid left neck mass which was biopsied with /ENT and significant for metastatic melanoma.  He has discussed systemic therapy with [facility]. He is recovering well since his surgery on 8/21/24 with . He denies any dysphagia or loss of appetite. He does have some mild numbness below left ear into upper jaw, but it is not too bothersome.  CT Neck 7/14/2024: IMPRESSION: Left level II station ovoid enhancing lesion measuring 2.2 cm. It is located just under the parotid gland. Diagnostic considerations include an exophytic parotid mass versus an enlarged abnormal lymph node. Consider tissue sampling.  FNA Pathology: 7/25/2024: Specimen(s) Submitted Left neck lymph node fluid Final Diagnosis LEFT NECK LYMPH NODES; U/S GUIDED, FNA: POSITIVE FOR MALIGNANT CELLS. - Metastatic malignant melanoma. See note. Note: immunostains performed on block 1A showing the tumor cells are positive for Sox 10 and Melan-A supporting the diagnosis.   Petscan 8/14/2024: IMPRESSION: 1.  2.2 x 1.6 cm centrally photopenic left neck mass with  peripheral FDG avidity (SUV max 3.2) consistent with biopsy-proven metastatic disease. 2.  No additional sites of abnormal FDG uptake.   Pathology: 8/21/2024: Final Diagnosis 1.  Left periparotid lymph node, left neck dissection: -  One large lymph node extensively replaced by metastatic melanoma, with foci of tumor in the regional soft tissue. -  Two additional tiny lymph nodes, 1 with metastatic melanoma (total 2/3 lymph nodes with metastatic melanoma). -  Small fragments of unremarkable parotid gland parenchyma are identified. Comment: Based on gross measurements and distribution of the tumor in the microscopic sections, the metastatic focus is about 3 cm in maximum dimension. Matted lymph nodes are not identified. 2.  Left level 2 lymph node, left neck dissection: - Metastatic melanoma in 1 of 3 lymph nodes (1/3). 3.  Left level 3 lymph node, left neck dissection: -  Fourteen lymph nodes negative for tumor (0/14).  MRI Head: 9/12/2024: IMPRESSION: No evidence of intracranial metastatic disease. Unremarkable study.

## 2024-09-20 NOTE — REVIEW OF SYSTEMS
[Fatigue] : fatigue [Lower Ext Edema] : lower extremity edema [Nocturia] : nocturia [Urinary Frequency] : urinary frequency [Joint Pain] : joint pain [Muscle Weakness] : muscle weakness [Negative] : Allergic/Immunologic [Urinary Ostomy] : no ~T urinary ostomy present [Muscle Pain] : no muscle pain [Disturbance Of Gait] : no gait disturbance [FreeTextEntry3] : Glasses [FreeTextEntry4] : Tinnitus [FreeTextEntry8] : BPH/ On finasteride and tamsulosin [FreeTextEntry9] : arthritis/ Sees a rheumatologist/

## 2024-09-20 NOTE — DISEASE MANAGEMENT
[IIIB] : IIIB [Pathological] : TNM Stage: p [FreeTextEntry4] : metastatic malignant melanoma [TTNM] : 2a [NTNM] : 2 [MTNM] : 0

## 2024-09-23 DIAGNOSIS — C79.89 SECONDARY MALIGNANT NEOPLASM OF OTHER SPECIFIED SITES: ICD-10-CM

## 2024-09-23 NOTE — ADDENDUM
[FreeTextEntry1] :  I, Shahida Schmitt ([facility]) assisted in filling out this chart under the dictation of Flynn Lorenzo on 09/18/2024

## 2024-09-23 NOTE — PHYSICAL EXAM
[FreeTextEntry1] : General: Awake, Alert, No Acute Distress Respiratory: Normal Respiratory Effort Abdomen: Soft, non-tender, non-distended, well-healed port sites and Pfannenstiel incision, right lower quadrant ileostomy site with less than a one-centimeter area of granulation tissue. This was treated with silver nitrate.

## 2024-09-23 NOTE — HISTORY OF PRESENT ILLNESS
[FreeTextEntry1] : Patient is a 74-year-old male with PMHx of BPH, spinal stenosis, and rheumatoid arthritis on methotrexate who presents for a post-operative office visit, s/p robotic low anterior resection with hand-sewn colo-anal anastomosis and diverting loop ileostomy on February 29, 2024. Pathology showed T1N0 (0/34 ) adenocarcinoma with negative margins. Patient is now s/p closure of loop ileostomy on July 25, 2024, pathology was benign. Since the patient's last office visit, he reports that he continues to do well. He underwent surgery for metastatic melanoma in the left neck. He's recovered well from that. He reports that he's tolerating a diet and has four to five bowel movements daily with clustering. He presents due to granulation tissue at his ileostomy closure site. He denies significant drainage. He denies a family history of colon cancer, rectal cancer, or inflammatory bowel disease.

## 2024-09-23 NOTE — END OF VISIT
[FreeTextEntry3] : Documented by Shahida Schmitt acting as a scribe for Flynn Lorenzo on 09/18/2024   All medical record entries made by the Scribe were at my, Dr. Flynn Lorenzo direction and personally dictated by me on 09/18/2024 . I have reviewed the chart and agree that the record accurately reflects my personal performance of the history, physical exam, assessment and plan. I have also personally directed, reviewed, and agreed with the chart.

## 2024-09-23 NOTE — ADDENDUM
[FreeTextEntry1] :  I, Shahida Schmitt ([facility]) assisted in filling out this chart under the dictation of Flynn Lorenzo on 09/18/2024

## 2024-09-23 NOTE — ASSESSMENT
[FreeTextEntry1] : 74-year-old male with stage one rectal adenocarcinoma, s/p robotic low anterior resection with hand-sewn coloanal anastomosis and diverting ileostomy. Now s/p ileostomy closure.   The patient continues to do well. He'll increase his fiber to improve his clustering. We will continue with local wound care for his ileostomy site. We'll see him back in November.

## 2024-09-25 ENCOUNTER — APPOINTMENT (OUTPATIENT)
Age: 74
End: 2024-09-25

## 2024-09-27 ENCOUNTER — APPOINTMENT (OUTPATIENT)
Age: 74
End: 2024-09-27

## 2024-09-27 ENCOUNTER — LABORATORY RESULT (OUTPATIENT)
Age: 74
End: 2024-09-27

## 2024-09-27 ENCOUNTER — OUTPATIENT (OUTPATIENT)
Dept: OUTPATIENT SERVICES | Facility: HOSPITAL | Age: 74
LOS: 1 days | End: 2024-09-27
Payer: MEDICARE

## 2024-09-27 DIAGNOSIS — C20 MALIGNANT NEOPLASM OF RECTUM: ICD-10-CM

## 2024-09-27 DIAGNOSIS — Z98.890 OTHER SPECIFIED POSTPROCEDURAL STATES: Chronic | ICD-10-CM

## 2024-09-27 DIAGNOSIS — Z90.49 ACQUIRED ABSENCE OF OTHER SPECIFIED PARTS OF DIGESTIVE TRACT: Chronic | ICD-10-CM

## 2024-09-27 LAB
HCT VFR BLD CALC: 36 %
HGB BLD-MCNC: 12 G/DL
MCHC RBC-ENTMCNC: 30.2 PG
MCHC RBC-ENTMCNC: 33.3 G/DL
MCV RBC AUTO: 90.5 FL
PLATELET # BLD AUTO: 222 K/UL
PMV BLD: 10.5 FL
RBC # BLD: 3.98 M/UL
RBC # FLD: 13.1 %
WBC # FLD AUTO: 8.99 K/UL

## 2024-09-27 PROCEDURE — 96413 CHEMO IV INFUSION 1 HR: CPT

## 2024-09-27 PROCEDURE — 85027 COMPLETE CBC AUTOMATED: CPT

## 2024-09-27 RX ORDER — PEMBROLIZUMAB 25 MG/ML
200 INJECTION, SOLUTION INTRAVENOUS ONCE
Refills: 0 | Status: COMPLETED | OUTPATIENT
Start: 2024-09-27 | End: 2024-09-27

## 2024-09-27 RX ADMIN — PEMBROLIZUMAB 200 MILLIGRAM(S): 25 INJECTION, SOLUTION INTRAVENOUS at 16:10

## 2024-09-27 RX ADMIN — PEMBROLIZUMAB 200 MILLIGRAM(S): 25 INJECTION, SOLUTION INTRAVENOUS at 15:40

## 2024-10-01 ENCOUNTER — APPOINTMENT (OUTPATIENT)
Dept: OTOLARYNGOLOGY | Facility: CLINIC | Age: 74
End: 2024-10-01
Payer: MEDICARE

## 2024-10-01 VITALS — HEIGHT: 74.5 IN | WEIGHT: 162 LBS | BODY MASS INDEX: 20.57 KG/M2

## 2024-10-01 DIAGNOSIS — C77.9 SECONDARY AND UNSPECIFIED MALIGNANT NEOPLASM OF LYMPH NODE, UNSPECIFIED: ICD-10-CM

## 2024-10-01 DIAGNOSIS — C43.4 MALIGNANT MELANOMA OF SCALP AND NECK: ICD-10-CM

## 2024-10-01 PROCEDURE — 99024 POSTOP FOLLOW-UP VISIT: CPT

## 2024-10-01 NOTE — REASON FOR VISIT
[Post-Operative Visit] : a post-operative visit [FreeTextEntry2] : s/p mariangel parotid LN and level 2/3  on 08/21/2024

## 2024-10-01 NOTE — HISTORY OF PRESENT ILLNESS
[de-identified] : Oncology Summary Stage: X3xX5dT1 (stage IIIB) Site: Left neck Pathology: Melanoma Treatment: WLE w/ negative SLNB 2014 [place], left parotidectomy and neck dissection 8/2024 (3/20 LN positive) Disease status: Postoperative [FreeTextEntry1] : Patient returns today s/p mariangel parotid LN and level 2/3 LND on 8/21/24. He has been doing well without complaints.

## 2024-10-04 ENCOUNTER — APPOINTMENT (OUTPATIENT)
Age: 74
End: 2024-10-04

## 2024-10-08 ENCOUNTER — OUTPATIENT (OUTPATIENT)
Dept: OUTPATIENT SERVICES | Facility: HOSPITAL | Age: 74
LOS: 1 days | End: 2024-10-08

## 2024-10-08 ENCOUNTER — APPOINTMENT (OUTPATIENT)
Dept: GASTROENTEROLOGY | Facility: CLINIC | Age: 74
End: 2024-10-08

## 2024-10-08 VITALS
HEART RATE: 68 BPM | BODY MASS INDEX: 20.79 KG/M2 | SYSTOLIC BLOOD PRESSURE: 123 MMHG | OXYGEN SATURATION: 98 % | DIASTOLIC BLOOD PRESSURE: 78 MMHG | TEMPERATURE: 97.2 F | HEIGHT: 74 IN | WEIGHT: 162 LBS

## 2024-10-08 DIAGNOSIS — K62.89 OTHER SPECIFIED DISEASES OF ANUS AND RECTUM: ICD-10-CM

## 2024-10-08 DIAGNOSIS — Z98.890 OTHER SPECIFIED POSTPROCEDURAL STATES: Chronic | ICD-10-CM

## 2024-10-08 DIAGNOSIS — K90.41 NON-CELIAC GLUTEN SENSITIVITY: ICD-10-CM

## 2024-10-08 DIAGNOSIS — C20 MALIGNANT NEOPLASM OF RECTUM: ICD-10-CM

## 2024-10-08 DIAGNOSIS — Z00.00 ENCOUNTER FOR GENERAL ADULT MEDICAL EXAMINATION WITHOUT ABNORMAL FINDINGS: ICD-10-CM

## 2024-10-08 DIAGNOSIS — Z90.49 ACQUIRED ABSENCE OF OTHER SPECIFIED PARTS OF DIGESTIVE TRACT: Chronic | ICD-10-CM

## 2024-10-08 PROCEDURE — 99214 OFFICE O/P EST MOD 30 MIN: CPT

## 2024-10-11 ENCOUNTER — APPOINTMENT (OUTPATIENT)
Age: 74
End: 2024-10-11

## 2024-10-15 ENCOUNTER — APPOINTMENT (OUTPATIENT)
Age: 74
End: 2024-10-15

## 2024-10-15 ENCOUNTER — LABORATORY RESULT (OUTPATIENT)
Age: 74
End: 2024-10-15

## 2024-10-15 ENCOUNTER — OUTPATIENT (OUTPATIENT)
Dept: OUTPATIENT SERVICES | Facility: HOSPITAL | Age: 74
LOS: 1 days | End: 2024-10-15
Payer: MEDICARE

## 2024-10-15 DIAGNOSIS — Z98.890 OTHER SPECIFIED POSTPROCEDURAL STATES: Chronic | ICD-10-CM

## 2024-10-15 DIAGNOSIS — C21.8 MALIGNANT NEOPLASM OF OVERLAPPING SITES OF RECTUM, ANUS AND ANAL CANAL: ICD-10-CM

## 2024-10-15 PROCEDURE — 82378 CARCINOEMBRYONIC ANTIGEN: CPT

## 2024-10-15 PROCEDURE — 85027 COMPLETE CBC AUTOMATED: CPT

## 2024-10-15 PROCEDURE — 84443 ASSAY THYROID STIM HORMONE: CPT

## 2024-10-15 PROCEDURE — 84439 ASSAY OF FREE THYROXINE: CPT

## 2024-10-15 PROCEDURE — 84481 FREE ASSAY (FT-3): CPT

## 2024-10-15 PROCEDURE — 84100 ASSAY OF PHOSPHORUS: CPT

## 2024-10-15 PROCEDURE — 80048 BASIC METABOLIC PNL TOTAL CA: CPT

## 2024-10-15 PROCEDURE — 36415 COLL VENOUS BLD VENIPUNCTURE: CPT

## 2024-10-15 PROCEDURE — 80076 HEPATIC FUNCTION PANEL: CPT

## 2024-10-16 DIAGNOSIS — C21.8 MALIGNANT NEOPLASM OF OVERLAPPING SITES OF RECTUM, ANUS AND ANAL CANAL: ICD-10-CM

## 2024-10-16 LAB
ALBUMIN SERPL ELPH-MCNC: 4.4 G/DL
ALP BLD-CCNC: 99 U/L
ALT SERPL-CCNC: 9 U/L
ANION GAP SERPL CALC-SCNC: 11 MMOL/L
AST SERPL-CCNC: 21 U/L
BILIRUB DIRECT SERPL-MCNC: 0.2 MG/DL
BILIRUB INDIRECT SERPL-MCNC: 0.2 MG/DL
BILIRUB SERPL-MCNC: 0.4 MG/DL
BUN SERPL-MCNC: 15 MG/DL
CALCIUM SERPL-MCNC: 10.1 MG/DL
CEA SERPL-MCNC: 2.2 NG/ML
CHLORIDE SERPL-SCNC: 104 MMOL/L
CO2 SERPL-SCNC: 26 MMOL/L
CREAT SERPL-MCNC: 0.8 MG/DL
EGFR: 94 ML/MIN/1.73M2
GLUCOSE SERPL-MCNC: 98 MG/DL
HCT VFR BLD CALC: 37.4 %
HGB BLD-MCNC: 12.2 G/DL
MCHC RBC-ENTMCNC: 29.8 PG
MCHC RBC-ENTMCNC: 32.6 G/DL
MCV RBC AUTO: 91.2 FL
PHOSPHATE SERPL-MCNC: 3.3 MG/DL
PLATELET # BLD AUTO: 236 K/UL
PMV BLD: 10.2 FL
POTASSIUM SERPL-SCNC: 4.4 MMOL/L
PROT SERPL-MCNC: 7.2 G/DL
RBC # BLD: 4.1 M/UL
RBC # FLD: 12.6 %
SODIUM SERPL-SCNC: 141 MMOL/L
T3FREE SERPL-MCNC: 2.46 PG/ML
T4 FREE SERPL-MCNC: 1 NG/DL
TSH SERPL-ACNC: 1.89 UIU/ML
WBC # FLD AUTO: 6.52 K/UL

## 2024-10-17 ENCOUNTER — APPOINTMENT (OUTPATIENT)
Age: 74
End: 2024-10-17
Payer: MEDICARE

## 2024-10-17 ENCOUNTER — OUTPATIENT (OUTPATIENT)
Dept: OUTPATIENT SERVICES | Facility: HOSPITAL | Age: 74
LOS: 1 days | End: 2024-10-17
Payer: MEDICARE

## 2024-10-17 VITALS
SYSTOLIC BLOOD PRESSURE: 126 MMHG | WEIGHT: 166 LBS | HEIGHT: 74 IN | BODY MASS INDEX: 21.3 KG/M2 | HEART RATE: 66 BPM | DIASTOLIC BLOOD PRESSURE: 73 MMHG | RESPIRATION RATE: 16 BRPM | TEMPERATURE: 98.1 F | OXYGEN SATURATION: 99 %

## 2024-10-17 VITALS
RESPIRATION RATE: 16 BRPM | DIASTOLIC BLOOD PRESSURE: 73 MMHG | HEART RATE: 66 BPM | SYSTOLIC BLOOD PRESSURE: 126 MMHG | TEMPERATURE: 98.1 F

## 2024-10-17 DIAGNOSIS — C43.4 MALIGNANT MELANOMA OF SCALP AND NECK: ICD-10-CM

## 2024-10-17 DIAGNOSIS — Z98.890 OTHER SPECIFIED POSTPROCEDURAL STATES: Chronic | ICD-10-CM

## 2024-10-17 DIAGNOSIS — C21.8 MALIGNANT NEOPLASM OF OVERLAPPING SITES OF RECTUM, ANUS AND ANAL CANAL: ICD-10-CM

## 2024-10-17 DIAGNOSIS — C77.9 SECONDARY AND UNSPECIFIED MALIGNANT NEOPLASM OF LYMPH NODE, UNSPECIFIED: ICD-10-CM

## 2024-10-17 DIAGNOSIS — Z90.49 ACQUIRED ABSENCE OF OTHER SPECIFIED PARTS OF DIGESTIVE TRACT: Chronic | ICD-10-CM

## 2024-10-17 PROCEDURE — G2211 COMPLEX E/M VISIT ADD ON: CPT

## 2024-10-17 PROCEDURE — 99215 OFFICE O/P EST HI 40 MIN: CPT

## 2024-10-17 PROCEDURE — 96413 CHEMO IV INFUSION 1 HR: CPT

## 2024-10-17 RX ORDER — PEMBROLIZUMAB 50 MG/2ML
200 INJECTION, POWDER, LYOPHILIZED, FOR SOLUTION INTRAVENOUS ONCE
Refills: 0 | Status: COMPLETED | OUTPATIENT
Start: 2024-10-17 | End: 2024-10-17

## 2024-10-17 RX ADMIN — PEMBROLIZUMAB 200 MILLIGRAM(S): 50 INJECTION, POWDER, LYOPHILIZED, FOR SOLUTION INTRAVENOUS at 15:13

## 2024-10-17 RX ADMIN — PEMBROLIZUMAB 200 MILLIGRAM(S): 50 INJECTION, POWDER, LYOPHILIZED, FOR SOLUTION INTRAVENOUS at 15:44

## 2024-10-22 ENCOUNTER — NON-APPOINTMENT (OUTPATIENT)
Age: 74
End: 2024-10-22

## 2024-11-04 ENCOUNTER — OUTPATIENT (OUTPATIENT)
Dept: OUTPATIENT SERVICES | Facility: HOSPITAL | Age: 74
LOS: 1 days | End: 2024-11-04
Payer: MEDICARE

## 2024-11-04 ENCOUNTER — LABORATORY RESULT (OUTPATIENT)
Age: 74
End: 2024-11-04

## 2024-11-04 ENCOUNTER — APPOINTMENT (OUTPATIENT)
Age: 74
End: 2024-11-04
Payer: MEDICARE

## 2024-11-04 VITALS
WEIGHT: 162 LBS | HEART RATE: 66 BPM | BODY MASS INDEX: 20.79 KG/M2 | HEIGHT: 74 IN | TEMPERATURE: 98.1 F | RESPIRATION RATE: 16 BRPM | OXYGEN SATURATION: 99 % | DIASTOLIC BLOOD PRESSURE: 78 MMHG | SYSTOLIC BLOOD PRESSURE: 141 MMHG

## 2024-11-04 DIAGNOSIS — Z98.890 OTHER SPECIFIED POSTPROCEDURAL STATES: Chronic | ICD-10-CM

## 2024-11-04 DIAGNOSIS — C21.8 MALIGNANT NEOPLASM OF OVERLAPPING SITES OF RECTUM, ANUS AND ANAL CANAL: ICD-10-CM

## 2024-11-04 DIAGNOSIS — Z90.49 ACQUIRED ABSENCE OF OTHER SPECIFIED PARTS OF DIGESTIVE TRACT: Chronic | ICD-10-CM

## 2024-11-04 DIAGNOSIS — C43.4 MALIGNANT MELANOMA OF SCALP AND NECK: ICD-10-CM

## 2024-11-04 DIAGNOSIS — C20 MALIGNANT NEOPLASM OF RECTUM: ICD-10-CM

## 2024-11-04 DIAGNOSIS — C77.9 SECONDARY AND UNSPECIFIED MALIGNANT NEOPLASM OF LYMPH NODE, UNSPECIFIED: ICD-10-CM

## 2024-11-04 LAB
ALBUMIN SERPL ELPH-MCNC: 4.5 G/DL
ALP BLD-CCNC: 92 U/L
ALT SERPL-CCNC: 11 U/L
ANION GAP SERPL CALC-SCNC: 12 MMOL/L
AST SERPL-CCNC: 21 U/L
BILIRUB DIRECT SERPL-MCNC: 0.2 MG/DL
BILIRUB INDIRECT SERPL-MCNC: 0.3 MG/DL
BILIRUB SERPL-MCNC: 0.5 MG/DL
BUN SERPL-MCNC: 15 MG/DL
CALCIUM SERPL-MCNC: 10 MG/DL
CHLORIDE SERPL-SCNC: 105 MMOL/L
CO2 SERPL-SCNC: 26 MMOL/L
CREAT SERPL-MCNC: 0.9 MG/DL
EGFR: 91 ML/MIN/1.73M2
GLUCOSE SERPL-MCNC: 101 MG/DL
HCT VFR BLD CALC: 37.4 %
HGB BLD-MCNC: 12.5 G/DL
MCHC RBC-ENTMCNC: 29.7 PG
MCHC RBC-ENTMCNC: 33.4 G/DL
MCV RBC AUTO: 88.8 FL
PHOSPHATE SERPL-MCNC: 3.3 MG/DL
PLATELET # BLD AUTO: 244 K/UL
PMV BLD: 10.2 FL
POTASSIUM SERPL-SCNC: 4.8 MMOL/L
PROT SERPL-MCNC: 7.2 G/DL
RBC # BLD: 4.21 M/UL
RBC # FLD: 12.7 %
SODIUM SERPL-SCNC: 143 MMOL/L
WBC # FLD AUTO: 7.09 K/UL

## 2024-11-04 PROCEDURE — G2211 COMPLEX E/M VISIT ADD ON: CPT

## 2024-11-04 PROCEDURE — 84443 ASSAY THYROID STIM HORMONE: CPT

## 2024-11-04 PROCEDURE — 84480 ASSAY TRIIODOTHYRONINE (T3): CPT

## 2024-11-04 PROCEDURE — 84439 ASSAY OF FREE THYROXINE: CPT

## 2024-11-04 PROCEDURE — 80048 BASIC METABOLIC PNL TOTAL CA: CPT

## 2024-11-04 PROCEDURE — 99214 OFFICE O/P EST MOD 30 MIN: CPT

## 2024-11-04 PROCEDURE — 80076 HEPATIC FUNCTION PANEL: CPT

## 2024-11-04 PROCEDURE — 84100 ASSAY OF PHOSPHORUS: CPT

## 2024-11-04 PROCEDURE — 85027 COMPLETE CBC AUTOMATED: CPT

## 2024-11-05 DIAGNOSIS — C20 MALIGNANT NEOPLASM OF RECTUM: ICD-10-CM

## 2024-11-05 LAB
T3 SERPL-MCNC: 86 NG/DL
T4 FREE SERPL-MCNC: 1.2 NG/DL
TSH SERPL-ACNC: 1.5 UIU/ML

## 2024-11-07 ENCOUNTER — OUTPATIENT (OUTPATIENT)
Dept: OUTPATIENT SERVICES | Facility: HOSPITAL | Age: 74
LOS: 1 days | End: 2024-11-07
Payer: MEDICARE

## 2024-11-07 ENCOUNTER — APPOINTMENT (OUTPATIENT)
Age: 74
End: 2024-11-07

## 2024-11-07 DIAGNOSIS — Z98.890 OTHER SPECIFIED POSTPROCEDURAL STATES: Chronic | ICD-10-CM

## 2024-11-07 DIAGNOSIS — Z90.49 ACQUIRED ABSENCE OF OTHER SPECIFIED PARTS OF DIGESTIVE TRACT: Chronic | ICD-10-CM

## 2024-11-07 DIAGNOSIS — C20 MALIGNANT NEOPLASM OF RECTUM: ICD-10-CM

## 2024-11-07 PROCEDURE — 96413 CHEMO IV INFUSION 1 HR: CPT

## 2024-11-07 RX ORDER — PEMBROLIZUMAB 50 MG/2ML
200 INJECTION, POWDER, LYOPHILIZED, FOR SOLUTION INTRAVENOUS ONCE
Refills: 0 | Status: COMPLETED | OUTPATIENT
Start: 2024-11-07 | End: 2024-11-07

## 2024-11-07 RX ADMIN — PEMBROLIZUMAB 200 MILLIGRAM(S): 50 INJECTION, POWDER, LYOPHILIZED, FOR SOLUTION INTRAVENOUS at 13:28

## 2024-11-08 ENCOUNTER — APPOINTMENT (OUTPATIENT)
Age: 74
End: 2024-11-08

## 2024-11-19 ENCOUNTER — APPOINTMENT (OUTPATIENT)
Dept: SURGERY | Facility: CLINIC | Age: 74
End: 2024-11-19

## 2024-11-19 VITALS
HEIGHT: 74 IN | BODY MASS INDEX: 20.79 KG/M2 | OXYGEN SATURATION: 99 % | TEMPERATURE: 97 F | SYSTOLIC BLOOD PRESSURE: 124 MMHG | WEIGHT: 162 LBS | DIASTOLIC BLOOD PRESSURE: 80 MMHG | HEART RATE: 65 BPM

## 2024-11-19 DIAGNOSIS — C20 MALIGNANT NEOPLASM OF RECTUM: ICD-10-CM

## 2024-11-19 PROCEDURE — G2211 COMPLEX E/M VISIT ADD ON: CPT

## 2024-11-19 PROCEDURE — 99213 OFFICE O/P EST LOW 20 MIN: CPT

## 2024-11-25 ENCOUNTER — OUTPATIENT (OUTPATIENT)
Dept: OUTPATIENT SERVICES | Facility: HOSPITAL | Age: 74
LOS: 1 days | End: 2024-11-25
Payer: MEDICARE

## 2024-11-25 ENCOUNTER — LABORATORY RESULT (OUTPATIENT)
Age: 74
End: 2024-11-25

## 2024-11-25 ENCOUNTER — APPOINTMENT (OUTPATIENT)
Age: 74
End: 2024-11-25

## 2024-11-25 DIAGNOSIS — Z98.890 OTHER SPECIFIED POSTPROCEDURAL STATES: Chronic | ICD-10-CM

## 2024-11-25 DIAGNOSIS — C20 MALIGNANT NEOPLASM OF RECTUM: ICD-10-CM

## 2024-11-25 DIAGNOSIS — Z90.49 ACQUIRED ABSENCE OF OTHER SPECIFIED PARTS OF DIGESTIVE TRACT: Chronic | ICD-10-CM

## 2024-11-25 DIAGNOSIS — C21.8 MALIGNANT NEOPLASM OF OVERLAPPING SITES OF RECTUM, ANUS AND ANAL CANAL: ICD-10-CM

## 2024-11-25 LAB
HCT VFR BLD CALC: 37 %
HGB BLD-MCNC: 12.4 G/DL
MCHC RBC-ENTMCNC: 29.3 PG
MCHC RBC-ENTMCNC: 33.5 G/DL
MCV RBC AUTO: 87.5 FL
PLATELET # BLD AUTO: 214 K/UL
PMV BLD: 9.8 FL
RBC # BLD: 4.23 M/UL
RBC # FLD: 13.1 %
WBC # FLD AUTO: 6.25 K/UL

## 2024-11-25 PROCEDURE — 84443 ASSAY THYROID STIM HORMONE: CPT

## 2024-11-25 PROCEDURE — 80053 COMPREHEN METABOLIC PANEL: CPT

## 2024-11-25 PROCEDURE — 85027 COMPLETE CBC AUTOMATED: CPT

## 2024-11-25 PROCEDURE — 36415 COLL VENOUS BLD VENIPUNCTURE: CPT

## 2024-11-26 DIAGNOSIS — C21.8 MALIGNANT NEOPLASM OF OVERLAPPING SITES OF RECTUM, ANUS AND ANAL CANAL: ICD-10-CM

## 2024-11-26 LAB
ALBUMIN SERPL ELPH-MCNC: 4.6 G/DL
ALP BLD-CCNC: 86 U/L
ALT SERPL-CCNC: 13 U/L
ANION GAP SERPL CALC-SCNC: 9 MMOL/L
AST SERPL-CCNC: 21 U/L
BILIRUB SERPL-MCNC: 0.5 MG/DL
BUN SERPL-MCNC: 14 MG/DL
CALCIUM SERPL-MCNC: 9.7 MG/DL
CHLORIDE SERPL-SCNC: 104 MMOL/L
CO2 SERPL-SCNC: 26 MMOL/L
CREAT SERPL-MCNC: 0.8 MG/DL
EGFR: 94 ML/MIN/1.73M2
GLUCOSE SERPL-MCNC: 108 MG/DL
POTASSIUM SERPL-SCNC: 4.3 MMOL/L
PROT SERPL-MCNC: 6.8 G/DL
SODIUM SERPL-SCNC: 139 MMOL/L
TSH SERPL-ACNC: 1.52 UIU/ML

## 2024-11-27 ENCOUNTER — APPOINTMENT (OUTPATIENT)
Age: 74
End: 2024-11-27

## 2024-11-27 ENCOUNTER — OUTPATIENT (OUTPATIENT)
Dept: OUTPATIENT SERVICES | Facility: HOSPITAL | Age: 74
LOS: 1 days | End: 2024-11-27
Payer: MEDICARE

## 2024-11-27 VITALS — HEART RATE: 64 BPM | SYSTOLIC BLOOD PRESSURE: 122 MMHG | TEMPERATURE: 98 F | DIASTOLIC BLOOD PRESSURE: 77 MMHG

## 2024-11-27 DIAGNOSIS — C20 MALIGNANT NEOPLASM OF RECTUM: ICD-10-CM

## 2024-11-27 DIAGNOSIS — C21.8 MALIGNANT NEOPLASM OF OVERLAPPING SITES OF RECTUM, ANUS AND ANAL CANAL: ICD-10-CM

## 2024-11-27 DIAGNOSIS — Z90.49 ACQUIRED ABSENCE OF OTHER SPECIFIED PARTS OF DIGESTIVE TRACT: Chronic | ICD-10-CM

## 2024-11-27 DIAGNOSIS — Z98.890 OTHER SPECIFIED POSTPROCEDURAL STATES: Chronic | ICD-10-CM

## 2024-11-27 PROCEDURE — 96413 CHEMO IV INFUSION 1 HR: CPT

## 2024-11-27 RX ORDER — PEMBROLIZUMAB 50 MG/2ML
200 INJECTION, POWDER, LYOPHILIZED, FOR SOLUTION INTRAVENOUS ONCE
Refills: 0 | Status: COMPLETED | OUTPATIENT
Start: 2024-11-27 | End: 2024-11-27

## 2024-11-27 RX ADMIN — PEMBROLIZUMAB 200 MILLIGRAM(S): 50 INJECTION, POWDER, LYOPHILIZED, FOR SOLUTION INTRAVENOUS at 15:10

## 2024-11-27 RX ADMIN — PEMBROLIZUMAB 200 MILLIGRAM(S): 50 INJECTION, POWDER, LYOPHILIZED, FOR SOLUTION INTRAVENOUS at 14:40

## 2024-11-28 DIAGNOSIS — C21.8 MALIGNANT NEOPLASM OF OVERLAPPING SITES OF RECTUM, ANUS AND ANAL CANAL: ICD-10-CM

## 2024-12-02 ENCOUNTER — APPOINTMENT (OUTPATIENT)
Dept: OTOLARYNGOLOGY | Facility: CLINIC | Age: 74
End: 2024-12-02
Payer: MEDICARE

## 2024-12-02 DIAGNOSIS — R22.1 LOCALIZED SWELLING, MASS AND LUMP, NECK: ICD-10-CM

## 2024-12-02 DIAGNOSIS — C43.4 MALIGNANT MELANOMA OF SCALP AND NECK: ICD-10-CM

## 2024-12-02 PROCEDURE — 99214 OFFICE O/P EST MOD 30 MIN: CPT | Mod: 25

## 2024-12-02 PROCEDURE — 11104 PUNCH BX SKIN SINGLE LESION: CPT

## 2024-12-09 LAB — CORE LAB BIOPSY: NORMAL

## 2024-12-11 RX ORDER — SODIUM SULFATE, POTASSIUM SULFATE AND MAGNESIUM SULFATE 1.6; 3.13; 17.5 G/177ML; G/177ML; G/177ML
17.5-3.13-1.6 SOLUTION ORAL
Qty: 1 | Refills: 0 | Status: ACTIVE | COMMUNITY
Start: 2024-12-11 | End: 1900-01-01

## 2024-12-12 ENCOUNTER — APPOINTMENT (OUTPATIENT)
Dept: OTOLARYNGOLOGY | Facility: CLINIC | Age: 74
End: 2024-12-12
Payer: MEDICARE

## 2024-12-12 PROCEDURE — 99214 OFFICE O/P EST MOD 30 MIN: CPT

## 2024-12-12 PROCEDURE — G2211 COMPLEX E/M VISIT ADD ON: CPT

## 2024-12-16 ENCOUNTER — NON-APPOINTMENT (OUTPATIENT)
Age: 74
End: 2024-12-16

## 2024-12-16 ENCOUNTER — LABORATORY RESULT (OUTPATIENT)
Age: 74
End: 2024-12-16

## 2024-12-16 ENCOUNTER — OUTPATIENT (OUTPATIENT)
Dept: OUTPATIENT SERVICES | Facility: HOSPITAL | Age: 74
LOS: 1 days | End: 2024-12-16
Payer: MEDICARE

## 2024-12-16 ENCOUNTER — APPOINTMENT (OUTPATIENT)
Age: 74
End: 2024-12-16

## 2024-12-16 DIAGNOSIS — Z98.890 OTHER SPECIFIED POSTPROCEDURAL STATES: Chronic | ICD-10-CM

## 2024-12-16 DIAGNOSIS — C21.8 MALIGNANT NEOPLASM OF OVERLAPPING SITES OF RECTUM, ANUS AND ANAL CANAL: ICD-10-CM

## 2024-12-16 DIAGNOSIS — Z90.49 ACQUIRED ABSENCE OF OTHER SPECIFIED PARTS OF DIGESTIVE TRACT: Chronic | ICD-10-CM

## 2024-12-16 LAB
ALBUMIN SERPL ELPH-MCNC: 4.4 G/DL
ALP BLD-CCNC: 87 U/L
ALT SERPL-CCNC: 13 U/L
ANION GAP SERPL CALC-SCNC: 13 MMOL/L
AST SERPL-CCNC: 26 U/L
BILIRUB SERPL-MCNC: 0.5 MG/DL
BUN SERPL-MCNC: 18 MG/DL
CALCIUM SERPL-MCNC: 9.6 MG/DL
CHLORIDE SERPL-SCNC: 101 MMOL/L
CO2 SERPL-SCNC: 26 MMOL/L
CREAT SERPL-MCNC: 0.8 MG/DL
EGFR: 94 ML/MIN/1.73M2
GLUCOSE SERPL-MCNC: 97 MG/DL
HCT VFR BLD CALC: 38.8 %
HGB BLD-MCNC: 12.7 G/DL
MCHC RBC-ENTMCNC: 29.4 PG
MCHC RBC-ENTMCNC: 32.7 G/DL
MCV RBC AUTO: 89.8 FL
PLATELET # BLD AUTO: 251 K/UL
PMV BLD: 9.5 FL
POTASSIUM SERPL-SCNC: 4.5 MMOL/L
PROT SERPL-MCNC: 7.2 G/DL
RBC # BLD: 4.32 M/UL
RBC # FLD: 13.2 %
SODIUM SERPL-SCNC: 140 MMOL/L
WBC # FLD AUTO: 8.07 K/UL

## 2024-12-16 PROCEDURE — 80053 COMPREHEN METABOLIC PANEL: CPT

## 2024-12-16 PROCEDURE — 85027 COMPLETE CBC AUTOMATED: CPT

## 2024-12-16 PROCEDURE — 36415 COLL VENOUS BLD VENIPUNCTURE: CPT

## 2024-12-16 PROCEDURE — 84443 ASSAY THYROID STIM HORMONE: CPT

## 2024-12-17 DIAGNOSIS — C21.8 MALIGNANT NEOPLASM OF OVERLAPPING SITES OF RECTUM, ANUS AND ANAL CANAL: ICD-10-CM

## 2024-12-17 LAB — TSH SERPL-ACNC: 1.79 UIU/ML

## 2024-12-19 ENCOUNTER — OUTPATIENT (OUTPATIENT)
Dept: OUTPATIENT SERVICES | Facility: HOSPITAL | Age: 74
LOS: 1 days | End: 2024-12-19
Payer: MEDICARE

## 2024-12-19 ENCOUNTER — APPOINTMENT (OUTPATIENT)
Age: 74
End: 2024-12-19
Payer: MEDICARE

## 2024-12-19 VITALS
OXYGEN SATURATION: 100 % | HEART RATE: 101 BPM | SYSTOLIC BLOOD PRESSURE: 128 MMHG | RESPIRATION RATE: 16 BRPM | BODY MASS INDEX: 20.15 KG/M2 | WEIGHT: 157 LBS | TEMPERATURE: 98 F | DIASTOLIC BLOOD PRESSURE: 76 MMHG | HEIGHT: 74 IN

## 2024-12-19 VITALS — HEART RATE: 72 BPM | TEMPERATURE: 97 F | SYSTOLIC BLOOD PRESSURE: 112 MMHG | DIASTOLIC BLOOD PRESSURE: 73 MMHG

## 2024-12-19 DIAGNOSIS — C77.9 SECONDARY AND UNSPECIFIED MALIGNANT NEOPLASM OF LYMPH NODE, UNSPECIFIED: ICD-10-CM

## 2024-12-19 DIAGNOSIS — Z98.890 OTHER SPECIFIED POSTPROCEDURAL STATES: Chronic | ICD-10-CM

## 2024-12-19 DIAGNOSIS — C21.8 MALIGNANT NEOPLASM OF OVERLAPPING SITES OF RECTUM, ANUS AND ANAL CANAL: ICD-10-CM

## 2024-12-19 DIAGNOSIS — Z90.49 ACQUIRED ABSENCE OF OTHER SPECIFIED PARTS OF DIGESTIVE TRACT: Chronic | ICD-10-CM

## 2024-12-19 DIAGNOSIS — C43.4 MALIGNANT MELANOMA OF SCALP AND NECK: ICD-10-CM

## 2024-12-19 PROCEDURE — G2211 COMPLEX E/M VISIT ADD ON: CPT

## 2024-12-19 PROCEDURE — 99215 OFFICE O/P EST HI 40 MIN: CPT

## 2024-12-19 PROCEDURE — 96413 CHEMO IV INFUSION 1 HR: CPT

## 2024-12-19 PROCEDURE — 96415 CHEMO IV INFUSION ADDL HR: CPT

## 2024-12-19 PROCEDURE — 96417 CHEMO IV INFUS EACH ADDL SEQ: CPT

## 2024-12-19 RX ORDER — IPILIMUMAB 5 MG/ML
210 INJECTION INTRAVENOUS ONCE
Refills: 0 | Status: COMPLETED | OUTPATIENT
Start: 2024-12-19 | End: 2024-12-19

## 2024-12-19 RX ORDER — ONDANSETRON 8 MG/1
8 TABLET ORAL 3 TIMES DAILY
Qty: 90 | Refills: 1 | Status: ACTIVE | COMMUNITY
Start: 2024-12-19 | End: 1900-01-01

## 2024-12-19 RX ORDER — NIVOLUMAB 10 MG/ML
70 INJECTION INTRAVENOUS ONCE
Refills: 0 | Status: COMPLETED | OUTPATIENT
Start: 2024-12-19 | End: 2024-12-19

## 2024-12-19 RX ADMIN — IPILIMUMAB 210 MILLIGRAM(S): 5 INJECTION INTRAVENOUS at 13:35

## 2024-12-19 RX ADMIN — NIVOLUMAB 70 MILLIGRAM(S): 10 INJECTION INTRAVENOUS at 12:55

## 2024-12-19 RX ADMIN — NIVOLUMAB 70 MILLIGRAM(S): 10 INJECTION INTRAVENOUS at 13:33

## 2024-12-19 RX ADMIN — IPILIMUMAB 210 MILLIGRAM(S): 5 INJECTION INTRAVENOUS at 15:15

## 2024-12-23 ENCOUNTER — NON-APPOINTMENT (OUTPATIENT)
Age: 74
End: 2024-12-23

## 2025-01-06 ENCOUNTER — OUTPATIENT (OUTPATIENT)
Dept: OUTPATIENT SERVICES | Facility: HOSPITAL | Age: 75
LOS: 1 days | End: 2025-01-06
Payer: MEDICARE

## 2025-01-06 ENCOUNTER — LABORATORY RESULT (OUTPATIENT)
Age: 75
End: 2025-01-06

## 2025-01-06 ENCOUNTER — APPOINTMENT (OUTPATIENT)
Age: 75
End: 2025-01-06

## 2025-01-06 DIAGNOSIS — Z90.49 ACQUIRED ABSENCE OF OTHER SPECIFIED PARTS OF DIGESTIVE TRACT: Chronic | ICD-10-CM

## 2025-01-06 DIAGNOSIS — Z98.890 OTHER SPECIFIED POSTPROCEDURAL STATES: Chronic | ICD-10-CM

## 2025-01-06 DIAGNOSIS — C21.8 MALIGNANT NEOPLASM OF OVERLAPPING SITES OF RECTUM, ANUS AND ANAL CANAL: ICD-10-CM

## 2025-01-06 PROCEDURE — 80053 COMPREHEN METABOLIC PANEL: CPT

## 2025-01-06 PROCEDURE — 85027 COMPLETE CBC AUTOMATED: CPT

## 2025-01-06 PROCEDURE — 36415 COLL VENOUS BLD VENIPUNCTURE: CPT

## 2025-01-06 PROCEDURE — 84443 ASSAY THYROID STIM HORMONE: CPT

## 2025-01-07 ENCOUNTER — OUTPATIENT (OUTPATIENT)
Dept: OUTPATIENT SERVICES | Facility: HOSPITAL | Age: 75
LOS: 1 days | End: 2025-01-07
Payer: MEDICARE

## 2025-01-07 ENCOUNTER — RESULT REVIEW (OUTPATIENT)
Age: 75
End: 2025-01-07

## 2025-01-07 DIAGNOSIS — Z98.890 OTHER SPECIFIED POSTPROCEDURAL STATES: Chronic | ICD-10-CM

## 2025-01-07 DIAGNOSIS — Z90.49 ACQUIRED ABSENCE OF OTHER SPECIFIED PARTS OF DIGESTIVE TRACT: Chronic | ICD-10-CM

## 2025-01-07 DIAGNOSIS — C21.8 MALIGNANT NEOPLASM OF OVERLAPPING SITES OF RECTUM, ANUS AND ANAL CANAL: ICD-10-CM

## 2025-01-07 DIAGNOSIS — Z00.8 ENCOUNTER FOR OTHER GENERAL EXAMINATION: ICD-10-CM

## 2025-01-07 DIAGNOSIS — C43.4 MALIGNANT MELANOMA OF SCALP AND NECK: ICD-10-CM

## 2025-01-07 DIAGNOSIS — C77.9 SECONDARY AND UNSPECIFIED MALIGNANT NEOPLASM OF LYMPH NODE, UNSPECIFIED: ICD-10-CM

## 2025-01-07 PROCEDURE — A9579: CPT

## 2025-01-07 PROCEDURE — 70553 MRI BRAIN STEM W/O & W/DYE: CPT

## 2025-01-07 PROCEDURE — 70553 MRI BRAIN STEM W/O & W/DYE: CPT | Mod: 26

## 2025-01-08 DIAGNOSIS — C77.9 SECONDARY AND UNSPECIFIED MALIGNANT NEOPLASM OF LYMPH NODE, UNSPECIFIED: ICD-10-CM

## 2025-01-08 DIAGNOSIS — C43.4 MALIGNANT MELANOMA OF SCALP AND NECK: ICD-10-CM

## 2025-01-08 NOTE — PRE-ANESTHESIA EVALUATION ADULT - NSANTHPROCED_GEN_ALL_CORE
Immediate Brief Procedure Note    Patient: Nicky Castanon    Pre-op Diagnosis: Tunneled catheter no longer needed    Post-op Diagnosis: Same    Procedure: right tunneled catheter removal     Proceduralist: YAW Robledo    Assistants: Haider Flowers     Anesthesia Staff: No anesthesia staff entered.    Anesthesia Type: 1%lidocaine     Findings: Catheter removed without fracture and tip intact     Estimated Blood Loss: less than 5cc    Complications: None immediate    Specimens Removed: None  
Arterial Catheter

## 2025-01-09 ENCOUNTER — APPOINTMENT (OUTPATIENT)
Age: 75
End: 2025-01-09
Payer: MEDICARE

## 2025-01-09 ENCOUNTER — APPOINTMENT (OUTPATIENT)
Dept: BURN CARE | Facility: CLINIC | Age: 75
End: 2025-01-09
Payer: MEDICARE

## 2025-01-09 ENCOUNTER — OUTPATIENT (OUTPATIENT)
Dept: OUTPATIENT SERVICES | Facility: HOSPITAL | Age: 75
LOS: 1 days | End: 2025-01-09
Payer: MEDICARE

## 2025-01-09 VITALS
SYSTOLIC BLOOD PRESSURE: 137 MMHG | OXYGEN SATURATION: 99 % | BODY MASS INDEX: 20.41 KG/M2 | WEIGHT: 159 LBS | HEIGHT: 74 IN | TEMPERATURE: 97.4 F | DIASTOLIC BLOOD PRESSURE: 78 MMHG | RESPIRATION RATE: 16 BRPM | HEART RATE: 74 BPM

## 2025-01-09 VITALS — DIASTOLIC BLOOD PRESSURE: 76 MMHG | HEART RATE: 70 BPM | OXYGEN SATURATION: 96 % | SYSTOLIC BLOOD PRESSURE: 125 MMHG

## 2025-01-09 DIAGNOSIS — Z90.49 ACQUIRED ABSENCE OF OTHER SPECIFIED PARTS OF DIGESTIVE TRACT: Chronic | ICD-10-CM

## 2025-01-09 DIAGNOSIS — C77.9 SECONDARY AND UNSPECIFIED MALIGNANT NEOPLASM OF LYMPH NODE, UNSPECIFIED: ICD-10-CM

## 2025-01-09 DIAGNOSIS — Z98.890 OTHER SPECIFIED POSTPROCEDURAL STATES: Chronic | ICD-10-CM

## 2025-01-09 DIAGNOSIS — C21.8 MALIGNANT NEOPLASM OF OVERLAPPING SITES OF RECTUM, ANUS AND ANAL CANAL: ICD-10-CM

## 2025-01-09 DIAGNOSIS — Z00.8 ENCOUNTER FOR OTHER GENERAL EXAMINATION: ICD-10-CM

## 2025-01-09 DIAGNOSIS — C43.4 MALIGNANT MELANOMA OF SCALP AND NECK: ICD-10-CM

## 2025-01-09 DIAGNOSIS — C20 MALIGNANT NEOPLASM OF RECTUM: ICD-10-CM

## 2025-01-09 PROCEDURE — 17250 CHEM CAUT OF GRANLTJ TISSUE: CPT

## 2025-01-09 PROCEDURE — 96417 CHEMO IV INFUS EACH ADDL SEQ: CPT

## 2025-01-09 PROCEDURE — 99214 OFFICE O/P EST MOD 30 MIN: CPT

## 2025-01-09 PROCEDURE — 96413 CHEMO IV INFUSION 1 HR: CPT

## 2025-01-09 RX ORDER — IPILIMUMAB 5 MG/ML
210 INJECTION INTRAVENOUS ONCE
Refills: 0 | Status: COMPLETED | OUTPATIENT
Start: 2025-01-09 | End: 2025-01-09

## 2025-01-09 RX ORDER — MUPIROCIN 20 MG/G
2 OINTMENT TOPICAL 3 TIMES DAILY
Qty: 3 | Refills: 2 | Status: ACTIVE | COMMUNITY
Start: 2025-01-09 | End: 1900-01-01

## 2025-01-09 RX ORDER — NIVOLUMAB 10 MG/ML
70 INJECTION INTRAVENOUS ONCE
Refills: 0 | Status: COMPLETED | OUTPATIENT
Start: 2025-01-09 | End: 2025-01-09

## 2025-01-09 RX ADMIN — IPILIMUMAB 210 MILLIGRAM(S): 5 INJECTION INTRAVENOUS at 14:55

## 2025-01-09 RX ADMIN — IPILIMUMAB 210 MILLIGRAM(S): 5 INJECTION INTRAVENOUS at 15:25

## 2025-01-09 RX ADMIN — NIVOLUMAB 70 MILLIGRAM(S): 10 INJECTION INTRAVENOUS at 14:50

## 2025-01-09 RX ADMIN — NIVOLUMAB 70 MILLIGRAM(S): 10 INJECTION INTRAVENOUS at 14:17

## 2025-01-13 DIAGNOSIS — C76.0 MALIGNANT NEOPLASM OF HEAD, FACE AND NECK: ICD-10-CM

## 2025-01-13 DIAGNOSIS — Z98.890 OTHER SPECIFIED POSTPROCEDURAL STATES: ICD-10-CM

## 2025-01-13 LAB
ALBUMIN SERPL ELPH-MCNC: 4.5 G/DL
ALP BLD-CCNC: 95 U/L
ALT SERPL-CCNC: 9 U/L
ANION GAP SERPL CALC-SCNC: 12 MMOL/L
AST SERPL-CCNC: 23 U/L
BILIRUB SERPL-MCNC: 0.5 MG/DL
BUN SERPL-MCNC: 13 MG/DL
CALCIUM SERPL-MCNC: 9.6 MG/DL
CHLORIDE SERPL-SCNC: 104 MMOL/L
CO2 SERPL-SCNC: 26 MMOL/L
CREAT SERPL-MCNC: 0.8 MG/DL
EGFR: 94 ML/MIN/1.73M2
GLUCOSE SERPL-MCNC: 88 MG/DL
HCT VFR BLD CALC: 37.4 %
HGB BLD-MCNC: 12.3 G/DL
MCHC RBC-ENTMCNC: 29.4 PG
MCHC RBC-ENTMCNC: 32.9 G/DL
MCV RBC AUTO: 89.3 FL
PLATELET # BLD AUTO: 256 K/UL
PMV BLD: 9.5 FL
POTASSIUM SERPL-SCNC: 4.2 MMOL/L
PROT SERPL-MCNC: 6.8 G/DL
RBC # BLD: 4.19 M/UL
RBC # FLD: 13.6 %
SODIUM SERPL-SCNC: 142 MMOL/L
TSH SERPL-ACNC: 1.75 UIU/ML
WBC # FLD AUTO: 7.34 K/UL

## 2025-01-17 ENCOUNTER — RESULT REVIEW (OUTPATIENT)
Age: 75
End: 2025-01-17

## 2025-01-17 ENCOUNTER — OUTPATIENT (OUTPATIENT)
Dept: OUTPATIENT SERVICES | Facility: HOSPITAL | Age: 75
LOS: 1 days | End: 2025-01-17
Payer: MEDICARE

## 2025-01-17 DIAGNOSIS — Z00.8 ENCOUNTER FOR OTHER GENERAL EXAMINATION: ICD-10-CM

## 2025-01-17 DIAGNOSIS — Z90.49 ACQUIRED ABSENCE OF OTHER SPECIFIED PARTS OF DIGESTIVE TRACT: Chronic | ICD-10-CM

## 2025-01-17 DIAGNOSIS — Z98.890 OTHER SPECIFIED POSTPROCEDURAL STATES: Chronic | ICD-10-CM

## 2025-01-17 DIAGNOSIS — C43.4 MALIGNANT MELANOMA OF SCALP AND NECK: ICD-10-CM

## 2025-01-17 PROCEDURE — 74177 CT ABD & PELVIS W/CONTRAST: CPT

## 2025-01-17 PROCEDURE — 71260 CT THORAX DX C+: CPT | Mod: 26

## 2025-01-17 PROCEDURE — 71260 CT THORAX DX C+: CPT

## 2025-01-17 PROCEDURE — 74177 CT ABD & PELVIS W/CONTRAST: CPT | Mod: 26

## 2025-01-18 DIAGNOSIS — C43.4 MALIGNANT MELANOMA OF SCALP AND NECK: ICD-10-CM

## 2025-01-21 ENCOUNTER — OUTPATIENT (OUTPATIENT)
Dept: OUTPATIENT SERVICES | Facility: HOSPITAL | Age: 75
LOS: 1 days | Discharge: HOME | End: 2025-01-21
Payer: MEDICARE

## 2025-01-21 ENCOUNTER — RESULT REVIEW (OUTPATIENT)
Age: 75
End: 2025-01-21

## 2025-01-21 VITALS
TEMPERATURE: 98 F | OXYGEN SATURATION: 100 % | RESPIRATION RATE: 18 BRPM | WEIGHT: 160.06 LBS | HEIGHT: 74 IN | HEART RATE: 63 BPM | DIASTOLIC BLOOD PRESSURE: 80 MMHG | SYSTOLIC BLOOD PRESSURE: 143 MMHG

## 2025-01-21 VITALS
RESPIRATION RATE: 20 BRPM | HEART RATE: 52 BPM | SYSTOLIC BLOOD PRESSURE: 161 MMHG | OXYGEN SATURATION: 100 % | DIASTOLIC BLOOD PRESSURE: 70 MMHG

## 2025-01-21 DIAGNOSIS — Z98.890 OTHER SPECIFIED POSTPROCEDURAL STATES: Chronic | ICD-10-CM

## 2025-01-21 DIAGNOSIS — Z90.49 ACQUIRED ABSENCE OF OTHER SPECIFIED PARTS OF DIGESTIVE TRACT: Chronic | ICD-10-CM

## 2025-01-21 DIAGNOSIS — C20 MALIGNANT NEOPLASM OF RECTUM: ICD-10-CM

## 2025-01-21 DIAGNOSIS — K62.89 OTHER SPECIFIED DISEASES OF ANUS AND RECTUM: ICD-10-CM

## 2025-01-21 PROCEDURE — 88312 SPECIAL STAINS GROUP 1: CPT

## 2025-01-21 PROCEDURE — 45380 COLONOSCOPY AND BIOPSY: CPT

## 2025-01-21 PROCEDURE — 43239 EGD BIOPSY SINGLE/MULTIPLE: CPT | Mod: XS

## 2025-01-21 PROCEDURE — 88305 TISSUE EXAM BY PATHOLOGIST: CPT | Mod: 26

## 2025-01-21 PROCEDURE — 88312 SPECIAL STAINS GROUP 1: CPT | Mod: 26

## 2025-01-21 PROCEDURE — 88305 TISSUE EXAM BY PATHOLOGIST: CPT

## 2025-01-21 RX ORDER — HYDROMORPHONE HCL 4 MG
0.5 TABLET ORAL
Refills: 0 | Status: DISCONTINUED | OUTPATIENT
Start: 2025-01-21 | End: 2025-01-21

## 2025-01-21 RX ORDER — SODIUM CHLORIDE 9 MG/ML
1000 INJECTION, SOLUTION INTRAVENOUS
Refills: 0 | Status: DISCONTINUED | OUTPATIENT
Start: 2025-01-21 | End: 2025-01-21

## 2025-01-21 RX ORDER — ONDANSETRON 4 MG/1
4 TABLET ORAL ONCE
Refills: 0 | Status: DISCONTINUED | OUTPATIENT
Start: 2025-01-21 | End: 2025-01-21

## 2025-01-21 NOTE — H&P PST ADULT - ASSESSMENT
74-year-old male with PMHx of BPH, spinal stenosis, and rheumatoid arthritis on methotrexate who presents for follow up, s/p robotic low anterior resection with hand-sewn colo-anal anastomosis and diverting loop ileostomy on February 29, 2024 with eventual reversal on 7/31/2024. Pathology showed T1N0 (0/34 ) adenocarcinoma with negative margins. Last Sigmoidoscopy done with [place] [facility] June 2024. He feels well overall but wants to check for Celiac.  ?  To discern celiac he can have Gluten again  Labs ordered  EGD with Duodenal Biopsies while on gluten diet  ?  #Rectal adenocarcinoma T1N0, S/P low anterior resection with colo-anal anastomosis and diverting loop ileostomy  - F/U with Dr. Lorenzo for postsurgical follow-up  - f/u Hem/Onc - no Chemotherapy was recommended  -fiber supplements to help bulk up the stool.  - Here for colonoscopy and EGD , risks vs benefits discussed

## 2025-01-21 NOTE — H&P PST ADULT - HISTORY OF PRESENT ILLNESS
74-year-old male with PMHx of BPH, spinal stenosis, and rheumatoid arthritis on methotrexate who presents for follow up, s/p robotic low anterior resection with hand-sewn colo-anal anastomosis and diverting loop ileostomy on February 29, 2024 with eventual reversal on 7/31/2024. Pathology showed T1N0 (0/34 ) adenocarcinoma with negative margins. Last Sigmoidoscopy done with [place] [facility] June 2024. He feels well overall but wants to check for Celiac.  ?  To discern celiac he can have Gluten again  Labs ordered  EGD with Duodenal Biopsies while on gluten diet  ?  #Rectal adenocarcinoma T1N0, S/P low anterior resection with colo-anal anastomosis and diverting loop ileostomy  - F/U with Dr. Lorenzo for postsurgical follow-up  - f/u Hem/Onc - no Chemotherapy was recommended  -fiber supplements to help bulk up the stool.  - Here for colonoscopy and EGD , risks vs benefits discussed

## 2025-01-21 NOTE — ASU DISCHARGE PLAN (ADULT/PEDIATRIC) - CARE PROVIDER_API CALL
Chucho Maddox  Gastroenterology  [address] [place] [id number]  Phone: [phone]  Fax: [phone]  Follow Up Time: 1 month

## 2025-01-21 NOTE — ASU DISCHARGE PLAN (ADULT/PEDIATRIC) - NS MD DC FALL RISK RISK
For information on Fall & Injury Prevention, visit: [email or web address] OR  [email or web address] OR  https://www.cdc.gov/steadi/patient.html

## 2025-01-21 NOTE — PRE-ANESTHESIA EVALUATION ADULT - NSANTHPMHFT_GEN_ALL_CORE
74-year-old male with PMHx of BPH, spinal stenosis, and rheumatoid arthritis on methotrexate who presents for follow up, s/p robotic low anterior resection with hand-sewn colo-anal anastomosis and diverting loop ileostomy on February 29, 2024 with eventual reversal on 7/31/2024. Pathology showed T1N0 (0/34 ) adenocarcinoma with negative margins. Last Sigmoidoscopy done with [place] [facility] June 2024. He feels well overall but wants to check for Celiac.

## 2025-01-21 NOTE — ASU DISCHARGE PLAN (ADULT/PEDIATRIC) - FINANCIAL ASSISTANCE
[facility] provides services at a reduced cost to those who are determined to be eligible through [facility]’s financial assistance program. Information regarding [facility]’s financial assistance program can be found by going to [email or web address] or by calling [phone].

## 2025-01-22 LAB
SURGICAL PATHOLOGY STUDY: SIGNIFICANT CHANGE UP
SURGICAL PATHOLOGY STUDY: SIGNIFICANT CHANGE UP

## 2025-01-23 ENCOUNTER — OUTPATIENT (OUTPATIENT)
Dept: OUTPATIENT SERVICES | Facility: HOSPITAL | Age: 75
LOS: 1 days | End: 2025-01-23
Payer: MEDICARE

## 2025-01-23 ENCOUNTER — APPOINTMENT (OUTPATIENT)
Dept: BURN CARE | Facility: CLINIC | Age: 75
End: 2025-01-23
Payer: MEDICARE

## 2025-01-23 VITALS — DIASTOLIC BLOOD PRESSURE: 62 MMHG | OXYGEN SATURATION: 97 % | HEART RATE: 61 BPM | SYSTOLIC BLOOD PRESSURE: 109 MMHG

## 2025-01-23 DIAGNOSIS — Z98.890 OTHER SPECIFIED POSTPROCEDURAL STATES: Chronic | ICD-10-CM

## 2025-01-23 DIAGNOSIS — Z90.49 ACQUIRED ABSENCE OF OTHER SPECIFIED PARTS OF DIGESTIVE TRACT: Chronic | ICD-10-CM

## 2025-01-23 DIAGNOSIS — Z00.8 ENCOUNTER FOR OTHER GENERAL EXAMINATION: ICD-10-CM

## 2025-01-23 PROCEDURE — 99214 OFFICE O/P EST MOD 30 MIN: CPT

## 2025-01-24 DIAGNOSIS — K31.89 OTHER DISEASES OF STOMACH AND DUODENUM: ICD-10-CM

## 2025-01-24 DIAGNOSIS — Z08 ENCOUNTER FOR FOLLOW-UP EXAMINATION AFTER COMPLETED TREATMENT FOR MALIGNANT NEOPLASM: ICD-10-CM

## 2025-01-24 DIAGNOSIS — N40.0 BENIGN PROSTATIC HYPERPLASIA WITHOUT LOWER URINARY TRACT SYMPTOMS: ICD-10-CM

## 2025-01-24 DIAGNOSIS — Z98.890 OTHER SPECIFIED POSTPROCEDURAL STATES: ICD-10-CM

## 2025-01-24 DIAGNOSIS — R19.7 DIARRHEA, UNSPECIFIED: ICD-10-CM

## 2025-01-24 DIAGNOSIS — Z85.820 PERSONAL HISTORY OF MALIGNANT MELANOMA OF SKIN: ICD-10-CM

## 2025-01-24 DIAGNOSIS — K31.4 GASTRIC DIVERTICULUM: ICD-10-CM

## 2025-01-24 DIAGNOSIS — Z98.0 INTESTINAL BYPASS AND ANASTOMOSIS STATUS: ICD-10-CM

## 2025-01-24 DIAGNOSIS — Z85.048 PERSONAL HISTORY OF OTHER MALIGNANT NEOPLASM OF RECTUM, RECTOSIGMOID JUNCTION, AND ANUS: ICD-10-CM

## 2025-01-24 DIAGNOSIS — K29.50 UNSPECIFIED CHRONIC GASTRITIS WITHOUT BLEEDING: ICD-10-CM

## 2025-01-24 DIAGNOSIS — K44.9 DIAPHRAGMATIC HERNIA WITHOUT OBSTRUCTION OR GANGRENE: ICD-10-CM

## 2025-01-24 DIAGNOSIS — K64.9 UNSPECIFIED HEMORRHOIDS: ICD-10-CM

## 2025-01-24 DIAGNOSIS — C76.0 MALIGNANT NEOPLASM OF HEAD, FACE AND NECK: ICD-10-CM

## 2025-01-24 DIAGNOSIS — K62.89 OTHER SPECIFIED DISEASES OF ANUS AND RECTUM: ICD-10-CM

## 2025-01-27 ENCOUNTER — OUTPATIENT (OUTPATIENT)
Dept: OUTPATIENT SERVICES | Facility: HOSPITAL | Age: 75
LOS: 1 days | End: 2025-01-27
Payer: MEDICARE

## 2025-01-27 ENCOUNTER — LABORATORY RESULT (OUTPATIENT)
Age: 75
End: 2025-01-27

## 2025-01-27 ENCOUNTER — APPOINTMENT (OUTPATIENT)
Age: 75
End: 2025-01-27

## 2025-01-27 DIAGNOSIS — Z98.890 OTHER SPECIFIED POSTPROCEDURAL STATES: Chronic | ICD-10-CM

## 2025-01-27 DIAGNOSIS — C21.8 MALIGNANT NEOPLASM OF OVERLAPPING SITES OF RECTUM, ANUS AND ANAL CANAL: ICD-10-CM

## 2025-01-27 DIAGNOSIS — Z90.49 ACQUIRED ABSENCE OF OTHER SPECIFIED PARTS OF DIGESTIVE TRACT: Chronic | ICD-10-CM

## 2025-01-27 LAB
ALBUMIN SERPL ELPH-MCNC: 4 G/DL
ALP BLD-CCNC: 103 U/L
ALT SERPL-CCNC: 13 U/L
ANION GAP SERPL CALC-SCNC: 8 MMOL/L
AST SERPL-CCNC: 26 U/L
BILIRUB DIRECT SERPL-MCNC: 0.2 MG/DL
BILIRUB INDIRECT SERPL-MCNC: 0.2 MG/DL
BILIRUB SERPL-MCNC: 0.4 MG/DL
BUN SERPL-MCNC: 20 MG/DL
CALCIUM SERPL-MCNC: 9.8 MG/DL
CHLORIDE SERPL-SCNC: 101 MMOL/L
CO2 SERPL-SCNC: 28 MMOL/L
CREAT SERPL-MCNC: 0.8 MG/DL
EGFR: 94 ML/MIN/1.73M2
GLUCOSE SERPL-MCNC: 102 MG/DL
HCT VFR BLD CALC: 35.6 %
HGB BLD-MCNC: 11.4 G/DL
MCHC RBC-ENTMCNC: 28.8 PG
MCHC RBC-ENTMCNC: 32 G/DL
MCV RBC AUTO: 89.9 FL
PHOSPHATE SERPL-MCNC: 3.6 MG/DL
PLATELET # BLD AUTO: 317 K/UL
PMV BLD: 9.5 FL
POTASSIUM SERPL-SCNC: 4.5 MMOL/L
PROT SERPL-MCNC: 6.3 G/DL
RBC # BLD: 3.96 M/UL
RBC # FLD: 13.9 %
SODIUM SERPL-SCNC: 137 MMOL/L
WBC # FLD AUTO: 9 K/UL

## 2025-01-27 PROCEDURE — 80048 BASIC METABOLIC PNL TOTAL CA: CPT

## 2025-01-27 PROCEDURE — 84100 ASSAY OF PHOSPHORUS: CPT

## 2025-01-27 PROCEDURE — 36415 COLL VENOUS BLD VENIPUNCTURE: CPT

## 2025-01-27 PROCEDURE — 80076 HEPATIC FUNCTION PANEL: CPT

## 2025-01-27 PROCEDURE — 84443 ASSAY THYROID STIM HORMONE: CPT

## 2025-01-27 PROCEDURE — 84436 ASSAY OF TOTAL THYROXINE: CPT

## 2025-01-27 PROCEDURE — 84439 ASSAY OF FREE THYROXINE: CPT

## 2025-01-27 PROCEDURE — 85027 COMPLETE CBC AUTOMATED: CPT

## 2025-01-27 NOTE — ASU PATIENT PROFILE, ADULT - AS SC BRADEN FRICTION
Faxed a Xarelto plan to cardiologist, Dr. Deangelo Edouard's office. Confirmed the fax number with the office to be [phone].  The patient has already placed a call to Dr. Edouard's office to seen when she should stop her Xarelto, but is awaiting a return call.  She will call PAT if she receives instructions.  DOS: 2/10/2025   (3) no apparent problem

## 2025-01-28 LAB
T4 FREE SERPL-MCNC: 1 NG/DL
T4 SERPL-MCNC: 7.2 UG/DL
TSH SERPL-ACNC: 2.99 UIU/ML

## 2025-01-30 ENCOUNTER — OUTPATIENT (OUTPATIENT)
Dept: OUTPATIENT SERVICES | Facility: HOSPITAL | Age: 75
LOS: 1 days | End: 2025-01-30
Payer: MEDICARE

## 2025-01-30 ENCOUNTER — APPOINTMENT (OUTPATIENT)
Age: 75
End: 2025-01-30
Payer: MEDICARE

## 2025-01-30 VITALS
HEART RATE: 77 BPM | BODY MASS INDEX: 21.05 KG/M2 | WEIGHT: 164 LBS | HEIGHT: 74 IN | TEMPERATURE: 97.4 F | SYSTOLIC BLOOD PRESSURE: 131 MMHG | OXYGEN SATURATION: 100 % | RESPIRATION RATE: 16 BRPM | DIASTOLIC BLOOD PRESSURE: 75 MMHG

## 2025-01-30 VITALS — SYSTOLIC BLOOD PRESSURE: 131 MMHG | HEART RATE: 72 BPM | TEMPERATURE: 97 F | DIASTOLIC BLOOD PRESSURE: 75 MMHG

## 2025-01-30 VITALS — DIASTOLIC BLOOD PRESSURE: 80 MMHG | SYSTOLIC BLOOD PRESSURE: 140 MMHG

## 2025-01-30 DIAGNOSIS — C43.4 MALIGNANT MELANOMA OF SCALP AND NECK: ICD-10-CM

## 2025-01-30 DIAGNOSIS — Z90.49 ACQUIRED ABSENCE OF OTHER SPECIFIED PARTS OF DIGESTIVE TRACT: Chronic | ICD-10-CM

## 2025-01-30 DIAGNOSIS — Z98.890 OTHER SPECIFIED POSTPROCEDURAL STATES: Chronic | ICD-10-CM

## 2025-01-30 DIAGNOSIS — C21.8 MALIGNANT NEOPLASM OF OVERLAPPING SITES OF RECTUM, ANUS AND ANAL CANAL: ICD-10-CM

## 2025-01-30 DIAGNOSIS — C77.9 SECONDARY AND UNSPECIFIED MALIGNANT NEOPLASM OF LYMPH NODE, UNSPECIFIED: ICD-10-CM

## 2025-01-30 PROCEDURE — 45380 COLONOSCOPY AND BIOPSY: CPT

## 2025-01-30 PROCEDURE — 99215 OFFICE O/P EST HI 40 MIN: CPT

## 2025-01-30 PROCEDURE — 43239 EGD BIOPSY SINGLE/MULTIPLE: CPT | Mod: XS

## 2025-01-30 PROCEDURE — 96413 CHEMO IV INFUSION 1 HR: CPT

## 2025-01-30 PROCEDURE — 96417 CHEMO IV INFUS EACH ADDL SEQ: CPT

## 2025-01-30 RX ORDER — NIVOLUMAB 10 MG/ML
70 INJECTION INTRAVENOUS ONCE
Refills: 0 | Status: COMPLETED | OUTPATIENT
Start: 2025-01-30 | End: 2025-01-30

## 2025-01-30 RX ORDER — IPILIMUMAB 5 MG/ML
210 INJECTION INTRAVENOUS ONCE
Refills: 0 | Status: COMPLETED | OUTPATIENT
Start: 2025-01-30 | End: 2025-01-30

## 2025-01-30 RX ADMIN — IPILIMUMAB 210 MILLIGRAM(S): 5 INJECTION INTRAVENOUS at 14:50

## 2025-01-30 RX ADMIN — NIVOLUMAB 70 MILLIGRAM(S): 10 INJECTION INTRAVENOUS at 14:15

## 2025-01-30 RX ADMIN — IPILIMUMAB 210 MILLIGRAM(S): 5 INJECTION INTRAVENOUS at 16:20

## 2025-01-30 RX ADMIN — NIVOLUMAB 70 MILLIGRAM(S): 10 INJECTION INTRAVENOUS at 14:45

## 2025-01-31 DIAGNOSIS — S11.90XD UNSPECIFIED OPEN WOUND OF UNSPECIFIED PART OF NECK, SUBSEQUENT ENCOUNTER: ICD-10-CM

## 2025-01-31 RX ORDER — MUPIROCIN 20 MG/G
2 OINTMENT TOPICAL
Qty: 2 | Refills: 4 | Status: ACTIVE | COMMUNITY
Start: 2025-01-31 | End: 1900-01-01

## 2025-02-03 ENCOUNTER — APPOINTMENT (OUTPATIENT)
Dept: GASTROENTEROLOGY | Facility: CLINIC | Age: 75
End: 2025-02-03
Payer: MEDICARE

## 2025-02-03 DIAGNOSIS — R19.5 OTHER FECAL ABNORMALITIES: ICD-10-CM

## 2025-02-03 PROCEDURE — 99214 OFFICE O/P EST MOD 30 MIN: CPT

## 2025-02-03 PROCEDURE — G2211 COMPLEX E/M VISIT ADD ON: CPT

## 2025-02-04 ENCOUNTER — APPOINTMENT (OUTPATIENT)
Facility: CLINIC | Age: 75
End: 2025-02-04
Payer: MEDICARE

## 2025-02-04 ENCOUNTER — NON-APPOINTMENT (OUTPATIENT)
Age: 75
End: 2025-02-04

## 2025-02-04 PROCEDURE — 99204 OFFICE O/P NEW MOD 45 MIN: CPT

## 2025-02-04 PROCEDURE — 92134 CPTRZ OPH DX IMG PST SGM RTA: CPT

## 2025-02-04 PROCEDURE — 92202 OPSCPY EXTND ON/MAC DRAW: CPT

## 2025-02-05 ENCOUNTER — OUTPATIENT (OUTPATIENT)
Dept: OUTPATIENT SERVICES | Facility: HOSPITAL | Age: 75
LOS: 1 days | End: 2025-02-05
Payer: MEDICARE

## 2025-02-05 ENCOUNTER — RESULT REVIEW (OUTPATIENT)
Age: 75
End: 2025-02-05

## 2025-02-05 DIAGNOSIS — Z98.890 OTHER SPECIFIED POSTPROCEDURAL STATES: Chronic | ICD-10-CM

## 2025-02-05 DIAGNOSIS — Z90.49 ACQUIRED ABSENCE OF OTHER SPECIFIED PARTS OF DIGESTIVE TRACT: Chronic | ICD-10-CM

## 2025-02-05 DIAGNOSIS — C43.4 MALIGNANT MELANOMA OF SCALP AND NECK: ICD-10-CM

## 2025-02-05 PROCEDURE — 70491 CT SOFT TISSUE NECK W/DYE: CPT | Mod: 26

## 2025-02-05 PROCEDURE — 70491 CT SOFT TISSUE NECK W/DYE: CPT

## 2025-02-06 DIAGNOSIS — C43.4 MALIGNANT MELANOMA OF SCALP AND NECK: ICD-10-CM

## 2025-02-10 ENCOUNTER — APPOINTMENT (OUTPATIENT)
Dept: OTOLARYNGOLOGY | Facility: CLINIC | Age: 75
End: 2025-02-10
Payer: MEDICARE

## 2025-02-10 VITALS — BODY MASS INDEX: 21.17 KG/M2 | WEIGHT: 165 LBS | HEIGHT: 74 IN

## 2025-02-10 PROCEDURE — G2211 COMPLEX E/M VISIT ADD ON: CPT

## 2025-02-10 PROCEDURE — 99214 OFFICE O/P EST MOD 30 MIN: CPT

## 2025-02-13 ENCOUNTER — APPOINTMENT (OUTPATIENT)
Dept: OTOLARYNGOLOGY | Facility: CLINIC | Age: 75
End: 2025-02-13
Payer: MEDICARE

## 2025-02-13 PROCEDURE — 14041 TIS TRNFR F/C/C/M/N/A/G/H/F: CPT

## 2025-02-13 PROCEDURE — 21554 EXC NECK TUM DEEP 5 CM/>: CPT

## 2025-02-13 RX ORDER — CEPHALEXIN 500 MG/1
500 CAPSULE ORAL 3 TIMES DAILY
Qty: 15 | Refills: 0 | Status: ACTIVE | COMMUNITY
Start: 2025-02-13 | End: 1900-01-01

## 2025-02-14 ENCOUNTER — OUTPATIENT (OUTPATIENT)
Dept: OUTPATIENT SERVICES | Facility: HOSPITAL | Age: 75
LOS: 1 days | End: 2025-02-14
Payer: MEDICARE

## 2025-02-14 ENCOUNTER — APPOINTMENT (OUTPATIENT)
Dept: RADIATION ONCOLOGY | Facility: HOSPITAL | Age: 75
End: 2025-02-14
Payer: MEDICARE

## 2025-02-14 VITALS
WEIGHT: 170.13 LBS | OXYGEN SATURATION: 99 % | TEMPERATURE: 98.2 F | BODY MASS INDEX: 21.84 KG/M2 | DIASTOLIC BLOOD PRESSURE: 77 MMHG | RESPIRATION RATE: 16 BRPM | HEART RATE: 66 BPM | SYSTOLIC BLOOD PRESSURE: 122 MMHG

## 2025-02-14 DIAGNOSIS — Z98.890 OTHER SPECIFIED POSTPROCEDURAL STATES: Chronic | ICD-10-CM

## 2025-02-14 DIAGNOSIS — Z90.49 ACQUIRED ABSENCE OF OTHER SPECIFIED PARTS OF DIGESTIVE TRACT: Chronic | ICD-10-CM

## 2025-02-14 DIAGNOSIS — C79.89 SECONDARY MALIGNANT NEOPLASM OF OTHER SPECIFIED SITES: ICD-10-CM

## 2025-02-14 PROCEDURE — 99214 OFFICE O/P EST MOD 30 MIN: CPT

## 2025-02-14 RX ORDER — PREDNISOLONE ACETATE 10 MG/ML
1 SUSPENSION/ DROPS OPHTHALMIC
Qty: 5 | Refills: 0 | Status: ACTIVE | COMMUNITY
Start: 2025-01-20

## 2025-02-14 RX ORDER — HYDROCORTISONE 25 MG/G
2.5 CREAM TOPICAL
Qty: 60 | Refills: 0 | Status: ACTIVE | COMMUNITY
Start: 2024-10-18

## 2025-02-14 RX ORDER — CLINDAMYCIN PHOSPHATE TOPICAL SOLUTION USP, 1% 10 MG/ML
1 SOLUTION TOPICAL
Qty: 60 | Refills: 0 | Status: ACTIVE | COMMUNITY
Start: 2024-11-20

## 2025-02-14 RX ORDER — METRONIDAZOLE 7.5 MG/G
0.75 GEL TOPICAL
Qty: 45 | Refills: 0 | Status: ACTIVE | COMMUNITY
Start: 2024-09-04

## 2025-02-14 RX ORDER — NEOMYCIN AND POLYMYXIN B SULFATES AND DEXAMETHASONE 3.5; 10000; 1 MG/G; [IU]/G; MG/G
3.5-10000-0.1 OINTMENT OPHTHALMIC
Qty: 4 | Refills: 0 | Status: ACTIVE | COMMUNITY
Start: 2025-02-04

## 2025-02-14 RX ORDER — LORAZEPAM 0.5 MG/1
0.5 TABLET ORAL
Qty: 30 | Refills: 0 | Status: ACTIVE | COMMUNITY
Start: 2024-12-10

## 2025-02-18 ENCOUNTER — OUTPATIENT (OUTPATIENT)
Dept: OUTPATIENT SERVICES | Facility: HOSPITAL | Age: 75
LOS: 1 days | End: 2025-02-18
Payer: MEDICARE

## 2025-02-18 ENCOUNTER — APPOINTMENT (OUTPATIENT)
Age: 75
End: 2025-02-18

## 2025-02-18 DIAGNOSIS — Z98.890 OTHER SPECIFIED POSTPROCEDURAL STATES: Chronic | ICD-10-CM

## 2025-02-18 DIAGNOSIS — C21.8 MALIGNANT NEOPLASM OF OVERLAPPING SITES OF RECTUM, ANUS AND ANAL CANAL: ICD-10-CM

## 2025-02-18 DIAGNOSIS — Z90.49 ACQUIRED ABSENCE OF OTHER SPECIFIED PARTS OF DIGESTIVE TRACT: Chronic | ICD-10-CM

## 2025-02-18 LAB
AUTO BASOPHILS #: 0.04 K/UL
AUTO BASOPHILS %: 0.5 %
AUTO EOSINOPHILS #: 0.45 K/UL
AUTO EOSINOPHILS %: 5.3 %
AUTO IMMATURE GRANULOCYTES #: 0.02 K/UL
AUTO LYMPHOCYTES #: 2.02 K/UL
AUTO LYMPHOCYTES %: 23.6 %
AUTO MONOCYTES #: 0.67 K/UL
AUTO MONOCYTES %: 7.8 %
AUTO NEUTROPHILS #: 5.36 K/UL
AUTO NEUTROPHILS %: 62.6 %
AUTO NRBC #: 0 K/UL
HCT VFR BLD CALC: 37.5 %
HGB BLD-MCNC: 12.2 G/DL
IMM GRANULOCYTES NFR BLD AUTO: 0.2 %
MAN DIFF?: NORMAL
MCHC RBC-ENTMCNC: 29.4 PG
MCHC RBC-ENTMCNC: 32.5 G/DL
MCV RBC AUTO: 90.4 FL
PLATELET # BLD AUTO: 266 K/UL
PMV BLD AUTO: 0 /100 WBCS
PMV BLD: 9.7 FL
RBC # BLD: 4.15 M/UL
RBC # FLD: 13.5 %
WBC # FLD AUTO: 8.56 K/UL

## 2025-02-18 PROCEDURE — 80076 HEPATIC FUNCTION PANEL: CPT

## 2025-02-18 PROCEDURE — 84100 ASSAY OF PHOSPHORUS: CPT

## 2025-02-18 PROCEDURE — 85025 COMPLETE CBC W/AUTO DIFF WBC: CPT

## 2025-02-18 PROCEDURE — 84443 ASSAY THYROID STIM HORMONE: CPT

## 2025-02-18 PROCEDURE — 36415 COLL VENOUS BLD VENIPUNCTURE: CPT

## 2025-02-18 PROCEDURE — 84436 ASSAY OF TOTAL THYROXINE: CPT

## 2025-02-18 PROCEDURE — 80048 BASIC METABOLIC PNL TOTAL CA: CPT

## 2025-02-19 DIAGNOSIS — C21.8 MALIGNANT NEOPLASM OF OVERLAPPING SITES OF RECTUM, ANUS AND ANAL CANAL: ICD-10-CM

## 2025-02-19 LAB
ALBUMIN SERPL ELPH-MCNC: 4.3 G/DL
ALP BLD-CCNC: 111 U/L
ALT SERPL-CCNC: 15 U/L
ANION GAP SERPL CALC-SCNC: 10 MMOL/L
AST SERPL-CCNC: 29 U/L
BILIRUB DIRECT SERPL-MCNC: <0.2 MG/DL
BILIRUB INDIRECT SERPL-MCNC: >0.3 MG/DL
BILIRUB SERPL-MCNC: 0.5 MG/DL
BUN SERPL-MCNC: 21 MG/DL
CALCIUM SERPL-MCNC: 9.6 MG/DL
CHLORIDE SERPL-SCNC: 104 MMOL/L
CO2 SERPL-SCNC: 26 MMOL/L
CREAT SERPL-MCNC: 0.8 MG/DL
EGFR: 94 ML/MIN/1.73M2
GLUCOSE SERPL-MCNC: 88 MG/DL
PHOSPHATE SERPL-MCNC: 3.8 MG/DL
POTASSIUM SERPL-SCNC: 4 MMOL/L
PROT SERPL-MCNC: 7 G/DL
SODIUM SERPL-SCNC: 140 MMOL/L
T4 SERPL-MCNC: 8.1 UG/DL
TSH SERPL-ACNC: 2.38 UIU/ML

## 2025-02-20 ENCOUNTER — APPOINTMENT (OUTPATIENT)
Age: 75
End: 2025-02-20
Payer: MEDICARE

## 2025-02-20 ENCOUNTER — OUTPATIENT (OUTPATIENT)
Dept: OUTPATIENT SERVICES | Facility: HOSPITAL | Age: 75
LOS: 1 days | End: 2025-02-20
Payer: MEDICARE

## 2025-02-20 ENCOUNTER — APPOINTMENT (OUTPATIENT)
Dept: BURN CARE | Facility: CLINIC | Age: 75
End: 2025-02-20
Payer: MEDICARE

## 2025-02-20 VITALS
BODY MASS INDEX: 21.56 KG/M2 | OXYGEN SATURATION: 99 % | SYSTOLIC BLOOD PRESSURE: 125 MMHG | WEIGHT: 168 LBS | DIASTOLIC BLOOD PRESSURE: 75 MMHG | RESPIRATION RATE: 16 BRPM | HEIGHT: 74 IN | HEART RATE: 68 BPM | TEMPERATURE: 97.4 F

## 2025-02-20 VITALS
HEIGHT: 74 IN | WEIGHT: 170 LBS | DIASTOLIC BLOOD PRESSURE: 74 MMHG | OXYGEN SATURATION: 96 % | SYSTOLIC BLOOD PRESSURE: 124 MMHG | BODY MASS INDEX: 21.82 KG/M2 | HEART RATE: 78 BPM

## 2025-02-20 VITALS — HEART RATE: 62 BPM | TEMPERATURE: 99 F | SYSTOLIC BLOOD PRESSURE: 127 MMHG | DIASTOLIC BLOOD PRESSURE: 84 MMHG

## 2025-02-20 DIAGNOSIS — C21.8 MALIGNANT NEOPLASM OF OVERLAPPING SITES OF RECTUM, ANUS AND ANAL CANAL: ICD-10-CM

## 2025-02-20 DIAGNOSIS — Z90.49 ACQUIRED ABSENCE OF OTHER SPECIFIED PARTS OF DIGESTIVE TRACT: Chronic | ICD-10-CM

## 2025-02-20 DIAGNOSIS — Z00.8 ENCOUNTER FOR OTHER GENERAL EXAMINATION: ICD-10-CM

## 2025-02-20 DIAGNOSIS — Z98.890 OTHER SPECIFIED POSTPROCEDURAL STATES: Chronic | ICD-10-CM

## 2025-02-20 PROCEDURE — 96417 CHEMO IV INFUS EACH ADDL SEQ: CPT

## 2025-02-20 PROCEDURE — 99214 OFFICE O/P EST MOD 30 MIN: CPT

## 2025-02-20 PROCEDURE — 99215 OFFICE O/P EST HI 40 MIN: CPT

## 2025-02-20 PROCEDURE — 96413 CHEMO IV INFUSION 1 HR: CPT

## 2025-02-20 RX ORDER — NIVOLUMAB 10 MG/ML
75 INJECTION INTRAVENOUS ONCE
Refills: 0 | Status: COMPLETED | OUTPATIENT
Start: 2025-02-20 | End: 2025-02-20

## 2025-02-20 RX ORDER — IPILIMUMAB 5 MG/ML
225 INJECTION INTRAVENOUS ONCE
Refills: 0 | Status: COMPLETED | OUTPATIENT
Start: 2025-02-20 | End: 2025-02-20

## 2025-02-20 RX ADMIN — IPILIMUMAB 225 MILLIGRAM(S): 5 INJECTION INTRAVENOUS at 16:06

## 2025-02-20 RX ADMIN — NIVOLUMAB 75 MILLIGRAM(S): 10 INJECTION INTRAVENOUS at 13:28

## 2025-02-20 RX ADMIN — NIVOLUMAB 75 MILLIGRAM(S): 10 INJECTION INTRAVENOUS at 14:00

## 2025-02-20 RX ADMIN — IPILIMUMAB 225 MILLIGRAM(S): 5 INJECTION INTRAVENOUS at 14:30

## 2025-02-21 ENCOUNTER — OUTPATIENT (OUTPATIENT)
Dept: OUTPATIENT SERVICES | Facility: HOSPITAL | Age: 75
LOS: 1 days | End: 2025-02-21
Payer: MEDICARE

## 2025-02-21 DIAGNOSIS — Z90.49 ACQUIRED ABSENCE OF OTHER SPECIFIED PARTS OF DIGESTIVE TRACT: Chronic | ICD-10-CM

## 2025-02-21 DIAGNOSIS — Z98.890 OTHER SPECIFIED POSTPROCEDURAL STATES: Chronic | ICD-10-CM

## 2025-02-21 DIAGNOSIS — Z98.890 OTHER SPECIFIED POSTPROCEDURAL STATES: ICD-10-CM

## 2025-02-21 DIAGNOSIS — C79.89 SECONDARY MALIGNANT NEOPLASM OF OTHER SPECIFIED SITES: ICD-10-CM

## 2025-02-21 DIAGNOSIS — C76.0 MALIGNANT NEOPLASM OF HEAD, FACE AND NECK: ICD-10-CM

## 2025-02-21 PROCEDURE — 77263 THER RADIOLOGY TX PLNG CPLX: CPT

## 2025-02-21 PROCEDURE — 77334 RADIATION TREATMENT AID(S): CPT | Mod: 26

## 2025-02-21 PROCEDURE — 77334 RADIATION TREATMENT AID(S): CPT

## 2025-02-26 ENCOUNTER — APPOINTMENT (OUTPATIENT)
Dept: OTOLARYNGOLOGY | Facility: CLINIC | Age: 75
End: 2025-02-26
Payer: MEDICARE

## 2025-02-26 VITALS — WEIGHT: 168 LBS | BODY MASS INDEX: 21.56 KG/M2 | HEIGHT: 74 IN

## 2025-02-26 DIAGNOSIS — R22.1 LOCALIZED SWELLING, MASS AND LUMP, NECK: ICD-10-CM

## 2025-02-26 PROCEDURE — 99213 OFFICE O/P EST LOW 20 MIN: CPT | Mod: 24

## 2025-03-04 ENCOUNTER — OUTPATIENT (OUTPATIENT)
Dept: OUTPATIENT SERVICES | Facility: HOSPITAL | Age: 75
LOS: 1 days | End: 2025-03-04
Payer: MEDICARE

## 2025-03-04 ENCOUNTER — APPOINTMENT (OUTPATIENT)
Age: 75
End: 2025-03-04

## 2025-03-04 DIAGNOSIS — C21.8 MALIGNANT NEOPLASM OF OVERLAPPING SITES OF RECTUM, ANUS AND ANAL CANAL: ICD-10-CM

## 2025-03-04 DIAGNOSIS — Z98.890 OTHER SPECIFIED POSTPROCEDURAL STATES: Chronic | ICD-10-CM

## 2025-03-04 DIAGNOSIS — Z90.49 ACQUIRED ABSENCE OF OTHER SPECIFIED PARTS OF DIGESTIVE TRACT: Chronic | ICD-10-CM

## 2025-03-04 LAB
ALBUMIN SERPL ELPH-MCNC: 4.2 G/DL
ALP BLD-CCNC: 121 U/L
ALT SERPL-CCNC: 68 U/L
ANION GAP SERPL CALC-SCNC: 11 MMOL/L
AST SERPL-CCNC: 73 U/L
AUTO BASOPHILS #: 0.05 K/UL
AUTO BASOPHILS %: 0.7 %
AUTO EOSINOPHILS #: 0.41 K/UL
AUTO EOSINOPHILS %: 6.1 %
AUTO IMMATURE GRANULOCYTES #: 0.01 K/UL
AUTO LYMPHOCYTES #: 1.61 K/UL
AUTO LYMPHOCYTES %: 23.9 %
AUTO MONOCYTES #: 0.71 K/UL
AUTO MONOCYTES %: 10.5 %
AUTO NEUTROPHILS #: 3.94 K/UL
AUTO NEUTROPHILS %: 58.7 %
AUTO NRBC #: 0 K/UL
BILIRUB DIRECT SERPL-MCNC: <0.2 MG/DL
BILIRUB INDIRECT SERPL-MCNC: >0.2 MG/DL
BILIRUB SERPL-MCNC: 0.4 MG/DL
BUN SERPL-MCNC: 19 MG/DL
CALCIUM SERPL-MCNC: 9.2 MG/DL
CHLORIDE SERPL-SCNC: 102 MMOL/L
CO2 SERPL-SCNC: 26 MMOL/L
CREAT SERPL-MCNC: 0.9 MG/DL
EGFRCR SERPLBLD CKD-EPI 2021: 91 ML/MIN/1.73M2
GLUCOSE SERPL-MCNC: 91 MG/DL
HCT VFR BLD CALC: 35.9 %
HGB BLD-MCNC: 11.6 G/DL
IMM GRANULOCYTES NFR BLD AUTO: 0.1 %
MAN DIFF?: NORMAL
MCHC RBC-ENTMCNC: 29.3 PG
MCHC RBC-ENTMCNC: 32.3 G/DL
MCV RBC AUTO: 90.7 FL
PHOSPHATE SERPL-MCNC: 3 MG/DL
PLATELET # BLD AUTO: 274 K/UL
PMV BLD AUTO: 0 /100 WBCS
PMV BLD: 9.6 FL
POTASSIUM SERPL-SCNC: 4.4 MMOL/L
PROT SERPL-MCNC: 6.4 G/DL
RBC # BLD: 3.96 M/UL
RBC # FLD: 13.1 %
SODIUM SERPL-SCNC: 139 MMOL/L
WBC # FLD AUTO: 6.73 K/UL

## 2025-03-04 PROCEDURE — 84439 ASSAY OF FREE THYROXINE: CPT

## 2025-03-04 PROCEDURE — 36415 COLL VENOUS BLD VENIPUNCTURE: CPT

## 2025-03-04 PROCEDURE — 84443 ASSAY THYROID STIM HORMONE: CPT

## 2025-03-04 PROCEDURE — 80076 HEPATIC FUNCTION PANEL: CPT

## 2025-03-04 PROCEDURE — 80048 BASIC METABOLIC PNL TOTAL CA: CPT

## 2025-03-04 PROCEDURE — 84100 ASSAY OF PHOSPHORUS: CPT

## 2025-03-04 PROCEDURE — 85025 COMPLETE CBC W/AUTO DIFF WBC: CPT

## 2025-03-04 PROCEDURE — 84481 FREE ASSAY (FT-3): CPT

## 2025-03-05 ENCOUNTER — APPOINTMENT (OUTPATIENT)
Age: 75
End: 2025-03-05
Payer: MEDICARE

## 2025-03-05 ENCOUNTER — OUTPATIENT (OUTPATIENT)
Dept: OUTPATIENT SERVICES | Facility: HOSPITAL | Age: 75
LOS: 1 days | End: 2025-03-05
Payer: MEDICARE

## 2025-03-05 VITALS
DIASTOLIC BLOOD PRESSURE: 77 MMHG | SYSTOLIC BLOOD PRESSURE: 137 MMHG | TEMPERATURE: 97.9 F | OXYGEN SATURATION: 98 % | HEIGHT: 74 IN | BODY MASS INDEX: 22.59 KG/M2 | RESPIRATION RATE: 16 BRPM | WEIGHT: 176 LBS | HEART RATE: 73 BPM

## 2025-03-05 DIAGNOSIS — Z98.890 OTHER SPECIFIED POSTPROCEDURAL STATES: Chronic | ICD-10-CM

## 2025-03-05 DIAGNOSIS — R79.89 OTHER SPECIFIED ABNORMAL FINDINGS OF BLOOD CHEMISTRY: ICD-10-CM

## 2025-03-05 DIAGNOSIS — D64.9 ANEMIA, UNSPECIFIED: ICD-10-CM

## 2025-03-05 DIAGNOSIS — C21.8 MALIGNANT NEOPLASM OF OVERLAPPING SITES OF RECTUM, ANUS AND ANAL CANAL: ICD-10-CM

## 2025-03-05 DIAGNOSIS — C43.4 MALIGNANT MELANOMA OF SCALP AND NECK: ICD-10-CM

## 2025-03-05 LAB
ALBUMIN SERPL ELPH-MCNC: 4.1 G/DL
ALBUMIN SERPL ELPH-MCNC: 4.2 G/DL
ALP BLD-CCNC: 112 U/L
ALP BLD-CCNC: 113 U/L
ALT SERPL-CCNC: 69 U/L
ALT SERPL-CCNC: 72 U/L
AST SERPL-CCNC: 76 U/L
AST SERPL-CCNC: 91 U/L
BILIRUB DIRECT SERPL-MCNC: <0.2 MG/DL
BILIRUB DIRECT SERPL-MCNC: <0.2 MG/DL
BILIRUB INDIRECT SERPL-MCNC: >0 MG/DL
BILIRUB INDIRECT SERPL-MCNC: >0.1 MG/DL
BILIRUB SERPL-MCNC: 0.2 MG/DL
BILIRUB SERPL-MCNC: 0.3 MG/DL
GGT SERPL-CCNC: 33 U/L
GGT SERPL-CCNC: 38 U/L
LDH SERPL-CCNC: 203 U/L
PROT SERPL-MCNC: 6.3 G/DL
PROT SERPL-MCNC: 6.4 G/DL
T3FREE SERPL-MCNC: 2.79 PG/ML
T4 FREE SERPL-MCNC: 1.1 NG/DL
TSH SERPL-ACNC: 2.27 UIU/ML

## 2025-03-05 PROCEDURE — 77301 RADIOTHERAPY DOSE PLAN IMRT: CPT

## 2025-03-05 PROCEDURE — 82977 ASSAY OF GGT: CPT | Mod: 59

## 2025-03-05 PROCEDURE — 99215 OFFICE O/P EST HI 40 MIN: CPT | Mod: 25

## 2025-03-05 PROCEDURE — 77300 RADIATION THERAPY DOSE PLAN: CPT | Mod: 26

## 2025-03-05 PROCEDURE — 77300 RADIATION THERAPY DOSE PLAN: CPT

## 2025-03-05 PROCEDURE — 77301 RADIOTHERAPY DOSE PLAN IMRT: CPT | Mod: 26

## 2025-03-05 PROCEDURE — 96360 HYDRATION IV INFUSION INIT: CPT

## 2025-03-05 PROCEDURE — 99215 OFFICE O/P EST HI 40 MIN: CPT

## 2025-03-05 PROCEDURE — 77338 DESIGN MLC DEVICE FOR IMRT: CPT | Mod: 26

## 2025-03-05 PROCEDURE — 83615 LACTATE (LD) (LDH) ENZYME: CPT

## 2025-03-05 PROCEDURE — 36415 COLL VENOUS BLD VENIPUNCTURE: CPT

## 2025-03-05 PROCEDURE — 77338 DESIGN MLC DEVICE FOR IMRT: CPT

## 2025-03-05 PROCEDURE — 96361 HYDRATE IV INFUSION ADD-ON: CPT

## 2025-03-05 PROCEDURE — 80076 HEPATIC FUNCTION PANEL: CPT

## 2025-03-06 ENCOUNTER — OUTPATIENT (OUTPATIENT)
Dept: OUTPATIENT SERVICES | Facility: HOSPITAL | Age: 75
LOS: 1 days | End: 2025-03-06

## 2025-03-06 DIAGNOSIS — Z98.890 OTHER SPECIFIED POSTPROCEDURAL STATES: Chronic | ICD-10-CM

## 2025-03-06 DIAGNOSIS — C79.89 SECONDARY MALIGNANT NEOPLASM OF OTHER SPECIFIED SITES: ICD-10-CM

## 2025-03-06 PROCEDURE — 77334 RADIATION TREATMENT AID(S): CPT | Mod: 26

## 2025-03-06 PROCEDURE — 77387B: CUSTOM | Mod: 26

## 2025-03-06 PROCEDURE — 77427 RADIATION TX MANAGEMENT X5: CPT

## 2025-03-07 ENCOUNTER — OUTPATIENT (OUTPATIENT)
Dept: OUTPATIENT SERVICES | Facility: HOSPITAL | Age: 75
LOS: 1 days | End: 2025-03-07

## 2025-03-07 DIAGNOSIS — Z98.890 OTHER SPECIFIED POSTPROCEDURAL STATES: Chronic | ICD-10-CM

## 2025-03-07 DIAGNOSIS — Z90.49 ACQUIRED ABSENCE OF OTHER SPECIFIED PARTS OF DIGESTIVE TRACT: Chronic | ICD-10-CM

## 2025-03-07 DIAGNOSIS — C79.89 SECONDARY MALIGNANT NEOPLASM OF OTHER SPECIFIED SITES: ICD-10-CM

## 2025-03-08 DIAGNOSIS — C79.89 SECONDARY MALIGNANT NEOPLASM OF OTHER SPECIFIED SITES: ICD-10-CM

## 2025-03-10 ENCOUNTER — OUTPATIENT (OUTPATIENT)
Dept: OUTPATIENT SERVICES | Facility: HOSPITAL | Age: 75
LOS: 1 days | End: 2025-03-10
Payer: MEDICARE

## 2025-03-10 ENCOUNTER — APPOINTMENT (OUTPATIENT)
Age: 75
End: 2025-03-10

## 2025-03-10 ENCOUNTER — OUTPATIENT (OUTPATIENT)
Dept: OUTPATIENT SERVICES | Facility: HOSPITAL | Age: 75
LOS: 1 days | End: 2025-03-10

## 2025-03-10 DIAGNOSIS — Z90.49 ACQUIRED ABSENCE OF OTHER SPECIFIED PARTS OF DIGESTIVE TRACT: Chronic | ICD-10-CM

## 2025-03-10 DIAGNOSIS — Z98.890 OTHER SPECIFIED POSTPROCEDURAL STATES: Chronic | ICD-10-CM

## 2025-03-10 DIAGNOSIS — C21.8 MALIGNANT NEOPLASM OF OVERLAPPING SITES OF RECTUM, ANUS AND ANAL CANAL: ICD-10-CM

## 2025-03-10 DIAGNOSIS — C79.89 SECONDARY MALIGNANT NEOPLASM OF OTHER SPECIFIED SITES: ICD-10-CM

## 2025-03-10 LAB
ALBUMIN SERPL ELPH-MCNC: 4.3 G/DL
ALP BLD-CCNC: 131 U/L
ALT SERPL-CCNC: 186 U/L
AST SERPL-CCNC: 183 U/L
BILIRUB DIRECT SERPL-MCNC: 0.2 MG/DL
BILIRUB INDIRECT SERPL-MCNC: 0.3 MG/DL
BILIRUB SERPL-MCNC: 0.5 MG/DL
GGT SERPL-CCNC: 53 U/L
PROT SERPL-MCNC: 6.7 G/DL

## 2025-03-10 PROCEDURE — 77387B: CUSTOM | Mod: 26

## 2025-03-10 PROCEDURE — 36415 COLL VENOUS BLD VENIPUNCTURE: CPT

## 2025-03-10 PROCEDURE — 82977 ASSAY OF GGT: CPT

## 2025-03-10 PROCEDURE — 80076 HEPATIC FUNCTION PANEL: CPT

## 2025-03-11 ENCOUNTER — RESULT REVIEW (OUTPATIENT)
Age: 75
End: 2025-03-11

## 2025-03-11 ENCOUNTER — OUTPATIENT (OUTPATIENT)
Dept: OUTPATIENT SERVICES | Facility: HOSPITAL | Age: 75
LOS: 1 days | End: 2025-03-11
Payer: MEDICARE

## 2025-03-11 ENCOUNTER — NON-APPOINTMENT (OUTPATIENT)
Age: 75
End: 2025-03-11

## 2025-03-11 ENCOUNTER — OUTPATIENT (OUTPATIENT)
Dept: OUTPATIENT SERVICES | Facility: HOSPITAL | Age: 75
LOS: 1 days | End: 2025-03-11

## 2025-03-11 VITALS
HEART RATE: 75 BPM | SYSTOLIC BLOOD PRESSURE: 126 MMHG | DIASTOLIC BLOOD PRESSURE: 75 MMHG | BODY MASS INDEX: 22.02 KG/M2 | OXYGEN SATURATION: 97 % | RESPIRATION RATE: 16 BRPM | TEMPERATURE: 98 F | WEIGHT: 171.5 LBS

## 2025-03-11 DIAGNOSIS — Z98.890 OTHER SPECIFIED POSTPROCEDURAL STATES: Chronic | ICD-10-CM

## 2025-03-11 DIAGNOSIS — Z00.8 ENCOUNTER FOR OTHER GENERAL EXAMINATION: ICD-10-CM

## 2025-03-11 DIAGNOSIS — R79.89 OTHER SPECIFIED ABNORMAL FINDINGS OF BLOOD CHEMISTRY: ICD-10-CM

## 2025-03-11 DIAGNOSIS — C79.89 SECONDARY MALIGNANT NEOPLASM OF OTHER SPECIFIED SITES: ICD-10-CM

## 2025-03-11 PROCEDURE — 76700 US EXAM ABDOM COMPLETE: CPT

## 2025-03-11 PROCEDURE — 76700 US EXAM ABDOM COMPLETE: CPT | Mod: 26

## 2025-03-11 PROCEDURE — 77387B: CUSTOM | Mod: 26

## 2025-03-12 ENCOUNTER — OUTPATIENT (OUTPATIENT)
Dept: OUTPATIENT SERVICES | Facility: HOSPITAL | Age: 75
LOS: 1 days | End: 2025-03-12

## 2025-03-12 DIAGNOSIS — R79.89 OTHER SPECIFIED ABNORMAL FINDINGS OF BLOOD CHEMISTRY: ICD-10-CM

## 2025-03-12 DIAGNOSIS — C79.89 SECONDARY MALIGNANT NEOPLASM OF OTHER SPECIFIED SITES: ICD-10-CM

## 2025-03-12 DIAGNOSIS — Z98.890 OTHER SPECIFIED POSTPROCEDURAL STATES: Chronic | ICD-10-CM

## 2025-03-12 PROCEDURE — 77014: CPT | Mod: 26

## 2025-03-13 ENCOUNTER — NON-APPOINTMENT (OUTPATIENT)
Age: 75
End: 2025-03-13

## 2025-03-13 ENCOUNTER — OUTPATIENT (OUTPATIENT)
Dept: OUTPATIENT SERVICES | Facility: HOSPITAL | Age: 75
LOS: 1 days | End: 2025-03-13

## 2025-03-13 DIAGNOSIS — Z98.890 OTHER SPECIFIED POSTPROCEDURAL STATES: Chronic | ICD-10-CM

## 2025-03-13 DIAGNOSIS — Z90.49 ACQUIRED ABSENCE OF OTHER SPECIFIED PARTS OF DIGESTIVE TRACT: Chronic | ICD-10-CM

## 2025-03-13 DIAGNOSIS — C79.89 SECONDARY MALIGNANT NEOPLASM OF OTHER SPECIFIED SITES: ICD-10-CM

## 2025-03-13 PROCEDURE — 77427 RADIATION TX MANAGEMENT X5: CPT

## 2025-03-13 PROCEDURE — 77387B: CUSTOM | Mod: 26

## 2025-03-17 ENCOUNTER — OUTPATIENT (OUTPATIENT)
Dept: OUTPATIENT SERVICES | Facility: HOSPITAL | Age: 75
LOS: 1 days | End: 2025-03-17

## 2025-03-17 DIAGNOSIS — Z90.49 ACQUIRED ABSENCE OF OTHER SPECIFIED PARTS OF DIGESTIVE TRACT: Chronic | ICD-10-CM

## 2025-03-17 PROCEDURE — 77387B: CUSTOM | Mod: 26

## 2025-03-18 ENCOUNTER — NON-APPOINTMENT (OUTPATIENT)
Age: 75
End: 2025-03-18

## 2025-03-18 ENCOUNTER — OUTPATIENT (OUTPATIENT)
Dept: OUTPATIENT SERVICES | Facility: HOSPITAL | Age: 75
LOS: 1 days | End: 2025-03-18

## 2025-03-18 VITALS
HEART RATE: 74 BPM | TEMPERATURE: 98.2 F | RESPIRATION RATE: 16 BRPM | WEIGHT: 168.38 LBS | SYSTOLIC BLOOD PRESSURE: 143 MMHG | BODY MASS INDEX: 21.62 KG/M2 | OXYGEN SATURATION: 97 % | DIASTOLIC BLOOD PRESSURE: 82 MMHG

## 2025-03-18 DIAGNOSIS — Z98.890 OTHER SPECIFIED POSTPROCEDURAL STATES: Chronic | ICD-10-CM

## 2025-03-18 DIAGNOSIS — C79.89 SECONDARY MALIGNANT NEOPLASM OF OTHER SPECIFIED SITES: ICD-10-CM

## 2025-03-18 PROCEDURE — 77387B: CUSTOM | Mod: 26

## 2025-03-19 ENCOUNTER — OUTPATIENT (OUTPATIENT)
Dept: OUTPATIENT SERVICES | Facility: HOSPITAL | Age: 75
LOS: 1 days | End: 2025-03-19

## 2025-03-19 DIAGNOSIS — Z98.890 OTHER SPECIFIED POSTPROCEDURAL STATES: Chronic | ICD-10-CM

## 2025-03-19 DIAGNOSIS — Z90.49 ACQUIRED ABSENCE OF OTHER SPECIFIED PARTS OF DIGESTIVE TRACT: Chronic | ICD-10-CM

## 2025-03-19 PROCEDURE — 77387B: CUSTOM | Mod: 26

## 2025-03-20 ENCOUNTER — OUTPATIENT (OUTPATIENT)
Dept: OUTPATIENT SERVICES | Facility: HOSPITAL | Age: 75
LOS: 1 days | End: 2025-03-20

## 2025-03-20 DIAGNOSIS — C79.89 SECONDARY MALIGNANT NEOPLASM OF OTHER SPECIFIED SITES: ICD-10-CM

## 2025-03-20 PROCEDURE — 77014: CPT | Mod: 26

## 2025-03-21 ENCOUNTER — OUTPATIENT (OUTPATIENT)
Dept: OUTPATIENT SERVICES | Facility: HOSPITAL | Age: 75
LOS: 1 days | End: 2025-03-21

## 2025-03-21 DIAGNOSIS — Z98.890 OTHER SPECIFIED POSTPROCEDURAL STATES: Chronic | ICD-10-CM

## 2025-03-21 DIAGNOSIS — C79.89 SECONDARY MALIGNANT NEOPLASM OF OTHER SPECIFIED SITES: ICD-10-CM

## 2025-03-21 DIAGNOSIS — Z90.49 ACQUIRED ABSENCE OF OTHER SPECIFIED PARTS OF DIGESTIVE TRACT: Chronic | ICD-10-CM

## 2025-03-21 PROCEDURE — 77427 RADIATION TX MANAGEMENT X5: CPT

## 2025-03-24 ENCOUNTER — OUTPATIENT (OUTPATIENT)
Dept: OUTPATIENT SERVICES | Facility: HOSPITAL | Age: 75
LOS: 1 days | End: 2025-03-24

## 2025-03-24 DIAGNOSIS — Z98.890 OTHER SPECIFIED POSTPROCEDURAL STATES: Chronic | ICD-10-CM

## 2025-03-24 DIAGNOSIS — Z90.49 ACQUIRED ABSENCE OF OTHER SPECIFIED PARTS OF DIGESTIVE TRACT: Chronic | ICD-10-CM

## 2025-03-24 DIAGNOSIS — C79.89 SECONDARY MALIGNANT NEOPLASM OF OTHER SPECIFIED SITES: ICD-10-CM

## 2025-03-24 PROCEDURE — 77387B: CUSTOM | Mod: 26

## 2025-03-25 ENCOUNTER — NON-APPOINTMENT (OUTPATIENT)
Age: 75
End: 2025-03-25

## 2025-03-25 ENCOUNTER — OUTPATIENT (OUTPATIENT)
Dept: OUTPATIENT SERVICES | Facility: HOSPITAL | Age: 75
LOS: 1 days | End: 2025-03-25

## 2025-03-25 VITALS
BODY MASS INDEX: 21.1 KG/M2 | WEIGHT: 164.38 LBS | DIASTOLIC BLOOD PRESSURE: 72 MMHG | RESPIRATION RATE: 16 BRPM | TEMPERATURE: 98.2 F | SYSTOLIC BLOOD PRESSURE: 111 MMHG | OXYGEN SATURATION: 97 % | HEART RATE: 73 BPM

## 2025-03-25 DIAGNOSIS — Z98.890 OTHER SPECIFIED POSTPROCEDURAL STATES: Chronic | ICD-10-CM

## 2025-03-25 PROCEDURE — 77387B: CUSTOM | Mod: 26

## 2025-03-26 ENCOUNTER — OUTPATIENT (OUTPATIENT)
Dept: OUTPATIENT SERVICES | Facility: HOSPITAL | Age: 75
LOS: 1 days | End: 2025-03-26

## 2025-03-26 DIAGNOSIS — Z98.890 OTHER SPECIFIED POSTPROCEDURAL STATES: Chronic | ICD-10-CM

## 2025-03-26 DIAGNOSIS — Z90.49 ACQUIRED ABSENCE OF OTHER SPECIFIED PARTS OF DIGESTIVE TRACT: Chronic | ICD-10-CM

## 2025-03-26 PROCEDURE — 77387B: CUSTOM | Mod: 26

## 2025-03-27 ENCOUNTER — OUTPATIENT (OUTPATIENT)
Dept: OUTPATIENT SERVICES | Facility: HOSPITAL | Age: 75
LOS: 1 days | End: 2025-03-27

## 2025-03-27 DIAGNOSIS — Z98.890 OTHER SPECIFIED POSTPROCEDURAL STATES: Chronic | ICD-10-CM

## 2025-03-27 DIAGNOSIS — C79.89 SECONDARY MALIGNANT NEOPLASM OF OTHER SPECIFIED SITES: ICD-10-CM

## 2025-03-27 DIAGNOSIS — Z90.49 ACQUIRED ABSENCE OF OTHER SPECIFIED PARTS OF DIGESTIVE TRACT: Chronic | ICD-10-CM

## 2025-03-27 PROCEDURE — 77427 RADIATION TX MANAGEMENT X5: CPT

## 2025-03-27 PROCEDURE — 77014: CPT | Mod: 26

## 2025-03-28 ENCOUNTER — OUTPATIENT (OUTPATIENT)
Dept: OUTPATIENT SERVICES | Facility: HOSPITAL | Age: 75
LOS: 1 days | End: 2025-03-28

## 2025-03-28 DIAGNOSIS — Z98.890 OTHER SPECIFIED POSTPROCEDURAL STATES: Chronic | ICD-10-CM

## 2025-03-28 DIAGNOSIS — C79.89 SECONDARY MALIGNANT NEOPLASM OF OTHER SPECIFIED SITES: ICD-10-CM

## 2025-03-28 DIAGNOSIS — Z90.49 ACQUIRED ABSENCE OF OTHER SPECIFIED PARTS OF DIGESTIVE TRACT: Chronic | ICD-10-CM

## 2025-03-31 ENCOUNTER — OUTPATIENT (OUTPATIENT)
Dept: OUTPATIENT SERVICES | Facility: HOSPITAL | Age: 75
LOS: 1 days | End: 2025-03-31

## 2025-03-31 DIAGNOSIS — Z98.890 OTHER SPECIFIED POSTPROCEDURAL STATES: Chronic | ICD-10-CM

## 2025-03-31 DIAGNOSIS — Z90.49 ACQUIRED ABSENCE OF OTHER SPECIFIED PARTS OF DIGESTIVE TRACT: Chronic | ICD-10-CM

## 2025-03-31 DIAGNOSIS — C79.89 SECONDARY MALIGNANT NEOPLASM OF OTHER SPECIFIED SITES: ICD-10-CM

## 2025-03-31 PROCEDURE — 77387B: CUSTOM | Mod: 26

## 2025-04-01 ENCOUNTER — NON-APPOINTMENT (OUTPATIENT)
Age: 75
End: 2025-04-01

## 2025-04-01 ENCOUNTER — OUTPATIENT (OUTPATIENT)
Dept: OUTPATIENT SERVICES | Facility: HOSPITAL | Age: 75
LOS: 1 days | End: 2025-04-01
Payer: MEDICARE

## 2025-04-01 VITALS
HEART RATE: 76 BPM | WEIGHT: 163 LBS | DIASTOLIC BLOOD PRESSURE: 75 MMHG | RESPIRATION RATE: 16 BRPM | SYSTOLIC BLOOD PRESSURE: 126 MMHG | BODY MASS INDEX: 20.93 KG/M2 | OXYGEN SATURATION: 100 % | TEMPERATURE: 98.2 F

## 2025-04-01 DIAGNOSIS — Z98.890 OTHER SPECIFIED POSTPROCEDURAL STATES: Chronic | ICD-10-CM

## 2025-04-01 DIAGNOSIS — Z90.49 ACQUIRED ABSENCE OF OTHER SPECIFIED PARTS OF DIGESTIVE TRACT: Chronic | ICD-10-CM

## 2025-04-01 DIAGNOSIS — C79.89 SECONDARY MALIGNANT NEOPLASM OF OTHER SPECIFIED SITES: ICD-10-CM

## 2025-04-01 PROCEDURE — 77387B: CUSTOM | Mod: 26

## 2025-04-01 PROCEDURE — 77386: CPT

## 2025-04-02 ENCOUNTER — OUTPATIENT (OUTPATIENT)
Dept: OUTPATIENT SERVICES | Facility: HOSPITAL | Age: 75
LOS: 1 days | End: 2025-04-02

## 2025-04-02 DIAGNOSIS — Z90.49 ACQUIRED ABSENCE OF OTHER SPECIFIED PARTS OF DIGESTIVE TRACT: Chronic | ICD-10-CM

## 2025-04-02 DIAGNOSIS — Z98.890 OTHER SPECIFIED POSTPROCEDURAL STATES: Chronic | ICD-10-CM

## 2025-04-02 PROCEDURE — 77387B: CUSTOM | Mod: 26

## 2025-04-02 RX ORDER — DIPHENHYDRAMINE HYDROCHLORIDE AND LIDOCAINE HYDROCHLORIDE AND ALUMINUM HYDROXIDE AND MAGNESIUM HYDRO
KIT
Qty: 1 | Refills: 2 | Status: ACTIVE | COMMUNITY
Start: 2025-04-02 | End: 1900-01-01

## 2025-04-03 ENCOUNTER — OUTPATIENT (OUTPATIENT)
Dept: OUTPATIENT SERVICES | Facility: HOSPITAL | Age: 75
LOS: 1 days | End: 2025-04-03

## 2025-04-03 DIAGNOSIS — Z90.49 ACQUIRED ABSENCE OF OTHER SPECIFIED PARTS OF DIGESTIVE TRACT: Chronic | ICD-10-CM

## 2025-04-03 DIAGNOSIS — C79.89 SECONDARY MALIGNANT NEOPLASM OF OTHER SPECIFIED SITES: ICD-10-CM

## 2025-04-03 DIAGNOSIS — Z98.890 OTHER SPECIFIED POSTPROCEDURAL STATES: Chronic | ICD-10-CM

## 2025-04-07 ENCOUNTER — OUTPATIENT (OUTPATIENT)
Dept: OUTPATIENT SERVICES | Facility: HOSPITAL | Age: 75
LOS: 1 days | End: 2025-04-07
Payer: MEDICARE

## 2025-04-07 ENCOUNTER — APPOINTMENT (OUTPATIENT)
Age: 75
End: 2025-04-07

## 2025-04-07 DIAGNOSIS — Z90.49 ACQUIRED ABSENCE OF OTHER SPECIFIED PARTS OF DIGESTIVE TRACT: Chronic | ICD-10-CM

## 2025-04-07 DIAGNOSIS — Z98.890 OTHER SPECIFIED POSTPROCEDURAL STATES: Chronic | ICD-10-CM

## 2025-04-07 DIAGNOSIS — C21.8 MALIGNANT NEOPLASM OF OVERLAPPING SITES OF RECTUM, ANUS AND ANAL CANAL: ICD-10-CM

## 2025-04-07 LAB
ALBUMIN SERPL ELPH-MCNC: 3.7 G/DL
ALP BLD-CCNC: 197 U/L
ALT SERPL-CCNC: 541 U/L
ANION GAP SERPL CALC-SCNC: 9 MMOL/L
AST SERPL-CCNC: 692 U/L
AUTO BASOPHILS #: 0.03 K/UL
AUTO BASOPHILS %: 0.5 %
AUTO EOSINOPHILS #: 0.06 K/UL
AUTO EOSINOPHILS %: 0.9 %
AUTO IMMATURE GRANULOCYTES #: 0.01 K/UL
AUTO LYMPHOCYTES #: 1.44 K/UL
AUTO LYMPHOCYTES %: 22.3 %
AUTO MONOCYTES #: 1.04 K/UL
AUTO MONOCYTES %: 16.1 %
AUTO NEUTROPHILS #: 3.89 K/UL
AUTO NEUTROPHILS %: 60 %
AUTO NRBC #: 0 K/UL
BILIRUB DIRECT SERPL-MCNC: 0.6 MG/DL
BILIRUB INDIRECT SERPL-MCNC: 0.5 MG/DL
BILIRUB SERPL-MCNC: 1.1 MG/DL
BUN SERPL-MCNC: 16 MG/DL
CALCIUM SERPL-MCNC: 9 MG/DL
CHLORIDE SERPL-SCNC: 104 MMOL/L
CO2 SERPL-SCNC: 26 MMOL/L
CREAT SERPL-MCNC: 0.8 MG/DL
EGFRCR SERPLBLD CKD-EPI 2021: 93 ML/MIN/1.73M2
GLUCOSE SERPL-MCNC: 93 MG/DL
HCT VFR BLD CALC: 37 %
HGB BLD-MCNC: 12 G/DL
IMM GRANULOCYTES NFR BLD AUTO: 0.2 %
MAN DIFF?: NORMAL
MCHC RBC-ENTMCNC: 28.6 PG
MCHC RBC-ENTMCNC: 32.4 G/DL
MCV RBC AUTO: 88.1 FL
PHOSPHATE SERPL-MCNC: 2.7 MG/DL
PLATELET # BLD AUTO: 206 K/UL
PMV BLD AUTO: 0 /100 WBCS
PMV BLD: 9.9 FL
POTASSIUM SERPL-SCNC: 4.3 MMOL/L
PROT SERPL-MCNC: 6.8 G/DL
RBC # BLD: 4.2 M/UL
RBC # FLD: 14.5 %
SODIUM SERPL-SCNC: 139 MMOL/L
WBC # FLD AUTO: 6.47 K/UL

## 2025-04-07 PROCEDURE — 84100 ASSAY OF PHOSPHORUS: CPT

## 2025-04-07 PROCEDURE — 80048 BASIC METABOLIC PNL TOTAL CA: CPT

## 2025-04-07 PROCEDURE — 80076 HEPATIC FUNCTION PANEL: CPT

## 2025-04-07 PROCEDURE — 84439 ASSAY OF FREE THYROXINE: CPT

## 2025-04-07 PROCEDURE — 84443 ASSAY THYROID STIM HORMONE: CPT

## 2025-04-07 PROCEDURE — 85025 COMPLETE CBC W/AUTO DIFF WBC: CPT

## 2025-04-07 PROCEDURE — 84480 ASSAY TRIIODOTHYRONINE (T3): CPT

## 2025-04-07 PROCEDURE — 82378 CARCINOEMBRYONIC ANTIGEN: CPT

## 2025-04-07 PROCEDURE — 36415 COLL VENOUS BLD VENIPUNCTURE: CPT

## 2025-04-08 DIAGNOSIS — C21.8 MALIGNANT NEOPLASM OF OVERLAPPING SITES OF RECTUM, ANUS AND ANAL CANAL: ICD-10-CM

## 2025-04-08 LAB
CEA SERPL-MCNC: 3.2 NG/ML
T3 SERPL-MCNC: 161 NG/DL
T4 FREE SERPL-MCNC: 1.2 NG/DL
TSH SERPL-ACNC: 1.62 UIU/ML

## 2025-04-09 ENCOUNTER — APPOINTMENT (OUTPATIENT)
Age: 75
End: 2025-04-09
Payer: MEDICARE

## 2025-04-09 ENCOUNTER — OUTPATIENT (OUTPATIENT)
Dept: OUTPATIENT SERVICES | Facility: HOSPITAL | Age: 75
LOS: 1 days | End: 2025-04-09
Payer: MEDICARE

## 2025-04-09 VITALS
TEMPERATURE: 97.9 F | HEART RATE: 65 BPM | SYSTOLIC BLOOD PRESSURE: 123 MMHG | WEIGHT: 162.44 LBS | HEIGHT: 74 IN | BODY MASS INDEX: 20.85 KG/M2 | DIASTOLIC BLOOD PRESSURE: 75 MMHG | OXYGEN SATURATION: 97 %

## 2025-04-09 VITALS — TEMPERATURE: 98 F | SYSTOLIC BLOOD PRESSURE: 123 MMHG | HEART RATE: 65 BPM | DIASTOLIC BLOOD PRESSURE: 75 MMHG

## 2025-04-09 DIAGNOSIS — Z98.890 OTHER SPECIFIED POSTPROCEDURAL STATES: Chronic | ICD-10-CM

## 2025-04-09 DIAGNOSIS — Z90.49 ACQUIRED ABSENCE OF OTHER SPECIFIED PARTS OF DIGESTIVE TRACT: Chronic | ICD-10-CM

## 2025-04-09 DIAGNOSIS — C21.8 MALIGNANT NEOPLASM OF OVERLAPPING SITES OF RECTUM, ANUS AND ANAL CANAL: ICD-10-CM

## 2025-04-09 DIAGNOSIS — C20 MALIGNANT NEOPLASM OF RECTUM: ICD-10-CM

## 2025-04-09 LAB
ALBUMIN SERPL ELPH-MCNC: 4 G/DL
ALP BLD-CCNC: 198 U/L
ALT SERPL-CCNC: 486 U/L
ANION GAP SERPL CALC-SCNC: 10 MMOL/L
AST SERPL-CCNC: 620 U/L
AUTO BASOPHILS #: 0.02 K/UL
AUTO BASOPHILS %: 0.3 %
AUTO EOSINOPHILS #: 0.05 K/UL
AUTO EOSINOPHILS %: 0.9 %
AUTO IMMATURE GRANULOCYTES #: 0.02 K/UL
AUTO LYMPHOCYTES #: 1.34 K/UL
AUTO LYMPHOCYTES %: 23.1 %
AUTO MONOCYTES #: 0.69 K/UL
AUTO MONOCYTES %: 11.9 %
AUTO NEUTROPHILS #: 3.68 K/UL
AUTO NEUTROPHILS %: 63.5 %
AUTO NRBC #: 0 K/UL
BILIRUB DIRECT SERPL-MCNC: 0.5 MG/DL
BILIRUB INDIRECT SERPL-MCNC: 0.5 MG/DL
BILIRUB SERPL-MCNC: 1 MG/DL
BUN SERPL-MCNC: 15 MG/DL
CALCIUM SERPL-MCNC: 9.2 MG/DL
CHLORIDE SERPL-SCNC: 104 MMOL/L
CO2 SERPL-SCNC: 26 MMOL/L
CREAT SERPL-MCNC: 0.8 MG/DL
EGFRCR SERPLBLD CKD-EPI 2021: 93 ML/MIN/1.73M2
GGT SERPL-CCNC: 113 U/L
GLUCOSE SERPL-MCNC: 156 MG/DL
HCT VFR BLD CALC: 37.2 %
HGB BLD-MCNC: 11.9 G/DL
IMM GRANULOCYTES NFR BLD AUTO: 0.3 %
MAN DIFF?: NORMAL
MCHC RBC-ENTMCNC: 28.6 PG
MCHC RBC-ENTMCNC: 32 G/DL
MCV RBC AUTO: 89.4 FL
PHOSPHATE SERPL-MCNC: 2.8 MG/DL
PLATELET # BLD AUTO: 200 K/UL
PMV BLD AUTO: 0 /100 WBCS
PMV BLD: 10.5 FL
POTASSIUM SERPL-SCNC: 4.8 MMOL/L
PROT SERPL-MCNC: 6.9 G/DL
RBC # BLD: 4.16 M/UL
RBC # FLD: 14.7 %
SODIUM SERPL-SCNC: 140 MMOL/L
WBC # FLD AUTO: 5.8 K/UL

## 2025-04-09 PROCEDURE — 80048 BASIC METABOLIC PNL TOTAL CA: CPT

## 2025-04-09 PROCEDURE — 96360 HYDRATION IV INFUSION INIT: CPT

## 2025-04-09 PROCEDURE — 96361 HYDRATE IV INFUSION ADD-ON: CPT

## 2025-04-09 PROCEDURE — 84100 ASSAY OF PHOSPHORUS: CPT

## 2025-04-09 PROCEDURE — 82378 CARCINOEMBRYONIC ANTIGEN: CPT

## 2025-04-09 PROCEDURE — 84443 ASSAY THYROID STIM HORMONE: CPT

## 2025-04-09 PROCEDURE — 82977 ASSAY OF GGT: CPT

## 2025-04-09 PROCEDURE — 99215 OFFICE O/P EST HI 40 MIN: CPT

## 2025-04-09 PROCEDURE — 84481 FREE ASSAY (FT-3): CPT

## 2025-04-09 PROCEDURE — 36415 COLL VENOUS BLD VENIPUNCTURE: CPT

## 2025-04-09 PROCEDURE — 85025 COMPLETE CBC W/AUTO DIFF WBC: CPT

## 2025-04-09 PROCEDURE — 99215 OFFICE O/P EST HI 40 MIN: CPT | Mod: 25

## 2025-04-09 PROCEDURE — 84439 ASSAY OF FREE THYROXINE: CPT

## 2025-04-09 PROCEDURE — 80076 HEPATIC FUNCTION PANEL: CPT

## 2025-04-10 ENCOUNTER — APPOINTMENT (OUTPATIENT)
Dept: OTOLARYNGOLOGY | Facility: CLINIC | Age: 75
End: 2025-04-10

## 2025-04-10 ENCOUNTER — OUTPATIENT (OUTPATIENT)
Dept: OUTPATIENT SERVICES | Facility: HOSPITAL | Age: 75
LOS: 1 days | End: 2025-04-10
Payer: MEDICARE

## 2025-04-10 ENCOUNTER — APPOINTMENT (OUTPATIENT)
Age: 75
End: 2025-04-10

## 2025-04-10 VITALS — WEIGHT: 162 LBS | HEIGHT: 74 IN | BODY MASS INDEX: 20.79 KG/M2

## 2025-04-10 DIAGNOSIS — C21.8 MALIGNANT NEOPLASM OF OVERLAPPING SITES OF RECTUM, ANUS AND ANAL CANAL: ICD-10-CM

## 2025-04-10 DIAGNOSIS — R59.0 LOCALIZED ENLARGED LYMPH NODES: ICD-10-CM

## 2025-04-10 LAB
ALBUMIN SERPL ELPH-MCNC: 3.6 G/DL
ALP BLD-CCNC: 183 U/L
ALT SERPL-CCNC: 436 U/L
AST SERPL-CCNC: 525 U/L
BILIRUB DIRECT SERPL-MCNC: 0.7 MG/DL
BILIRUB INDIRECT SERPL-MCNC: 0.4 MG/DL
BILIRUB SERPL-MCNC: 1.1 MG/DL
CEA SERPL-MCNC: 3.3 NG/ML
GGT SERPL-CCNC: 114 U/L
PROT SERPL-MCNC: 6.6 G/DL
T3FREE SERPL-MCNC: 3.17 PG/ML
T4 FREE SERPL-MCNC: 1.3 NG/DL
TSH SERPL-ACNC: 1.95 UIU/ML

## 2025-04-10 PROCEDURE — 80076 HEPATIC FUNCTION PANEL: CPT

## 2025-04-10 PROCEDURE — 82977 ASSAY OF GGT: CPT

## 2025-04-10 PROCEDURE — 99024 POSTOP FOLLOW-UP VISIT: CPT

## 2025-04-10 PROCEDURE — 36415 COLL VENOUS BLD VENIPUNCTURE: CPT

## 2025-04-12 ENCOUNTER — OUTPATIENT (OUTPATIENT)
Dept: OUTPATIENT SERVICES | Facility: HOSPITAL | Age: 75
LOS: 1 days | End: 2025-04-12
Payer: MEDICARE

## 2025-04-12 ENCOUNTER — RESULT REVIEW (OUTPATIENT)
Age: 75
End: 2025-04-12

## 2025-04-12 DIAGNOSIS — Z90.49 ACQUIRED ABSENCE OF OTHER SPECIFIED PARTS OF DIGESTIVE TRACT: Chronic | ICD-10-CM

## 2025-04-12 DIAGNOSIS — Z98.890 OTHER SPECIFIED POSTPROCEDURAL STATES: Chronic | ICD-10-CM

## 2025-04-12 DIAGNOSIS — Z00.8 ENCOUNTER FOR OTHER GENERAL EXAMINATION: ICD-10-CM

## 2025-04-12 DIAGNOSIS — R10.9 UNSPECIFIED ABDOMINAL PAIN: ICD-10-CM

## 2025-04-12 DIAGNOSIS — C43.4 MALIGNANT MELANOMA OF SCALP AND NECK: ICD-10-CM

## 2025-04-12 PROCEDURE — 71260 CT THORAX DX C+: CPT

## 2025-04-12 PROCEDURE — 74177 CT ABD & PELVIS W/CONTRAST: CPT

## 2025-04-12 PROCEDURE — 74177 CT ABD & PELVIS W/CONTRAST: CPT | Mod: 26

## 2025-04-12 PROCEDURE — 71260 CT THORAX DX C+: CPT | Mod: 26

## 2025-04-13 DIAGNOSIS — R10.9 UNSPECIFIED ABDOMINAL PAIN: ICD-10-CM

## 2025-04-13 DIAGNOSIS — C43.4 MALIGNANT MELANOMA OF SCALP AND NECK: ICD-10-CM

## 2025-04-15 ENCOUNTER — OUTPATIENT (OUTPATIENT)
Dept: OUTPATIENT SERVICES | Facility: HOSPITAL | Age: 75
LOS: 1 days | End: 2025-04-15
Payer: MEDICARE

## 2025-04-15 ENCOUNTER — RESULT REVIEW (OUTPATIENT)
Age: 75
End: 2025-04-15

## 2025-04-15 ENCOUNTER — APPOINTMENT (OUTPATIENT)
Age: 75
End: 2025-04-15

## 2025-04-15 VITALS — DIASTOLIC BLOOD PRESSURE: 78 MMHG | HEART RATE: 71 BPM | SYSTOLIC BLOOD PRESSURE: 121 MMHG | TEMPERATURE: 99 F

## 2025-04-15 DIAGNOSIS — Z98.890 OTHER SPECIFIED POSTPROCEDURAL STATES: Chronic | ICD-10-CM

## 2025-04-15 DIAGNOSIS — C43.4 MALIGNANT MELANOMA OF SCALP AND NECK: ICD-10-CM

## 2025-04-15 DIAGNOSIS — Z90.49 ACQUIRED ABSENCE OF OTHER SPECIFIED PARTS OF DIGESTIVE TRACT: Chronic | ICD-10-CM

## 2025-04-15 DIAGNOSIS — C21.8 MALIGNANT NEOPLASM OF OVERLAPPING SITES OF RECTUM, ANUS AND ANAL CANAL: ICD-10-CM

## 2025-04-15 DIAGNOSIS — Z00.8 ENCOUNTER FOR OTHER GENERAL EXAMINATION: ICD-10-CM

## 2025-04-15 LAB
AUTO BASOPHILS #: 0.02 K/UL
AUTO BASOPHILS %: 0.2 %
AUTO EOSINOPHILS #: 0 K/UL
AUTO EOSINOPHILS %: 0 %
AUTO IMMATURE GRANULOCYTES #: 0.02 K/UL
AUTO LYMPHOCYTES #: 1.1 K/UL
AUTO LYMPHOCYTES %: 13.7 %
AUTO MONOCYTES #: 0.25 K/UL
AUTO MONOCYTES %: 3.1 %
AUTO NEUTROPHILS #: 6.64 K/UL
AUTO NEUTROPHILS %: 82.8 %
AUTO NRBC #: 0 K/UL
HCT VFR BLD CALC: 37.1 %
HGB BLD-MCNC: 12 G/DL
IMM GRANULOCYTES NFR BLD AUTO: 0.2 %
MAN DIFF?: NORMAL
MCHC RBC-ENTMCNC: 28.4 PG
MCHC RBC-ENTMCNC: 32.3 G/DL
MCV RBC AUTO: 87.7 FL
PLATELET # BLD AUTO: 246 K/UL
PMV BLD AUTO: 0 /100 WBCS
PMV BLD: 10.2 FL
RBC # BLD: 4.23 M/UL
RBC # FLD: 14.8 %
WBC # FLD AUTO: 8.03 K/UL

## 2025-04-15 PROCEDURE — 96361 HYDRATE IV INFUSION ADD-ON: CPT

## 2025-04-15 PROCEDURE — A9579: CPT

## 2025-04-15 PROCEDURE — 36415 COLL VENOUS BLD VENIPUNCTURE: CPT

## 2025-04-15 PROCEDURE — 74183 MRI ABD W/O CNTR FLWD CNTR: CPT | Mod: 26

## 2025-04-15 PROCEDURE — 80076 HEPATIC FUNCTION PANEL: CPT

## 2025-04-15 PROCEDURE — 96360 HYDRATION IV INFUSION INIT: CPT

## 2025-04-15 PROCEDURE — 74183 MRI ABD W/O CNTR FLWD CNTR: CPT

## 2025-04-15 PROCEDURE — 85025 COMPLETE CBC W/AUTO DIFF WBC: CPT

## 2025-04-15 PROCEDURE — 82977 ASSAY OF GGT: CPT

## 2025-04-15 PROCEDURE — 80048 BASIC METABOLIC PNL TOTAL CA: CPT

## 2025-04-15 RX ADMIN — Medication 1000 MILLILITER(S): at 18:00

## 2025-04-15 RX ADMIN — Medication 500 MILLILITER(S): at 16:00

## 2025-04-16 DIAGNOSIS — C43.4 MALIGNANT MELANOMA OF SCALP AND NECK: ICD-10-CM

## 2025-04-16 LAB
ALBUMIN SERPL ELPH-MCNC: 4 G/DL
ALP BLD-CCNC: 200 U/L
ALT SERPL-CCNC: 238 U/L
ANION GAP SERPL CALC-SCNC: 12 MMOL/L
AST SERPL-CCNC: 169 U/L
BILIRUB DIRECT SERPL-MCNC: 0.4 MG/DL
BILIRUB INDIRECT SERPL-MCNC: 0.3 MG/DL
BILIRUB SERPL-MCNC: 0.7 MG/DL
BUN SERPL-MCNC: 19 MG/DL
CALCIUM SERPL-MCNC: 9.6 MG/DL
CHLORIDE SERPL-SCNC: 101 MMOL/L
CO2 SERPL-SCNC: 24 MMOL/L
CREAT SERPL-MCNC: 0.8 MG/DL
EGFRCR SERPLBLD CKD-EPI 2021: 93 ML/MIN/1.73M2
GGT SERPL-CCNC: 134 U/L
GLUCOSE SERPL-MCNC: 113 MG/DL
POTASSIUM SERPL-SCNC: 4.2 MMOL/L
PROT SERPL-MCNC: 7.4 G/DL
SODIUM SERPL-SCNC: 137 MMOL/L

## 2025-04-17 ENCOUNTER — APPOINTMENT (OUTPATIENT)
Age: 75
End: 2025-04-17
Payer: MEDICARE

## 2025-04-17 ENCOUNTER — OUTPATIENT (OUTPATIENT)
Dept: OUTPATIENT SERVICES | Facility: HOSPITAL | Age: 75
LOS: 1 days | End: 2025-04-17
Payer: MEDICARE

## 2025-04-17 VITALS
HEART RATE: 90 BPM | SYSTOLIC BLOOD PRESSURE: 127 MMHG | TEMPERATURE: 97.5 F | DIASTOLIC BLOOD PRESSURE: 74 MMHG | BODY MASS INDEX: 21.69 KG/M2 | WEIGHT: 169 LBS | RESPIRATION RATE: 16 BRPM | HEIGHT: 74 IN

## 2025-04-17 DIAGNOSIS — C21.8 MALIGNANT NEOPLASM OF OVERLAPPING SITES OF RECTUM, ANUS AND ANAL CANAL: ICD-10-CM

## 2025-04-17 DIAGNOSIS — Z98.890 OTHER SPECIFIED POSTPROCEDURAL STATES: Chronic | ICD-10-CM

## 2025-04-17 LAB
ALBUMIN SERPL ELPH-MCNC: 3.8 G/DL
ALP BLD-CCNC: 163 U/L
ALT SERPL-CCNC: 171 U/L
AST SERPL-CCNC: 115 U/L
BILIRUB DIRECT SERPL-MCNC: 0.3 MG/DL
BILIRUB INDIRECT SERPL-MCNC: 0.4 MG/DL
BILIRUB SERPL-MCNC: 0.7 MG/DL
GGT SERPL-CCNC: 117 U/L
PROT SERPL-MCNC: 6.9 G/DL

## 2025-04-17 PROCEDURE — 99215 OFFICE O/P EST HI 40 MIN: CPT

## 2025-04-17 PROCEDURE — 80076 HEPATIC FUNCTION PANEL: CPT

## 2025-04-17 PROCEDURE — 82977 ASSAY OF GGT: CPT

## 2025-04-17 RX ORDER — PROCHLORPERAZINE MALEATE 10 MG/1
10 TABLET ORAL
Qty: 120 | Refills: 2 | Status: ACTIVE | COMMUNITY
Start: 2025-04-17 | End: 1900-01-01

## 2025-04-17 RX ORDER — PREDNISONE 10 MG/1
10 TABLET ORAL
Qty: 7 | Refills: 0 | Status: ACTIVE | COMMUNITY
Start: 2025-04-09 | End: 1900-01-01

## 2025-04-17 RX ORDER — ESOMEPRAZOLE MAGNESIUM 40 MG/1
40 CAPSULE, DELAYED RELEASE ORAL DAILY
Qty: 7 | Refills: 0 | Status: ACTIVE | COMMUNITY
Start: 2025-04-09 | End: 1900-01-01

## 2025-04-17 RX ORDER — DABRAFENIB 75 MG/1
75 CAPSULE ORAL TWICE DAILY
Qty: 120 | Refills: 6 | Status: ACTIVE | COMMUNITY
Start: 2025-04-17 | End: 1900-01-01

## 2025-04-17 RX ORDER — TRAMETINIB 2 MG/1
2 TABLET, FILM COATED ORAL DAILY
Qty: 30 | Refills: 6 | Status: ACTIVE | COMMUNITY
Start: 2025-04-17 | End: 1900-01-01

## 2025-04-18 ENCOUNTER — NON-APPOINTMENT (OUTPATIENT)
Age: 75
End: 2025-04-18

## 2025-04-21 ENCOUNTER — APPOINTMENT (OUTPATIENT)
Age: 75
End: 2025-04-21
Payer: MEDICARE

## 2025-04-21 ENCOUNTER — OUTPATIENT (OUTPATIENT)
Dept: OUTPATIENT SERVICES | Facility: HOSPITAL | Age: 75
LOS: 1 days | End: 2025-04-21
Payer: MEDICARE

## 2025-04-21 ENCOUNTER — APPOINTMENT (OUTPATIENT)
Dept: NEUROSURGERY | Facility: CLINIC | Age: 75
End: 2025-04-21
Payer: MEDICARE

## 2025-04-21 VITALS — SYSTOLIC BLOOD PRESSURE: 136 MMHG | HEART RATE: 85 BPM | TEMPERATURE: 98 F | DIASTOLIC BLOOD PRESSURE: 81 MMHG

## 2025-04-21 VITALS
HEIGHT: 74 IN | OXYGEN SATURATION: 98 % | SYSTOLIC BLOOD PRESSURE: 128 MMHG | HEART RATE: 70 BPM | BODY MASS INDEX: 21.3 KG/M2 | RESPIRATION RATE: 16 BRPM | TEMPERATURE: 97.4 F | WEIGHT: 166 LBS | DIASTOLIC BLOOD PRESSURE: 75 MMHG

## 2025-04-21 VITALS — WEIGHT: 166 LBS | BODY MASS INDEX: 21.3 KG/M2 | HEIGHT: 74 IN

## 2025-04-21 DIAGNOSIS — Z98.890 OTHER SPECIFIED POSTPROCEDURAL STATES: Chronic | ICD-10-CM

## 2025-04-21 DIAGNOSIS — C21.8 MALIGNANT NEOPLASM OF OVERLAPPING SITES OF RECTUM, ANUS AND ANAL CANAL: ICD-10-CM

## 2025-04-21 DIAGNOSIS — Z90.49 ACQUIRED ABSENCE OF OTHER SPECIFIED PARTS OF DIGESTIVE TRACT: Chronic | ICD-10-CM

## 2025-04-21 DIAGNOSIS — C20 MALIGNANT NEOPLASM OF RECTUM: ICD-10-CM

## 2025-04-21 DIAGNOSIS — C79.51 SECONDARY MALIGNANT NEOPLASM OF BONE: ICD-10-CM

## 2025-04-21 LAB
ALBUMIN SERPL ELPH-MCNC: 3.7 G/DL
ALP BLD-CCNC: 151 U/L
ALT SERPL-CCNC: 118 U/L
ANION GAP SERPL CALC-SCNC: 13 MMOL/L
AST SERPL-CCNC: 83 U/L
AUTO BASOPHILS #: 0.03 K/UL
AUTO BASOPHILS %: 0.4 %
AUTO EOSINOPHILS #: 0.03 K/UL
AUTO EOSINOPHILS %: 0.4 %
AUTO IMMATURE GRANULOCYTES #: 0.02 K/UL
AUTO LYMPHOCYTES #: 1.36 K/UL
AUTO LYMPHOCYTES %: 17.3 %
AUTO MONOCYTES #: 0.8 K/UL
AUTO MONOCYTES %: 10.2 %
AUTO NEUTROPHILS #: 5.62 K/UL
AUTO NEUTROPHILS %: 71.4 %
AUTO NRBC #: 0 K/UL
BILIRUB DIRECT SERPL-MCNC: 0.3 MG/DL
BILIRUB INDIRECT SERPL-MCNC: 0.4 MG/DL
BILIRUB SERPL-MCNC: 0.7 MG/DL
BUN SERPL-MCNC: 20 MG/DL
CALCIUM SERPL-MCNC: 9.4 MG/DL
CHLORIDE SERPL-SCNC: 100 MMOL/L
CO2 SERPL-SCNC: 25 MMOL/L
CREAT SERPL-MCNC: 0.9 MG/DL
EGFRCR SERPLBLD CKD-EPI 2021: 90 ML/MIN/1.73M2
GGT SERPL-CCNC: 109 U/L
GLUCOSE SERPL-MCNC: 113 MG/DL
HCT VFR BLD CALC: 36.4 %
HGB BLD-MCNC: 11.8 G/DL
IMM GRANULOCYTES NFR BLD AUTO: 0.3 %
MAN DIFF?: NORMAL
MCHC RBC-ENTMCNC: 28.5 PG
MCHC RBC-ENTMCNC: 32.4 G/DL
MCV RBC AUTO: 87.9 FL
PLATELET # BLD AUTO: 219 K/UL
PMV BLD AUTO: 0 /100 WBCS
PMV BLD: 9.9 FL
POTASSIUM SERPL-SCNC: 4 MMOL/L
PROT SERPL-MCNC: 6.9 G/DL
RBC # BLD: 4.14 M/UL
RBC # FLD: 15 %
SODIUM SERPL-SCNC: 138 MMOL/L
WBC # FLD AUTO: 7.86 K/UL

## 2025-04-21 PROCEDURE — 82977 ASSAY OF GGT: CPT

## 2025-04-21 PROCEDURE — 85025 COMPLETE CBC W/AUTO DIFF WBC: CPT

## 2025-04-21 PROCEDURE — 99215 OFFICE O/P EST HI 40 MIN: CPT

## 2025-04-21 PROCEDURE — 96361 HYDRATE IV INFUSION ADD-ON: CPT

## 2025-04-21 PROCEDURE — 80048 BASIC METABOLIC PNL TOTAL CA: CPT

## 2025-04-21 PROCEDURE — 99215 OFFICE O/P EST HI 40 MIN: CPT | Mod: 25

## 2025-04-21 PROCEDURE — 96360 HYDRATION IV INFUSION INIT: CPT

## 2025-04-21 PROCEDURE — 80076 HEPATIC FUNCTION PANEL: CPT

## 2025-04-21 PROCEDURE — 99213 OFFICE O/P EST LOW 20 MIN: CPT

## 2025-04-21 RX ADMIN — Medication 500 MILLILITER(S): at 15:50

## 2025-04-21 RX ADMIN — Medication 1000 MILLILITER(S): at 16:47

## 2025-04-23 ENCOUNTER — APPOINTMENT (OUTPATIENT)
Dept: GASTROENTEROLOGY | Facility: CLINIC | Age: 75
End: 2025-04-23
Payer: MEDICARE

## 2025-04-23 ENCOUNTER — OUTPATIENT (OUTPATIENT)
Dept: OUTPATIENT SERVICES | Facility: HOSPITAL | Age: 75
LOS: 1 days | End: 2025-04-23
Payer: MEDICARE

## 2025-04-23 ENCOUNTER — RESULT REVIEW (OUTPATIENT)
Age: 75
End: 2025-04-23

## 2025-04-23 ENCOUNTER — APPOINTMENT (OUTPATIENT)
Dept: CV DIAGNOSITCS | Facility: HOSPITAL | Age: 75
End: 2025-04-23
Payer: MEDICARE

## 2025-04-23 VITALS
OXYGEN SATURATION: 98 % | HEIGHT: 74 IN | DIASTOLIC BLOOD PRESSURE: 75 MMHG | BODY MASS INDEX: 21.3 KG/M2 | HEART RATE: 64 BPM | WEIGHT: 166 LBS | SYSTOLIC BLOOD PRESSURE: 129 MMHG

## 2025-04-23 DIAGNOSIS — Z90.49 ACQUIRED ABSENCE OF OTHER SPECIFIED PARTS OF DIGESTIVE TRACT: Chronic | ICD-10-CM

## 2025-04-23 DIAGNOSIS — R74.8 ABNORMAL LEVELS OF OTHER SERUM ENZYMES: ICD-10-CM

## 2025-04-23 DIAGNOSIS — I50.1 LEFT VENTRICULAR FAILURE, UNSPECIFIED: ICD-10-CM

## 2025-04-23 DIAGNOSIS — C79.89 SECONDARY MALIGNANT NEOPLASM OF OTHER SPECIFIED SITES: ICD-10-CM

## 2025-04-23 DIAGNOSIS — B17.9 ACUTE VIRAL HEPATITIS, UNSPECIFIED: ICD-10-CM

## 2025-04-23 DIAGNOSIS — K71.6 TOXIC LIVER DISEASE WITH HEPATITIS, NOT ELSEWHERE CLASSIFIED: ICD-10-CM

## 2025-04-23 DIAGNOSIS — Z98.890 OTHER SPECIFIED POSTPROCEDURAL STATES: Chronic | ICD-10-CM

## 2025-04-23 DIAGNOSIS — T50.905A TOXIC LIVER DISEASE WITH HEPATITIS, NOT ELSEWHERE CLASSIFIED: ICD-10-CM

## 2025-04-23 PROCEDURE — 99215 OFFICE O/P EST HI 40 MIN: CPT

## 2025-04-23 PROCEDURE — 93306 TTE W/DOPPLER COMPLETE: CPT | Mod: 26

## 2025-04-23 PROCEDURE — 93306 TTE W/DOPPLER COMPLETE: CPT

## 2025-04-23 RX ORDER — PREDNISONE 5 MG/1
5 TABLET ORAL DAILY
Qty: 30 | Refills: 0 | Status: ACTIVE | COMMUNITY
Start: 2025-04-23 | End: 1900-01-01

## 2025-04-24 ENCOUNTER — RESULT REVIEW (OUTPATIENT)
Age: 75
End: 2025-04-24

## 2025-04-24 ENCOUNTER — OUTPATIENT (OUTPATIENT)
Dept: OUTPATIENT SERVICES | Facility: HOSPITAL | Age: 75
LOS: 1 days | End: 2025-04-24
Payer: MEDICARE

## 2025-04-24 DIAGNOSIS — C43.4 MALIGNANT MELANOMA OF SCALP AND NECK: ICD-10-CM

## 2025-04-24 DIAGNOSIS — Z43.4 ENCOUNTER FOR ATTENTION TO OTHER ARTIFICIAL OPENINGS OF DIGESTIVE TRACT: ICD-10-CM

## 2025-04-24 DIAGNOSIS — Z98.890 OTHER SPECIFIED POSTPROCEDURAL STATES: Chronic | ICD-10-CM

## 2025-04-24 DIAGNOSIS — Z00.8 ENCOUNTER FOR OTHER GENERAL EXAMINATION: ICD-10-CM

## 2025-04-24 DIAGNOSIS — Z90.49 ACQUIRED ABSENCE OF OTHER SPECIFIED PARTS OF DIGESTIVE TRACT: Chronic | ICD-10-CM

## 2025-04-24 DIAGNOSIS — I50.1 LEFT VENTRICULAR FAILURE, UNSPECIFIED: ICD-10-CM

## 2025-04-24 PROCEDURE — A9579: CPT

## 2025-04-24 PROCEDURE — 70553 MRI BRAIN STEM W/O & W/DYE: CPT | Mod: 26

## 2025-04-24 PROCEDURE — 70553 MRI BRAIN STEM W/O & W/DYE: CPT

## 2025-04-28 ENCOUNTER — APPOINTMENT (OUTPATIENT)
Age: 75
End: 2025-04-28
Payer: MEDICARE

## 2025-04-28 ENCOUNTER — OUTPATIENT (OUTPATIENT)
Dept: OUTPATIENT SERVICES | Facility: HOSPITAL | Age: 75
LOS: 1 days | End: 2025-04-28
Payer: MEDICARE

## 2025-04-28 VITALS — DIASTOLIC BLOOD PRESSURE: 73 MMHG | TEMPERATURE: 99 F | HEART RATE: 69 BPM | SYSTOLIC BLOOD PRESSURE: 129 MMHG

## 2025-04-28 VITALS
HEIGHT: 74 IN | TEMPERATURE: 98.8 F | OXYGEN SATURATION: 99 % | RESPIRATION RATE: 16 BRPM | HEART RATE: 69 BPM | BODY MASS INDEX: 20.79 KG/M2 | SYSTOLIC BLOOD PRESSURE: 129 MMHG | DIASTOLIC BLOOD PRESSURE: 73 MMHG | WEIGHT: 162 LBS

## 2025-04-28 DIAGNOSIS — Z98.890 OTHER SPECIFIED POSTPROCEDURAL STATES: Chronic | ICD-10-CM

## 2025-04-28 DIAGNOSIS — Z90.49 ACQUIRED ABSENCE OF OTHER SPECIFIED PARTS OF DIGESTIVE TRACT: Chronic | ICD-10-CM

## 2025-04-28 DIAGNOSIS — K62.5 HEMORRHAGE OF ANUS AND RECTUM: ICD-10-CM

## 2025-04-28 DIAGNOSIS — R79.89 OTHER SPECIFIED ABNORMAL FINDINGS OF BLOOD CHEMISTRY: ICD-10-CM

## 2025-04-28 DIAGNOSIS — D64.9 ANEMIA, UNSPECIFIED: ICD-10-CM

## 2025-04-28 DIAGNOSIS — C21.8 MALIGNANT NEOPLASM OF OVERLAPPING SITES OF RECTUM, ANUS AND ANAL CANAL: ICD-10-CM

## 2025-04-28 LAB
ALBUMIN SERPL ELPH-MCNC: 3.8 G/DL
ALP BLD-CCNC: 155 U/L
ALT SERPL-CCNC: 74 U/L
ANION GAP SERPL CALC-SCNC: 10 MMOL/L
AST SERPL-CCNC: 85 U/L
AUTO BASOPHILS #: 0.03 K/UL
AUTO BASOPHILS %: 0.5 %
AUTO EOSINOPHILS #: 0.08 K/UL
AUTO EOSINOPHILS %: 1.4 %
AUTO IMMATURE GRANULOCYTES #: 0.01 K/UL
AUTO LYMPHOCYTES #: 1.5 K/UL
AUTO LYMPHOCYTES %: 26.4 %
AUTO MONOCYTES #: 0.54 K/UL
AUTO MONOCYTES %: 9.5 %
AUTO NEUTROPHILS #: 3.53 K/UL
AUTO NEUTROPHILS %: 62 %
AUTO NRBC #: 0 K/UL
BILIRUB DIRECT SERPL-MCNC: 0.2 MG/DL
BILIRUB INDIRECT SERPL-MCNC: 0.3 MG/DL
BILIRUB SERPL-MCNC: 0.5 MG/DL
BUN SERPL-MCNC: 18 MG/DL
CALCIUM SERPL-MCNC: 9.3 MG/DL
CHLORIDE SERPL-SCNC: 103 MMOL/L
CO2 SERPL-SCNC: 25 MMOL/L
CREAT SERPL-MCNC: 0.8 MG/DL
EGFRCR SERPLBLD CKD-EPI 2021: 93 ML/MIN/1.73M2
GGT SERPL-CCNC: 91 U/L
GLUCOSE SERPL-MCNC: 154 MG/DL
HCT VFR BLD CALC: 36.3 %
HGB BLD-MCNC: 11.9 G/DL
IMM GRANULOCYTES NFR BLD AUTO: 0.2 %
IRON SATN MFR SERPL: 12 %
IRON SERPL-MCNC: 44 UG/DL
MAN DIFF?: NORMAL
MCHC RBC-ENTMCNC: 28.3 PG
MCHC RBC-ENTMCNC: 32.8 G/DL
MCV RBC AUTO: 86.4 FL
PLATELET # BLD AUTO: 223 K/UL
PMV BLD AUTO: 0 /100 WBCS
PMV BLD: 9.9 FL
POTASSIUM SERPL-SCNC: 4.2 MMOL/L
PROT SERPL-MCNC: 7.2 G/DL
RBC # BLD: 4.2 M/UL
RBC # FLD: 15 %
SODIUM SERPL-SCNC: 138 MMOL/L
TIBC SERPL-MCNC: 374 UG/DL
UIBC SERPL-MCNC: 330 UG/DL
WBC # FLD AUTO: 5.69 K/UL

## 2025-04-28 PROCEDURE — 99214 OFFICE O/P EST MOD 30 MIN: CPT

## 2025-04-28 PROCEDURE — 99214 OFFICE O/P EST MOD 30 MIN: CPT | Mod: 25

## 2025-04-28 PROCEDURE — 80048 BASIC METABOLIC PNL TOTAL CA: CPT

## 2025-04-28 PROCEDURE — 82607 VITAMIN B-12: CPT

## 2025-04-28 PROCEDURE — 83540 ASSAY OF IRON: CPT

## 2025-04-28 PROCEDURE — 83550 IRON BINDING TEST: CPT

## 2025-04-28 PROCEDURE — 96360 HYDRATION IV INFUSION INIT: CPT

## 2025-04-28 PROCEDURE — 82746 ASSAY OF FOLIC ACID SERUM: CPT

## 2025-04-28 PROCEDURE — 82728 ASSAY OF FERRITIN: CPT

## 2025-04-28 PROCEDURE — 85025 COMPLETE CBC W/AUTO DIFF WBC: CPT

## 2025-04-28 PROCEDURE — 36415 COLL VENOUS BLD VENIPUNCTURE: CPT

## 2025-04-28 PROCEDURE — 82977 ASSAY OF GGT: CPT

## 2025-04-28 PROCEDURE — 83921 ORGANIC ACID SINGLE QUANT: CPT

## 2025-04-28 PROCEDURE — 80076 HEPATIC FUNCTION PANEL: CPT

## 2025-04-29 ENCOUNTER — APPOINTMENT (OUTPATIENT)
Dept: OTOLARYNGOLOGY | Facility: CLINIC | Age: 75
End: 2025-04-29

## 2025-04-29 VITALS — HEIGHT: 74 IN | BODY MASS INDEX: 20.79 KG/M2 | WEIGHT: 162 LBS

## 2025-04-29 DIAGNOSIS — H93.8X3 OTHER SPECIFIED DISORDERS OF EAR, BILATERAL: ICD-10-CM

## 2025-04-29 DIAGNOSIS — J32.4 CHRONIC PANSINUSITIS: ICD-10-CM

## 2025-04-29 DIAGNOSIS — C77.9 SECONDARY AND UNSPECIFIED MALIGNANT NEOPLASM OF LYMPH NODE, UNSPECIFIED: ICD-10-CM

## 2025-04-29 DIAGNOSIS — R59.0 LOCALIZED ENLARGED LYMPH NODES: ICD-10-CM

## 2025-04-29 DIAGNOSIS — C43.4 MALIGNANT MELANOMA OF SCALP AND NECK: ICD-10-CM

## 2025-04-29 LAB
ALBUMIN SERPL ELPH-MCNC: 4.3 G/DL
ALP BLD-CCNC: 172 U/L
ALT SERPL-CCNC: 84 U/L
ANION GAP SERPL CALC-SCNC: 10 MMOL/L
AST SERPL-CCNC: 100 U/L
BASOPHILS # BLD AUTO: 0.03 K/UL
BASOPHILS NFR BLD AUTO: 0.6 %
BILIRUB SERPL-MCNC: 0.6 MG/DL
BUN SERPL-MCNC: 23 MG/DL
CALCIUM SERPL-MCNC: 10.2 MG/DL
CHLORIDE SERPL-SCNC: 102 MMOL/L
CO2 SERPL-SCNC: 28 MMOL/L
CREAT SERPL-MCNC: 0.9 MG/DL
EGFRCR SERPLBLD CKD-EPI 2021: 90 ML/MIN/1.73M2
EOSINOPHIL # BLD AUTO: 0.09 K/UL
EOSINOPHIL NFR BLD AUTO: 1.8 %
FERRITIN SERPL-MCNC: 85 NG/ML
FOLATE SERPL-MCNC: 10.7 NG/ML
GLUCOSE SERPL-MCNC: 120 MG/DL
HCT VFR BLD CALC: 41.9 %
HCV AB SER QL: NONREACTIVE
HCV S/CO RATIO: 0.05 COI
HGB BLD-MCNC: 13.1 G/DL
IMM GRANULOCYTES NFR BLD AUTO: 0.2 %
INR PPP: 0.97 RATIO
IRON SATN MFR SERPL: 16 %
IRON SERPL-MCNC: 62 UG/DL
LYMPHOCYTES # BLD AUTO: 1.37 K/UL
LYMPHOCYTES NFR BLD AUTO: 27.7 %
MAN DIFF?: NORMAL
MCHC RBC-ENTMCNC: 27.6 PG
MCHC RBC-ENTMCNC: 31.3 G/DL
MCV RBC AUTO: 88.2 FL
MONOCYTES # BLD AUTO: 0.48 K/UL
MONOCYTES NFR BLD AUTO: 9.7 %
NEUTROPHILS # BLD AUTO: 2.96 K/UL
NEUTROPHILS NFR BLD AUTO: 60 %
PLATELET # BLD AUTO: 277 K/UL
PMV BLD AUTO: 0 /100 WBCS
PMV BLD: 10.7 FL
POTASSIUM SERPL-SCNC: 4.4 MMOL/L
PROT SERPL-MCNC: 7.9 G/DL
PT BLD: 11.4 SEC
RBC # BLD: 4.75 M/UL
RBC # FLD: 15 %
SODIUM SERPL-SCNC: 140 MMOL/L
TIBC SERPL-MCNC: 399 UG/DL
UIBC SERPL-MCNC: 337 UG/DL
VIT B12 SERPL-MCNC: 1279 PG/ML
WBC # FLD AUTO: 4.94 K/UL

## 2025-04-29 PROCEDURE — 92567 TYMPANOMETRY: CPT

## 2025-04-29 PROCEDURE — 92557 COMPREHENSIVE HEARING TEST: CPT

## 2025-04-29 PROCEDURE — 31231 NASAL ENDOSCOPY DX: CPT | Mod: 79

## 2025-04-29 PROCEDURE — 99214 OFFICE O/P EST MOD 30 MIN: CPT | Mod: 25

## 2025-04-29 RX ORDER — FLUTICASONE PROPIONATE 50 UG/1
50 SPRAY, METERED NASAL TWICE DAILY
Qty: 1 | Refills: 11 | Status: ACTIVE | COMMUNITY
Start: 2025-04-29 | End: 1900-01-01

## 2025-04-30 LAB
CMV DNA SPEC QL NAA+PROBE: NOT DETECTED IU/ML
CMVPCR LOG: NOT DETECTED LOG10IU/ML
DEPRECATED KAPPA LC FREE/LAMBDA SER: 1.09 RATIO
EBV DNA SERPL NAA+PROBE-ACNC: NOT DETECTED IU/ML
EBVPCR LOG: NOT DETECTED LOG10IU/ML
FERRITIN SERPL-MCNC: 102 NG/ML
HBV CORE IGG+IGM SER QL: REACTIVE
HBV SURFACE AB SER QL: REACTIVE
HBV SURFACE AB SERPL IA-ACNC: >1000 MIU/ML
HBV SURFACE AG SER QL: NONREACTIVE
HEPATITIS A IGG ANTIBODY: NONREACTIVE
IGA SER QL IEP: 327 MG/DL
IGG SER QL IEP: 1468 MG/DL
IGM SER QL IEP: 115 MG/DL
KAPPA LC CSF-MCNC: 2.28 MG/DL
KAPPA LC SERPL-MCNC: 2.48 MG/DL
MITOCHONDRIA AB SER IF-ACNC: NORMAL
SMOOTH MUSCLE AB SER QL IF: NORMAL

## 2025-05-01 LAB
ANA PAT FLD IF-IMP: ABNORMAL
ANA SER IF-ACNC: ABNORMAL

## 2025-05-02 LAB
HEPATITIS E IGM ABY: NEGATIVE
HEV AB SER QL: NEGATIVE
METHYLMALONATE SERPL-SCNC: 137 NMOL/L

## 2025-05-05 ENCOUNTER — APPOINTMENT (OUTPATIENT)
Age: 75
End: 2025-05-05

## 2025-05-05 ENCOUNTER — OUTPATIENT (OUTPATIENT)
Dept: OUTPATIENT SERVICES | Facility: HOSPITAL | Age: 75
LOS: 1 days | End: 2025-05-05
Payer: MEDICARE

## 2025-05-05 DIAGNOSIS — Z90.49 ACQUIRED ABSENCE OF OTHER SPECIFIED PARTS OF DIGESTIVE TRACT: Chronic | ICD-10-CM

## 2025-05-05 DIAGNOSIS — Z98.890 OTHER SPECIFIED POSTPROCEDURAL STATES: Chronic | ICD-10-CM

## 2025-05-05 DIAGNOSIS — C21.8 MALIGNANT NEOPLASM OF OVERLAPPING SITES OF RECTUM, ANUS AND ANAL CANAL: ICD-10-CM

## 2025-05-05 PROCEDURE — 82977 ASSAY OF GGT: CPT

## 2025-05-05 PROCEDURE — 36415 COLL VENOUS BLD VENIPUNCTURE: CPT

## 2025-05-05 PROCEDURE — 80076 HEPATIC FUNCTION PANEL: CPT

## 2025-05-06 DIAGNOSIS — C21.8 MALIGNANT NEOPLASM OF OVERLAPPING SITES OF RECTUM, ANUS AND ANAL CANAL: ICD-10-CM

## 2025-05-12 ENCOUNTER — OUTPATIENT (OUTPATIENT)
Dept: OUTPATIENT SERVICES | Facility: HOSPITAL | Age: 75
LOS: 1 days | End: 2025-05-12
Payer: MEDICARE

## 2025-05-12 ENCOUNTER — APPOINTMENT (OUTPATIENT)
Age: 75
End: 2025-05-12
Payer: MEDICARE

## 2025-05-12 VITALS
HEART RATE: 92 BPM | TEMPERATURE: 98.1 F | BODY MASS INDEX: 21.18 KG/M2 | SYSTOLIC BLOOD PRESSURE: 113 MMHG | HEIGHT: 73.5 IN | DIASTOLIC BLOOD PRESSURE: 76 MMHG | OXYGEN SATURATION: 96 % | RESPIRATION RATE: 16 BRPM | WEIGHT: 163.31 LBS

## 2025-05-12 DIAGNOSIS — Z90.49 ACQUIRED ABSENCE OF OTHER SPECIFIED PARTS OF DIGESTIVE TRACT: Chronic | ICD-10-CM

## 2025-05-12 DIAGNOSIS — R26.89 OTHER ABNORMALITIES OF GAIT AND MOBILITY: ICD-10-CM

## 2025-05-12 DIAGNOSIS — C21.8 MALIGNANT NEOPLASM OF OVERLAPPING SITES OF RECTUM, ANUS AND ANAL CANAL: ICD-10-CM

## 2025-05-12 DIAGNOSIS — Z98.890 OTHER SPECIFIED POSTPROCEDURAL STATES: Chronic | ICD-10-CM

## 2025-05-12 LAB
ALBUMIN SERPL ELPH-MCNC: 3.6 G/DL
ALP BLD-CCNC: 149 U/L
ALT SERPL-CCNC: 55 U/L
AST SERPL-CCNC: 215 U/L
BILIRUB DIRECT SERPL-MCNC: 0.3 MG/DL
BILIRUB INDIRECT SERPL-MCNC: 0.3 MG/DL
BILIRUB SERPL-MCNC: 0.6 MG/DL
PROT SERPL-MCNC: 6.5 G/DL

## 2025-05-12 PROCEDURE — 99214 OFFICE O/P EST MOD 30 MIN: CPT

## 2025-05-12 PROCEDURE — 82977 ASSAY OF GGT: CPT

## 2025-05-12 PROCEDURE — 80048 BASIC METABOLIC PNL TOTAL CA: CPT

## 2025-05-12 PROCEDURE — 85025 COMPLETE CBC W/AUTO DIFF WBC: CPT

## 2025-05-12 PROCEDURE — 80076 HEPATIC FUNCTION PANEL: CPT

## 2025-05-13 LAB
ALBUMIN SERPL ELPH-MCNC: 3.7 G/DL
ALP BLD-CCNC: 164 U/L
ALT SERPL-CCNC: 60 U/L
ANION GAP SERPL CALC-SCNC: 11 MMOL/L
AST SERPL-CCNC: 93 U/L
AUTO BASOPHILS #: 0.04 K/UL
AUTO BASOPHILS %: 0.8 %
AUTO EOSINOPHILS #: 0 K/UL
AUTO EOSINOPHILS %: 0 %
AUTO IMMATURE GRANULOCYTES #: 0.04 K/UL
AUTO LYMPHOCYTES #: 1.72 K/UL
AUTO LYMPHOCYTES %: 34.1 %
AUTO MONOCYTES #: 0.32 K/UL
AUTO MONOCYTES %: 6.3 %
AUTO NEUTROPHILS #: 2.93 K/UL
AUTO NEUTROPHILS %: 58 %
AUTO NRBC #: 0 K/UL
BILIRUB DIRECT SERPL-MCNC: 0.2 MG/DL
BILIRUB INDIRECT SERPL-MCNC: 0.4 MG/DL
BILIRUB SERPL-MCNC: 0.6 MG/DL
BUN SERPL-MCNC: 18 MG/DL
CALCIUM SERPL-MCNC: 9.1 MG/DL
CHLORIDE SERPL-SCNC: 95 MMOL/L
CO2 SERPL-SCNC: 25 MMOL/L
CREAT SERPL-MCNC: 1 MG/DL
EGFRCR SERPLBLD CKD-EPI 2021: 79 ML/MIN/1.73M2
GGT SERPL-CCNC: 78 U/L
GGT SERPL-CCNC: 90 U/L
GLUCOSE SERPL-MCNC: 148 MG/DL
HCT VFR BLD CALC: 35.6 %
HGB BLD-MCNC: 12.2 G/DL
IMM GRANULOCYTES NFR BLD AUTO: 0.8 %
MAN DIFF?: NORMAL
MCHC RBC-ENTMCNC: 27.9 PG
MCHC RBC-ENTMCNC: 34.3 G/DL
MCV RBC AUTO: 81.3 FL
PLATELET # BLD AUTO: 123 K/UL
PMV BLD AUTO: 0 /100 WBCS
PMV BLD: 11.2 FL
POTASSIUM SERPL-SCNC: 3.7 MMOL/L
PROT SERPL-MCNC: 6.7 G/DL
RBC # BLD: 4.38 M/UL
RBC # FLD: 14.7 %
SODIUM SERPL-SCNC: 131 MMOL/L
WBC # FLD AUTO: 5.05 K/UL

## 2025-05-14 ENCOUNTER — NON-APPOINTMENT (OUTPATIENT)
Age: 75
End: 2025-05-14

## 2025-05-15 ENCOUNTER — APPOINTMENT (OUTPATIENT)
Dept: RADIATION ONCOLOGY | Facility: HOSPITAL | Age: 75
End: 2025-05-15
Payer: MEDICARE

## 2025-05-15 ENCOUNTER — OUTPATIENT (OUTPATIENT)
Dept: OUTPATIENT SERVICES | Facility: HOSPITAL | Age: 75
LOS: 1 days | End: 2025-05-15
Payer: MEDICARE

## 2025-05-15 VITALS
RESPIRATION RATE: 16 BRPM | OXYGEN SATURATION: 98 % | TEMPERATURE: 97.6 F | WEIGHT: 163.38 LBS | BODY MASS INDEX: 21.26 KG/M2 | SYSTOLIC BLOOD PRESSURE: 114 MMHG | DIASTOLIC BLOOD PRESSURE: 79 MMHG | HEART RATE: 81 BPM

## 2025-05-15 DIAGNOSIS — C43.4 MALIGNANT MELANOMA OF SCALP AND NECK: ICD-10-CM

## 2025-05-15 DIAGNOSIS — Z92.3 PERSONAL HISTORY OF IRRADIATION: ICD-10-CM

## 2025-05-15 DIAGNOSIS — Z90.49 ACQUIRED ABSENCE OF OTHER SPECIFIED PARTS OF DIGESTIVE TRACT: Chronic | ICD-10-CM

## 2025-05-15 DIAGNOSIS — Z98.890 OTHER SPECIFIED POSTPROCEDURAL STATES: Chronic | ICD-10-CM

## 2025-05-15 DIAGNOSIS — C43.39 MALIGNANT MELANOMA OF OTHER PARTS OF FACE: ICD-10-CM

## 2025-05-15 PROCEDURE — 99024 POSTOP FOLLOW-UP VISIT: CPT

## 2025-05-16 PROBLEM — Z92.3 STATUS POST RADIATION THERAPY: Status: ACTIVE | Noted: 2025-05-16

## 2025-05-19 ENCOUNTER — APPOINTMENT (OUTPATIENT)
Age: 75
End: 2025-05-19

## 2025-05-19 ENCOUNTER — OUTPATIENT (OUTPATIENT)
Dept: OUTPATIENT SERVICES | Facility: HOSPITAL | Age: 75
LOS: 1 days | End: 2025-05-19
Payer: MEDICARE

## 2025-05-19 DIAGNOSIS — Z98.890 OTHER SPECIFIED POSTPROCEDURAL STATES: Chronic | ICD-10-CM

## 2025-05-19 DIAGNOSIS — C21.8 MALIGNANT NEOPLASM OF OVERLAPPING SITES OF RECTUM, ANUS AND ANAL CANAL: ICD-10-CM

## 2025-05-19 DIAGNOSIS — Z90.49 ACQUIRED ABSENCE OF OTHER SPECIFIED PARTS OF DIGESTIVE TRACT: Chronic | ICD-10-CM

## 2025-05-19 LAB
ALBUMIN SERPL ELPH-MCNC: 3.4 G/DL
ALP BLD-CCNC: 136 U/L
ALT SERPL-CCNC: 37 U/L
AST SERPL-CCNC: 92 U/L
BILIRUB DIRECT SERPL-MCNC: 0.3 MG/DL
BILIRUB INDIRECT SERPL-MCNC: 0.3 MG/DL
BILIRUB SERPL-MCNC: 0.6 MG/DL
GGT SERPL-CCNC: 70 U/L
PROT SERPL-MCNC: 6.2 G/DL

## 2025-05-19 PROCEDURE — 36415 COLL VENOUS BLD VENIPUNCTURE: CPT

## 2025-05-19 PROCEDURE — 80076 HEPATIC FUNCTION PANEL: CPT

## 2025-05-19 PROCEDURE — 82977 ASSAY OF GGT: CPT

## 2025-05-20 ENCOUNTER — APPOINTMENT (OUTPATIENT)
Dept: SURGERY | Facility: CLINIC | Age: 75
End: 2025-05-20

## 2025-05-20 VITALS
OXYGEN SATURATION: 98 % | HEART RATE: 88 BPM | BODY MASS INDEX: 21.14 KG/M2 | DIASTOLIC BLOOD PRESSURE: 76 MMHG | TEMPERATURE: 97 F | HEIGHT: 73.5 IN | WEIGHT: 163 LBS | SYSTOLIC BLOOD PRESSURE: 122 MMHG

## 2025-05-20 PROCEDURE — 99213 OFFICE O/P EST LOW 20 MIN: CPT

## 2025-05-21 ENCOUNTER — OUTPATIENT (OUTPATIENT)
Dept: OUTPATIENT SERVICES | Facility: HOSPITAL | Age: 75
LOS: 1 days | End: 2025-05-21
Payer: MEDICARE

## 2025-05-21 ENCOUNTER — APPOINTMENT (OUTPATIENT)
Age: 75
End: 2025-05-21
Payer: MEDICARE

## 2025-05-21 VITALS
DIASTOLIC BLOOD PRESSURE: 74 MMHG | RESPIRATION RATE: 16 BRPM | HEART RATE: 81 BPM | OXYGEN SATURATION: 98 % | WEIGHT: 168 LBS | TEMPERATURE: 97.7 F | HEIGHT: 73.5 IN | BODY MASS INDEX: 21.79 KG/M2 | SYSTOLIC BLOOD PRESSURE: 112 MMHG

## 2025-05-21 DIAGNOSIS — Z98.890 OTHER SPECIFIED POSTPROCEDURAL STATES: Chronic | ICD-10-CM

## 2025-05-21 DIAGNOSIS — C21.8 MALIGNANT NEOPLASM OF OVERLAPPING SITES OF RECTUM, ANUS AND ANAL CANAL: ICD-10-CM

## 2025-05-21 PROBLEM — Z51.81 ENCOUNTER FOR MEDICATION MONITORING: Status: ACTIVE | Noted: 2025-05-21

## 2025-05-21 PROCEDURE — G2211 COMPLEX E/M VISIT ADD ON: CPT

## 2025-05-21 PROCEDURE — 99214 OFFICE O/P EST MOD 30 MIN: CPT

## 2025-05-27 ENCOUNTER — APPOINTMENT (OUTPATIENT)
Age: 75
End: 2025-05-27

## 2025-05-27 ENCOUNTER — OUTPATIENT (OUTPATIENT)
Dept: OUTPATIENT SERVICES | Facility: HOSPITAL | Age: 75
LOS: 1 days | End: 2025-05-27
Payer: MEDICARE

## 2025-05-27 DIAGNOSIS — C21.8 MALIGNANT NEOPLASM OF OVERLAPPING SITES OF RECTUM, ANUS AND ANAL CANAL: ICD-10-CM

## 2025-05-27 DIAGNOSIS — Z98.890 OTHER SPECIFIED POSTPROCEDURAL STATES: Chronic | ICD-10-CM

## 2025-05-27 LAB
AUTO BASOPHILS #: 0.02 K/UL
AUTO BASOPHILS %: 0.4 %
AUTO EOSINOPHILS #: 0.04 K/UL
AUTO EOSINOPHILS %: 0.9 %
AUTO IMMATURE GRANULOCYTES #: 0.01 K/UL
AUTO LYMPHOCYTES #: 1.3 K/UL
AUTO LYMPHOCYTES %: 28.8 %
AUTO MONOCYTES #: 0.55 K/UL
AUTO MONOCYTES %: 12.2 %
AUTO NEUTROPHILS #: 2.59 K/UL
AUTO NEUTROPHILS %: 57.5 %
AUTO NRBC #: 0 K/UL
HCT VFR BLD CALC: 34.9 %
HGB BLD-MCNC: 11.3 G/DL
IMM GRANULOCYTES NFR BLD AUTO: 0.2 %
MAN DIFF?: NORMAL
MCHC RBC-ENTMCNC: 28.1 PG
MCHC RBC-ENTMCNC: 32.4 G/DL
MCV RBC AUTO: 86.8 FL
PLATELET # BLD AUTO: 195 K/UL
PMV BLD AUTO: 0 /100 WBCS
PMV BLD: 9.6 FL
RBC # BLD: 4.02 M/UL
RBC # FLD: 16.2 %
WBC # FLD AUTO: 4.51 K/UL

## 2025-05-27 PROCEDURE — 80076 HEPATIC FUNCTION PANEL: CPT

## 2025-05-27 PROCEDURE — 80048 BASIC METABOLIC PNL TOTAL CA: CPT

## 2025-05-27 PROCEDURE — 85025 COMPLETE CBC W/AUTO DIFF WBC: CPT

## 2025-05-27 PROCEDURE — 36415 COLL VENOUS BLD VENIPUNCTURE: CPT

## 2025-05-27 PROCEDURE — 82977 ASSAY OF GGT: CPT

## 2025-05-28 ENCOUNTER — OUTPATIENT (OUTPATIENT)
Dept: OUTPATIENT SERVICES | Facility: HOSPITAL | Age: 75
LOS: 1 days | End: 2025-05-28
Payer: MEDICARE

## 2025-05-28 ENCOUNTER — APPOINTMENT (OUTPATIENT)
Age: 75
End: 2025-05-28
Payer: MEDICARE

## 2025-05-28 VITALS
RESPIRATION RATE: 16 BRPM | HEIGHT: 73.5 IN | SYSTOLIC BLOOD PRESSURE: 117 MMHG | HEART RATE: 72 BPM | BODY MASS INDEX: 21.27 KG/M2 | WEIGHT: 164 LBS | TEMPERATURE: 97.4 F | DIASTOLIC BLOOD PRESSURE: 77 MMHG | OXYGEN SATURATION: 98 %

## 2025-05-28 DIAGNOSIS — C21.8 MALIGNANT NEOPLASM OF OVERLAPPING SITES OF RECTUM, ANUS AND ANAL CANAL: ICD-10-CM

## 2025-05-28 DIAGNOSIS — C77.9 SECONDARY AND UNSPECIFIED MALIGNANT NEOPLASM OF LYMPH NODE, UNSPECIFIED: ICD-10-CM

## 2025-05-28 DIAGNOSIS — C79.51 SECONDARY MALIGNANT NEOPLASM OF BONE: ICD-10-CM

## 2025-05-28 DIAGNOSIS — L30.9 DERMATITIS, UNSPECIFIED: ICD-10-CM

## 2025-05-28 DIAGNOSIS — C43.4 MALIGNANT MELANOMA OF SCALP AND NECK: ICD-10-CM

## 2025-05-28 DIAGNOSIS — R79.89 OTHER SPECIFIED ABNORMAL FINDINGS OF BLOOD CHEMISTRY: ICD-10-CM

## 2025-05-28 DIAGNOSIS — D64.9 ANEMIA, UNSPECIFIED: ICD-10-CM

## 2025-05-28 DIAGNOSIS — Z51.81 ENCOUNTER FOR THERAPEUTIC DRUG LVL MONITORING: ICD-10-CM

## 2025-05-28 DIAGNOSIS — C20 MALIGNANT NEOPLASM OF RECTUM: ICD-10-CM

## 2025-05-28 LAB
ALBUMIN SERPL ELPH-MCNC: 3.3 G/DL
ALP BLD-CCNC: 134 U/L
ALT SERPL-CCNC: 26 U/L
ANION GAP SERPL CALC-SCNC: 9 MMOL/L
AST SERPL-CCNC: 67 U/L
BILIRUB DIRECT SERPL-MCNC: 0.2 MG/DL
BILIRUB INDIRECT SERPL-MCNC: 0.3 MG/DL
BILIRUB SERPL-MCNC: 0.5 MG/DL
BUN SERPL-MCNC: 21 MG/DL
CALCIUM SERPL-MCNC: 9 MG/DL
CHLORIDE SERPL-SCNC: 106 MMOL/L
CO2 SERPL-SCNC: 26 MMOL/L
CREAT SERPL-MCNC: 0.8 MG/DL
EGFRCR SERPLBLD CKD-EPI 2021: 93 ML/MIN/1.73M2
GGT SERPL-CCNC: 54 U/L
GLUCOSE SERPL-MCNC: 119 MG/DL
POTASSIUM SERPL-SCNC: 4.5 MMOL/L
PROT SERPL-MCNC: 6.3 G/DL
SODIUM SERPL-SCNC: 141 MMOL/L

## 2025-05-28 PROCEDURE — 99215 OFFICE O/P EST HI 40 MIN: CPT

## 2025-05-28 RX ORDER — VIT A/VIT D2/E/B-O/PETROLAT
OINTMENT (GRAM) TOPICAL
Qty: 1 | Refills: 1 | Status: ACTIVE | COMMUNITY
Start: 2025-05-28 | End: 1900-01-01

## 2025-06-02 ENCOUNTER — APPOINTMENT (OUTPATIENT)
Age: 75
End: 2025-06-02

## 2025-06-03 ENCOUNTER — APPOINTMENT (OUTPATIENT)
Age: 75
End: 2025-06-03

## 2025-06-03 RX ORDER — OXYCODONE HYDROCHLORIDE 5 MG/1
CAPSULE ORAL
Refills: 0 | Status: ACTIVE | COMMUNITY

## 2025-06-04 ENCOUNTER — APPOINTMENT (OUTPATIENT)
Age: 75
End: 2025-06-04

## 2025-06-04 ENCOUNTER — OUTPATIENT (OUTPATIENT)
Dept: OUTPATIENT SERVICES | Facility: HOSPITAL | Age: 75
LOS: 1 days | End: 2025-06-04
Payer: MEDICARE

## 2025-06-04 DIAGNOSIS — Z98.890 OTHER SPECIFIED POSTPROCEDURAL STATES: Chronic | ICD-10-CM

## 2025-06-04 DIAGNOSIS — C21.8 MALIGNANT NEOPLASM OF OVERLAPPING SITES OF RECTUM, ANUS AND ANAL CANAL: ICD-10-CM

## 2025-06-04 DIAGNOSIS — Z90.49 ACQUIRED ABSENCE OF OTHER SPECIFIED PARTS OF DIGESTIVE TRACT: Chronic | ICD-10-CM

## 2025-06-04 PROCEDURE — 36415 COLL VENOUS BLD VENIPUNCTURE: CPT

## 2025-06-04 PROCEDURE — 82977 ASSAY OF GGT: CPT

## 2025-06-04 PROCEDURE — 80076 HEPATIC FUNCTION PANEL: CPT

## 2025-06-05 ENCOUNTER — APPOINTMENT (OUTPATIENT)
Dept: GASTROENTEROLOGY | Facility: CLINIC | Age: 75
End: 2025-06-05
Payer: MEDICARE

## 2025-06-05 VITALS
DIASTOLIC BLOOD PRESSURE: 72 MMHG | OXYGEN SATURATION: 98 % | BODY MASS INDEX: 20.75 KG/M2 | SYSTOLIC BLOOD PRESSURE: 112 MMHG | HEART RATE: 64 BPM | WEIGHT: 160 LBS | HEIGHT: 73.5 IN

## 2025-06-05 DIAGNOSIS — R74.8 ABNORMAL LEVELS OF OTHER SERUM ENZYMES: ICD-10-CM

## 2025-06-05 DIAGNOSIS — K71.6 TOXIC LIVER DISEASE WITH HEPATITIS, NOT ELSEWHERE CLASSIFIED: ICD-10-CM

## 2025-06-05 DIAGNOSIS — Z92.89 PERSONAL HISTORY OF OTHER MEDICAL TREATMENT: ICD-10-CM

## 2025-06-05 DIAGNOSIS — T50.905A TOXIC LIVER DISEASE WITH HEPATITIS, NOT ELSEWHERE CLASSIFIED: ICD-10-CM

## 2025-06-05 DIAGNOSIS — R76.8 OTHER SPECIFIED ABNORMAL IMMUNOLOGICAL FINDINGS IN SERUM: ICD-10-CM

## 2025-06-05 DIAGNOSIS — C21.8 MALIGNANT NEOPLASM OF OVERLAPPING SITES OF RECTUM, ANUS AND ANAL CANAL: ICD-10-CM

## 2025-06-05 LAB
ALBUMIN SERPL ELPH-MCNC: 4 G/DL
ALP BLD-CCNC: 148 U/L
ALT SERPL-CCNC: 24 U/L
AST SERPL-CCNC: 72 U/L
BILIRUB DIRECT SERPL-MCNC: 0.3 MG/DL
BILIRUB INDIRECT SERPL-MCNC: 0.5 MG/DL
BILIRUB SERPL-MCNC: 0.8 MG/DL
GGT SERPL-CCNC: 62 U/L
PROT SERPL-MCNC: 7.1 G/DL

## 2025-06-05 PROCEDURE — 99215 OFFICE O/P EST HI 40 MIN: CPT

## 2025-06-05 PROCEDURE — G2211 COMPLEX E/M VISIT ADD ON: CPT

## 2025-06-05 RX ORDER — TENOFOVIR ALAFENAMIDE 25 MG/1
25 TABLET ORAL
Qty: 30 | Refills: 11 | Status: ACTIVE | COMMUNITY
Start: 2025-06-05 | End: 1900-01-01

## 2025-06-05 RX ORDER — TRAMADOL HYDROCHLORIDE 75 MG/1
TABLET, COATED ORAL
Refills: 0 | Status: ACTIVE | COMMUNITY

## 2025-06-05 RX ORDER — AMOXICILLIN 500 MG/1
500 TABLET, FILM COATED ORAL
Refills: 0 | Status: ACTIVE | COMMUNITY

## 2025-06-05 RX ORDER — AMOXICILLIN AND CLAVULANATE POTASSIUM 500; 125 MG/1; 1/1
TABLET, FILM COATED ORAL
Refills: 0 | Status: ACTIVE | COMMUNITY

## 2025-06-05 RX ORDER — PREDNISONE 5 MG/1
5 TABLET ORAL DAILY
Qty: 30 | Refills: 0 | Status: ACTIVE | COMMUNITY
Start: 2025-06-05 | End: 1900-01-01

## 2025-06-05 RX ORDER — GABAPENTIN 800 MG/1
800 TABLET, COATED ORAL
Refills: 0 | Status: ACTIVE | COMMUNITY

## 2025-06-05 RX ORDER — POTASSIUM CHLORIDE
CRYSTALS MISCELLANEOUS
Refills: 0 | Status: ACTIVE | COMMUNITY

## 2025-06-05 RX ORDER — FINASTERIDE 5 MG/1
5 TABLET, FILM COATED ORAL
Refills: 0 | Status: ACTIVE | COMMUNITY

## 2025-06-05 RX ORDER — FUROSEMIDE 80 MG/1
TABLET ORAL
Refills: 0 | Status: ACTIVE | COMMUNITY

## 2025-06-10 ENCOUNTER — APPOINTMENT (OUTPATIENT)
Dept: OTOLARYNGOLOGY | Facility: CLINIC | Age: 75
End: 2025-06-10
Payer: MEDICARE

## 2025-06-10 VITALS — HEIGHT: 72 IN | WEIGHT: 160 LBS | BODY MASS INDEX: 21.67 KG/M2

## 2025-06-10 PROCEDURE — 99214 OFFICE O/P EST MOD 30 MIN: CPT

## 2025-06-10 PROCEDURE — G2211 COMPLEX E/M VISIT ADD ON: CPT

## 2025-06-12 ENCOUNTER — APPOINTMENT (OUTPATIENT)
Dept: BURN CARE | Facility: CLINIC | Age: 75
End: 2025-06-12

## 2025-06-12 ENCOUNTER — APPOINTMENT (OUTPATIENT)
Dept: OTOLARYNGOLOGY | Facility: CLINIC | Age: 75
End: 2025-06-12

## 2025-06-24 ENCOUNTER — RESULT REVIEW (OUTPATIENT)
Age: 75
End: 2025-06-24

## 2025-06-24 ENCOUNTER — OUTPATIENT (OUTPATIENT)
Dept: OUTPATIENT SERVICES | Facility: HOSPITAL | Age: 75
LOS: 1 days | End: 2025-06-24
Payer: MEDICARE

## 2025-06-24 DIAGNOSIS — C43.4 MALIGNANT MELANOMA OF SCALP AND NECK: ICD-10-CM

## 2025-06-24 DIAGNOSIS — Z90.49 ACQUIRED ABSENCE OF OTHER SPECIFIED PARTS OF DIGESTIVE TRACT: Chronic | ICD-10-CM

## 2025-06-24 LAB — GLUCOSE BLDC GLUCOMTR-MCNC: 95 MG/DL — SIGNIFICANT CHANGE UP (ref 70–99)

## 2025-06-24 PROCEDURE — 82962 GLUCOSE BLOOD TEST: CPT

## 2025-06-24 PROCEDURE — 78815 PET IMAGE W/CT SKULL-THIGH: CPT | Mod: MH,PS

## 2025-06-24 PROCEDURE — A9552: CPT

## 2025-06-24 PROCEDURE — 78815 PET IMAGE W/CT SKULL-THIGH: CPT | Mod: 26,PS

## 2025-06-25 ENCOUNTER — APPOINTMENT (OUTPATIENT)
Age: 75
End: 2025-06-25

## 2025-06-25 ENCOUNTER — LABORATORY RESULT (OUTPATIENT)
Age: 75
End: 2025-06-25

## 2025-06-25 DIAGNOSIS — C43.4 MALIGNANT MELANOMA OF SCALP AND NECK: ICD-10-CM

## 2025-06-25 LAB
ALBUMIN SERPL ELPH-MCNC: 3.7 G/DL
ALP BLD-CCNC: 139 U/L
ALT SERPL-CCNC: 17 U/L
ANION GAP SERPL CALC-SCNC: 11 MMOL/L
AST SERPL-CCNC: 52 U/L
AUTO BASOPHILS #: 0.02 K/UL
AUTO BASOPHILS %: 0.4 %
AUTO EOSINOPHILS #: 0.06 K/UL
AUTO EOSINOPHILS %: 1.1 %
AUTO IMMATURE GRANULOCYTES #: 0.02 K/UL
AUTO LYMPHOCYTES #: 1.42 K/UL
AUTO LYMPHOCYTES %: 25 %
AUTO MONOCYTES #: 0.98 K/UL
AUTO MONOCYTES %: 17.3 %
AUTO NEUTROPHILS #: 3.17 K/UL
AUTO NEUTROPHILS %: 55.8 %
AUTO NRBC #: 0 K/UL
BILIRUB SERPL-MCNC: 0.5 MG/DL
BUN SERPL-MCNC: 20 MG/DL
CALCIUM SERPL-MCNC: 9.3 MG/DL
CHLORIDE SERPL-SCNC: 102 MMOL/L
CO2 SERPL-SCNC: 23 MMOL/L
CREAT SERPL-MCNC: 0.8 MG/DL
EGFRCR SERPLBLD CKD-EPI 2021: 93 ML/MIN/1.73M2
GGT SERPL-CCNC: 48 U/L
GLUCOSE SERPL-MCNC: 104 MG/DL
HCT VFR BLD CALC: 36.9 %
HGB BLD-MCNC: 12.4 G/DL
IMM GRANULOCYTES NFR BLD AUTO: 0.4 %
MAGNESIUM SERPL-MCNC: 1.8 MG/DL
MAN DIFF?: NORMAL
MCHC RBC-ENTMCNC: 29 PG
MCHC RBC-ENTMCNC: 33.6 G/DL
MCV RBC AUTO: 86.2 FL
PHOSPHATE SERPL-MCNC: 2.6 MG/DL
PLATELET # BLD AUTO: 188 K/UL
PMV BLD AUTO: 0 /100 WBCS
PMV BLD: 10 FL
POTASSIUM SERPL-SCNC: 4.1 MMOL/L
PROT SERPL-MCNC: 6.5 G/DL
RBC # BLD: 4.28 M/UL
RBC # FLD: 17.3 %
SODIUM SERPL-SCNC: 136 MMOL/L
WBC # FLD AUTO: 5.67 K/UL

## 2025-06-26 ENCOUNTER — APPOINTMENT (OUTPATIENT)
Age: 75
End: 2025-06-26
Payer: MEDICARE

## 2025-06-26 VITALS
HEIGHT: 73.5 IN | DIASTOLIC BLOOD PRESSURE: 79 MMHG | RESPIRATION RATE: 16 BRPM | HEART RATE: 63 BPM | SYSTOLIC BLOOD PRESSURE: 130 MMHG | OXYGEN SATURATION: 99 % | BODY MASS INDEX: 20.88 KG/M2 | WEIGHT: 161 LBS | TEMPERATURE: 98.3 F

## 2025-06-26 LAB
T3 SERPL-MCNC: 102 NG/DL
T4 FREE SERPL-MCNC: 0.8 NG/DL
TSH SERPL-ACNC: 2.03 UIU/ML

## 2025-06-26 PROCEDURE — 99215 OFFICE O/P EST HI 40 MIN: CPT

## 2025-06-26 PROCEDURE — G2211 COMPLEX E/M VISIT ADD ON: CPT

## 2025-06-26 RX ORDER — TRAMETINIB 2 MG/1
2 TABLET, FILM COATED ORAL
Refills: 0 | Status: ACTIVE | COMMUNITY

## 2025-06-26 RX ORDER — CHROMIUM 200 MCG
10 MCG TABLET ORAL
Refills: 0 | Status: ACTIVE | COMMUNITY

## 2025-06-26 RX ORDER — FUROSEMIDE 40 MG/1
40 TABLET ORAL
Refills: 0 | Status: ACTIVE | COMMUNITY

## 2025-06-26 RX ORDER — PREDNISONE 5 MG/1
5 TABLET ORAL
Refills: 0 | Status: ACTIVE | COMMUNITY

## 2025-06-26 RX ORDER — OXYCODONE HYDROCHLORIDE 5 MG/1
5 CAPSULE ORAL
Refills: 0 | Status: ACTIVE | COMMUNITY

## 2025-06-26 RX ORDER — DABRAFENIB 75 MG/1
75 CAPSULE ORAL
Refills: 0 | Status: ACTIVE | COMMUNITY

## 2025-06-26 RX ORDER — POTASSIUM 75 MG
TABLET ORAL
Refills: 0 | Status: ACTIVE | COMMUNITY

## 2025-06-26 RX ORDER — TAMSULOSIN HYDROCHLORIDE 0.4 MG/1
0.4 CAPSULE ORAL
Refills: 0 | Status: ACTIVE | COMMUNITY

## 2025-06-26 RX ORDER — FLUTICASONE PROPIONATE AND SALMETEROL 250; 50 UG/1; UG/1
250-50 POWDER RESPIRATORY (INHALATION)
Refills: 0 | Status: ACTIVE | COMMUNITY

## 2025-06-26 RX ORDER — GABAPENTIN 600 MG/1
600 TABLET, COATED ORAL
Refills: 0 | Status: ACTIVE | COMMUNITY

## 2025-06-26 RX ORDER — BACLOFEN 10 MG/1
10 TABLET ORAL
Refills: 0 | Status: ACTIVE | COMMUNITY

## 2025-06-26 RX ORDER — FINASTERIDE 5 MG/1
5 TABLET, FILM COATED ORAL
Refills: 0 | Status: ACTIVE | COMMUNITY

## 2025-06-26 RX ORDER — TRAMADOL HYDROCHLORIDE 50 MG/1
50 TABLET, COATED ORAL
Refills: 0 | Status: ACTIVE | COMMUNITY

## 2025-06-26 RX ORDER — TENOFOVIR ALAFENAMIDE 25 MG/1
TABLET ORAL
Refills: 0 | Status: ACTIVE | COMMUNITY

## 2025-06-26 RX ORDER — LORAZEPAM 2 MG/1
TABLET ORAL
Refills: 0 | Status: ACTIVE | COMMUNITY

## 2025-06-26 RX ORDER — AMOXICILLIN AND CLAVULANATE POTASSIUM 875; 125 MG/1; MG/1
875-125 TABLET, COATED ORAL
Refills: 0 | Status: ACTIVE | COMMUNITY

## 2025-06-30 ENCOUNTER — OUTPATIENT (OUTPATIENT)
Dept: OUTPATIENT SERVICES | Facility: HOSPITAL | Age: 75
LOS: 1 days | End: 2025-06-30
Payer: MEDICARE

## 2025-06-30 ENCOUNTER — RESULT REVIEW (OUTPATIENT)
Age: 75
End: 2025-06-30

## 2025-06-30 ENCOUNTER — APPOINTMENT (OUTPATIENT)
Dept: OTOLARYNGOLOGY | Facility: CLINIC | Age: 75
End: 2025-06-30

## 2025-06-30 DIAGNOSIS — Z98.890 OTHER SPECIFIED POSTPROCEDURAL STATES: Chronic | ICD-10-CM

## 2025-06-30 DIAGNOSIS — R22.9 LOCALIZED SWELLING, MASS AND LUMP, UNSPECIFIED: ICD-10-CM

## 2025-06-30 DIAGNOSIS — Z00.8 ENCOUNTER FOR OTHER GENERAL EXAMINATION: ICD-10-CM

## 2025-06-30 PROBLEM — R22.0 CHEEK SWELLING: Status: ACTIVE | Noted: 2025-06-30

## 2025-06-30 PROCEDURE — 31575 DIAGNOSTIC LARYNGOSCOPY: CPT

## 2025-06-30 PROCEDURE — 93970 EXTREMITY STUDY: CPT

## 2025-06-30 PROCEDURE — 93970 EXTREMITY STUDY: CPT | Mod: 26

## 2025-06-30 PROCEDURE — 99214 OFFICE O/P EST MOD 30 MIN: CPT | Mod: 25

## 2025-06-30 RX ORDER — DABRAFENIB 75 MG/1
75 CAPSULE ORAL
Refills: 0 | Status: ACTIVE | COMMUNITY

## 2025-07-01 DIAGNOSIS — R22.9 LOCALIZED SWELLING, MASS AND LUMP, UNSPECIFIED: ICD-10-CM

## 2025-07-03 ENCOUNTER — RESULT REVIEW (OUTPATIENT)
Age: 75
End: 2025-07-03

## 2025-07-03 ENCOUNTER — OUTPATIENT (OUTPATIENT)
Dept: OUTPATIENT SERVICES | Facility: HOSPITAL | Age: 75
LOS: 1 days | End: 2025-07-03
Payer: MEDICARE

## 2025-07-03 ENCOUNTER — APPOINTMENT (OUTPATIENT)
Dept: CV DIAGNOSITCS | Facility: HOSPITAL | Age: 75
End: 2025-07-03
Payer: MEDICARE

## 2025-07-03 DIAGNOSIS — C43.4 MALIGNANT MELANOMA OF SCALP AND NECK: ICD-10-CM

## 2025-07-03 DIAGNOSIS — Z98.890 OTHER SPECIFIED POSTPROCEDURAL STATES: Chronic | ICD-10-CM

## 2025-07-03 PROCEDURE — 93306 TTE W/DOPPLER COMPLETE: CPT

## 2025-07-03 PROCEDURE — 93306 TTE W/DOPPLER COMPLETE: CPT | Mod: 26

## 2025-07-04 DIAGNOSIS — C43.4 MALIGNANT MELANOMA OF SCALP AND NECK: ICD-10-CM

## 2025-07-09 ENCOUNTER — APPOINTMENT (OUTPATIENT)
Dept: GASTROENTEROLOGY | Facility: CLINIC | Age: 75
End: 2025-07-09

## 2025-07-09 VITALS
OXYGEN SATURATION: 97 % | HEART RATE: 82 BPM | BODY MASS INDEX: 20.11 KG/M2 | SYSTOLIC BLOOD PRESSURE: 96 MMHG | HEIGHT: 73.5 IN | DIASTOLIC BLOOD PRESSURE: 73 MMHG | WEIGHT: 155 LBS

## 2025-07-09 PROBLEM — Z51.81 ENCOUNTER FOR MONITORING OF SYSTEMIC STEROID THERAPY: Status: ACTIVE | Noted: 2025-07-09

## 2025-07-09 PROCEDURE — 91200 LIVER ELASTOGRAPHY: CPT

## 2025-07-09 PROCEDURE — 99215 OFFICE O/P EST HI 40 MIN: CPT | Mod: 25

## 2025-07-14 ENCOUNTER — TRANSCRIPTION ENCOUNTER (OUTPATIENT)
Age: 75
End: 2025-07-14

## 2025-07-16 ENCOUNTER — RESULT REVIEW (OUTPATIENT)
Age: 75
End: 2025-07-16

## 2025-07-16 ENCOUNTER — OUTPATIENT (OUTPATIENT)
Dept: OUTPATIENT SERVICES | Facility: HOSPITAL | Age: 75
LOS: 1 days | End: 2025-07-16
Payer: MEDICARE

## 2025-07-16 DIAGNOSIS — Z98.890 OTHER SPECIFIED POSTPROCEDURAL STATES: Chronic | ICD-10-CM

## 2025-07-16 DIAGNOSIS — Z00.8 ENCOUNTER FOR OTHER GENERAL EXAMINATION: ICD-10-CM

## 2025-07-16 DIAGNOSIS — Z13.820 ENCOUNTER FOR SCREENING FOR OSTEOPOROSIS: ICD-10-CM

## 2025-07-16 DIAGNOSIS — Z90.49 ACQUIRED ABSENCE OF OTHER SPECIFIED PARTS OF DIGESTIVE TRACT: Chronic | ICD-10-CM

## 2025-07-16 PROCEDURE — 77080 DXA BONE DENSITY AXIAL: CPT | Mod: 26

## 2025-07-16 PROCEDURE — 77080 DXA BONE DENSITY AXIAL: CPT

## 2025-07-17 DIAGNOSIS — Z13.820 ENCOUNTER FOR SCREENING FOR OSTEOPOROSIS: ICD-10-CM

## 2025-07-21 ENCOUNTER — APPOINTMENT (OUTPATIENT)
Dept: NEUROLOGY | Facility: CLINIC | Age: 75
End: 2025-07-21
Payer: MEDICARE

## 2025-07-21 VITALS
HEIGHT: 74 IN | BODY MASS INDEX: 20.28 KG/M2 | HEART RATE: 70 BPM | WEIGHT: 158 LBS | DIASTOLIC BLOOD PRESSURE: 80 MMHG | SYSTOLIC BLOOD PRESSURE: 126 MMHG | OXYGEN SATURATION: 98 %

## 2025-07-21 DIAGNOSIS — M81.0 AGE-RELATED OSTEOPOROSIS WITHOUT CURRENT PATHOLOGICAL FRACTURE: ICD-10-CM

## 2025-07-21 DIAGNOSIS — R26.89 OTHER ABNORMALITIES OF GAIT AND MOBILITY: ICD-10-CM

## 2025-07-21 PROCEDURE — G2211 COMPLEX E/M VISIT ADD ON: CPT

## 2025-07-21 PROCEDURE — 99205 OFFICE O/P NEW HI 60 MIN: CPT

## 2025-07-27 ENCOUNTER — OUTPATIENT (OUTPATIENT)
Dept: OUTPATIENT SERVICES | Facility: HOSPITAL | Age: 75
LOS: 1 days | End: 2025-07-27
Payer: MEDICARE

## 2025-07-27 DIAGNOSIS — Z00.8 ENCOUNTER FOR OTHER GENERAL EXAMINATION: ICD-10-CM

## 2025-07-27 DIAGNOSIS — C79.51 SECONDARY MALIGNANT NEOPLASM OF BONE: ICD-10-CM

## 2025-07-27 DIAGNOSIS — M54.2 CERVICALGIA: ICD-10-CM

## 2025-07-27 PROCEDURE — 72156 MRI NECK SPINE W/O & W/DYE: CPT | Mod: 26

## 2025-07-27 PROCEDURE — 72157 MRI CHEST SPINE W/O & W/DYE: CPT

## 2025-07-27 PROCEDURE — 72156 MRI NECK SPINE W/O & W/DYE: CPT

## 2025-07-27 PROCEDURE — A9579: CPT

## 2025-07-27 PROCEDURE — 72157 MRI CHEST SPINE W/O & W/DYE: CPT | Mod: 26

## 2025-07-28 ENCOUNTER — OUTPATIENT (OUTPATIENT)
Dept: OUTPATIENT SERVICES | Facility: HOSPITAL | Age: 75
LOS: 1 days | End: 2025-07-28
Payer: MEDICARE

## 2025-07-28 ENCOUNTER — RESULT REVIEW (OUTPATIENT)
Age: 75
End: 2025-07-28

## 2025-07-28 DIAGNOSIS — C79.51 SECONDARY MALIGNANT NEOPLASM OF BONE: ICD-10-CM

## 2025-07-28 DIAGNOSIS — Z00.8 ENCOUNTER FOR OTHER GENERAL EXAMINATION: ICD-10-CM

## 2025-07-28 DIAGNOSIS — M54.2 CERVICALGIA: ICD-10-CM

## 2025-07-28 DIAGNOSIS — Z98.890 OTHER SPECIFIED POSTPROCEDURAL STATES: Chronic | ICD-10-CM

## 2025-07-28 PROCEDURE — 72158 MRI LUMBAR SPINE W/O & W/DYE: CPT

## 2025-07-28 PROCEDURE — 72158 MRI LUMBAR SPINE W/O & W/DYE: CPT | Mod: 26

## 2025-07-28 PROCEDURE — A9579: CPT

## 2025-07-29 DIAGNOSIS — C79.51 SECONDARY MALIGNANT NEOPLASM OF BONE: ICD-10-CM

## 2025-07-30 ENCOUNTER — APPOINTMENT (OUTPATIENT)
Age: 75
End: 2025-07-30
Payer: MEDICARE

## 2025-07-30 VITALS
DIASTOLIC BLOOD PRESSURE: 73 MMHG | TEMPERATURE: 98.9 F | HEART RATE: 90 BPM | HEIGHT: 74 IN | SYSTOLIC BLOOD PRESSURE: 124 MMHG | WEIGHT: 163 LBS | OXYGEN SATURATION: 98 % | BODY MASS INDEX: 20.92 KG/M2

## 2025-07-30 DIAGNOSIS — D64.9 ANEMIA, UNSPECIFIED: ICD-10-CM

## 2025-07-30 DIAGNOSIS — T50.905A TOXIC LIVER DISEASE WITH HEPATITIS, NOT ELSEWHERE CLASSIFIED: ICD-10-CM

## 2025-07-30 DIAGNOSIS — R79.89 OTHER SPECIFIED ABNORMAL FINDINGS OF BLOOD CHEMISTRY: ICD-10-CM

## 2025-07-30 DIAGNOSIS — M81.0 AGE-RELATED OSTEOPOROSIS W/OUT CURRENT PATHOLOGICAL FRACTURE: ICD-10-CM

## 2025-07-30 DIAGNOSIS — Z51.81 ENCOUNTER FOR THERAPEUTIC DRUG LVL MONITORING: ICD-10-CM

## 2025-07-30 DIAGNOSIS — Z85.820 OTHER SPECIFIED POSTPROCEDURAL STATES: ICD-10-CM

## 2025-07-30 DIAGNOSIS — M54.2 CERVICALGIA: ICD-10-CM

## 2025-07-30 DIAGNOSIS — Z98.890 OTHER SPECIFIED POSTPROCEDURAL STATES: ICD-10-CM

## 2025-07-30 DIAGNOSIS — K71.6 TOXIC LIVER DISEASE WITH HEPATITIS, NOT ELSEWHERE CLASSIFIED: ICD-10-CM

## 2025-07-30 DIAGNOSIS — B17.9 ACUTE VIRAL HEPATITIS, UNSPECIFIED: ICD-10-CM

## 2025-07-30 LAB
ALBUMIN SERPL ELPH-MCNC: 3.9 G/DL
ALP BLD-CCNC: 141 U/L
ALT SERPL-CCNC: 16 U/L
ANION GAP SERPL CALC-SCNC: 8 MMOL/L
AST SERPL-CCNC: 50 U/L
AUTO BASOPHILS #: 0.02 K/UL
AUTO BASOPHILS %: 0.5 %
AUTO EOSINOPHILS #: 0.08 K/UL
AUTO EOSINOPHILS %: 2 %
AUTO IMMATURE GRANULOCYTES #: 0.01 K/UL
AUTO LYMPHOCYTES #: 0.82 K/UL
AUTO LYMPHOCYTES %: 20.2 %
AUTO MONOCYTES #: 0.59 K/UL
AUTO MONOCYTES %: 14.6 %
AUTO NEUTROPHILS #: 2.53 K/UL
AUTO NEUTROPHILS %: 62.5 %
AUTO NRBC #: 0 K/UL
BILIRUB SERPL-MCNC: 0.4 MG/DL
BUN SERPL-MCNC: 18 MG/DL
CALCIUM SERPL-MCNC: 9.3 MG/DL
CHLORIDE SERPL-SCNC: 102 MMOL/L
CO2 SERPL-SCNC: 28 MMOL/L
CREAT SERPL-MCNC: 0.9 MG/DL
EGFRCR SERPLBLD CKD-EPI 2021: 90 ML/MIN/1.73M2
GLUCOSE SERPL-MCNC: 109 MG/DL
HCT VFR BLD CALC: 34.1 %
HGB BLD-MCNC: 11.3 G/DL
IMM GRANULOCYTES NFR BLD AUTO: 0.2 %
MAN DIFF?: NORMAL
MCHC RBC-ENTMCNC: 30.6 PG
MCHC RBC-ENTMCNC: 33.1 G/DL
MCV RBC AUTO: 92.4 FL
PLATELET # BLD AUTO: 196 K/UL
PMV BLD AUTO: 0 /100 WBCS
PMV BLD: 9.3 FL
POTASSIUM SERPL-SCNC: 4.5 MMOL/L
PROT SERPL-MCNC: 7 G/DL
RBC # BLD: 3.69 M/UL
RBC # FLD: 16.6 %
SODIUM SERPL-SCNC: 138 MMOL/L
WBC # FLD AUTO: 4.05 K/UL

## 2025-07-30 PROCEDURE — G2211 COMPLEX E/M VISIT ADD ON: CPT

## 2025-07-30 PROCEDURE — 99205 OFFICE O/P NEW HI 60 MIN: CPT

## 2025-08-01 ENCOUNTER — APPOINTMENT (OUTPATIENT)
Dept: OTOLARYNGOLOGY | Facility: CLINIC | Age: 75
End: 2025-08-01
Payer: MEDICARE

## 2025-08-01 VITALS — WEIGHT: 163 LBS | BODY MASS INDEX: 20.92 KG/M2 | HEIGHT: 74 IN

## 2025-08-01 DIAGNOSIS — C43.4 MALIGNANT MELANOMA OF SCALP AND NECK: ICD-10-CM

## 2025-08-01 DIAGNOSIS — C77.9 SECONDARY AND UNSPECIFIED MALIGNANT NEOPLASM OF LYMPH NODE, UNSPECIFIED: ICD-10-CM

## 2025-08-01 DIAGNOSIS — R59.0 LOCALIZED ENLARGED LYMPH NODES: ICD-10-CM

## 2025-08-01 PROCEDURE — 31575 DIAGNOSTIC LARYNGOSCOPY: CPT

## 2025-08-01 PROCEDURE — 99214 OFFICE O/P EST MOD 30 MIN: CPT | Mod: 25

## 2025-08-04 ENCOUNTER — APPOINTMENT (OUTPATIENT)
Dept: GASTROENTEROLOGY | Facility: CLINIC | Age: 75
End: 2025-08-04

## 2025-08-04 ENCOUNTER — NON-APPOINTMENT (OUTPATIENT)
Age: 75
End: 2025-08-04

## 2025-08-04 DIAGNOSIS — C21.8 MALIGNANT NEOPLASM OF OVERLAPPING SITES OF RECTUM, ANUS AND ANAL CANAL: ICD-10-CM

## 2025-08-04 DIAGNOSIS — R19.7 DIARRHEA, UNSPECIFIED: ICD-10-CM

## 2025-08-04 DIAGNOSIS — R14.3 FLATULENCE: ICD-10-CM

## 2025-08-04 PROCEDURE — 99214 OFFICE O/P EST MOD 30 MIN: CPT

## 2025-08-06 ENCOUNTER — APPOINTMENT (OUTPATIENT)
Facility: CLINIC | Age: 75
End: 2025-08-06
Payer: MEDICARE

## 2025-08-06 ENCOUNTER — APPOINTMENT (OUTPATIENT)
Dept: NEUROSURGERY | Facility: CLINIC | Age: 75
End: 2025-08-06
Payer: MEDICARE

## 2025-08-06 ENCOUNTER — NON-APPOINTMENT (OUTPATIENT)
Age: 75
End: 2025-08-06

## 2025-08-06 VITALS — WEIGHT: 165 LBS | HEIGHT: 74 IN | BODY MASS INDEX: 21.17 KG/M2

## 2025-08-06 PROCEDURE — 92202 OPSCPY EXTND ON/MAC DRAW: CPT

## 2025-08-06 PROCEDURE — 99214 OFFICE O/P EST MOD 30 MIN: CPT

## 2025-08-06 PROCEDURE — 92134 CPTRZ OPH DX IMG PST SGM RTA: CPT

## 2025-08-06 PROCEDURE — 99213 OFFICE O/P EST LOW 20 MIN: CPT

## 2025-08-11 ENCOUNTER — APPOINTMENT (OUTPATIENT)
Age: 75
End: 2025-08-11

## 2025-08-11 LAB
ALBUMIN SERPL ELPH-MCNC: 4 G/DL
ALP BLD-CCNC: 160 U/L
ALT SERPL-CCNC: 5 U/L
ANION GAP SERPL CALC-SCNC: 11 MMOL/L
AST SERPL-CCNC: 45 U/L
AUTO BASOPHILS #: 0.02 K/UL
AUTO BASOPHILS %: 0.5 %
AUTO EOSINOPHILS #: 0.19 K/UL
AUTO EOSINOPHILS %: 4.4 %
AUTO IMMATURE GRANULOCYTES #: 0 K/UL
AUTO LYMPHOCYTES #: 1.1 K/UL
AUTO LYMPHOCYTES %: 25.5 %
AUTO MONOCYTES #: 0.51 K/UL
AUTO MONOCYTES %: 11.8 %
AUTO NEUTROPHILS #: 2.49 K/UL
AUTO NEUTROPHILS %: 57.8 %
AUTO NRBC #: 0 K/UL
BILIRUB SERPL-MCNC: 0.5 MG/DL
BUN SERPL-MCNC: 15 MG/DL
CALCIUM SERPL-MCNC: 9.4 MG/DL
CHLORIDE SERPL-SCNC: 104 MMOL/L
CO2 SERPL-SCNC: 25 MMOL/L
CREAT SERPL-MCNC: 0.8 MG/DL
EGFRCR SERPLBLD CKD-EPI 2021: 93 ML/MIN/1.73M2
GLUCOSE SERPL-MCNC: 97 MG/DL
HCT VFR BLD CALC: 34.8 %
HGB BLD-MCNC: 11.5 G/DL
IMM GRANULOCYTES NFR BLD AUTO: 0 %
MAN DIFF?: NORMAL
MCHC RBC-ENTMCNC: 30.5 PG
MCHC RBC-ENTMCNC: 33 G/DL
MCV RBC AUTO: 92.3 FL
PLATELET # BLD AUTO: 223 K/UL
PMV BLD AUTO: 0 /100 WBCS
PMV BLD: 9.3 FL
POTASSIUM SERPL-SCNC: 4.8 MMOL/L
PROT SERPL-MCNC: 6.8 G/DL
RBC # BLD: 3.77 M/UL
RBC # FLD: 15.9 %
SODIUM SERPL-SCNC: 140 MMOL/L
WBC # FLD AUTO: 4.31 K/UL

## 2025-08-11 RX ORDER — CARBIDOPA AND LEVODOPA 25; 100 MG/1; MG/1
25-100 TABLET ORAL 3 TIMES DAILY
Refills: 0 | Status: ACTIVE | COMMUNITY
Start: 2025-08-11

## 2025-08-14 ENCOUNTER — APPOINTMENT (OUTPATIENT)
Dept: RADIATION ONCOLOGY | Facility: HOSPITAL | Age: 75
End: 2025-08-14
Payer: MEDICARE

## 2025-08-14 VITALS
HEART RATE: 86 BPM | SYSTOLIC BLOOD PRESSURE: 102 MMHG | OXYGEN SATURATION: 98 % | BODY MASS INDEX: 20.82 KG/M2 | RESPIRATION RATE: 16 BRPM | WEIGHT: 162.19 LBS | TEMPERATURE: 97.9 F | DIASTOLIC BLOOD PRESSURE: 69 MMHG

## 2025-08-14 DIAGNOSIS — C79.51 SECONDARY MALIGNANT NEOPLASM OF BONE: ICD-10-CM

## 2025-08-14 PROCEDURE — 99213 OFFICE O/P EST LOW 20 MIN: CPT

## 2025-08-18 ENCOUNTER — OUTPATIENT (OUTPATIENT)
Dept: OUTPATIENT SERVICES | Facility: HOSPITAL | Age: 75
LOS: 1 days | End: 2025-08-18
Payer: MEDICARE

## 2025-08-18 DIAGNOSIS — Z98.890 OTHER SPECIFIED POSTPROCEDURAL STATES: Chronic | ICD-10-CM

## 2025-08-18 DIAGNOSIS — C79.51 SECONDARY MALIGNANT NEOPLASM OF BONE: ICD-10-CM

## 2025-08-18 PROCEDURE — 72146 MRI CHEST SPINE W/O DYE: CPT | Mod: 26

## 2025-08-18 PROCEDURE — 72146 MRI CHEST SPINE W/O DYE: CPT

## 2025-08-19 DIAGNOSIS — C79.51 SECONDARY MALIGNANT NEOPLASM OF BONE: ICD-10-CM

## 2025-08-19 PROBLEM — A04.72 C. DIFFICILE DIARRHEA: Status: ACTIVE | Noted: 2025-08-19

## 2025-08-19 RX ORDER — FIDAXOMICIN 200 MG/1
200 TABLET, FILM COATED ORAL TWICE DAILY
Qty: 20 | Refills: 0 | Status: ACTIVE | COMMUNITY
Start: 2025-08-19 | End: 1900-01-01

## 2025-08-26 ENCOUNTER — APPOINTMENT (OUTPATIENT)
Age: 75
End: 2025-08-26
Payer: MEDICARE

## 2025-08-26 VITALS
SYSTOLIC BLOOD PRESSURE: 124 MMHG | DIASTOLIC BLOOD PRESSURE: 72 MMHG | OXYGEN SATURATION: 98 % | TEMPERATURE: 97.8 F | HEIGHT: 74 IN | HEART RATE: 73 BPM | WEIGHT: 163 LBS | RESPIRATION RATE: 16 BRPM | BODY MASS INDEX: 20.92 KG/M2

## 2025-08-26 DIAGNOSIS — Z51.81 ENCOUNTER FOR THERAPEUTIC DRUG LVL MONITORING: ICD-10-CM

## 2025-08-26 DIAGNOSIS — C79.51 SECONDARY MALIGNANT NEOPLASM OF BONE: ICD-10-CM

## 2025-08-26 DIAGNOSIS — C21.8 MALIGNANT NEOPLASM OF OVERLAPPING SITES OF RECTUM, ANUS AND ANAL CANAL: ICD-10-CM

## 2025-08-26 DIAGNOSIS — A04.72 ENTEROCOLITIS DUE TO CLOSTRIDIUM DIFFICILE, NOT SPECIFIED AS RECURRENT: ICD-10-CM

## 2025-08-26 DIAGNOSIS — Z98.890 OTHER SPECIFIED POSTPROCEDURAL STATES: ICD-10-CM

## 2025-08-26 DIAGNOSIS — M54.2 CERVICALGIA: ICD-10-CM

## 2025-08-26 DIAGNOSIS — R26.89 OTHER ABNORMALITIES OF GAIT AND MOBILITY: ICD-10-CM

## 2025-08-26 DIAGNOSIS — Z85.820 OTHER SPECIFIED POSTPROCEDURAL STATES: ICD-10-CM

## 2025-08-26 DIAGNOSIS — Z92.3 PERSONAL HISTORY OF IRRADIATION: ICD-10-CM

## 2025-08-26 LAB
ALBUMIN SERPL ELPH-MCNC: 4.1 G/DL
ALP BLD-CCNC: 183 U/L
ALT SERPL-CCNC: <5 U/L
ANION GAP SERPL CALC-SCNC: 11 MMOL/L
AST SERPL-CCNC: 48 U/L
AUTO BASOPHILS #: 0.02 K/UL
AUTO BASOPHILS %: 0.5 %
AUTO EOSINOPHILS #: 0.11 K/UL
AUTO EOSINOPHILS %: 2.9 %
AUTO IMMATURE GRANULOCYTES #: 0.01 K/UL
AUTO LYMPHOCYTES #: 1.36 K/UL
AUTO LYMPHOCYTES %: 35.3 %
AUTO MONOCYTES #: 0.56 K/UL
AUTO MONOCYTES %: 14.5 %
AUTO NEUTROPHILS #: 1.79 K/UL
AUTO NEUTROPHILS %: 46.5 %
AUTO NRBC #: 0 K/UL
BILIRUB SERPL-MCNC: 0.4 MG/DL
BUN SERPL-MCNC: 16 MG/DL
CALCIUM SERPL-MCNC: 9.4 MG/DL
CHLORIDE SERPL-SCNC: 104 MMOL/L
CO2 SERPL-SCNC: 24 MMOL/L
CREAT SERPL-MCNC: 0.9 MG/DL
EGFRCR SERPLBLD CKD-EPI 2021: 90 ML/MIN/1.73M2
GLUCOSE SERPL-MCNC: 97 MG/DL
HCT VFR BLD CALC: 33.7 %
HGB BLD-MCNC: 11.5 G/DL
IMM GRANULOCYTES NFR BLD AUTO: 0.3 %
MAN DIFF?: NORMAL
MCHC RBC-ENTMCNC: 30.9 PG
MCHC RBC-ENTMCNC: 34.1 G/DL
MCV RBC AUTO: 90.6 FL
PLATELET # BLD AUTO: 201 K/UL
PMV BLD AUTO: 0 /100 WBCS
PMV BLD: 9.4 FL
POTASSIUM SERPL-SCNC: 4.4 MMOL/L
PROT SERPL-MCNC: 7.2 G/DL
RBC # BLD: 3.72 M/UL
RBC # FLD: 14.7 %
SODIUM SERPL-SCNC: 139 MMOL/L
WBC # FLD AUTO: 3.85 K/UL

## 2025-08-26 PROCEDURE — 99215 OFFICE O/P EST HI 40 MIN: CPT

## 2025-08-26 PROCEDURE — G2211 COMPLEX E/M VISIT ADD ON: CPT

## 2025-09-04 ENCOUNTER — OUTPATIENT (OUTPATIENT)
Dept: OUTPATIENT SERVICES | Facility: HOSPITAL | Age: 75
LOS: 1 days | End: 2025-09-04
Payer: MEDICARE

## 2025-09-04 ENCOUNTER — RESULT REVIEW (OUTPATIENT)
Age: 75
End: 2025-09-04

## 2025-09-04 DIAGNOSIS — C79.51 SECONDARY MALIGNANT NEOPLASM OF BONE: ICD-10-CM

## 2025-09-04 DIAGNOSIS — Z98.890 OTHER SPECIFIED POSTPROCEDURAL STATES: Chronic | ICD-10-CM

## 2025-09-04 LAB — GLUCOSE BLDC GLUCOMTR-MCNC: 96 MG/DL — SIGNIFICANT CHANGE UP (ref 70–99)

## 2025-09-04 PROCEDURE — 78816 PET IMAGE W/CT FULL BODY: CPT | Mod: 26,PS

## 2025-09-04 PROCEDURE — 78816 PET IMAGE W/CT FULL BODY: CPT | Mod: PS

## 2025-09-04 PROCEDURE — A9552: CPT

## 2025-09-04 PROCEDURE — 82962 GLUCOSE BLOOD TEST: CPT

## 2025-09-05 DIAGNOSIS — C79.51 SECONDARY MALIGNANT NEOPLASM OF BONE: ICD-10-CM

## 2025-09-05 RX ORDER — VANCOMYCIN HYDROCHLORIDE 125 MG/1
125 CAPSULE ORAL
Qty: 40 | Refills: 0 | Status: ACTIVE | COMMUNITY
Start: 2025-09-05 | End: 1900-01-01

## 2025-09-15 ENCOUNTER — RESULT REVIEW (OUTPATIENT)
Age: 75
End: 2025-09-15

## 2025-09-18 ENCOUNTER — NON-APPOINTMENT (OUTPATIENT)
Age: 75
End: 2025-09-18

## 2025-09-19 ENCOUNTER — TRANSCRIPTION ENCOUNTER (OUTPATIENT)
Age: 75
End: 2025-09-19